# Patient Record
Sex: FEMALE | Race: BLACK OR AFRICAN AMERICAN | NOT HISPANIC OR LATINO | Employment: UNEMPLOYED | URBAN - METROPOLITAN AREA
[De-identification: names, ages, dates, MRNs, and addresses within clinical notes are randomized per-mention and may not be internally consistent; named-entity substitution may affect disease eponyms.]

---

## 2017-01-05 ENCOUNTER — PRE VISIT (OUTPATIENT)
Dept: UROLOGY | Facility: CLINIC | Age: 32
End: 2017-01-05

## 2017-01-05 NOTE — TELEPHONE ENCOUNTER
Patient coming in for neurogenic bladder follow up. Chart reviewed and all records are available. No need for a call.

## 2017-01-09 ENCOUNTER — ALLIED HEALTH/NURSE VISIT (OUTPATIENT)
Dept: UROLOGY | Facility: CLINIC | Age: 32
End: 2017-01-09

## 2017-01-09 ENCOUNTER — OFFICE VISIT (OUTPATIENT)
Dept: UROLOGY | Facility: CLINIC | Age: 32
End: 2017-01-09

## 2017-01-09 VITALS
DIASTOLIC BLOOD PRESSURE: 78 MMHG | BODY MASS INDEX: 26.05 KG/M2 | WEIGHT: 147 LBS | HEART RATE: 111 BPM | HEIGHT: 63 IN | SYSTOLIC BLOOD PRESSURE: 119 MMHG

## 2017-01-09 DIAGNOSIS — N31.9 NEUROGENIC BLADDER: Primary | ICD-10-CM

## 2017-01-09 RX ORDER — CIPROFLOXACIN 250 MG/1
250 TABLET, FILM COATED ORAL EVERY 12 HOURS SCHEDULED
Status: DISCONTINUED | OUTPATIENT
Start: 2017-01-09 | End: 2017-01-09 | Stop reason: HOSPADM

## 2017-01-09 ASSESSMENT — PAIN SCALES - GENERAL: PAINLEVEL: NO PAIN (0)

## 2017-01-09 NOTE — NURSING NOTE
"Chief Complaint   Patient presents with     RECHECK     Nurogenic bladder follow up       Blood pressure 119/78, pulse 111, height 1.6 m (5' 3\"), weight 66.679 kg (147 lb), last menstrual period 12/10/2016, not currently breastfeeding. Body mass index is 26.05 kg/(m^2).    Patient Active Problem List   Diagnosis     Spinal dysraphism (H)     Neurogenic bladder     Renal cyst       Allergies   Allergen Reactions     Naproxen Hives and Nausea and Vomiting     Bactrim [Sulfamethoxazole W/Trimethoprim] Itching     Gabapentin Itching and Nausea     Vicodin [Hydrocodone-Acetaminophen] Itching       Current Outpatient Prescriptions   Medication Sig Dispense Refill     mirabegron (MYRBETRIQ) 25 MG 24 hr tablet Take 1 tablet (25 mg) by mouth daily 30 tablet 6     polyethylene glycol (MIRALAX) powder Take 17 g by mouth daily 510 g 6     solifenacin (VESICARE) 10 MG tablet Take 1 tablet (10 mg) by mouth daily 90 tablet 3     cefixime (SUPRAX) 400 MG tablet Take 1 tablet by mouth daily. 7 tablet 0     ranitidine (ZANTAC) 150 MG tablet Take 1 tablet by mouth 2 times daily for 10 days. 20 tablet 0     sennosides (SENOKOT) 8.6 MG tablet Take 1 tablet by mouth 2 times daily as needed for constipation. 180 tablet 1     sodium chloride (OCEAN) 0.65 % nasal spray Spray 1 spray in nostril every hour as needed for congestion. 1 Bottle 0       Social History   Substance Use Topics     Smoking status: Never Smoker      Smokeless tobacco: Never Used     Alcohol Use: No       YANCI Osborn  1/9/2017  1:11 PM       "

## 2017-01-09 NOTE — PROGRESS NOTES
Pre Op Teaching Flowsheet       Pre and Post op Patient Education  Relevant Diagnosis:  Neurogenic bladder  Teaching Topic:  Pre and post op teaching for cystoscopy, botox injection into bladder  Person Involved in teaching:  Minh Altamirano      Motivation Level:  Asks Questions: Yes  Eager to Learn:  Yes  Cooperative: Yes  Receptive (willing/able to accept information):  Yes  Patient demonstrates understanding of the following:  Date and time of surgery:  2.24.17 at 0730  Location of surgery: [facility]- 5th Floor  History and Physical and any other testing necessary prior to surgery: Yes  Required time line for completion of History and Physical and any pre-op testing: Yes    NPO Guidelines: NPO per Anesthesia Guidelines    Patient demonstrates understanding of the following:  Pre-op bowel prep: not needed  Pre-op showering/scrub information with Hibiclens Soap: Yes  Medications to take the day of surgery:  Per PCP  Blood thinner medications discussed and when to stop (if applicable):  Yes  Diabetes medication management (if applicable):  N/A  Discussed pain control after surgery: pain scale, pain medications and pain management techniques  Infection Prevention: Patient demonstrates understanding of the following:  Patient instructed on hand hygiene:  Yes  Surgical procedure site care taught: N/A  Signs and symptoms of infection taught:  Yes  Wound care will be taught at the time of discharge.  Central venous catheter care will be taught at the time of discharge (if applicable).    Post-op follow-up:  Discussed how to contact the hospital, nurse, and clinic scheduling staff if necessary.    Instructional materials used/given/mailed:  [facility] Booklet, post op teaching sheet, Map, Soap, and arrival/location information.    Surgical instructions given to patient in clinic: Yes.    Instructional Materials given:  Before your surgery packet , Medications to avoid before  surgery , Showering or Bathing instructions before surgery  and What to expect after surgery    Post-op appointment/testing scheduled per MD orders: No    Total time with patient: 10 minutes    Daly Garcia, RN-BC, BSN  Urology Care Coordinator

## 2017-01-09 NOTE — PROGRESS NOTES
Follow-up Visit for Neurogenic Bladder    Name: Minh Altamirano    MRN: [id number]   YOB: 1985  Accompanied at today's visit by:self                 Chief Complaint:   Neurogenic Bladder          History of Present Illness:   HISTORY: Minh Altamirano is a 32 year old female with a history of neurogenic bladder secondary to Sacral agenesis. Patient is not wheelchair bound. Last visit with us was August 2016.     Previous Bladder Surgeries:  Previous Bladder Augmentation: colon in 2012. Revisions include: none   Catheterizable stoma:appendiceal Mitrofanoff in 2013. Revisions include: takedown due to dehiscence.   Anti-incontinence procedures: bladder neck sling (autologous fascia) in 2012  Botox injections: 8/2016. - states that leakage is less now.    Pertinent Medications:  Current Anticholinergics: myrbetriq 25mg. vesicare 10mg  Current Prophylactic antibiotics: None  Intravesical gentamycin:  none  Intravesical oxybutinin: None    Catheterization History:  The patient catheterizes per native urethra with a 12F straight catheter q4 hours. Catheterization is performed by  self. The patient uses a new catheter each time. She does irrigate the bladder. Irrigation is performed 1 times per week using water.     Incontinence History:  She does leak between voids/caths. She does experience urgency of urination. She does not experience stress urinary incontinence.    Urinary Tract Infection History:  Treated with antibiotics for positive culture and non-specific symptoms: 0 times in the last year  Treated with antibiotics for positive culture plus symptoms of UTI:  0 times in the last year    Bowel Movement History:  The patient has a bowel movement q5-7 days. Bowel regimen includes Miralax         Past Medical History:     Past Medical History   Diagnosis Date     neurogenic bladder      Constipation, chronic      Spinal dysraphism (H)      Lipoma of spinal cord      Anemia      Migraine      Incontinence of  urine      Chronic infection      MRSA            Past Surgical History:     Past Surgical History   Procedure Laterality Date     Laminectomy lumbar two levels       Tumor resection and cord detethering       Mitrofanoff procedure (appendix conduit)  1/5/2012     Procedure:MITROFANOFF PROCEDURE (APPENDIX CONDUIT); Bladder Augmentation with Right Colon, Appendix Conduit- Mitrofanoff, Insertion of Pubovaginal Sling using Autologous Rectus Fascia; Surgeon:TRINA MOORE; [facility]     Bladder augmentation  1/5/2012     Procedure:BLADDER AUGMENTATION; Surgeon:TRINA MOORE; [facility]     Laparotomy exploratory  1/11/2012     Procedure:LAPAROTOMY EXPLORATORY; Exploratory Laparotomy with Takedown of Mitrofanoff, Ventral Hernia Repair with Strattice Mesh implantation ; Surgeon:TRINA MOORE; [facility]     Cystoscopy, intravesical injection N/A 8/19/2016     Procedure: CYSTOSCOPY, INTRAVESICAL INJECTION;  Surgeon: Trina Moore MD;  [facility]            Allergies:     Allergies   Allergen Reactions     Naproxen Hives and Nausea and Vomiting     Bactrim [Sulfamethoxazole W/Trimethoprim] Itching     Gabapentin Itching and Nausea     Vicodin [Hydrocodone-Acetaminophen] Itching            Medications:     Current Outpatient Prescriptions   Medication Sig     mirabegron (MYRBETRIQ) 25 MG 24 hr tablet Take 1 tablet (25 mg) by mouth daily     polyethylene glycol (MIRALAX) powder Take 17 g by mouth daily     solifenacin (VESICARE) 10 MG tablet Take 1 tablet (10 mg) by mouth daily     cefixime (SUPRAX) 400 MG tablet Take 1 tablet by mouth daily.     ranitidine (ZANTAC) 150 MG tablet Take 1 tablet by mouth 2 times daily for 10 days.     sennosides (SENOKOT) 8.6 MG tablet Take 1 tablet by mouth 2 times daily as needed for constipation.     sodium chloride (OCEAN) 0.65 % nasal spray Spray 1 spray in nostril every hour as needed for congestion.     No current facility-administered  "medications for this visit.             Review of Systems:    ROS: 10 point ROS neg other than the symptoms noted above in the HPI and PMH.          Physical Exam:   B/P: 119/78, T: Data Unavailable, P: 111, R: Data Unavailable  Estimated body mass index is 26.05 kg/(m^2) as calculated from the following:    Height as of this encounter: 1.6 m (5' 3\").    Weight as of this encounter: 66.679 kg (147 lb).  General: age-appropriate appearing female in NAD sitting in an exam chair.    Abdomen: Degree of obesity is mild. Abdomen is soft and nontender. No organomegaly. Surgical scars include midline.  Gyn: Bartholin's cyst on right, non tender, pea sized or smaller.   Motor: Normal in both upper and lower limbs          Data:    Imaging:  Renal/Bladder Ultrasound (Date = 8//2016):       Right hydronephrosis: no hydronephrosis, but atrophi       Left hydronephrosis: none       Kidney stones:None  Bladder:        Labs:  Creatinine (Date = 8/1/2016): 0.83  Vitamin B12: 8/1/2016 558           Assessment and Plan:   32 year old female with neurogenic bladder secondary to sacral agenesis.    - Will schedule for repeat Botox for urinary incontinence without anesthesia    Follow-Up: in OR.       Emy Bay MD  January 9, 2017           =======================================================  As the attending surgeon I, Fday Moore, interviewed and examined the patient. The plan was developed between me and the patient. My findings and plan are as stated by the resident.    Fady Moore MD      "

## 2017-01-09 NOTE — Clinical Note
1/9/2017       RE: Minh Altamirano  [address] [facility]  [address]  [facility] [address]               Dear Colleague,    Thank you for referring your patient, Minh Altamirano, to the [facility] UROLOG[facility] AND INST FOR PROSTATE AND UROLOGIC CANCERS at [facility]. Please see a copy of my visit note below.    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 Patient is not wheelchair bound. Last visit with us was August 2016.     Previous Bladder Surgeries:  Previous Bladder Augmentation: colon in 2012. Revisions include: none   Catheterizable stoma:appendiceal Mitrofanoff in 2013. Revisions include: takedown due to dehiscence.   Anti-incontinence procedures: bladder neck sling (autologous fascia) in 2012  Botox injections: 8/2016. - states that leakage is less now.    Pertinent Medications:  Current Anticholinergics: myrbetriq 25mg. vesicare 10mg  Current Prophylactic antibiotics: None  Intravesical gentamycin:  none  Intravesical oxybutinin: None    Catheterization History:  The patient catheterizes per native urethra with a 12F straight catheter q4 hours. Catheterization is performed by  self. The patient uses a new catheter each time. She does irrigate the bladder. Irrigation is performed 1 times per week using water.     Incontinence History:  She does leak between voids/caths. She does experience urgency of urination. She does not experience stress urinary incontinence.    Urinary Tract Infection History:  Treated with antibiotics for positive culture and non-specific symptoms: 0 times in the last year  Treated with antibiotics for positive culture plus symptoms of UTI:  0 times  in the last year    Bowel Movement History:  The patient has a bowel movement q5-7 days. Bowel regimen includes Miralax         Past Medical History:     Past Medical History   Diagnosis Date     neurogenic bladder      Constipation, chronic      Spinal dysraphism (H)      Lipoma of spinal cord      Anemia      Migraine      Incontinence of urine      Chronic infection      MRSA            Past Surgical History:     Past Surgical History   Procedure Laterality Date     Laminectomy lumbar two levels       Tumor resection and cord detethering       Mitrofanoff procedure (appendix conduit)  1/5/2012     Procedure:MITROFANOFF PROCEDURE (APPENDIX CONDUIT); Bladder Augmentation with Right Colon, Appendix Conduit- Mitrofanoff, Insertion of Pubovaginal Sling using Autologous Rectus Fascia; Surgeon:TRINA MOORE; Location[facility]     Bladder augmentation  1/5/2012     Procedure:BLADDER AUGMENTATION; Surgeon:TRINA MOORE; Location[facility]     Laparotomy exploratory  1/11/2012     Procedure:LAPAROTOMY EXPLORATORY; Exploratory Laparotomy with Takedown of Mitrofanoff, Ventral Hernia Repair with Strattice Mesh implantation ; Surgeon:TRINA MOORE; [facility]     Cystoscopy, intravesical injection N/A 8/19/2016     Procedure: CYSTOSCOPY, INTRAVESICAL INJECTION;  Surgeon: Trina Moore MD;  [facility]            Allergies:     Allergies   Allergen Reactions     Naproxen Hives and Nausea and Vomiting     Bactrim [Sulfamethoxazole W/Trimethoprim] Itching     Gabapentin Itching and Nausea     Vicodin [Hydrocodone-Acetaminophen] Itching            Medications:     Current Outpatient Prescriptions   Medication Sig     mirabegron (MYRBETRIQ) 25 MG 24 hr tablet Take 1 tablet (25 mg) by mouth daily     polyethylene glycol (MIRALAX) powder Take 17 g by mouth daily     solifenacin (VESICARE) 10 MG tablet Take 1 tablet (10 mg) by mouth daily     cefixime (SUPRAX) 400 MG tablet Take 1 tablet by mouth  "daily.     ranitidine (ZANTAC) 150 MG tablet Take 1 tablet by mouth 2 times daily for 10 days.     sennosides (SENOKOT) 8.6 MG tablet Take 1 tablet by mouth 2 times daily as needed for constipation.     sodium chloride (OCEAN) 0.65 % nasal spray Spray 1 spray in nostril every hour as needed for congestion.     No current facility-administered medications for this visit.             Review of Systems:    ROS: 10 point ROS neg other than the symptoms noted above in the HPI and PMH.          Physical Exam:   B/P: 119/78, T: Data Unavailable, P: 111, R: Data Unavailable  Estimated body mass index is 26.05 kg/(m^2) as calculated from the following:    Height as of this encounter: 1.6 m (5' 3\").    Weight as of this encounter: 66.679 kg (147 lb).  General: age-appropriate appearing female in NAD sitting in an exam chair.    Abdomen: Degree of obesity is mild. Abdomen is soft and nontender. No organomegaly. Surgical scars include midline.  Gyn: Bartholin's cyst on right, non tender, pea sized or smaller.   Motor: Normal in both upper and lower limbs          Data:    Imaging:  Renal/Bladder Ultrasound (Date = 8//2016):       Right hydronephrosis: no hydronephrosis, but atrophi       Left hydronephrosis: none       Kidney stones:None  Bladder:        Labs:  Creatinine (Date = 8/1/2016): 0.83  Vitamin B12: 8/1/2016 558           Assessment and Plan:   32 year old female with neurogenic bladder secondary to sacral agenesis.    - Will schedule for repeat Botox for urinary incontinence without anesthesia    Follow-Up: in OR.       Emy Bay MD  January 9, 2017           =======================================================  As the attending surgeon I, Fady Moore, interviewed and examined the patient. The plan was developed between me and the patient. My findings and plan are as stated by the resident.    Fady Moore MD            "

## 2017-06-16 ENCOUNTER — TELEPHONE (OUTPATIENT)
Dept: UROLOGY | Facility: CLINIC | Age: 32
End: 2017-06-16

## 2017-06-16 NOTE — TELEPHONE ENCOUNTER
[employer]    UROLOGICAL ORDER FORM      Patient Information:    Customer's Name: Minh Altamirano  Date of Birth: [birth year]  Address: [address] [place] [address]  City / State / Zip: [facility]/[address]  Phone #: [phone]  Diagnosis: NEUROGENIC BLADDER    Product Needed:    12 [place]ian intermittent straight catheters  Instruction: Please catheterize 6-7 per day  QTY: 180 per month  Length of need: 99      ORDERING Physician/PA/NP        Dr. Fady Moore  DATE: 06/16/2017    [facility]  [address] [place] [address] [id number]  [place], [address]      FAX to [employer]  [address] [place] [address], [address]  Phone: [phone]  Fax: [phone]

## 2017-07-06 ENCOUNTER — PRE VISIT (OUTPATIENT)
Dept: UROLOGY | Facility: CLINIC | Age: 32
End: 2017-07-06

## 2017-07-06 NOTE — TELEPHONE ENCOUNTER
Patient with history of NGB coming in for catheter review. Patient chart reviewed, no need for call, all records available and ready for appointment.

## 2017-07-17 ENCOUNTER — OFFICE VISIT (OUTPATIENT)
Dept: UROLOGY | Facility: CLINIC | Age: 32
End: 2017-07-17

## 2017-07-17 VITALS
HEIGHT: 63 IN | BODY MASS INDEX: 25.6 KG/M2 | HEART RATE: 97 BPM | DIASTOLIC BLOOD PRESSURE: 85 MMHG | WEIGHT: 144.5 LBS | SYSTOLIC BLOOD PRESSURE: 125 MMHG

## 2017-07-17 DIAGNOSIS — N31.9 NEUROGENIC BLADDER: Primary | ICD-10-CM

## 2017-07-17 RX ORDER — TOLTERODINE 4 MG/1
4 CAPSULE, EXTENDED RELEASE ORAL DAILY
Qty: 90 CAPSULE | Refills: 3 | Status: SHIPPED | OUTPATIENT
Start: 2017-07-17 | End: 2019-07-12

## 2017-07-17 ASSESSMENT — PAIN SCALES - GENERAL: PAINLEVEL: NO PAIN (0)

## 2017-07-17 NOTE — PROGRESS NOTES
Follow-up Visit for Neurogenic Bladder    Name: Minh Altamirano    MRN: [id number]   YOB: 1985  Accompanied at today's visit by:self                 Chief Complaint:   Neurogenic Bladder          History of Present Illness:   HISTORY: Minh Altamirano is a 32 year old female with a history of neurogenic bladder secondary to sacral agenesis. Patient is not wheelchair bound. Last visit with us was January 2017. She is here for routine NGB f/u w/o any specific changes or complaints.     Previous Bladder Surgeries:  Previous Bladder Augmentation: colon in 2012. Revisions include: none   Catheterizable stoma:appendiceal Mitrofanoff in 2012. Revisions include: takedown due to dehiscence.   Anti-incontinence procedures: bladder neck sling (autologous fascia) in 2012  Botox injections: 8/2016.     Pertinent Medications:  Current Anticholinergics: None  Current Prophylactic antibiotics: None  Intravesical gentamycin:  None  Intravesical oxybutinin: None    Catheterization History:  The patient catheterizes per native urethra with a 12F straight catheter q3 hours. Catheterization is performed by  self. The patient uses a new catheter each time. She does not irrigate the bladder. NOTE - She used to receive 180 cath/month and irrigation supplies. Now she only receives 30/month and no irrigation supplies. Dr Moore on 06/16/17 wrote her a new order for 180/month.     Incontinence History:  She does leak between voids/caths. She does experience urgency of urination. She does not experience stress urinary incontinence with the following activities: coughing/sneezing. She wears pads and uses 1-2/day.    If patient has an AUS or Sling then:  Leak management: NA  Sphincter Complications: NA      Urinary Tract Infection History:  Treated with antibiotics for positive culture and non-specific symptoms: 1 times in the last year  Treated with antibiotics for positive culture plus symptoms of UTI: 1 times in the last  year    Bowel Movement History:  The patient has a bowel movement q2 days. Bowel regimen includes Miralax and senna          Past Medical History:     Past Medical History:   Diagnosis Date     Anemia      Chronic infection     MRSA     Constipation, chronic      Incontinence of urine      Lipoma of spinal cord      Migraine      neurogenic bladder      Spinal dysraphism (H)             Past Surgical History:     Past Surgical History:   Procedure Laterality Date     BLADDER AUGMENTATION  1/5/2012    Procedure:BLADDER AUGMENTATION; Surgeon:TRINA MOORE; [facility]     CYSTOSCOPY, INTRAVESICAL INJECTION N/A 8/19/2016    Procedure: CYSTOSCOPY, INTRAVESICAL INJECTION;  Surgeon: Trina Moore MD;  [facility]     LAMINECTOMY LUMBAR TWO LEVELS       LAPAROTOMY EXPLORATORY  1/11/2012    Procedure:LAPAROTOMY EXPLORATORY; Exploratory Laparotomy with Takedown of Mitrofanoff, Ventral Hernia Repair with Strattice Mesh implantation ; Surgeon:TRINA MOORE; Location[facility]     MITROFANOFF PROCEDURE (APPENDIX CONDUIT)  1/5/2012    Procedure:MITROFANOFF PROCEDURE (APPENDIX CONDUIT); Bladder Augmentation with Right Colon, Appendix Conduit- Mitrofanoff, Insertion of Pubovaginal Sling using Autologous Rectus Fascia; Surgeon:TRINA MOORE; Location[facility] OR     tumor resection and cord detethering              Allergies:     Allergies   Allergen Reactions     Naproxen Hives and Nausea and Vomiting     Bactrim [Sulfamethoxazole W/Trimethoprim] Itching     Gabapentin Itching and Nausea     Vicodin [Hydrocodone-Acetaminophen] Itching            Medications:     Current Outpatient Prescriptions   Medication Sig     mirabegron (MYRBETRIQ) 25 MG 24 hr tablet Take 1 tablet (25 mg) by mouth daily     polyethylene glycol (MIRALAX) powder Take 17 g by mouth daily     solifenacin (VESICARE) 10 MG tablet Take 1 tablet (10 mg) by mouth daily     ranitidine (ZANTAC) 150 MG tablet Take 1 tablet by mouth 2 times  "daily for 10 days.     sennosides (SENOKOT) 8.6 MG tablet Take 1 tablet by mouth 2 times daily as needed for constipation.     sodium chloride (OCEAN) 0.65 % nasal spray Spray 1 spray in nostril every hour as needed for congestion.     No current facility-administered medications for this visit.              Review of Systems:    ROS: 10 point ROS neg other than the symptoms noted above in the HPI and PMH.          Physical Exam:   B/P: Data Unavailable, T: Data Unavailable, P: Data Unavailable, R: Data Unavailable  Estimated body mass index is 26.04 kg/(m^2) as calculated from the following:    Height as of 1/9/17: 1.6 m (5' 3\").    Weight as of 1/9/17: 66.7 kg (147 lb).  General: age-appropriate appearing female in NAD sitting in an exam chair.    Abdomen: Degree of obesity is none.   Motor: Normal in both upper and lower limbs          Data:    Imaging:  Renal/Bladder Ultrasound   - f/u US ordered today    Labs:  Creatinine ordered today   Vitamin B12: NA           Assessment and Plan:   32 year old female with neurogenic bladder secondary to sacral agensis. Who is here for routine f/u. She has no acute changes in her  function and she is doing well. She does report stable urinary incontinence associated w/ spasms and urgency using 1-2 pads/day. She previously has been on anticholinergics in 2015. She is open for an new trial today. She also is having trouble receiving necessary catheretization and irrigation supplies from her insurance company despite recent renewal orders. She did not get survalance renal us (NB - she has a solitary lft kideny) or BMP prior to today's visit       Plan:   - start Detrol 4mg QD today  - Renal and bladder US ordered  - BMP ordered  - gave pt a few 12f straight caths today (she is down to one currently)    Renewed [facility] supply order today     Follow-Up: 1 month with above tests with Jerilyn to follow new anticholinergic and US/labs       LYDIA WELCH, MS4  UROLOGY Subi  July " 17, 2017     Scribe Disclosure:   I, Aditya Go, am serving as a scribe; to document services personally performed by Dr Moore -based on data collection and the provider's statements to me.     The medical student acted as a scribe for this note. All portions of the documented history and physical were personally performed by me as the attending physician.

## 2017-07-17 NOTE — PROGRESS NOTES
[facility]    UROLOGICAL ORDER FORM      Patient Information:    Customer's Name: Minh Altamirano  YOB: 1985  Address: [address] [place] [address] [place] [address]  City / State / Zip: [facility] / [address]  Phone #: [phone]  Diagnosis: Neurogenic Bladder N31.9    Product Needed:    12 Fr Straight Tipped Female Cath Self-Cath  Instruction: Catheterize self 6 times daily  QTY: 180 per 30 days  Length of need: 99 months    Product Needed:    Insertion supplies (lubrication)  Instruction: Use daily with catheterizations  QTY: 2 tubes per months  Length of need: 99 months      ORDERING Physician/PA/NP        Dr. Fady Moore  DATE: 7/17/2017    [facility]  [address] [place] [address] [id number]  [place], [address]      FAX to Room  [address] [place], [place], [address]  Phone: [phone]  Fax: [phone]

## 2017-07-17 NOTE — MR AVS SNAPSHOT
After Visit Summary   7/17/2017    Minh Altamirano    MRN: [id number]           Patient Information     Date Of Birth          1985        Visit Information        Provider Department      7/17/2017 5:00 PM Fady Moore MD; LANGUAGE [facility] and [facility] for Prostate and Urologic Cancers        Today's Diagnoses     Neurogenic bladder    -  1       Follow-ups after your visit        Follow-up notes from your care team     Return in about 4 weeks (around 8/14/2017), or with kuldeep.      Who to contact     Please call your clinic at [phone] to:    Ask questions about your health    Make or cancel appointments    Discuss your medicines    Learn about your test results    Speak to your doctor   If you have compliments or concerns about an experience at your clinic, or if you wish to file a complaint, please contact [facility] Patient Relations at [phone] or email us at [email or web address]         Additional Information About Your Visit        MyChart Information     [employer] gives you secure access to your electronic health record. If you see a primary care provider, you can also send messages to your care team and make appointments. If you have questions, please call your primary care clinic.  If you do not have a primary care provider, please call [phone] and they will assist you.      [employer] is an electronic gateway that provides easy, online access to your medical records. With [employer] you can request a clinic appointment, read your test results, renew a prescription or communicate with your care team.     To access your existing account, please contact your [facility] or call [phone] for assistance.        Care EveryWhere ID     This is your Care EveryWhere ID. This could be used by other organizations to access your [place] medical records  [id number]        Your Vitals Were     Pulse  "Height BMI (Body Mass Index)             97 1.6 m (5' 3\") 25.6 kg/m2          Blood Pressure from Last 3 Encounters:   07/17/17 125/85   01/09/17 119/78   08/19/16 110/70    Weight from Last 3 Encounters:   07/17/17 65.5 kg (144 lb 8 oz)   01/09/17 66.7 kg (147 lb)   08/19/16 65.8 kg (145 lb)              Today, you had the following     No orders found for display         Today's Medication Changes          These changes are accurate as of: 7/17/17  6:22 PM.  If you have any questions, ask your nurse or doctor.               Start taking these medicines.        Dose/Directions    tolterodine 4 MG 24 hr capsule   Commonly known as:  DETROL LA   Used for:  Neurogenic bladder   Started by:  Fady Moore MD        Dose:  4 mg   Take 1 capsule (4 mg) by mouth daily   Quantity:  90 capsule   Refills:  3            Where to get your medicines      These medications were sent to [facility] [place] [address]     Phone:  [phone]     tolterodine 4 MG 24 hr capsule                Primary Care Provider    Physician No Ref-Primary       No address on file        Equal Access to Services     IJEOMA SANCHEZ AH: Criss pondo Soraul, waaxda luqadaha, qaybta kaalmada adeegyada, mariah reeves. So [facility] [phone].    ATENCIÓN: Si habla español, tiene a georges disposición servicios gratuitos de asistencia lingüística. Llame al [phone].    We comply with applicable federal civil rights laws and [place] laws. We do not discriminate on the basis of race, color, national origin, age, disability sex, sexual orientation or gender identity.            Thank you!     Thank you for choosing [facility] UROLOGY AND [facility] FOR PROSTATE AND UROLOGIC CANCERS  for your care. Our goal is always to provide you with excellent care. Hearing back from our patients is one way we can continue to improve our services. Please take a few " minutes to complete the written survey that you may receive in the mail after your visit with us. Thank you!             Your Updated Medication List - Protect others around you: Learn how to safely use, store and throw away your medicines at www.disposemymeds.org.          This list is accurate as of: 7/17/17  6:22 PM.  Always use your most recent med list.                   Brand Name Dispense Instructions for use Diagnosis    mirabegron 25 MG 24 hr tablet    MYRBETRIQ    30 tablet    Take 1 tablet (25 mg) by mouth daily    Neurogenic bladder, Incontinence in female       polyethylene glycol powder    MIRALAX    510 g    Take 17 g by mouth daily    Constipation       ranitidine 150 MG tablet    ZANTAC    20 tablet    Take 1 tablet by mouth 2 times daily for 10 days.        sennosides 8.6 MG tablet    SENOKOT    180 tablet    Take 1 tablet by mouth 2 times daily as needed for constipation.    Chronic constipation       sodium chloride 0.65 % nasal spray    OCEAN    1 Bottle    Spray 1 spray in nostril every hour as needed for congestion.    Neurogenic bladder, NOS, Other urinary incontinence       solifenacin 10 MG tablet    VESICARE    90 tablet    Take 1 tablet (10 mg) by mouth daily    Neurogenic bladder, Urinary urgency, Urge incontinence       tolterodine 4 MG 24 hr capsule    DETROL LA    90 capsule    Take 1 capsule (4 mg) by mouth daily    Neurogenic bladder

## 2017-07-17 NOTE — NURSING NOTE
"No chief complaint on file.      Blood pressure 125/85, pulse 97, height 1.6 m (5' 3\"), weight 65.5 kg (144 lb 8 oz), not currently breastfeeding. Body mass index is 25.6 kg/(m^2).    Patient Active Problem List   Diagnosis     Spinal dysraphism (H)     Neurogenic bladder     Renal cyst       Allergies   Allergen Reactions     Naproxen Hives and Nausea and Vomiting     Bactrim [Sulfamethoxazole W/Trimethoprim] Itching     Gabapentin Itching and Nausea     Vicodin [Hydrocodone-Acetaminophen] Itching       Current Outpatient Prescriptions   Medication Sig Dispense Refill     polyethylene glycol (MIRALAX) powder Take 17 g by mouth daily 510 g 6     sennosides (SENOKOT) 8.6 MG tablet Take 1 tablet by mouth 2 times daily as needed for constipation. 180 tablet 1     sodium chloride (OCEAN) 0.65 % nasal spray Spray 1 spray in nostril every hour as needed for congestion. 1 Bottle 0     mirabegron (MYRBETRIQ) 25 MG 24 hr tablet Take 1 tablet (25 mg) by mouth daily (Patient not taking: Reported on 7/17/2017) 30 tablet 6     solifenacin (VESICARE) 10 MG tablet Take 1 tablet (10 mg) by mouth daily (Patient not taking: Reported on 7/17/2017) 90 tablet 3     ranitidine (ZANTAC) 150 MG tablet Take 1 tablet by mouth 2 times daily for 10 days. 20 tablet 0       Social History   Substance Use Topics     Smoking status: Never Smoker     Smokeless tobacco: Never Used     Alcohol use No       YANCI Osborn  7/17/2017  5:13 PM       "

## 2017-07-20 ENCOUNTER — TELEPHONE (OUTPATIENT)
Dept: UROLOGY | Facility: CLINIC | Age: 32
End: 2017-07-20

## 2017-07-20 DIAGNOSIS — N31.9 NEUROGENIC BLADDER: Primary | ICD-10-CM

## 2017-07-20 NOTE — TELEPHONE ENCOUNTER
----- Message from Galina Hampton sent at 7/20/2017  1:07 PM CDT -----  Regarding: RE: Appts please  Spoke to pt and confirmed her appt for 8/28 with Jerilyn IBARRA  ----- Message -----     From: Amelie Gallegos LPN     Sent: 7/20/2017  11:11 AM       To: Galina Hampton  Subject: RE: Appts please                                 Orders are in :)!  ----- Message -----     From: Galina Hampton     Sent: 7/20/2017  10:40 AM       To: Amelie Gallegos LPN  Subject: RE: Appts please                                 Hey Amelie, can you drop in the US order mentioned below? - FRED  ----- Message -----     From: Amelie Gallegos LPN     Sent: 7/17/2017   5:37 PM       To: [facility]  Subject: Appts please                                     Hi,    This patient needs to get a renal US, labs, and follow up with Jerilyn Greene PA-C, in a month.      Thank you!    -Amelie Gallegos LPN

## 2017-08-14 ENCOUNTER — PRE VISIT (OUTPATIENT)
Dept: UROLOGY | Facility: CLINIC | Age: 32
End: 2017-08-14

## 2017-08-23 DIAGNOSIS — N31.9 NEUROGENIC BLADDER: Primary | ICD-10-CM

## 2017-08-28 ENCOUNTER — OFFICE VISIT (OUTPATIENT)
Dept: UROLOGY | Facility: CLINIC | Age: 32
End: 2017-08-28

## 2017-08-28 VITALS
DIASTOLIC BLOOD PRESSURE: 84 MMHG | SYSTOLIC BLOOD PRESSURE: 118 MMHG | WEIGHT: 145 LBS | BODY MASS INDEX: 25.69 KG/M2 | HEIGHT: 63 IN | HEART RATE: 87 BPM

## 2017-08-28 DIAGNOSIS — R39.15 URINARY URGENCY: ICD-10-CM

## 2017-08-28 DIAGNOSIS — N31.9 NEUROGENIC BLADDER: ICD-10-CM

## 2017-08-28 DIAGNOSIS — N31.9 NEUROGENIC BLADDER: Primary | ICD-10-CM

## 2017-08-28 LAB
ANION GAP SERPL CALCULATED.3IONS-SCNC: 7 MMOL/L (ref 3–14)
BUN SERPL-MCNC: 8 MG/DL (ref 7–30)
CALCIUM SERPL-MCNC: 9.2 MG/DL (ref 8.5–10.1)
CHLORIDE SERPL-SCNC: 106 MMOL/L (ref 94–109)
CO2 SERPL-SCNC: 24 MMOL/L (ref 20–32)
CREAT SERPL-MCNC: 0.56 MG/DL (ref 0.52–1.04)
GFR SERPL CREATININE-BSD FRML MDRD: >90 ML/MIN/1.7M2
GLUCOSE SERPL-MCNC: 129 MG/DL (ref 70–99)
POTASSIUM SERPL-SCNC: 3.8 MMOL/L (ref 3.4–5.3)
SODIUM SERPL-SCNC: 137 MMOL/L (ref 133–144)

## 2017-08-28 ASSESSMENT — PAIN SCALES - GENERAL: PAINLEVEL: NO PAIN (0)

## 2017-08-28 NOTE — MR AVS SNAPSHOT
After Visit Summary   8/28/2017    Minh Altamirano    MRN: [id number]           Patient Information     Date Of Birth          1985        Visit Information        Provider Department      8/28/2017 1:45 PM Jerilyn Greene PA-C; [employer] [facility] and [facility] for Prostate and Urologic Cancers        Care Instructions    UROLOGY CLINIC VISIT PATIENT INSTRUCTIONS    1) Continue the Detrol (tolterodine) medication. If you do not see any more improvement in your symptoms, we can repeat the urodynamics testing to see if anything has changed. Call [phone] if your symptoms are not getting better.    Otherwise, follow up in 1 year.    For fertility questions or concerns, please ask your primary doctor about this.     If you have any issues, questions or concerns in the meantime, do not hesitate to contact us at [phone] or via [employer].     It was a pleasure meeting with you today.  Thank you for allowing me and my team the privilege of caring for you today.  YOU are the reason we are here, and I truly hope we provided you with the excellent service you deserve.  Please let us know if there is anything else we can do for you so that we can be sure you are leaving completely satisfied with your care experience.                Follow-ups after your visit        Who to contact     Please call your clinic at [phone] to:    Ask questions about your health    Make or cancel appointments    Discuss your medicines    Learn about your test results    Speak to your doctor   If you have compliments or concerns about an experience at your clinic, or if you wish to file a complaint, please contact [facility] Patient Relations at [phone] or email us at [email or web address]         Additional Information About Your Visit        [employer] Information     [employer] gives you secure access to your electronic health record. If you see a primary  "care provider, you can also send messages to your care team and make appointments. If you have questions, please call your primary care clinic.  If you do not have a primary care provider, please call [phone] and they will assist you.      [email or web address] is an electronic gateway that provides easy, online access to your medical records. With [email or web address], you can request a clinic appointment, read your test results, renew a prescription or communicate with your care team.     To access your existing account, please contact your [facility] or call [phone] for assistance.        Care EveryWhere ID     This is your Care EveryWhere ID. This could be used by other organizations to access your [place] medical records  [id number]        Your Vitals Were     Pulse Height BMI (Body Mass Index)             87 1.6 m (5' 3\") 25.69 kg/m2          Blood Pressure from Last 3 Encounters:   08/28/17 118/84   07/17/17 125/85   01/09/17 119/78    Weight from Last 3 Encounters:   08/28/17 65.8 kg (145 lb)   07/17/17 65.5 kg (144 lb 8 oz)   01/09/17 66.7 kg (147 lb)              Today, you had the following     No orders found for display       Primary Care Provider    Physician No Ref-Primary       No address on file        Equal Access to Services     IJEOMA SANCHEZ : Hadii aad ku hadasho Soomaali, waaxda luqadaha, qaybta kaalmada adeegyada, waxay elizaebt ann . So [facility] [phone].    ATENCIÓN: Si habla español, tiene a georges disposición servicios gratuitos de asistencia lingüística. Llame al [phone].    We comply with applicable federal civil rights laws and [place] laws. We do not discriminate on the basis of race, color, national origin, age, disability sex, sexual orientation or gender identity.            Thank you!     Thank you for choosing [facility] UROLOGY AND [facility] FOR PROSTATE AND UROLOGIC CANCERS  for your care. Our goal is always to provide you with excellent care. " Hearing back from our patients is one way we can continue to improve our services. Please take a few minutes to complete the written survey that you may receive in the mail after your visit with us. Thank you!             Your Updated Medication List - Protect others around you: Learn how to safely use, store and throw away your medicines at www.disposemymeds.org.          This list is accurate as of: 8/28/17  1:54 PM.  Always use your most recent med list.                   Brand Name Dispense Instructions for use Diagnosis    mirabegron 25 MG 24 hr tablet    MYRBETRIQ    30 tablet    Take 1 tablet (25 mg) by mouth daily    Neurogenic bladder, Incontinence in female       polyethylene glycol powder    MIRALAX    510 g    Take 17 g by mouth daily    Constipation       ranitidine 150 MG tablet    ZANTAC    20 tablet    Take 1 tablet by mouth 2 times daily for 10 days.        sennosides 8.6 MG tablet    SENOKOT    180 tablet    Take 1 tablet by mouth 2 times daily as needed for constipation.    Chronic constipation       sodium chloride 0.65 % nasal spray    OCEAN    1 Bottle    Spray 1 spray in nostril every hour as needed for congestion.    Neurogenic bladder, NOS, Other urinary incontinence       solifenacin 10 MG tablet    VESICARE    90 tablet    Take 1 tablet (10 mg) by mouth daily    Neurogenic bladder, Urinary urgency, Urge incontinence       tolterodine 4 MG 24 hr capsule    DETROL LA    90 capsule    Take 1 capsule (4 mg) by mouth daily    Neurogenic bladder

## 2017-08-28 NOTE — LETTER
8/28/2017       RE: Minh Altamirano  [address] [facility]  [address]  [place] [address]     Dear Colleague,    Thank you for referring your patient, Minh Altamirano, to the [facility] UROLOGY AND INST FOR PROSTATE AND UROLOGIC CANCERS at [facility]. Please see a copy of my visit note below.    Follow-up Visit for Neurogenic Bladder    Name: Minh Altamirano    MRN: [id number]   YOB: 1985  Accompanied at today's visit by:                  Chief Complaint:   Neurogenic Bladder          History of Present Illness:   HISTORY: Minh Altamirano is a 32 year old female with a history of neurogenic bladder secondary to sacral agenesis. Patient is not wheelchair bound. Last visit was with Dr. Moore on 7/17/17.     At recent visit, she complained of some stable urinary incontinence associated with spasms and urgency. Going through 1-2 ppd. Has tried Vesicare and Myrbetriq in the past without much benefit. She was recently started on Detrol LA 4 mg daily and returns today for follow up and to review results of renal ultrasound and blood work. She thinks the Detrol may be helping somewhat. Still having slight urgency and incontinence but overall it seems to be improving.      Previous Bladder Surgeries:  Previous Bladder Augmentation: colon in 2012. Revisions include: none   Catheterizable stoma:appendiceal Mitrofanoff in 2012. Revisions include: takedown due to dehiscence.   Anti-incontinence procedures: bladder neck sling (autologous fascia) in 2012  Botox injections: 8/2016.      Pertinent Medications:  Current Anticholinergics: Detrol LA 4 mg daily  Current Prophylactic antibiotics: None  Intravesical gentamycin:  None  Intravesical oxybutinin: None     Catheterization History:  The patient catheterizes per native urethra with a 12F straight catheter q3 hours. Catheterization is performed by  self. The patient uses a new catheter each time. She does not irrigate  the bladder. NOTE - She used to receive 180 cath/month and irrigation supplies. Now she only receives 30/month and no irrigation supplies. Dr Moore on 06/16/17 wrote her a new order for 180/month.      Incontinence History:  She does leak between voids/caths. She does experience urgency of urination. She does not experience stress urinary incontinence with the following activities: coughing/sneezing. She wears pads and uses 1-2/day.     If patient has an AUS or Sling then:  Leak management: NA  Sphincter Complications: NA     Urinary Tract Infection History:  Treated with antibiotics for positive culture and non-specific symptoms: 1 times in the last year  Treated with antibiotics for positive culture plus symptoms of UTI: 1 times in the last year     Bowel Movement History:  The patient has a bowel movement q2 days. Bowel regimen includes Miralax and senna          Past Medical History:     Past Medical History:   Diagnosis Date     Anemia      Chronic infection     MRSA     Constipation, chronic      Incontinence of urine      Lipoma of spinal cord      Migraine      neurogenic bladder      Spinal dysraphism (H)             Past Surgical History:     Past Surgical History:   Procedure Laterality Date     BLADDER AUGMENTATION  1/5/2012    Procedure:BLADDER AUGMENTATION; Surgeon:TRINA MOORE; Location:[facility] OR     CYSTOSCOPY, INTRAVESICAL INJECTION N/A 8/19/2016    Procedure: CYSTOSCOPY, INTRAVESICAL INJECTION;  Surgeon: Trina Moore MD;  Location: [facility] OR     LAMINECTOMY LUMBAR TWO LEVELS       LAPAROTOMY EXPLORATORY  1/11/2012    Procedure:LAPAROTOMY EXPLORATORY; Exploratory Laparotomy with Takedown of Mitrofanoff, Ventral Hernia Repair with Strattice Mesh implantation ; Surgeon:TRINA MOORE; Location:[facility] OR     MITROFANOFF PROCEDURE (APPENDIX CONDUIT)  1/5/2012    Procedure:MITROFANOFF PROCEDURE (APPENDIX CONDUIT); Bladder Augmentation with Right Colon, Appendix Conduit- Mitrofanoff,  "Insertion of Pubovaginal Sling using Autologous Rectus Fascia; Surgeon:TRINA COLLADO; Location:[facility] OR     tumor resection and cord detethering              Allergies:     Allergies   Allergen Reactions     Naproxen Hives and Nausea and Vomiting     Bactrim [Sulfamethoxazole W/Trimethoprim] Itching     Gabapentin Itching and Nausea     Vicodin [Hydrocodone-Acetaminophen] Itching            Medications:     Current Outpatient Prescriptions   Medication Sig     tolterodine (DETROL LA) 4 MG 24 hr capsule Take 1 capsule (4 mg) by mouth daily     mirabegron (MYRBETRIQ) 25 MG 24 hr tablet Take 1 tablet (25 mg) by mouth daily     polyethylene glycol (MIRALAX) powder Take 17 g by mouth daily     solifenacin (VESICARE) 10 MG tablet Take 1 tablet (10 mg) by mouth daily     sennosides (SENOKOT) 8.6 MG tablet Take 1 tablet by mouth 2 times daily as needed for constipation.     sodium chloride (OCEAN) 0.65 % nasal spray Spray 1 spray in nostril every hour as needed for congestion.     ranitidine (ZANTAC) 150 MG tablet Take 1 tablet by mouth 2 times daily for 10 days.     No current facility-administered medications for this visit.              Review of Systems:    ROS: 10 point ROS neg other than the symptoms noted above in the HPI and PMH.          Physical Exam:   B/P: Data Unavailable, T: Data Unavailable, P: Data Unavailable, R: Data Unavailable  Estimated body mass index is 25.69 kg/(m^2) as calculated from the following:    Height as of this encounter: 1.6 m (5' 3\").    Weight as of this encounter: 65.8 kg (145 lb).  General: age-appropriate appearing female in NAD sitting in an exam chair.    Abdomen: Degree of obesity is none.   Motor: Normal in both upper and lower limbs          Data:    Imaging:  Renal/Bladder Ultrasound (Date = 8/28/2017):  Right kidney: Measures 8.0 cm in length, and demonstrates thinned,  echogenic cortical parenchyma, greatest at the superior pole,  unchanged from 8/1/2016. No focal mass or " hydronephrosis. Cyst  described previously not visualized on the current exam.     Left kidney: Measures 11.0 cm in length. Parenchyma is of normal  thickness and echogenicity. No focal mass. No hydronephrosis.      Bladder: Diffusely thickened, partially distended bladder which may be  related to the patient's history of colonic bladder augmentation.     IMPRESSION:  1. No significant change in asymmetric, atrophic right kidney with  upper pole predominant parenchymal scarring. No hydronephrosis.  2. Normal left kidney. No hydronephrosis.    Labs:  Creatinine   Date Value Ref Range Status   08/28/2017 0.56 0.52 - 1.04 mg/dL Final              Assessment and Plan:   32 year old female with neurogenic bladder secondary to sacral agenesis. Overall doing well with no acute changes in  function, stable renal u/s and creatinine. She has trialed several medications for stable urinary urgency, urge incontinence - feels Detrol LA 4 mg daily may be helping somewhat.     Plan:  -Continue Detrol LA 4 mg daily  -Continue current CIC regimen  -She asks about fertility - she and her  have been trying to conceive for > 1 year. Offered for her  to see Dr. Varun Cooper. Provided her with a list of clinics for female fertility specialists.     Patient will send a [employer] message in 1 month if urinary urgency/spasms/UUI remain bothersome. Would then consider scheduling for video urodynamics.    Otherwise follow up in 1 year with RBUS and BMP.       Jerilyn Greene PA-C  August 28, 2017

## 2017-08-28 NOTE — PATIENT INSTRUCTIONS
UROLOGY CLINIC VISIT PATIENT INSTRUCTIONS    1) Continue the Detrol (tolterodine) medication. If you do not see any more improvement in your symptoms, we can repeat the urodynamics testing to see if anything has changed. Call [phone] if your symptoms are not getting better.    Otherwise, follow up in 1 year.    For fertility questions or concerns, please ask your primary doctor about this.     If you have any issues, questions or concerns in the meantime, do not hesitate to contact us at [phone] or via [employer].     It was a pleasure meeting with you today.  Thank you for allowing me and my team the privilege of caring for you today.  YOU are the reason we are here, and I truly hope we provided you with the excellent service you deserve.  Please let us know if there is anything else we can do for you so that we can be sure you are leaving completely satisfied with your care experience.

## 2017-08-28 NOTE — PROGRESS NOTES
Follow-up Visit for Neurogenic Bladder    Name: Minh Altamirano    MRN: [id number]   YOB: 1985  Accompanied at today's visit by:                  Chief Complaint:   Neurogenic Bladder          History of Present Illness:   HISTORY: Minh Altamirano is a 32 year old female with a history of neurogenic bladder secondary to sacral agenesis. Patient is not wheelchair bound. Last visit was with Dr. Moore on 7/17/17.     At recent visit, she complained of some stable urinary incontinence associated with spasms and urgency. Going through 1-2 ppd. Has tried Vesicare and Myrbetriq in the past without much benefit. She was recently started on Detrol LA 4 mg daily and returns today for follow up and to review results of renal ultrasound and blood work. She thinks the Detrol may be helping somewhat. Still having slight urgency and incontinence but overall it seems to be improving.      Previous Bladder Surgeries:  Previous Bladder Augmentation: colon in 2012. Revisions include: none   Catheterizable stoma:appendiceal Mitrofanoff in 2012. Revisions include: takedown due to dehiscence.   Anti-incontinence procedures: bladder neck sling (autologous fascia) in 2012  Botox injections: 8/2016.      Pertinent Medications:  Current Anticholinergics: Detrol LA 4 mg daily  Current Prophylactic antibiotics: None  Intravesical gentamycin:  None  Intravesical oxybutinin: None     Catheterization History:  The patient catheterizes per native urethra with a 12F straight catheter q3 hours. Catheterization is performed by  self. The patient uses a new catheter each time. She does not irrigate the bladder. NOTE - She used to receive 180 cath/month and irrigation supplies. Now she only receives 30/month and no irrigation supplies. Dr Moore on 06/16/17 wrote her a new order for 180/month.      Incontinence History:  She does leak between voids/caths. She does experience urgency of urination. She does not experience stress  urinary incontinence with the following activities: coughing/sneezing. She wears pads and uses 1-2/day.     If patient has an AUS or Sling then:  Leak management: NA  Sphincter Complications: NA     Urinary Tract Infection History:  Treated with antibiotics for positive culture and non-specific symptoms: 1 times in the last year  Treated with antibiotics for positive culture plus symptoms of UTI: 1 times in the last year     Bowel Movement History:  The patient has a bowel movement q2 days. Bowel regimen includes Miralax and senna          Past Medical History:     Past Medical History:   Diagnosis Date     Anemia      Chronic infection     MRSA     Constipation, chronic      Incontinence of urine      Lipoma of spinal cord      Migraine      neurogenic bladder      Spinal dysraphism (H)             Past Surgical History:     Past Surgical History:   Procedure Laterality Date     BLADDER AUGMENTATION  1/5/2012    Procedure:BLADDER AUGMENTATION; Surgeon:TRINA MOORE; Location[facility]     CYSTOSCOPY, INTRAVESICAL INJECTION N/A 8/19/2016    Procedure: CYSTOSCOPY, INTRAVESICAL INJECTION;  Surgeon: Trina Moore MD;  [facility]     LAMINECTOMY LUMBAR TWO LEVELS       LAPAROTOMY EXPLORATORY  1/11/2012    Procedure:LAPAROTOMY EXPLORATORY; Exploratory Laparotomy with Takedown of Mitrofanoff, Ventral Hernia Repair with Strattice Mesh implantation ; Surgeon:TRINA MOORE; Location[facility]     MITROFANOFF PROCEDURE (APPENDIX CONDUIT)  1/5/2012    Procedure:MITROFANOFF PROCEDURE (APPENDIX CONDUIT); Bladder Augmentation with Right Colon, Appendix Conduit- Mitrofanoff, Insertion of Pubovaginal Sling using Autologous Rectus Fascia; Surgeon:TRINA MOORE; Location[facility] OR     tumor resection and cord detethering              Allergies:     Allergies   Allergen Reactions     Naproxen Hives and Nausea and Vomiting     Bactrim [Sulfamethoxazole W/Trimethoprim] Itching     Gabapentin Itching and Nausea  "    Vicodin [Hydrocodone-Acetaminophen] Itching            Medications:     Current Outpatient Prescriptions   Medication Sig     tolterodine (DETROL LA) 4 MG 24 hr capsule Take 1 capsule (4 mg) by mouth daily     mirabegron (MYRBETRIQ) 25 MG 24 hr tablet Take 1 tablet (25 mg) by mouth daily     polyethylene glycol (MIRALAX) powder Take 17 g by mouth daily     solifenacin (VESICARE) 10 MG tablet Take 1 tablet (10 mg) by mouth daily     sennosides (SENOKOT) 8.6 MG tablet Take 1 tablet by mouth 2 times daily as needed for constipation.     sodium chloride (OCEAN) 0.65 % nasal spray Spray 1 spray in nostril every hour as needed for congestion.     ranitidine (ZANTAC) 150 MG tablet Take 1 tablet by mouth 2 times daily for 10 days.     No current facility-administered medications for this visit.              Review of Systems:    ROS: 10 point ROS neg other than the symptoms noted above in the HPI and PMH.          Physical Exam:   B/P: Data Unavailable, T: Data Unavailable, P: Data Unavailable, R: Data Unavailable  Estimated body mass index is 25.69 kg/(m^2) as calculated from the following:    Height as of this encounter: 1.6 m (5' 3\").    Weight as of this encounter: 65.8 kg (145 lb).  General: age-appropriate appearing female in NAD sitting in an exam chair.    Abdomen: Degree of obesity is none.   Motor: Normal in both upper and lower limbs          Data:    Imaging:  Renal/Bladder Ultrasound (Date = 8/28/2017):  Right kidney: Measures 8.0 cm in length, and demonstrates thinned,  echogenic cortical parenchyma, greatest at the superior pole,  unchanged from 8/1/2016. No focal mass or hydronephrosis. Cyst  described previously not visualized on the current exam.     Left kidney: Measures 11.0 cm in length. Parenchyma is of normal  thickness and echogenicity. No focal mass. No hydronephrosis.      Bladder: Diffusely thickened, partially distended bladder which may be  related to the patient's history of colonic bladder " augmentation.     IMPRESSION:  1. No significant change in asymmetric, atrophic right kidney with  upper pole predominant parenchymal scarring. No hydronephrosis.  2. Normal left kidney. No hydronephrosis.    Labs:  Creatinine   Date Value Ref Range Status   08/28/2017 0.56 0.52 - 1.04 mg/dL Final              Assessment and Plan:   32 year old female with neurogenic bladder secondary to sacral agenesis. Overall doing well with no acute changes in  function, stable renal u/s and creatinine. She has trialed several medications for stable urinary urgency, urge incontinence - feels Detrol LA 4 mg daily may be helping somewhat.     Plan:  -Continue Detrol LA 4 mg daily  -Continue current CIC regimen  -She asks about fertility - she and her  have been trying to conceive for > 1 year. Offered for her  to see Dr. Varun Cooper. Provided her with a list of clinics for female fertility specialists.     Patient will send a [employer] message in 1 month if urinary urgency/spasms/UUI remain bothersome. Would then consider scheduling for video urodynamics.    Otherwise follow up in 1 year with RBUS and BMP.       Jerilyn Greene PA-C  August 28, 2017

## 2017-08-28 NOTE — NURSING NOTE
Chief Complaint   Patient presents with     RECHECK     1 month with labs and imaging before       Kiana Farah MA

## 2017-09-25 ENCOUNTER — HOSPITAL ENCOUNTER (OUTPATIENT)
Dept: LAB | Facility: CLINIC | Age: 32
Discharge: HOME | End: 2017-09-25
Attending: INTERNAL MEDICINE | Admitting: INTERNAL MEDICINE
Payer: COMMERCIAL

## 2017-09-25 DIAGNOSIS — Z13.21 SCREENING FOR MALNUTRITION: ICD-10-CM

## 2017-09-25 DIAGNOSIS — Z31.69 WANTED BIRTH: Primary | ICD-10-CM

## 2017-09-25 DIAGNOSIS — Z13.1 SCREENING FOR DIABETES MELLITUS: ICD-10-CM

## 2017-09-25 DIAGNOSIS — Z13.29 SCREENING FOR THYROID DISORDER: ICD-10-CM

## 2017-09-25 LAB
ALBUMIN SERPL-MCNC: 3.4 G/DL (ref 3.4–5)
ALP SERPL-CCNC: 84 U/L (ref 40–150)
ALT SERPL W P-5'-P-CCNC: 20 U/L (ref 0–50)
ANION GAP SERPL CALCULATED.3IONS-SCNC: 6 MMOL/L (ref 3–14)
AST SERPL W P-5'-P-CCNC: 16 U/L (ref 0–45)
BILIRUB SERPL-MCNC: 0.3 MG/DL (ref 0.2–1.3)
BUN SERPL-MCNC: 8 MG/DL (ref 7–30)
CALCIUM SERPL-MCNC: 9.1 MG/DL (ref 8.5–10.1)
CHLORIDE SERPL-SCNC: 107 MMOL/L (ref 94–109)
CO2 SERPL-SCNC: 26 MMOL/L (ref 20–32)
CREAT SERPL-MCNC: 0.7 MG/DL (ref 0.52–1.04)
ERYTHROCYTE [DISTWIDTH] IN BLOOD BY AUTOMATED COUNT: 14 % (ref 10–15)
ESTRADIOL SERPL-MCNC: 44 PG/ML
FSH SERPL-ACNC: 8.4 IU/L
GFR SERPL CREATININE-BSD FRML MDRD: >90 ML/MIN/1.7M2
GLUCOSE SERPL-MCNC: 93 MG/DL (ref 70–99)
HBA1C MFR BLD: 5.7 % (ref 4.3–6)
HCT VFR BLD AUTO: 42.4 % (ref 35–47)
HGB BLD-MCNC: 13.9 G/DL (ref 11.7–15.7)
LH SERPL-ACNC: 4.9 IU/L
MCH RBC QN AUTO: 29.3 PG (ref 26.5–33)
MCHC RBC AUTO-ENTMCNC: 32.8 G/DL (ref 31.5–36.5)
MCV RBC AUTO: 89 FL (ref 78–100)
PLATELET # BLD AUTO: 341 10E9/L (ref 150–450)
POTASSIUM SERPL-SCNC: 4.1 MMOL/L (ref 3.4–5.3)
PROLACTIN SERPL-MCNC: 19 UG/L (ref 3–27)
PROT SERPL-MCNC: 7.8 G/DL (ref 6.8–8.8)
RBC # BLD AUTO: 4.75 10E12/L (ref 3.8–5.2)
SODIUM SERPL-SCNC: 139 MMOL/L (ref 133–144)
TSH SERPL DL<=0.005 MIU/L-ACNC: 2.74 MU/L (ref 0.4–4)
WBC # BLD AUTO: 7.7 10E9/L (ref 4–11)

## 2017-09-25 PROCEDURE — 83002 ASSAY OF GONADOTROPIN (LH): CPT | Performed by: OBSTETRICS & GYNECOLOGY

## 2017-09-25 PROCEDURE — 83036 HEMOGLOBIN GLYCOSYLATED A1C: CPT | Performed by: OBSTETRICS & GYNECOLOGY

## 2017-09-25 PROCEDURE — 83520 IMMUNOASSAY QUANT NOS NONAB: CPT | Performed by: OBSTETRICS & GYNECOLOGY

## 2017-09-25 PROCEDURE — 83001 ASSAY OF GONADOTROPIN (FSH): CPT | Performed by: OBSTETRICS & GYNECOLOGY

## 2017-09-25 PROCEDURE — 36415 COLL VENOUS BLD VENIPUNCTURE: CPT | Performed by: OBSTETRICS & GYNECOLOGY

## 2017-09-25 PROCEDURE — 80053 COMPREHEN METABOLIC PANEL: CPT | Performed by: OBSTETRICS & GYNECOLOGY

## 2017-09-25 PROCEDURE — 82670 ASSAY OF TOTAL ESTRADIOL: CPT | Performed by: OBSTETRICS & GYNECOLOGY

## 2017-09-25 PROCEDURE — 84146 ASSAY OF PROLACTIN: CPT | Performed by: OBSTETRICS & GYNECOLOGY

## 2017-09-25 PROCEDURE — 85027 COMPLETE CBC AUTOMATED: CPT | Performed by: OBSTETRICS & GYNECOLOGY

## 2017-09-25 PROCEDURE — 84443 ASSAY THYROID STIM HORMONE: CPT | Performed by: OBSTETRICS & GYNECOLOGY

## 2017-09-29 LAB — MIS SERPL-MCNC: 2.93 NG/ML (ref 0.18–11.71)

## 2017-10-29 ENCOUNTER — HEALTH MAINTENANCE LETTER (OUTPATIENT)
Age: 32
End: 2017-10-29

## 2018-03-26 ENCOUNTER — TRANSFERRED RECORDS (OUTPATIENT)
Dept: HEALTH INFORMATION MANAGEMENT | Facility: CLINIC | Age: 33
End: 2018-03-26

## 2018-03-29 ENCOUNTER — TRANSFERRED RECORDS (OUTPATIENT)
Dept: HEALTH INFORMATION MANAGEMENT | Facility: CLINIC | Age: 33
End: 2018-03-29

## 2018-04-17 ENCOUNTER — TRANSFERRED RECORDS (OUTPATIENT)
Dept: HEALTH INFORMATION MANAGEMENT | Facility: CLINIC | Age: 33
End: 2018-04-17

## 2018-06-11 ENCOUNTER — PRE VISIT (OUTPATIENT)
Dept: UROLOGY | Facility: CLINIC | Age: 33
End: 2018-06-11

## 2018-06-18 ENCOUNTER — OFFICE VISIT (OUTPATIENT)
Dept: UROLOGY | Facility: CLINIC | Age: 33
End: 2018-06-18
Payer: COMMERCIAL

## 2018-06-18 ENCOUNTER — RADIANT APPOINTMENT (OUTPATIENT)
Dept: ULTRASOUND IMAGING | Facility: CLINIC | Age: 33
End: 2018-06-18
Attending: PHYSICIAN ASSISTANT
Payer: COMMERCIAL

## 2018-06-18 VITALS
HEIGHT: 62 IN | OXYGEN SATURATION: 95 % | WEIGHT: 155.7 LBS | DIASTOLIC BLOOD PRESSURE: 81 MMHG | HEART RATE: 115 BPM | SYSTOLIC BLOOD PRESSURE: 125 MMHG | BODY MASS INDEX: 28.65 KG/M2

## 2018-06-18 DIAGNOSIS — N31.9 NEUROGENIC BLADDER: Primary | ICD-10-CM

## 2018-06-18 DIAGNOSIS — N39.41 URGE INCONTINENCE: ICD-10-CM

## 2018-06-18 DIAGNOSIS — N31.9 NEUROGENIC BLADDER: ICD-10-CM

## 2018-06-18 DIAGNOSIS — R39.15 URINARY URGENCY: ICD-10-CM

## 2018-06-18 LAB
ANION GAP SERPL CALCULATED.3IONS-SCNC: 12 MMOL/L (ref 3–14)
BUN SERPL-MCNC: 6 MG/DL (ref 7–30)
CALCIUM SERPL-MCNC: 8.6 MG/DL (ref 8.5–10.1)
CHLORIDE SERPL-SCNC: 107 MMOL/L (ref 94–109)
CO2 SERPL-SCNC: 20 MMOL/L (ref 20–32)
CREAT SERPL-MCNC: 0.53 MG/DL (ref 0.52–1.04)
GFR SERPL CREATININE-BSD FRML MDRD: >90 ML/MIN/1.7M2
GLUCOSE SERPL-MCNC: 105 MG/DL (ref 70–99)
POTASSIUM SERPL-SCNC: 3.7 MMOL/L (ref 3.4–5.3)
SODIUM SERPL-SCNC: 138 MMOL/L (ref 133–144)

## 2018-06-18 ASSESSMENT — PAIN SCALES - GENERAL: PAINLEVEL: NO PAIN (0)

## 2018-06-18 NOTE — NURSING NOTE
Chief Complaint   Patient presents with     Follow Up For     neurogenic bladder         Kris Sainz MA

## 2018-06-18 NOTE — LETTER
6/18/2018       RE: Minh Altamirano  [address]  [facility] [address]     Dear Colleague,    Thank you for referring your patient, Minh Altamirano, to the [facility] UROLOGY AND INST FOR PROSTATE AND UROLOGIC CANCERS at [facility]. Please see a copy of my visit note below.    Follow-up Visit for Neurogenic Bladder    Name: Minh Altamirano    MRN: [id number]   YOB: 1985  Accompanied at today's visit by:                  Chief Complaint:   Neurogenic Bladder          History of Present Illness:   HISTORY: Minh Altamirano is a 33 year old female with a history of neurogenic bladder secondary to sacral agenesis. She has a complicated urologic surgical history (summarized below). Patient is not wheelchair bound. Last visit with us was 8/28/2017. Previously, she had complained of some stable urinary incontinence associated with spasms and urgency. Going through 1-2 ppd. Had tried Vesicare and Myrbetriq in the past without much benefit. She was then started on Detrol LA 4 mg daily and at follow up appointment in 8/2017 reported some improvement. Was still having mild urgency and incontinence but overall it was improving.     She returns for annual follow up today and reports that she is currently 25 weeks pregnant. Stopped Detrol when she found out she was pregnant. As a result, her urinary frequency and urgency are worse. She now has medium volume incontinence 5-7 times per day. Treated for a UTI recently with Macrobid; symptoms now resolved.     Previous Bladder Surgeries:  Previous Bladder Augmentation: colon in 2012. Revisions include: none   Catheterizable stoma: appendiceal Mitrofanoff in 2012. Revisions include: takedown due to dehiscence.   Anti-incontinence procedures: bladder neck sling (autologous fascia) in 2012  Botox injections: 8/2016.      Pertinent Medications:  Current Anticholinergics: None  Current Prophylactic antibiotics:  None  Intravesical gentamycin:  None  Intravesical oxybutinin: None     Catheterization History:  The patient catheterizes per native urethra with a 12F straight catheter q3-5 hours. Catheterization is performed by  self. The patient uses a new catheter each time. She does not irrigate the bladder.      Incontinence History:  She does leak between voids/caths. She does experience urgency of urination. She does not experience stress urinary incontinence with the following activities: coughing/sneezing. She wears pads and uses 3-4/day.     If patient has an AUS or Sling then:  Leak management: pads  Sphincter Complications: NA     Urinary Tract Infection History:  Treated with antibiotics for positive culture and non-specific symptoms: 1 times in the last year  Treated with antibiotics for positive culture plus symptoms of UTI: 1 times in the last year     Bowel Movement History:  The patient has a bowel movement q2 days. Bowel regimen includes Miralax and senna          Past Medical History:     Past Medical History:   Diagnosis Date     Anemia      Chronic infection     MRSA     Constipation, chronic      Incontinence of urine      Lipoma of spinal cord      Migraine      neurogenic bladder      Spinal dysraphism (H)             Past Surgical History:     Past Surgical History:   Procedure Laterality Date     BLADDER AUGMENTATION  1/5/2012    Procedure:BLADDER AUGMENTATION; Surgeon:TRINA MOORE; [facility]     CYSTOSCOPY, INTRAVESICAL INJECTION N/A 8/19/2016    Procedure: CYSTOSCOPY, INTRAVESICAL INJECTION;  Surgeon: Trina Moore MD;  [facility]     LAMINECTOMY LUMBAR TWO LEVELS       LAPAROTOMY EXPLORATORY  1/11/2012    Procedure:LAPAROTOMY EXPLORATORY; Exploratory Laparotomy with Takedown of Mitrofanoff, Ventral Hernia Repair with Strattice Mesh implantation ; Surgeon:TRINA MOORE; [facility]     MITROFANOFF PROCEDURE (APPENDIX CONDUIT)  1/5/2012    Procedure:MITROFANOFF  "PROCEDURE (APPENDIX CONDUIT); Bladder Augmentation with Right Colon, Appendix Conduit- Mitrofanoff, Insertion of Pubovaginal Sling using Autologous Rectus Fascia; Surgeon:TRINA COLLADO; Location:[facility] OR     tumor resection and cord detethering              Allergies:     Allergies   Allergen Reactions     Naproxen Hives and Nausea and Vomiting     Bactrim [Sulfamethoxazole W/Trimethoprim] Itching     Gabapentin Itching and Nausea     Vicodin [Hydrocodone-Acetaminophen] Itching            Medications:     Current Outpatient Prescriptions   Medication Sig     mirabegron (MYRBETRIQ) 25 MG 24 hr tablet Take 1 tablet (25 mg) by mouth daily (Patient not taking: Reported on 6/18/2018)     polyethylene glycol (MIRALAX) powder Take 17 g by mouth daily (Patient not taking: Reported on 6/18/2018)     ranitidine (ZANTAC) 150 MG tablet Take 1 tablet by mouth 2 times daily for 10 days.     sennosides (SENOKOT) 8.6 MG tablet Take 1 tablet by mouth 2 times daily as needed for constipation. (Patient not taking: Reported on 6/18/2018)     sodium chloride (OCEAN) 0.65 % nasal spray Spray 1 spray in nostril every hour as needed for congestion. (Patient not taking: Reported on 6/18/2018)     solifenacin (VESICARE) 10 MG tablet Take 1 tablet (10 mg) by mouth daily (Patient not taking: Reported on 6/18/2018)     tolterodine (DETROL LA) 4 MG 24 hr capsule Take 1 capsule (4 mg) by mouth daily (Patient not taking: Reported on 6/18/2018)     No current facility-administered medications for this visit.              Review of Systems:    ROS: 10 point ROS neg other than the symptoms noted above in the HPI and PMH.          Physical Exam:   /81 (BP Location: Right arm, Patient Position: Sitting, Cuff Size: Adult Regular)  Pulse 115  Ht 1.575 m (5' 2\")  Wt 70.6 kg (155 lb 11.2 oz)  SpO2 95%  BMI 28.48 kg/m2  General: age-appropriate appearing female in NAD sitting in an exam chair.  Respiratory: no increased respiratory " effort  Motor: Normal in both upper and lower limbs          Data:    Imaging:  Renal/Bladder Ultrasound (Date = 6/18/2018):  IMPRESSION:  1. No hydronephrosis.  2. No significant change in asymmetric, atrophic right kidney with  upper pole predominant parenchymal scarring.  3. The left kidney is normal. No hydronephrosis.  4. Bladder was not well visualized, secondary to nondistended state.    Labs:  Creatinine   Date Value Ref Range Status   06/18/2018 0.53 0.52 - 1.04 mg/dL Final            Assessment and Plan:   33 year old female with neurogenic bladder secondary to sacral agenesis. Now 25 weeks pregnant with her first pregnancy. Given complicated urologic surgical history (previous colon bladder augment with appendiceal Mitrofanoff s/p takedown due to dehiscence, autologous fascial bladder neck sling), patient's OB/GYN physician requests involvement of surgeon who performed bladder procedures (Dr. Moore). She plans to deliver at [facility].   -Will schedule office visit with Dr. Moore to further discuss plans for delivery and whether any specific recommendations are needed given prior  procedures.   -Remain off Detrol and other anticholinergic medications as these are generally not considered safe during pregnancy (Detrol is pregnancy category C).  -Continue current CIC regimen.    20 minutes were spent with the patient, >50% in counseling and coordination of care.      Jerilyn Greene PA-C   June 18, 2018

## 2018-06-18 NOTE — PATIENT INSTRUCTIONS
UROLOGY CLINIC VISIT PATIENT INSTRUCTIONS    1) Schedule a kidney ultrasound and blood work today.    2) Follow up as scheduled with Dr. Moore on July 9, 2018 at 9:30 AM.    If you have any issues, questions or concerns in the meantime, do not hesitate to contact us at [phone] or via [employer].     It was a pleasure meeting with you today.  Thank you for allowing me and my team the privilege of caring for you today.  YOU are the reason we are here, and I truly hope we provided you with the excellent service you deserve.  Please let us know if there is anything else we can do for you so that we can be sure you are leaving completely satisfied with your care experience.

## 2018-06-18 NOTE — MR AVS SNAPSHOT
After Visit Summary   6/18/2018    Minh Altamirano    MRN: [id number]           Patient Information     Date Of Birth          1985        Visit Information        Provider Department      6/18/2018 4:15 PM Jerilyn Greene PA-C; LANGUAGE [facility] Urology and [facility] for Prostate and Urologic Cancers        Today's Diagnoses     Neurogenic bladder    -  1    Urinary urgency        Urge incontinence          Care Instructions    UROLOGY CLINIC VISIT PATIENT INSTRUCTIONS    1) Schedule a kidney ultrasound and blood work today.    2) Follow up as scheduled with Dr. Moore on July 9, 2018 at 9:30 AM.    If you have any issues, questions or concerns in the meantime, do not hesitate to contact us at [phone] or via [employer].     It was a pleasure meeting with you today.  Thank you for allowing me and my team the privilege of caring for you today.  YOU are the reason we are here, and I truly hope we provided you with the excellent service you deserve.  Please let us know if there is anything else we can do for you so that we can be sure you are leaving completely satisfied with your care experience.                Follow-ups after your visit        Your next 10 appointments already scheduled     Jun 18, 2018  5:30 PM CDT   US RENAL COMPLETE with [facility] [place] [address] Imaging Center US ([facility])    [address] [place] [id number] [phone]           Please bring a list of your medicines (including vitamins, minerals and over-the-counter drugs). Also, tell your doctor about any allergies you may have. Wear comfortable clothes and leave your valuables at home.  You do not need to do anything special to prepare for your exam.  Please call the Imaging Department at your exam site with any questions.            Jun 18, 2018  5:45 PM CDT   LAB with  LAB   [facility] Lab ([facility])    [address] [place] [address]  Floor  [facility] [id number] [phone]           Please do not eat 10-12 hours before your appointment if you are coming in fasting for labs on lipids, cholesterol, or glucose (sugar). This does not apply to pregnant women. Water, hot tea and black coffee (with nothing added) are okay. Do not drink other fluids, diet soda or chew gum.            Jul 09, 2018  9:30 AM CDT   (Arrive by 9:15 AM)   Return Visit with Fady Moore MD   [facility] and [facility] for Prostate and Urologic Cancers ([facility])    [address] [facility]  4th Floor  [facility] [id number] [phone]              Future tests that were ordered for you today     Open Future Orders        Priority Expected Expires Ordered    Renal US Routine  6/18/2019 6/18/2018    Basic metabolic panel Routine  6/18/2019 6/18/2018            Who to contact     Please call your clinic at [phone] to:    Ask questions about your health    Make or cancel appointments    Discuss your medicines    Learn about your test results    Speak to your doctor            Additional Information About Your Visit        [employer] Information     [employer] gives you secure access to your electronic health record. If you see a primary care provider, you can also send messages to your care team and make appointments. If you have questions, please call your primary care clinic.  If you do not have a primary care provider, please call [phone] and they will assist you.      [employer] is an electronic gateway that provides easy, online access to your medical records. With [employer], you can request a clinic appointment, read your test results, renew a prescription or communicate with your care team.     To access your existing account, please contact your [facility] or call [phone] for assistance.        Care EveryWhere ID     This is your Care EveryWhere ID. This could be used by other organizations to  "access your [place] medical records  [id number]        Your Vitals Were     Pulse Height Pulse Oximetry BMI (Body Mass Index)          115 1.575 m (5' 2\") 95% 28.48 kg/m2         Blood Pressure from Last 3 Encounters:   06/18/18 125/81   08/28/17 118/84   07/17/17 125/85    Weight from Last 3 Encounters:   06/18/18 70.6 kg (155 lb 11.2 oz)   08/28/17 65.8 kg (145 lb)   07/17/17 65.5 kg (144 lb 8 oz)               Primary Care Provider Fax #    Physician No Ref-Primary [phone]       No address on file        Equal Access to Services     IJEOMA SANCHEZ : Criss Morris, amaury elliott, oly kaalmajose a geiger, mariah ann . So [facility] [phone].    ATENCIÓN: Si habla español, tiene a georges disposición servicios gratuitos de asistencia lingüística. Llame al [phone].    We comply with applicable federal civil rights laws and [place] laws. We do not discriminate on the basis of race, color, national origin, age, disability, sex, sexual orientation, or gender identity.            Thank you!     Thank you for choosing [facility] UROLOGY AND [facility] FOR PROSTATE AND UROLOGIC CANCERS  for your care. Our goal is always to provide you with excellent care. Hearing back from our patients is one way we can continue to improve our services. Please take a few minutes to complete the written survey that you may receive in the mail after your visit with us. Thank you!             Your Updated Medication List - Protect others around you: Learn how to safely use, store and throw away your medicines at www.disposemymeds.org.          This list is accurate as of 6/18/18  5:09 PM.  Always use your most recent med list.                   Brand Name Dispense Instructions for use Diagnosis    mirabegron 25 MG 24 hr tablet    MYRBETRIQ    30 tablet    Take 1 tablet (25 mg) by mouth daily    Neurogenic bladder, Incontinence in female       polyethylene glycol powder    MIRALAX    510 g    " Take 17 g by mouth daily    Constipation       ranitidine 150 MG tablet    ZANTAC    20 tablet    Take 1 tablet by mouth 2 times daily for 10 days.        sennosides 8.6 MG tablet    SENOKOT    180 tablet    Take 1 tablet by mouth 2 times daily as needed for constipation.    Chronic constipation       sodium chloride 0.65 % nasal spray    OCEAN    1 Bottle    Spray 1 spray in nostril every hour as needed for congestion.    Neurogenic bladder, NOS, Other urinary incontinence       solifenacin 10 MG tablet    VESICARE    90 tablet    Take 1 tablet (10 mg) by mouth daily    Neurogenic bladder, Urinary urgency, Urge incontinence       tolterodine 4 MG 24 hr capsule    DETROL LA    90 capsule    Take 1 capsule (4 mg) by mouth daily    Neurogenic bladder

## 2018-07-06 ENCOUNTER — PRE VISIT (OUTPATIENT)
Dept: UROLOGY | Facility: CLINIC | Age: 33
End: 2018-07-06

## 2018-07-06 NOTE — TELEPHONE ENCOUNTER
Reason for visit: discuss prenatal plan for cathing     Relevant information: pt saw Jerilyn in June    Records/imaging/labs: all records available    Pt called: No need for a call    Rooming: regular

## 2018-07-09 ENCOUNTER — OFFICE VISIT (OUTPATIENT)
Dept: UROLOGY | Facility: CLINIC | Age: 33
End: 2018-07-09
Payer: COMMERCIAL

## 2018-07-09 VITALS
SYSTOLIC BLOOD PRESSURE: 133 MMHG | HEIGHT: 62 IN | WEIGHT: 157.5 LBS | OXYGEN SATURATION: 96 % | BODY MASS INDEX: 28.98 KG/M2 | HEART RATE: 112 BPM | DIASTOLIC BLOOD PRESSURE: 89 MMHG

## 2018-07-09 DIAGNOSIS — Q76.49 SACRAL AGENESIS WITH VERTEBRAL ANOMALY: Primary | ICD-10-CM

## 2018-07-09 DIAGNOSIS — N31.9 NEUROGENIC BLADDER: ICD-10-CM

## 2018-07-09 RX ORDER — CEPHALEXIN 500 MG/1
500 CAPSULE ORAL DAILY
Qty: 30 CAPSULE | Refills: 3 | Status: SHIPPED | OUTPATIENT
Start: 2018-07-09 | End: 2018-08-21

## 2018-07-09 ASSESSMENT — PAIN SCALES - GENERAL: PAINLEVEL: NO PAIN (0)

## 2018-07-09 NOTE — PROGRESS NOTES
Follow-up Visit for Neurogenic Bladder    Name: Minh Altamirano    MRN: [id number]   YOB: 1985  Accompanied at today's visit by:                  Chief Complaint:   Neurogenic Bladder          History of Present Illness:   HISTORY: Minh Altamirano is a 33 year old female with a history of neurogenic bladder secondary to sacral agenesis. She has a complicated urologic surgical history (summarized below). Patient is not wheelchair bound. Last visit with us was 6/8/18. Previously, she had complained of some stable urinary incontinence associated with spasms and urgency. Going through 1-2 ppd. Had tried Vesicare and Myrbetriq in the past without much benefit. She was then started on Detrol LA 4 mg daily and at follow up appointment in 8/2017 reported some improvement. Was still having mild urgency and incontinence but overall it was improving.     She is currently 27 weeks pregnant. She stopped Detrol when she found out she was pregnant. As a result of stopping her medication and being pregnant, her urinary frequency and urgency are worsening. She was treated with 2 UTIs during pregnancy.     Previous Bladder Surgeries:  Previous Bladder Augmentation: colon in 2012. Revisions include: none   Catheterizable stoma: appendiceal Mitrofanoff in 2012. Revisions include: takedown due to dehiscence.   Anti-incontinence procedures: bladder neck sling (autologous fascia) in 2012  Botox injections: 8/2016.      Pertinent Medications:  Current Anticholinergics: None  Current Prophylactic antibiotics: None  Intravesical gentamycin:  None  Intravesical oxybutinin: None     Catheterization History:  The patient catheterizes per native urethra with a 12F straight catheter q3-5 hours. Catheterization is performed by  self. The patient uses a new catheter each time. She does not irrigate the bladder.      Incontinence History:  She does leak between voids/caths. She does experience urgency of urination. She does  not experience stress urinary incontinence with the following activities: coughing/sneezing. She wears pads and uses 3-4/day.     If patient has an AUS or Sling then:  Leak management: pads  Sphincter Complications: NA     Urinary Tract Infection History:  Treated with antibiotics for positive culture and non-specific symptoms: 2 times in the last year  Treated with antibiotics for positive culture plus symptoms of UTI: 2 times in the last year     Bowel Movement History:  The patient has a bowel movement q2 days. Bowel regimen includes Miralax and senna          Past Medical History:     Past Medical History:   Diagnosis Date     Anemia      Chronic infection     MRSA     Constipation, chronic      Incontinence of urine      Lipoma of spinal cord      Migraine      neurogenic bladder      Spinal dysraphism (H)             Past Surgical History:     Past Surgical History:   Procedure Laterality Date     BLADDER AUGMENTATION  1/5/2012    Procedure:BLADDER AUGMENTATION; Surgeon:TRINA MOORE; [facility]     CYSTOSCOPY, INTRAVESICAL INJECTION N/A 8/19/2016    Procedure: CYSTOSCOPY, INTRAVESICAL INJECTION;  Surgeon: Trina Moore MD;  [facility]     LAMINECTOMY LUMBAR TWO LEVELS       LAPAROTOMY EXPLORATORY  1/11/2012    Procedure:LAPAROTOMY EXPLORATORY; Exploratory Laparotomy with Takedown of Mitrofanoff, Ventral Hernia Repair with Strattice Mesh implantation ; Surgeon:TRINA MOORE; [facility]     MITROFANOFF PROCEDURE (APPENDIX CONDUIT)  1/5/2012    Procedure:MITROFANOFF PROCEDURE (APPENDIX CONDUIT); Bladder Augmentation with Right Colon, Appendix Conduit- Mitrofanoff, Insertion of Pubovaginal Sling using Autologous Rectus Fascia; Surgeon:TRINA MOORE; [facility]     tumor resection and cord detethering              Allergies:     Allergies   Allergen Reactions     Naproxen Hives and Nausea and Vomiting     Bactrim [Sulfamethoxazole W/Trimethoprim] Itching      "Gabapentin Itching and Nausea     Vicodin [Hydrocodone-Acetaminophen] Itching            Medications:     Current Outpatient Prescriptions   Medication Sig     cephALEXin (KEFLEX) 500 MG capsule Take 1 capsule (500 mg) by mouth daily     mirabegron (MYRBETRIQ) 25 MG 24 hr tablet Take 1 tablet (25 mg) by mouth daily (Patient not taking: Reported on 6/18/2018)     polyethylene glycol (MIRALAX) powder Take 17 g by mouth daily (Patient not taking: Reported on 6/18/2018)     ranitidine (ZANTAC) 150 MG tablet Take 1 tablet by mouth 2 times daily for 10 days.     sennosides (SENOKOT) 8.6 MG tablet Take 1 tablet by mouth 2 times daily as needed for constipation. (Patient not taking: Reported on 6/18/2018)     sodium chloride (OCEAN) 0.65 % nasal spray Spray 1 spray in nostril every hour as needed for congestion. (Patient not taking: Reported on 6/18/2018)     solifenacin (VESICARE) 10 MG tablet Take 1 tablet (10 mg) by mouth daily (Patient not taking: Reported on 6/18/2018)     tolterodine (DETROL LA) 4 MG 24 hr capsule Take 1 capsule (4 mg) by mouth daily (Patient not taking: Reported on 6/18/2018)     No current facility-administered medications for this visit.              Review of Systems:    ROS: 10 point ROS neg other than the symptoms noted above in the HPI and PMH.          Physical Exam:   /89 (BP Location: Right arm, Patient Position: Sitting, Cuff Size: Adult Regular)  Pulse 112  Ht 1.575 m (5' 2\")  Wt 71.4 kg (157 lb 8 oz)  SpO2 96%  BMI 28.81 kg/m2  General: age-appropriate appearing female in NAD sitting in an exam chair.  Respiratory: no increased respiratory effort  Cardiovascular: well-perfused  Abdomen: soft, non-tender  Motor: Normal in both upper and lower limbs          Data:    Imaging:  Renal/Bladder Ultrasound (Date = 6/18/2018):  IMPRESSION:  1. No hydronephrosis.  2. No significant change in asymmetric, atrophic right kidney with  upper pole predominant parenchymal scarring.  3. The " left kidney is normal. No hydronephrosis.  4. Bladder was not well visualized, secondary to nondistended state.    Labs:  Creatinine   Date Value Ref Range Status   2018 0.53 0.52 - 1.04 mg/dL Final            Assessment and Plan:   33 year old female with neurogenic bladder secondary to sacral agenesis. Now 27 weeks pregnant with her first pregnancy and complicated urologic surgical history (previous colon bladder augment with appendiceal Mitrofanoff s/p takedown due to dehiscence, autologous fascial bladder neck sling),   -Recommend  to deliver baby due to prior  surgeries including augmentation and sling. Will recommend surgery to be performed locally and will make referral to OB[facility]GYN at [facility]  -Remain off Detrol and other anticholinergic medications as these are generally not considered safe during pregnancy (Detrol is pregnancy category C).  -Continue current CIC regimen.  -Will start daily prophylactic keflex to reduce the risk of UTIs during pregnancy      Kg French MD   2018         =======================================================  As the attending surgeon I, Fady Moore, interviewed and examined the patient. The plan was developed between me and the patient. My findings and plan are as stated by the fellow.    Fady Moore MD

## 2018-07-09 NOTE — PATIENT INSTRUCTIONS
Establish care with an OB/GYN for delivery with our hospital, you will need a .    It was a pleasure meeting with you today.  Thank you for allowing me and my team the privilege of caring for you today.  YOU are the reason we are here, and I truly hope we provided you with the excellent service you deserve.  Please let us know if there is anything else we can do for you so that we can be sure you are leaving completely satisfied with your care experience.

## 2018-07-09 NOTE — MR AVS SNAPSHOT
After Visit Summary   2018    Minh Altamirano    MRN: [id number]           Patient Information     Date Of Birth          1985        Visit Information        Provider Department      2018 9:15 AM Fady Moore MD; LANGUAGE [facility]        Today's Diagnoses     Sacral agenesis with vertebral anomaly    -  1    Neurogenic bladder          Care Instructions    Establish care with an OB/GYN for delivery with our hospital, you will need a .    It was a pleasure meeting with you today.  Thank you for allowing me and my team the privilege of caring for you today.  YOU are the reason we are here, and I truly hope we provided you with the excellent service you deserve.  Please let us know if there is anything else we can do for you so that we can be sure you are leaving completely satisfied with your care experience.                  Follow-ups after your visit        Additional Services     OB/GYN REFERRAL       Your provider has referred you to:  [facility]: [facility] [phone]   [email or web address]    Please be aware that coverage of these services is subject to the terms and limitations of your health insurance plan.  Call member services at your health plan with any benefit or coverage questions.      Please bring the following with you to your appointment:    (1) Any X-Rays, CTs or MRIs which have been performed.  Contact the facility where they were done to arrange for  prior to your scheduled appointment.   (2) List of current medications   (3) This referral request   (4) Any documents/labs given to you for this referral                  Your next 10 appointments already scheduled     2018  1:15 PM CDT   New Patient Visit with Josefina Gregory MD   [facility] ([facility])    [place] [facility]  "[facility] [address]  [address]  [facility] [id number] [phone]              Who to contact     Please call your clinic at [phone] to:    Ask questions about your health    Make or cancel appointments    Discuss your medicines    Learn about your test results    Speak to your doctor            Additional Information About Your Visit        THE [employer] Information     [employer] gives you secure access to your electronic health record. If you see a primary care provider, you can also send messages to your care team and make appointments. If you have questions, please call your primary care clinic.  If you do not have a primary care provider, please call [phone] and they will assist you.      [employer] is an electronic gateway that provides easy, online access to your medical records. With [employer], you can request a clinic appointment, read your test results, renew a prescription or communicate with your care team.     To access your existing account, please contact your [facility] or call [phone] for assistance.        Care EveryWhere ID     This is your Care EveryWhere ID. This could be used by other organizations to access your [place] medical records  [id number]        Your Vitals Were     Pulse Height Pulse Oximetry BMI (Body Mass Index)          112 1.575 m (5' 2\") 96% 28.81 kg/m2         Blood Pressure from Last 3 Encounters:   07/09/18 133/89   06/18/18 125/81   08/28/17 118/84    Weight from Last 3 Encounters:   07/09/18 71.4 kg (157 lb 8 oz)   06/18/18 70.6 kg (155 lb 11.2 oz)   08/28/17 65.8 kg (145 lb)              We Performed the Following     OB/GYN REFERRAL          Today's Medication Changes          These changes are accurate as of 7/9/18 10:50 AM.  If you have any questions, ask your nurse or doctor.               Start taking these medicines.        Dose/Directions    cephALEXin 500 MG capsule   Commonly known as:  KEFLEX   Used for:  Sacral " agenesis with vertebral anomaly   Started by:  Fady Moore MD        Dose:  500 mg   Take 1 capsule (500 mg) by mouth daily   Quantity:  30 capsule   Refills:  3            Where to get your medicines      These medications were sent to [facility] - [place], [place] - [address]  [address] [facility], [facility] [address]     Phone:  [phone]     cephALEXin 500 MG capsule                Primary Care Provider Fax #    Physician No Ref-Primary [phone]       No address on file        Equal Access to Services     RADHA [facility] Hospital[facility]EED : Hadii aad ku hadasho Soomaali, waaxda luqadaha, qaybta kaalmada adeegyada, waxay idiin hayaan adeeg kharash seth . So [facility] [phone].    ATENCIÓN: Si habla español, tiene a georges disposición servicios gratuitos de asistencia lingüística. Llame al [phone].    We comply with applicable federal civil rights laws and [place] laws. We do not discriminate on the basis of race, color, national origin, age, disability, sex, sexual orientation, or gender identity.            Thank you!     Thank you for choosing [facility] UROLOGY AND [facility] FOR PROSTATE AND UROLOGIC CANCERS  for your care. Our goal is always to provide you with excellent care. Hearing back from our patients is one way we can continue to improve our services. Please take a few minutes to complete the written survey that you may receive in the mail after your visit with us. Thank you!             Your Updated Medication List - Protect others around you: Learn how to safely use, store and throw away your medicines at www.disposemymeds.org.          This list is accurate as of 7/9/18 10:50 AM.  Always use your most recent med list.                   Brand Name Dispense Instructions for use Diagnosis    cephALEXin 500 MG capsule    KEFLEX    30 capsule    Take 1 capsule (500 mg) by mouth daily    Sacral agenesis with vertebral anomaly       mirabegron 25 MG 24 hr tablet    MYRBETRIQ    30 tablet     Take 1 tablet (25 mg) by mouth daily    Neurogenic bladder, Incontinence in female       polyethylene glycol powder    MIRALAX    510 g    Take 17 g by mouth daily    Constipation       ranitidine 150 MG tablet    ZANTAC    20 tablet    Take 1 tablet by mouth 2 times daily for 10 days.        sennosides 8.6 MG tablet    SENOKOT    180 tablet    Take 1 tablet by mouth 2 times daily as needed for constipation.    Chronic constipation       sodium chloride 0.65 % nasal spray    OCEAN    1 Bottle    Spray 1 spray in nostril every hour as needed for congestion.    Neurogenic bladder, NOS, Other urinary incontinence       solifenacin 10 MG tablet    VESICARE    90 tablet    Take 1 tablet (10 mg) by mouth daily    Neurogenic bladder, Urinary urgency, Urge incontinence       tolterodine 4 MG 24 hr capsule    DETROL LA    90 capsule    Take 1 capsule (4 mg) by mouth daily    Neurogenic bladder

## 2018-07-09 NOTE — LETTER
7/9/2018       RE: Minh Altamirano  [address]  [facility] [address]     Dear Colleague,    Thank you for referring your patient, Minh Altamirano, to the [facility] UROLOGY AND INST FOR PROSTATE AND UROLOGIC CANCERS at [facility]. Please see a copy of my visit note below.    Follow-up Visit for Neurogenic Bladder    Name: Minh Altamirano    MRN: [id number]   YOB: 1985  Accompanied at today's visit by:                  Chief Complaint:   Neurogenic Bladder          History of Present Illness:   HISTORY: Minh Altamirano is a 33 year old female with a history of neurogenic bladder secondary to sacral agenesis. She has a complicated urologic surgical history (summarized below). Patient is not wheelchair bound. Last visit with us was 6/8/18. Previously, she had complained of some stable urinary incontinence associated with spasms and urgency. Going through 1-2 ppd. Had tried Vesicare and Myrbetriq in the past without much benefit. She was then started on Detrol LA 4 mg daily and at follow up appointment in 8/2017 reported some improvement. Was still having mild urgency and incontinence but overall it was improving.     She is currently 27 weeks pregnant. She stopped Detrol when she found out she was pregnant. As a result of stopping her medication and being pregnant, her urinary frequency and urgency are worsening. She was treated with 2 UTIs during pregnancy.     Previous Bladder Surgeries:  Previous Bladder Augmentation: colon in 2012. Revisions include: none   Catheterizable stoma: appendiceal Mitrofanoff in 2012. Revisions include: takedown due to dehiscence.   Anti-incontinence procedures: bladder neck sling (autologous fascia) in 2012  Botox injections: 8/2016.      Pertinent Medications:  Current Anticholinergics: None  Current Prophylactic antibiotics: None  Intravesical gentamycin:  None  Intravesical oxybutinin: None     Catheterization  History:  The patient catheterizes per native urethra with a 12F straight catheter q3-5 hours. Catheterization is performed by  self. The patient uses a new catheter each time. She does not irrigate the bladder.      Incontinence History:  She does leak between voids/caths. She does experience urgency of urination. She does not experience stress urinary incontinence with the following activities: coughing/sneezing. She wears pads and uses 3-4/day.     If patient has an AUS or Sling then:  Leak management: pads  Sphincter Complications: NA     Urinary Tract Infection History:  Treated with antibiotics for positive culture and non-specific symptoms: 2 times in the last year  Treated with antibiotics for positive culture plus symptoms of UTI: 2 times in the last year     Bowel Movement History:  The patient has a bowel movement q2 days. Bowel regimen includes Miralax and senna          Past Medical History:     Past Medical History:   Diagnosis Date     Anemia      Chronic infection     MRSA     Constipation, chronic      Incontinence of urine      Lipoma of spinal cord      Migraine      neurogenic bladder      Spinal dysraphism (H)             Past Surgical History:     Past Surgical History:   Procedure Laterality Date     BLADDER AUGMENTATION  1/5/2012    Procedure:BLADDER AUGMENTATION; Surgeon:TRINA MOORE; [facility]     CYSTOSCOPY, INTRAVESICAL INJECTION N/A 8/19/2016    Procedure: CYSTOSCOPY, INTRAVESICAL INJECTION;  Surgeon: Trina Moore MD;  [facility]     LAMINECTOMY LUMBAR TWO LEVELS       LAPAROTOMY EXPLORATORY  1/11/2012    Procedure:LAPAROTOMY EXPLORATORY; Exploratory Laparotomy with Takedown of Mitrofanoff, Ventral Hernia Repair with Strattice Mesh implantation ; Surgeon:TRINA MOORE; [facility]     MITROFANOFF PROCEDURE (APPENDIX CONDUIT)  1/5/2012    Procedure:MITROFANOFF PROCEDURE (APPENDIX CONDUIT); Bladder Augmentation with Right Colon, Appendix Conduit-  "Elisabethff, Insertion of Pubovaginal Sling using Autologous Rectus Fascia; Surgeon:TRINA COLLADO; Location:[facility]     tumor resection and cord detethering              Allergies:     Allergies   Allergen Reactions     Naproxen Hives and Nausea and Vomiting     Bactrim [Sulfamethoxazole W/Trimethoprim] Itching     Gabapentin Itching and Nausea     Vicodin [Hydrocodone-Acetaminophen] Itching            Medications:     Current Outpatient Prescriptions   Medication Sig     cephALEXin (KEFLEX) 500 MG capsule Take 1 capsule (500 mg) by mouth daily     mirabegron (MYRBETRIQ) 25 MG 24 hr tablet Take 1 tablet (25 mg) by mouth daily (Patient not taking: Reported on 6/18/2018)     polyethylene glycol (MIRALAX) powder Take 17 g by mouth daily (Patient not taking: Reported on 6/18/2018)     ranitidine (ZANTAC) 150 MG tablet Take 1 tablet by mouth 2 times daily for 10 days.     sennosides (SENOKOT) 8.6 MG tablet Take 1 tablet by mouth 2 times daily as needed for constipation. (Patient not taking: Reported on 6/18/2018)     sodium chloride (OCEAN) 0.65 % nasal spray Spray 1 spray in nostril every hour as needed for congestion. (Patient not taking: Reported on 6/18/2018)     solifenacin (VESICARE) 10 MG tablet Take 1 tablet (10 mg) by mouth daily (Patient not taking: Reported on 6/18/2018)     tolterodine (DETROL LA) 4 MG 24 hr capsule Take 1 capsule (4 mg) by mouth daily (Patient not taking: Reported on 6/18/2018)     No current facility-administered medications for this visit.              Review of Systems:    ROS: 10 point ROS neg other than the symptoms noted above in the HPI and PMH.          Physical Exam:   /89 (BP Location: Right arm, Patient Position: Sitting, Cuff Size: Adult Regular)  Pulse 112  Ht 1.575 m (5' 2\")  Wt 71.4 kg (157 lb 8 oz)  SpO2 96%  BMI 28.81 kg/m2  General: age-appropriate appearing female in NAD sitting in an exam chair.  Respiratory: no increased respiratory " effort  Cardiovascular: well-perfused  Abdomen: soft, non-tender  Motor: Normal in both upper and lower limbs          Data:    Imaging:  Renal/Bladder Ultrasound (Date = 2018):  IMPRESSION:  1. No hydronephrosis.  2. No significant change in asymmetric, atrophic right kidney with  upper pole predominant parenchymal scarring.  3. The left kidney is normal. No hydronephrosis.  4. Bladder was not well visualized, secondary to nondistended state.    Labs:  Creatinine   Date Value Ref Range Status   2018 0.53 0.52 - 1.04 mg/dL Final            Assessment and Plan:   33 year old female with neurogenic bladder secondary to sacral agenesis. Now 27 weeks pregnant with her first pregnancy and complicated urologic surgical history (previous colon bladder augment with appendiceal Mitrofanoff s/p takedown due to dehiscence, autologous fascial bladder neck sling),   -Recommend  to deliver baby due to prior  surgeries including augmentation and sling. Will recommend surgery to be performed locally and will make referral to OB/GYN at [facility]  -Remain off Detrol and other anticholinergic medications as these are generally not considered safe during pregnancy (Detrol is pregnancy category C).  -Continue current CIC regimen.  -Will start daily prophylactic keflex to reduce the risk of UTIs during pregnancy      Kg French MD   2018         =======================================================  As the attending surgeon I, Fady Moore, interviewed and examined the patient. The plan was developed between me and the patient. My findings and plan are as stated by the fellow.    Fady Moore MD

## 2018-07-19 ENCOUNTER — TRANSFERRED RECORDS (OUTPATIENT)
Dept: HEALTH INFORMATION MANAGEMENT | Facility: CLINIC | Age: 33
End: 2018-07-19

## 2018-07-24 ENCOUNTER — OFFICE VISIT (OUTPATIENT)
Dept: OBGYN | Facility: CLINIC | Age: 33
End: 2018-07-24
Payer: COMMERCIAL

## 2018-07-24 VITALS
SYSTOLIC BLOOD PRESSURE: 134 MMHG | HEART RATE: 114 BPM | WEIGHT: 156.8 LBS | HEIGHT: 62 IN | BODY MASS INDEX: 28.85 KG/M2 | DIASTOLIC BLOOD PRESSURE: 84 MMHG

## 2018-07-24 DIAGNOSIS — N39.0 PSEUDOMONAS URINARY TRACT INFECTION: Primary | ICD-10-CM

## 2018-07-24 DIAGNOSIS — B96.5 PSEUDOMONAS URINARY TRACT INFECTION: Primary | ICD-10-CM

## 2018-07-24 RX ORDER — CEFTAZIDIME 2 G/1
2 INJECTION, POWDER, FOR SOLUTION INTRAVENOUS EVERY 12 HOURS
Qty: 1 EACH | Refills: 0 | Status: SHIPPED | OUTPATIENT
Start: 2018-07-24 | End: 2018-08-07

## 2018-07-24 NOTE — PROGRESS NOTES
"Pregnancy transfer of care visit  2018, 1:06 PM     Minh is a 33 year old  at 30w0d by LMP consistent with 7 week US referred to in chart (but not actually documented), also consistent with 20w5d US, who presents for OB transfer of care. She has a complex pelvic surgery history due to (maternal) sacral agenesis, including bladder augmentation with colon, appendiceal Mitrofanoff subsequently taken down for dehiscence, and then an autologous bladder neck sling (all in ) and then Botox injection into her bladder neck area 2016. Also of note she has a solitary left kidney. She is taking prophylactic Keflex. No other medications besides PNV. Stopped all bladder meds with pregnancy.     She feels well today. She denies contractions, leaking fluid, vaginal bleeding, decreased fetal movement. She has not had nausea for many weeks now. No headache, visual changes, SOB, chest pain. She has had urine leakage which is normal for her even outside of pregnancy, and denies back pain outside of her pre-pregnancy normal. No fevers or chills.     Exam  Patient Vitals for the past 12 hrs:   BP Pulse Height Weight   18 1315 134/84 114 1.575 m (5' 2\") 71.1 kg (156 lb 12.8 oz)       Labs on  unless otherwise dated  Cr:  0.53 ()  Hgb: 12.9   Plt:  267  ABO: O pos  Emani: Neg  HIV:  NR  RPR:  Pos > neg TPA  Ru:  Immune  G/C: neg  HBAg: nonreactve  HBAby: reactive    GCT:  162 > has not yet had GTT   GBS: Not collected  UCx: Pseudomonas      USG   Impression:   1. Single living intrauterine pregnancy at 20 weeks 4 days based on today's ultrasound  yielding an EDC of 10/1/2018.    2. No abnormality is identified on the fetal anatomy survey although evaluation is somewhat limited, for example the lumbar spine and sacrum were not well seen.  3. Anterior placenta. Cervix is not clearly identified.    A/P  33 year old  at 30w0d by LMP consistent with reported 7w US here to begin transfer of prenatal " care for complex pelvic/urologic surgical history. Previously received care at [facility] clinic.     #) Pseudomonas UTI  No concern for ascending infection at this time. Previously prescribed cefuroxime by [facility], but this antibiotic does not cover pseudomonas. Because flouroquinolones are contraindicated in pregnancy we would recommend parenteral therapy as soon as possible. I discussed this with her PCP Dr. James at [facility] and mentioned it to Minh. Her urologist Dr. French is aware as well and did not have a strong opinion about how she should be treated. Plan ceftazidime 2g IV q12 hours for a 14 day course, plan repeat UA/UCx at 10-12 days. This was arranged via [facility] home infusion and discussed with a IV infusion pharmacist.     #) Neurogenic bladder, complex bladder surgical history  Strong recommendation from Urology regarding importance of  section. Her urologist (Dr. French) would like to be present at delivery. Minh was understanding of that recommendation and agreed to  section, understanding my explanation that labor could cause stretching and distortion of her anatomy, and in the worst case she would need an emergency  section while laboring. We also discussed that she would require an upper abdominal incision and fundal uterine incision; I discussed this via staff message with Dr. French and he thinks this would be the most prudent plan. He would like to be present at the time of delivery. Minh and I briefly discussed that she would have to deliver future pregnancies at 36 weeks following a fundal incision. She was curious how many pregnancies would be safe with this type of surgery. I told her it depended on the scar tissue that forms.     #) Prenatal care  At recommendation of Urology and Minh's consent we would also recommend transferring her care to the [place]. That way, she could be evaluated and cared for at our triage and clinic, with easier access to her  Urology team, if urgent issues arose.   Failed GCT - will require GTT  Labs as above  Discuss and likely recommend growth US at next visit (scheduled for 1 week as complex patient with multiple issues).     Discussed with Dr. Molly Gregory, PGY4  OB/Gyn  7/24/2018, 5:30 PM    [facility] Staff Note:  Patient was seen by the resident in Continuity of Care Clinic.  I reviewed the history & exam.  The patient's assessment and plan were made jointly.    Lissy Barnes MD  7/24/18

## 2018-07-24 NOTE — MR AVS SNAPSHOT
After Visit Summary   7/24/2018    Minh Altamirano    MRN: [id number]           Patient Information     Date Of Birth          1985        Visit Information        Provider Department      7/24/2018 1:15 PM Josefina Gregory MD [facility]        Today's Diagnoses     Pseudomonas urinary tract infection    -  1       Follow-ups after your visit        Follow-up notes from your care team     Return in about 1 week (around 7/31/2018).      Your next 10 appointments already scheduled     Jul 31, 2018  4:00 PM CDT   RETURN OB with Josefina Gregory MD   [facility] ([facility])    [facility] [address]  [address]  [facility] [id number] [phone]              Who to contact     Please call your clinic at [phone] to:    Ask questions about your health    Make or cancel appointments    Discuss your medicines    Learn about your test results    Speak to your doctor            Additional Information About Your Visit        [employer] Information     [employer] gives you secure access to your electronic health record. If you see a primary care provider, you can also send messages to your care team and make appointments. If you have questions, please call your primary care clinic.  If you do not have a primary care provider, please call [phone] and they will assist you.      [employer] is an electronic gateway that provides easy, online access to your medical records. With [employer], you can request a clinic appointment, read your test results, renew a prescription or communicate with your care team.     To access your existing account, please contact your [facility] or call [phone] for assistance.        Care EveryWhere ID     This is your Care EveryWhere ID. This could be used by other organizations to access your [place] medical records  [id number]        Your Vitals Were     Pulse Height  "BMI (Body Mass Index)             114 1.575 m (5' 2\") 28.68 kg/m2          Blood Pressure from Last 3 Encounters:   07/24/18 134/84   07/09/18 133/89   06/18/18 125/81    Weight from Last 3 Encounters:   07/24/18 71.1 kg (156 lb 12.8 oz)   07/09/18 71.4 kg (157 lb 8 oz)   06/18/18 70.6 kg (155 lb 11.2 oz)              Today, you had the following     No orders found for display         Today's Medication Changes          These changes are accurate as of 7/24/18 11:59 PM.  If you have any questions, ask your nurse or doctor.               Start taking these medicines.        Dose/Directions    cefTAZidime 2 g Solr   Used for:  Pseudomonas urinary tract infection   Started by:  Josefina Gregory MD        Dose:  2 g   Inject 2 g into the vein every 12 hours for 14 days   Quantity:  1 each   Refills:  0            Where to get your medicines      Some of these will need a paper prescription and others can be bought over the counter.  Ask your nurse if you have questions.     Bring a paper prescription for each of these medications     cefTAZidime 2 g Solr                Primary Care Provider Fax #    Physician No Ref-Primary [phone]       No address on file        Equal Access to Services     IJEOMA SANCHEZ AH: Criss Morris, walokesh elliott, qaybta kaalmada adeegyada, mariah reeves. So [facility] [phone].    ATENCIÓN: Si habla español, tiene a georges disposición servicios gratuitos de asistencia lingüística. Llame al [phone].    We comply with applicable federal civil rights laws and [place] laws. We do not discriminate on the basis of race, color, national origin, age, disability, sex, sexual orientation, or gender identity.            Thank you!     Thank you for choosing [facility]  for your care. Our goal is always to provide you with excellent care. Hearing back from our patients is one way we can continue to improve our services. Please take a few " minutes to complete the written survey that you may receive in the mail after your visit with us. Thank you!             Your Updated Medication List - Protect others around you: Learn how to safely use, store and throw away your medicines at www.disposemymeds.org.          This list is accurate as of 7/24/18 11:59 PM.  Always use your most recent med list.                   Brand Name Dispense Instructions for use Diagnosis    cefTAZidime 2 g Solr     1 each    Inject 2 g into the vein every 12 hours for 14 days    Pseudomonas urinary tract infection       cephALEXin 500 MG capsule    KEFLEX    30 capsule    Take 1 capsule (500 mg) by mouth daily    Sacral agenesis with vertebral anomaly       mirabegron 25 MG 24 hr tablet    MYRBETRIQ    30 tablet    Take 1 tablet (25 mg) by mouth daily    Neurogenic bladder, Incontinence in female       polyethylene glycol powder    MIRALAX    510 g    Take 17 g by mouth daily    Constipation       PRENATAL VITAMINS PO           ranitidine 150 MG tablet    ZANTAC    20 tablet    Take 1 tablet by mouth 2 times daily for 10 days.        sennosides 8.6 MG tablet    SENOKOT    180 tablet    Take 1 tablet by mouth 2 times daily as needed for constipation.    Chronic constipation       sodium chloride 0.65 % nasal spray    OCEAN    1 Bottle    Spray 1 spray in nostril every hour as needed for congestion.    Neurogenic bladder, NOS, Other urinary incontinence       solifenacin 10 MG tablet    VESICARE    90 tablet    Take 1 tablet (10 mg) by mouth daily    Neurogenic bladder, Urinary urgency, Urge incontinence       tolterodine 4 MG 24 hr capsule    DETROL LA    90 capsule    Take 1 capsule (4 mg) by mouth daily    Neurogenic bladder

## 2018-07-25 ENCOUNTER — HOME INFUSION (PRE-WILLOW HOME INFUSION) (OUTPATIENT)
Dept: PHARMACY | Facility: CLINIC | Age: 33
End: 2018-07-25

## 2018-07-25 ENCOUNTER — TELEPHONE (OUTPATIENT)
Dept: OBGYN | Facility: CLINIC | Age: 33
End: 2018-07-25

## 2018-07-25 NOTE — TELEPHONE ENCOUNTER
Received call from [facility] RN, stating he called Minh to set up first homecare appointment and was told by Minh that she is inpatient at [facility] and receiving IV antibiotics.  Due to this, [facility] needs to cancel orders. If their services are needed after Minh is discharged, a new order needs to be placed as plan may be different since she is inpatient.    Nurse agreed with plan.    Forwarding to Dr. Gregory and Dr. Barnes.

## 2018-07-25 NOTE — TELEPHONE ENCOUNTER
Received callback from Taras at [facility] that he just received a callback and Minh is NOT being admitted at [facility] so [facility] will still see her likely tonight - the orders [facility] wrote are still active. She has received the first dose of IV antibiotics at [facility].  Nothing further is needed by [facility] at this point.    Forwarding to Dr. Barnes and Dr. Gregory.

## 2018-07-26 ENCOUNTER — HOME INFUSION (PRE-WILLOW HOME INFUSION) (OUTPATIENT)
Dept: PHARMACY | Facility: CLINIC | Age: 33
End: 2018-07-26

## 2018-07-26 NOTE — PROGRESS NOTES
This is a recent snapshot of the patient's [facility] medical record.  For current drug dose and complete information and questions, call [phone] or In Basket pool, [facility] home infusion ([id number])  CSN Number:  [id number]

## 2018-07-27 ENCOUNTER — HOME INFUSION (PRE-WILLOW HOME INFUSION) (OUTPATIENT)
Dept: PHARMACY | Facility: CLINIC | Age: 33
End: 2018-07-27

## 2018-07-27 ENCOUNTER — TELEPHONE (OUTPATIENT)
Dept: OBGYN | Facility: CLINIC | Age: 33
End: 2018-07-27

## 2018-07-27 NOTE — PROGRESS NOTES
This is a recent snapshot of the patient's [facility] medical record.  For current drug dose and complete information and questions, call [phone] or In Basket pool, fv home infusion ([id number])  CSN Number:  [id number]

## 2018-07-27 NOTE — TELEPHONE ENCOUNTER
Received message on triage voicemail from [facility] at  [facility] ([phone]) stating pt midline has clotted and they are needing orders for a new midline. Due to this, pt missed dose of antibiotic yesterday.    Spoke with [facility] at  [facility]. He reports that since he left the voicemail message he received orders from Dr. David Councilman's office as pt is there and getting new midline and will receive antibiotics. Dr. Councilman (assume with [facility]) is taking over management.    Discussed with Dr. Kent who agreed with plan.

## 2018-07-30 NOTE — PROGRESS NOTES
This is a recent snapshot of the patient's [facility] medical record.  For current drug dose and complete information and questions, call [phone] or In Basket pool, fv home infusion ([id number])  CSN Number:  [id number]

## 2018-07-31 ENCOUNTER — OFFICE VISIT (OUTPATIENT)
Dept: OBGYN | Facility: CLINIC | Age: 33
End: 2018-07-31
Payer: COMMERCIAL

## 2018-07-31 VITALS
WEIGHT: 160.2 LBS | SYSTOLIC BLOOD PRESSURE: 128 MMHG | DIASTOLIC BLOOD PRESSURE: 84 MMHG | HEIGHT: 62 IN | BODY MASS INDEX: 29.48 KG/M2 | HEART RATE: 76 BPM

## 2018-07-31 DIAGNOSIS — N39.0 PSEUDOMONAS URINARY TRACT INFECTION: Primary | ICD-10-CM

## 2018-07-31 DIAGNOSIS — B96.5 PSEUDOMONAS URINARY TRACT INFECTION: Primary | ICD-10-CM

## 2018-07-31 DIAGNOSIS — O99.810 ABNORMAL MATERNAL GLUCOSE TOLERANCE, ANTEPARTUM: ICD-10-CM

## 2018-07-31 NOTE — PATIENT INSTRUCTIONS
- schedule Diabetes Education  - schedule appointment in 2 weeks with me or other provider  - Schedule growth ultrasound

## 2018-07-31 NOTE — MR AVS SNAPSHOT
After Visit Summary   7/31/2018    Minh Altamirano    MRN: [id number]           Patient Information     Date Of Birth          1985        Visit Information        Provider Department      7/31/2018 4:00 PM Josefina Gregory MD [facility]        Today's Diagnoses     Pseudomonas urinary tract infection    -  1    Abnormal maternal glucose tolerance, antepartum          Care Instructions    - schedule Diabetes Education  - schedule appointment in 2 weeks with me or other provider  - Schedule growth ultrasound          Follow-ups after your visit        Additional Services     Diabetes Educator Referral       DIABETES SELF MANAGEMENT TRAINING (DSMT)      Your provider has referred you to Diabetes Education: PREFERRED PROVIDERS:    A  will call you to make your appointment. If it has been more than 3 business days since your referral was placed, please call the above phone number to schedule.    Type of training and number of hours: GDM new diagnosis    Diabetes Type: Gestational Diabetes        Diabetes Education Topics: Comprehensive Knowledge Assessment and Instruction and Blood glucose meter instruction     Special Educational Needs Requiring Individual DSMT: Intepreter needed for Somaliu (language)      Please be aware that coverage of these services is subject to the terms and limitations of your health insurance plan.  Call member services at your health plan to determine Diabetes Self-Management Training (Codes  and ) and Medical Nutrition Therapy (Codes 66852 and 53993) benefits and ask which blood glucose monitor brands are covered by your plan.  Please bring the following with you to your appointment:    (1)  List of current medications   (2)  List of Blood Glucose Monitor brands that are covered by your insurance plan  (3)  Blood Glucose Monitor and log book  (4)   Food records for the 3 days prior to your visit    The Certified Diabetes Educator may make  "diabetes medication adjustments per the CDE Protocol and Collaborative Practice Agreement.                  Future tests that were ordered for you today     Open Future Orders        Priority Expected Expires Ordered    US OB > 14 Weeks Complete Single Routine  7/31/2019 7/31/2018            Who to contact     Please call your clinic at [phone] to:    Ask questions about your health    Make or cancel appointments    Discuss your medicines    Learn about your test results    Speak to your doctor            Additional Information About Your Visit        [employer] Information     [employer] gives you secure access to your electronic health record. If you see a primary care provider, you can also send messages to your care team and make appointments. If you have questions, please call your primary care clinic.  If you do not have a primary care provider, please call [phone] and they will assist you.      [employer] is an electronic gateway that provides easy, online access to your medical records. With [employer], you can request a clinic appointment, read your test results, renew a prescription or communicate with your care team.     To access your existing account, please contact your [facility] or call [phone] for assistance.        Care EveryWhere ID     This is your Care EveryWhere ID. This could be used by other organizations to access your [place] medical records  [id number]        Your Vitals Were     Pulse Height BMI (Body Mass Index)             76 1.575 m (5' 2\") 29.3 kg/m2          Blood Pressure from Last 3 Encounters:   07/31/18 128/84   07/24/18 134/84   07/09/18 133/89    Weight from Last 3 Encounters:   07/31/18 72.7 kg (160 lb 3.2 oz)   07/24/18 71.1 kg (156 lb 12.8 oz)   07/09/18 71.4 kg (157 lb 8 oz)              We Performed the Following     Diabetes Educator Referral     Urine Culture Aerobic Bacterial        Primary Care Provider Fax #    Physician No " Ref-Primary [phone]       No address on file        Equal Access to Services     IJEOMA GIRALDOSAEED : Hadii nery bradley morenita Morris, waamrikda luqlevi, xanderta blanka pauliebobby, waxjairo mariain hayaacharles apodacaadelina stovall lafelicharles reeves. So [facility] [phone].    ATENCIÓN: Si habla español, tiene a goerges disposición servicios gratuitos de asistencia lingüística. Llame al [phone].    We comply with applicable federal civil rights laws and [place] laws. We do not discriminate on the basis of race, color, national origin, age, disability, sex, sexual orientation, or gender identity.            Thank you!     Thank you for choosing [facility]  for your care. Our goal is always to provide you with excellent care. Hearing back from our patients is one way we can continue to improve our services. Please take a few minutes to complete the written survey that you may receive in the mail after your visit with us. Thank you!             Your Updated Medication List - Protect others around you: Learn how to safely use, store and throw away your medicines at www.disposemymeds.org.          This list is accurate as of 7/31/18  5:22 PM.  Always use your most recent med list.                   Brand Name Dispense Instructions for use Diagnosis    cefTAZidime 2 g Solr     1 each    Inject 2 g into the vein every 12 hours for 14 days    Pseudomonas urinary tract infection       cephALEXin 500 MG capsule    KEFLEX    30 capsule    Take 1 capsule (500 mg) by mouth daily    Sacral agenesis with vertebral anomaly       mirabegron 25 MG 24 hr tablet    MYRBETRIQ    30 tablet    Take 1 tablet (25 mg) by mouth daily    Neurogenic bladder, Incontinence in female       polyethylene glycol powder    MIRALAX    510 g    Take 17 g by mouth daily    Constipation       PRENATAL VITAMINS PO           ranitidine 150 MG tablet    ZANTAC    20 tablet    Take 1 tablet by mouth 2 times daily for 10 days.        sennosides 8.6 MG tablet    SENOKOT     180 tablet    Take 1 tablet by mouth 2 times daily as needed for constipation.    Chronic constipation       sodium chloride 0.65 % nasal spray    OCEAN    1 Bottle    Spray 1 spray in nostril every hour as needed for congestion.    Neurogenic bladder, NOS, Other urinary incontinence       solifenacin 10 MG tablet    VESICARE    90 tablet    Take 1 tablet (10 mg) by mouth daily    Neurogenic bladder, Urinary urgency, Urge incontinence       tolterodine 4 MG 24 hr capsule    DETROL LA    90 capsule    Take 1 capsule (4 mg) by mouth daily    Neurogenic bladder

## 2018-08-01 NOTE — PROGRESS NOTES
"Prenatal visit  2018     Minh is a 33 year old  at 31w0d by LMP consistent with 7 week US referred to in chart (but not actually documented), also consistent with 20w5d US, who presents for OB transfer of care.     Pregnancy notable for     - Complex pelvic surgery history due to (maternal) sacral agenesis, including bladder augmentation with colon, appendiceal Mitrofanoff subsequently taken down for dehiscence, and then an autologous bladder neck sling (all in ) and then Botox injection into her bladder neck area 2016. Also of note she has a solitary left kidney. She continues taking prophylactic Keflex.   - New diagnosis of GDM on failed GTT  at [facility]    As last week, she feels well today. Denies headache, blurry vision, chest pain, RUQ/epigastric pain, nausea/vomiting. Mild low back pain, no mid back or CVA pain. No fevers or chills. No contractions, bleeding, leaking fluid. Still leaks urine sometimes, which is normal for her.     Exam  Patient Vitals for the past 12 hrs:   BP Pulse Height Weight   18 1623 128/84 76 1.575 m (5' 2\") 72.7 kg (160 lb 3.2 oz)       Labs on  unless otherwise dated  Cr:  0.53 ()  Hgb: 12.9   Plt:  267  ABO: O pos  Emani: Neg  HIV:  NR  RPR:  Pos > neg TPA  Ru:  Immune  G/C: neg  HBAg: nonreactve  HBAby: reactive    GCT:  162 > failed  GTT vailues  GBS: Not collected  UCx: Pseudomonas      USG   Impression:   1. Single living intrauterine pregnancy at 20 weeks 4 days based on today's ultrasound  yielding an EDC of 10/1/2018.    2. No abnormality is identified on the fetal anatomy survey although evaluation is somewhat limited, for example the lumbar spine and sacrum were not well seen.  3. Anterior placenta. Cervix is not clearly identified.    A/P  33 year old  at 31w0d by LMP consistent with reported 7w US here to begin transfer of prenatal care for complex pelvic/urologic surgical history.    #) Pseudomonas UTI  Please see previous note. " Plan UCx at next visit (ordered, collect with diabetic educator visit). Continue ceftazidime 2g IV q12 hours for a 14 day course. Appreciate [facility] home infusion.     #) Neurogenic bladder, complex bladder surgical history  Strong recommendation from Urology regarding importance of  section. Her urologist (Dr. French) would like to be present at delivery. Because of her surgical history she would require an upper abdominal incision and fundal uterine incision; this was previously discussed via staff message with Dr. French and he thinks this would be the most prudent plan. He would like to be present at the time of delivery. Also previously discussed that she would have to deliver future pregnancies at 36 weeks following a fundal incision. See prior notes    #) Prenatal care  At recommendation of Urology and Deko's consent we would also recommend transferring her care to the [place]. That way, she could be evaluated and cared for at our triage and clinic, with easier access to her Urology team, if urgent issues arose.   Labs as above  - Ordered growth US given GDM diagnosis. Start BPPs next week -- weekly unless quickly progresses to GDMA2.     #) GDM  Recently failed GTT (2/4 values) [place] [facility]. Will schedule with DM educator. Briefly reviewed risks of GDM (diabetic fetopathy,  hypoglycemia) and ways to work on keeping BS under control until her Educator appointment (fewer carbs, more veggies and protein, as well as walking after meals).     RTC for  - GDM education ASAP  - Growth US within 1 week  - OB provider 2 weeks    Discussed with .     Josefina Landin MD PGY4  OB/Gyn  2018, 7:07 PM    The Patient was seen in Resident Continuity Clinic by JOSEFINA LANDIN.  I reviewed the history & exam. Assessment and plan were jointly made.    Pamella Ames MD

## 2018-08-02 ENCOUNTER — HOME INFUSION (PRE-WILLOW HOME INFUSION) (OUTPATIENT)
Dept: PHARMACY | Facility: CLINIC | Age: 33
End: 2018-08-02

## 2018-08-02 LAB
BACTERIA SPEC CULT: NORMAL
SPECIMEN SOURCE: NORMAL

## 2018-08-03 ENCOUNTER — RADIANT APPOINTMENT (OUTPATIENT)
Dept: ULTRASOUND IMAGING | Facility: CLINIC | Age: 33
End: 2018-08-03
Attending: OBSTETRICS & GYNECOLOGY
Payer: COMMERCIAL

## 2018-08-03 DIAGNOSIS — O99.810 ABNORMAL MATERNAL GLUCOSE TOLERANCE, ANTEPARTUM: ICD-10-CM

## 2018-08-03 PROCEDURE — 76816 OB US FOLLOW-UP PER FETUS: CPT

## 2018-08-07 ENCOUNTER — TELEPHONE (OUTPATIENT)
Dept: OBGYN | Facility: CLINIC | Age: 33
End: 2018-08-07

## 2018-08-07 ENCOUNTER — HOME INFUSION (PRE-WILLOW HOME INFUSION) (OUTPATIENT)
Dept: PHARMACY | Facility: CLINIC | Age: 33
End: 2018-08-07

## 2018-08-07 ENCOUNTER — OFFICE VISIT (OUTPATIENT)
Dept: EDUCATION SERVICES | Facility: CLINIC | Age: 33
End: 2018-08-07
Attending: NUTRITIONIST
Payer: COMMERCIAL

## 2018-08-07 DIAGNOSIS — N39.0 URINARY TRACT INFECTION: Primary | ICD-10-CM

## 2018-08-07 DIAGNOSIS — O24.419 GESTATIONAL DIABETES: Primary | ICD-10-CM

## 2018-08-07 PROCEDURE — G0108 DIAB MANAGE TRN  PER INDIV: HCPCS | Performed by: NUTRITIONIST

## 2018-08-07 NOTE — MR AVS SNAPSHOT
After Visit Summary   8/7/2018    Minh Altamirano    MRN: [id number]           Patient Information     Date Of Birth          1985        Visit Information        Provider Department      8/7/2018 8:15 AM Arianna Croft RD; [employer] [facility], [place],  Diabetes Education        Today's Diagnoses     Gestational diabetes    -  1      Care Instructions    Test your blood sugar 4 times per day.  The goal for your fasting blood sugar (before breakfast) is less than 95.  The goal for your blood sugar one hour after meals is less than 140.  Two hours after - less than 120.     Test your ketones every morning for the first week.    2-3 carbs with breakfast  3-4 carbs with lunch  3-4 carbs with dinner  1-2 carbs with snack    Keep rice and pasta portions to no more than about 1 cup.  Good job with eliminating sugar.    Walk after meals when able.     You can call Heather Roberto (our nurse educator) or your clinic with any questions or concerns this week. Heather's number is [phone].          Follow-ups after your visit        Your next 10 appointments already scheduled     Aug 10, 2018 11:00 AM CDT   (Arrive by 10:45 AM)   US OB SINGLE FOLLOW UP REPEAT with [id number]   [facility] ([facility])    [facility]  3rd Floor, [address]  [address] [place] [address] [phone]           Please bring a list of your medicines (including vitamins, minerals and over-the-counter drugs). Also, tell your doctor about any allergies you may have. Wear comfortable clothes and leave your valuables at home.  Drink four 8-ounce glasses of fluid an hour before your exam. If you need to empty your bladder before your exam, try to release only a little urine. Then, drink another glass of fluid.  You may have up to two family members in the exam room. If you bring a small child, an adult must be there to care for him or her. No video or  camera photography during the procedure.  Please call the Imaging Department at your exam site with any questions.            Aug 14, 2018  1:15 PM CDT   RETURN OB with Josefina Gregory MD   [facility] ([facility])    [facility]  [address]  [address]  [facility] [address]   [phone]            Aug 17, 2018 10:30 AM CDT   Office Visit with Arianna Croft RD   [facility]  [facility] ([facility])    [facility]  [address]  [facility] [address] [phone]           Bring a current list of meds and any records pertaining to this visit. For Physicals, please bring immunization records and any forms needing to be filled out. Please arrive 10 minutes early to complete paperwork.              Who to contact     If you have questions or need follow up information about today's clinic visit or your schedule please contact [facility]  DIABETES EDUCATION directly at [phone].  Normal or non-critical lab and imaging results will be communicated to you by i3 membranehart, letter or phone within 4 business days after the clinic has received the results. If you do not hear from us within 7 days, please contact the clinic through [employer] or phone. If you have a critical or abnormal lab result, we will notify you by phone as soon as possible.  Submit refill requests through [employer] or call your pharmacy and they will forward the refill request to us. Please allow 3 business days for your refill to be completed.          Additional Information About Your Visit        [employer] Information     [employer] gives you secure access to your electronic health record. If you see a primary care provider, you can also send messages to your care team and make appointments. If you have questions, please call your primary care clinic.  If you do not have a primary care provider, please call [phone] and  they will assist you.        Care EveryWhere ID     This is your Care EveryWhere ID. This could be used by other organizations to access your [place] medical records  [id number]         Blood Pressure from Last 3 Encounters:   07/31/18 128/84   07/24/18 134/84   07/09/18 133/89    Weight from Last 3 Encounters:   07/31/18 72.7 kg (160 lb 3.2 oz)   07/24/18 71.1 kg (156 lb 12.8 oz)   07/09/18 71.4 kg (157 lb 8 oz)              We Performed the Following     DIABETES EDUCATION - Individual  []          Today's Medication Changes          These changes are accurate as of 8/7/18 10:04 AM.  If you have any questions, ask your nurse or doctor.               Start taking these medicines.        Dose/Directions    acetone (Urine) test Strp   Used for:  Gestational diabetes        Dose:  1 strip   1 strip by In Vitro route daily   Quantity:  20 each   Refills:  1       blood glucose monitoring lancets   Used for:  Gestational diabetes        Use to test blood sugar 4 times daily or as directed.  Ok to substitute alternative if insurance prefers.   Quantity:  200 each   Refills:  3       blood glucose monitoring test strip   Commonly known as:  ONETOUCH VERIO IQ   Used for:  Gestational diabetes        Use to test blood sugars 4 times daily or as directed.   Quantity:  200 strip   Refills:  3            Where to get your medicines      These medications were sent to [facility] - [place], [place] - [address] [address]     Phone:  [phone]     acetone (Urine) test Strp    blood glucose monitoring lancets    blood glucose monitoring test strip                Primary Care Provider Fax #    Physician No Ref-Primary [phone]       No address on file        Equal Access to Services     RADHA SANCHEZ : Criss Morris, amaury elliott, qamariah cruz. So [facility] [phone].    ATENCIÓN: Megan bone  español, tiene a georges disposición servicios gratuitos de asistencia lingüística. Carlton bright [phone].    We comply with applicable federal civil rights laws and [place] laws. We do not discriminate on the basis of race, color, national origin, age, disability, sex, sexual orientation, or gender identity.            Thank you!     Thank you for choosing [facility]  DIABETES EDUCATION  for your care. Our goal is always to provide you with excellent care. Hearing back from our patients is one way we can continue to improve our services. Please take a few minutes to complete the written survey that you may receive in the mail after your visit with us. Thank you!             Your Updated Medication List - Protect others around you: Learn how to safely use, store and throw away your medicines at www.disposemymeds.org.          This list is accurate as of 8/7/18 10:04 AM.  Always use your most recent med list.                   Brand Name Dispense Instructions for use Diagnosis    acetone (Urine) test Strp     20 each    1 strip by In Vitro route daily    Gestational diabetes       blood glucose monitoring lancets     200 each    Use to test blood sugar 4 times daily or as directed.  Ok to substitute alternative if insurance prefers.    Gestational diabetes       blood glucose monitoring test strip    ONETOUCH VERIO IQ    200 strip    Use to test blood sugars 4 times daily or as directed.    Gestational diabetes       cefTAZidime 2 g Solr     1 each    Inject 2 g into the vein every 12 hours for 14 days    Pseudomonas urinary tract infection       cephALEXin 500 MG capsule    KEFLEX    30 capsule    Take 1 capsule (500 mg) by mouth daily    Sacral agenesis with vertebral anomaly       mirabegron 25 MG 24 hr tablet    MYRBETRIQ    30 tablet    Take 1 tablet (25 mg) by mouth daily    Neurogenic bladder, Incontinence in female       polyethylene glycol powder    MIRALAX    510 g    Take 17 g by mouth daily     Constipation       PRENATAL VITAMINS PO           ranitidine 150 MG tablet    ZANTAC    20 tablet    Take 1 tablet by mouth 2 times daily for 10 days.        sennosides 8.6 MG tablet    SENOKOT    180 tablet    Take 1 tablet by mouth 2 times daily as needed for constipation.    Chronic constipation       sodium chloride 0.65 % nasal spray    OCEAN    1 Bottle    Spray 1 spray in nostril every hour as needed for congestion.    Neurogenic bladder, NOS, Other urinary incontinence       solifenacin 10 MG tablet    VESICARE    90 tablet    Take 1 tablet (10 mg) by mouth daily    Neurogenic bladder, Urinary urgency, Urge incontinence       tolterodine 4 MG 24 hr capsule    DETROL LA    90 capsule    Take 1 capsule (4 mg) by mouth daily    Neurogenic bladder

## 2018-08-07 NOTE — TELEPHONE ENCOUNTER
Returned call to Gregoria to let her know after last dose of antibiotic tomorrow, Minh's PIC line will need to be maintained until we get a negative UC .Earliest Friday.She indicated understanding and agreed with plan.      Called and spoke to Minh with [facility]  to let her know she needs to go to out patient lab tomorrow am(scheduled for 10 am per Minh needing ucare ride) for UC.  Informed her the PIC line will need to stay in until we get final UC results- being negative, which will be Friday the earliest.Pt indicated understanding and agreed with plan.

## 2018-08-07 NOTE — TELEPHONE ENCOUNTER
----- Message from Erin Jurado sent at 8/7/2018 11:33 AM CDT -----  Regarding: Call from  pharmacist  In-patient Jhonathan PharmD (Gregoria) calling asking if the patient has completed her therapy. Asking if it is a hard stop and can pt have her PICC line removed?    Ph: [phone]

## 2018-08-07 NOTE — PROGRESS NOTES
"  Diabetes Self-Management Training - Gestational Diabetes    SUBJECTIVE/OBJECTIVE:  Minh Altamirano presents today for education related to gestational diabetes.  She is accompanied by self  This is her first pregnancy  Patient's gestational diabetes management related comments/concerns: None    Patient's emotional response to diabetes: expresses readiness to learn    Relevant co-morbidities and related health problems:  Significant for:  none    Current health service and resource utilization related to diabetes (hyperglycemia, hypoglycemia, etc.):  None    There were no vitals taken for this visit.    Pre pregnancy weight: 145#    Weight gain 155 lbs at 32 weeks gestation.    Estimated Date of Delivery: Oct 2, 2018    Fasting Glucose  Lab Results   Component Value Date     06/18/2018       1 hour OGTT  No results found for: GLU1]    3 hour OGTT    Fasting  No results found for: GLF]    1 hour  No results found for: GL1    2 hour  No results found for: GL2]    3 hour  No results found for: GL3    History   Smoking Status     Never Smoker   Smokeless Tobacco     Never Used       Lifestyle and Health Behaviors:  Physical Activity: started walking  Walked for thirty minutes twice yesterday    Nutrition:  Patient eats 3 meals and snacks per day.    Breakfast:  2-3 injera or whole wheat toast and oil. Tea. Stopped adding sugar to the injera and the tea.  Lunch and dinner is bread or rice or pasta with goat meat and veg/salad and half banana  Snacks: fruit     Other time(s) food is eaten? no     Beverages: 2-3 cups milk per day, water  Stopped drinking sweet tea    Patient also eliminated rice and pasta and is just eating bread instead now.     Socio/Economic/Cultural considerations:  Support System: not assessed    Cultural Influences/Ethnic Background:  [place]    Health Literacy/Numeracy:  \"With diabetes, it's helpful to use forms and log books to write down blood sugars and what you're eating at times to help " understand how foods affect your blood sugars. With this in mind:    How confident are you at filling out medical forms, such as these by yourself?   Comfortable speaking and reading English but prefers to have an  present.     Health Beliefs and Attitudes:   Stage of Change: ACTION (Actively working towards change)    INTERVENTION:  Patient was instructed on One Touch Verio meter and was able to provide an accurate return demonstration. Patient's blood glucose reading today was 86 mg/dL fasting.    Educational topics covered today:  GDM diagnosis, pathophysiology, Risks and Complications of GDM, Means of controlling GDM, Using a Blood Glucose Monitor, Blood Glucose Goals, Logging and Interpreting Glucose Results, Ketone Testing, When to Call a Diabetes Educator or OB Provider, Healthy Eating During Pregnancy, Counting Carbohydrates, Meal Planning for GDM, and Physical Activity    Educational materials provided today:   Jhonathan Understanding Gestational Diabetes  GDM Log Book  One Touch Verio meter kit    Pt verbalized understanding of concepts discussed and recommendations provided today.     PLAN:  See AVS    Call/e-mail/MyChart message diabetes educator if 3 or more blood sugars are above the goal in 1 week or if ketones are positive.     Call/e-mail/MyChart message with questions/concerns.    FOLLOW-UP:  Follow up with diabetes educator in 1 week.    Time Spent: 90 minutes  Encounter type: Individual    Any diabetes medication dose changes were made via the CDE Protocol and Collaborative Practice Agreement with the patient's referring provider. A copy of this encounter was shared with the provider.

## 2018-08-07 NOTE — PATIENT INSTRUCTIONS
Test your blood sugar 4 times per day.  The goal for your fasting blood sugar (before breakfast) is less than 95.  The goal for your blood sugar one hour after meals is less than 140.  Two hours after - less than 120.     Test your ketones every morning for the first week.    2-3 carbs with breakfast  3-4 carbs with lunch  3-4 carbs with dinner  1-2 carbs with snack    Keep rice and pasta portions to no more than about 1 cup.  Good job with eliminating sugar.    Walk after meals when able.     You can call Heather Roberto (our nurse educator) or your clinic with any questions or concerns this week. Heather's number is [phone].

## 2018-08-08 ENCOUNTER — TELEPHONE (OUTPATIENT)
Dept: EDUCATION SERVICES | Facility: CLINIC | Age: 33
End: 2018-08-08

## 2018-08-08 DIAGNOSIS — O99.810 ABNORMAL MATERNAL GLUCOSE TOLERANCE, ANTEPARTUM: Primary | ICD-10-CM

## 2018-08-08 RX ORDER — BLOOD-GLUCOSE METER
EACH MISCELLANEOUS
Qty: 1 KIT | Refills: 0 | Status: SHIPPED | OUTPATIENT
Start: 2018-08-08 | End: 2019-11-18

## 2018-08-09 ENCOUNTER — HOME INFUSION (PRE-WILLOW HOME INFUSION) (OUTPATIENT)
Dept: PHARMACY | Facility: CLINIC | Age: 33
End: 2018-08-09

## 2018-08-10 ENCOUNTER — RADIANT APPOINTMENT (OUTPATIENT)
Dept: ULTRASOUND IMAGING | Facility: CLINIC | Age: 33
End: 2018-08-10
Attending: OBSTETRICS & GYNECOLOGY
Payer: COMMERCIAL

## 2018-08-10 ENCOUNTER — HOME INFUSION (PRE-WILLOW HOME INFUSION) (OUTPATIENT)
Dept: PHARMACY | Facility: CLINIC | Age: 33
End: 2018-08-10

## 2018-08-10 ENCOUNTER — TELEPHONE (OUTPATIENT)
Dept: OBGYN | Facility: CLINIC | Age: 33
End: 2018-08-10

## 2018-08-10 DIAGNOSIS — O09.93 HRP (HIGH RISK PREGNANCY), THIRD TRIMESTER: ICD-10-CM

## 2018-08-10 DIAGNOSIS — Z01.818 PREOPERATIVE EVALUATION TO RULE OUT SURGICAL CONTRAINDICATION: ICD-10-CM

## 2018-08-10 DIAGNOSIS — O99.810 ABNORMAL MATERNAL GLUCOSE TOLERANCE, ANTEPARTUM: ICD-10-CM

## 2018-08-10 PROCEDURE — 76819 FETAL BIOPHYS PROFIL W/O NST: CPT

## 2018-08-13 NOTE — PROGRESS NOTES
This is a recent snapshot of the patient's [facility] medical record.  For current drug dose and complete information and questions, call [phone] or In Basket pool, [facility] home infusion ([id number])  CSN Number:  [id number]

## 2018-08-13 NOTE — PROGRESS NOTES
This is a recent snapshot of the patient's [facility] medical record.  For current drug dose and complete information and questions, call [phone] or In Basket pool, [facility] home infusion ([id number])  CSN Number:  [id number]

## 2018-08-14 ENCOUNTER — OFFICE VISIT (OUTPATIENT)
Dept: OBGYN | Facility: CLINIC | Age: 33
End: 2018-08-14
Payer: COMMERCIAL

## 2018-08-14 VITALS
BODY MASS INDEX: 28.37 KG/M2 | HEIGHT: 62 IN | SYSTOLIC BLOOD PRESSURE: 129 MMHG | WEIGHT: 154.2 LBS | DIASTOLIC BLOOD PRESSURE: 83 MMHG | HEART RATE: 108 BPM

## 2018-08-14 DIAGNOSIS — O99.810 ABNORMAL MATERNAL GLUCOSE TOLERANCE, ANTEPARTUM: Primary | ICD-10-CM

## 2018-08-14 NOTE — MR AVS SNAPSHOT
After Visit Summary   8/14/2018    Minh Altamirano    MRN: [id number]           Patient Information     Date Of Birth          1985        Visit Information        Provider Department      8/14/2018 1:15 PM Josefina Gregory MD [facility]        Today's Diagnoses     Abnormal maternal glucose tolerance, antepartum    -  1      Care Instructions    - Keep working hard on your diet. Try vegetables, salad, hard boiled eggs, or nuts for a snack. No medicines for now.   - Keep 1 hour blood glucose less than 140.   - Follow up 1 weeks            Follow-ups after your visit        Follow-up notes from your care team     Return in about 1 week (around 8/21/2018).      Your next 10 appointments already scheduled     Aug 17, 2018 10:30 AM CDT   Office Visit with Arianna Croft RD   [facility], [place],  Diabetes Education ([facility])    [address] [place] [address] [phone]           Bring a current list of meds and any records pertaining to this visit. For Physicals, please bring immunization records and any forms needing to be filled out. Please arrive 10 minutes early to complete paperwork.              Who to contact     Please call your clinic at [phone] to:    Ask questions about your health    Make or cancel appointments    Discuss your medicines    Learn about your test results    Speak to your doctor            Additional Information About Your Visit        MyChart Information     [employer] gives you secure access to your electronic health record. If you see a primary care provider, you can also send messages to your care team and make appointments. If you have questions, please call your primary care clinic.  If you do not have a primary care provider, please call [phone] and they will assist you.      [employer] is an electronic gateway that provides easy, online access to your medical records.  "With MyChart, you can request a clinic appointment, read your test results, renew a prescription or communicate with your care team.     To access your existing account, please contact your [facility] or call [phone] for assistance.        Care EveryWhere ID     This is your Care EveryWhere ID. This could be used by other organizations to access your [place] medical records  [id number]        Your Vitals Were     Pulse Height BMI (Body Mass Index)             108 1.575 m (5' 2\") 28.2 kg/m2          Blood Pressure from Last 3 Encounters:   08/14/18 129/83   07/31/18 128/84   07/24/18 134/84    Weight from Last 3 Encounters:   08/14/18 69.9 kg (154 lb 3.2 oz)   07/31/18 72.7 kg (160 lb 3.2 oz)   07/24/18 71.1 kg (156 lb 12.8 oz)              Today, you had the following     No orders found for display         Today's Medication Changes          These changes are accurate as of 8/14/18  1:39 PM.  If you have any questions, ask your nurse or doctor.               These medicines have changed or have updated prescriptions.        Dose/Directions    * blood glucose monitoring lancets   This may have changed:  Another medication with the same name was added. Make sure you understand how and when to take each.   Used for:  Gestational diabetes   Changed by:  Josefina Gregory MD        Use to test blood sugar 4 times daily or as directed.  Ok to substitute alternative if insurance prefers.   Quantity:  200 each   Refills:  3       * blood glucose monitoring lancets   This may have changed:  You were already taking a medication with the same name, and this prescription was added. Make sure you understand how and when to take each.   Used for:  Abnormal maternal glucose tolerance, antepartum   Changed by:  Josefina Gregory MD        Test 4 times daily.   Quantity:  100 each   Refills:  4       * blood glucose monitoring test strip   Commonly known as:  ONETOUCH VERIO IQ   This may have changed:  " Another medication with the same name was added. Make sure you understand how and when to take each.   Used for:  Gestational diabetes   Changed by:  Josefina Gregory MD        Use to test blood sugars 4 times daily or as directed.   Quantity:  200 strip   Refills:  3       * blood glucose monitoring test strip   Commonly known as:  no brand specified   This may have changed:  You were already taking a medication with the same name, and this prescription was added. Make sure you understand how and when to take each.   Used for:  Abnormal maternal glucose tolerance, antepartum   Changed by:  Josefina Gregory MD        Use to test blood sugars 4 times daily or as directed   Quantity:  100 strip   Refills:  3       * Notice:  This list has 4 medication(s) that are the same as other medications prescribed for you. Read the directions carefully, and ask your doctor or other care provider to review them with you.         Where to get your medicines      These medications were sent to [facility] [place], [place] - [address]  [address] [place] [address], [facility] [address]     Phone:  [phone]     blood glucose monitoring lancets    blood glucose monitoring test strip                Primary Care Provider Fax #    Physician No Ref-Primary [phone]       No address on file        Equal Access to Services     RAD[facility] : Hadii nery xavier Soraul, waaxda luqadaha, qaybta kaalmada adebobby, mariah ann . So [facility] [phone].    ATENCIÓN: Si habla español, tiene a georges disposición servicios gratuitos de asistencia lingüística. Llame al [phone].    We comply with applicable federal civil rights laws and [place] laws. We do not discriminate on the basis of race, color, national origin, age, disability, sex, sexual orientation, or gender identity.            Thank you!     Thank you for choosing [facility]  for your care. Our goal is always to  provide you with excellent care. Hearing back from our patients is one way we can continue to improve our services. Please take a few minutes to complete the written survey that you may receive in the mail after your visit with us. Thank you!             Your Updated Medication List - Protect others around you: Learn how to safely use, store and throw away your medicines at www.disposemymeds.org.          This list is accurate as of 8/14/18  1:39 PM.  Always use your most recent med list.                   Brand Name Dispense Instructions for use Diagnosis    acetone (Urine) test Strp     20 each    1 strip by In Vitro route daily    Gestational diabetes       * blood glucose monitoring lancets     200 each    Use to test blood sugar 4 times daily or as directed.  Ok to substitute alternative if insurance prefers.    Gestational diabetes       * blood glucose monitoring lancets     100 each    Test 4 times daily.    Abnormal maternal glucose tolerance, antepartum       blood glucose monitoring meter device kit     1 kit    Use to test blood sugars 4 times daily or as directed.    Abnormal maternal glucose tolerance, antepartum       * blood glucose monitoring test strip    ONETOUCH VERIO IQ    200 strip    Use to test blood sugars 4 times daily or as directed.    Gestational diabetes       * blood glucose monitoring test strip    no brand specified    100 strip    Use to test blood sugars 4 times daily or as directed    Abnormal maternal glucose tolerance, antepartum       cephALEXin 500 MG capsule    KEFLEX    30 capsule    Take 1 capsule (500 mg) by mouth daily    Sacral agenesis with vertebral anomaly       mirabegron 25 MG 24 hr tablet    MYRBETRIQ    30 tablet    Take 1 tablet (25 mg) by mouth daily    Neurogenic bladder, Incontinence in female       polyethylene glycol powder    MIRALAX    510 g    Take 17 g by mouth daily    Constipation       PRENATAL VITAMINS PO           ranitidine 150 MG tablet    ZANTAC     20 tablet    Take 1 tablet by mouth 2 times daily for 10 days.        sennosides 8.6 MG tablet    SENOKOT    180 tablet    Take 1 tablet by mouth 2 times daily as needed for constipation.    Chronic constipation       sodium chloride 0.65 % nasal spray    OCEAN    1 Bottle    Spray 1 spray in nostril every hour as needed for congestion.    Neurogenic bladder, NOS, Other urinary incontinence       solifenacin 10 MG tablet    VESICARE    90 tablet    Take 1 tablet (10 mg) by mouth daily    Neurogenic bladder, Urinary urgency, Urge incontinence       tolterodine 4 MG 24 hr capsule    DETROL LA    90 capsule    Take 1 capsule (4 mg) by mouth daily    Neurogenic bladder       * Notice:  This list has 4 medication(s) that are the same as other medications prescribed for you. Read the directions carefully, and ask your doctor or other care provider to review them with you.

## 2018-08-14 NOTE — PATIENT INSTRUCTIONS
- Keep working hard on your diet. Try vegetables, salad, hard boiled eggs, or nuts for a snack. No medicines for now.   - Keep 1 hour blood glucose less than 140.   - Follow up 1 weeks

## 2018-08-14 NOTE — PROGRESS NOTES
"S return OB visit  2018, 1:08 PM     Minh is a 33 year old  at 33w0d by LMP consistent with 7 week US referred to in chart (but not actually documented), also consistent with 20w5d US, who presents for OB transfer of care.      Pregnancy notable for      - Complex pelvic surgery history due to (maternal) sacral agenesis, including bladder augmentation with colon, appendiceal Mitrofanoff subsequently taken down for dehiscence, and then an autologous bladder neck sling (all in ) and then Botox injection into her bladder neck area 2016. Also of note she has a solitary left kidney. She continues taking prophylactic Keflex.   - GDM, currently A1     Minh feels really well today. She has had some tang back pain, mostly at night, that responds well to acetaminophen. No fever, chills, leaking fluid, vag bleeding, headache.     Has been working hard following diabetic diet, taking a couple of short walks each day. Worried about her glucometers. One she has no more supplies for and the other seems to give her variable values even when she takes them in close proximity.      Exam  Patient Vitals for the past 12 hrs:   BP Pulse Height Weight   18 1308 129/83 108 1.575 m (5' 2\") 69.9 kg (154 lb 3.2 oz)     Blood glucose log 10/27 values elevated, mostly postprandials, only 1 >160      Labs on  unless otherwise dated  Cr:                   0.53 ()  Hgb:                12.9   Plt:                  267  ABO:               O pos  Emani:                Neg  HIV:                NR  RPR:               Pos > neg TPA  Ru:                  Immune  G/C:                neg  HBAg:             nonreactve  HBAby:           reactive              GCT:               162 > failed /4 GTT vailues  GBS:               Not collected  UCx:               Pseudomonas  > negative       USG 8/3  EFW 54%ile, AC 65%ile  BPP      A/P  33 year old  at 33w0d by LMP consistent with reported 7w US here to begin " transfer of prenatal care for complex pelvic/urologic surgical history.     #) Pseudomonas UTI  Resolved at  UCx. PICC removed, ceftazidime stopped. Congratulated Minh on being patient with infusions. Plan repeat UCx if symptomatic.      #) Neurogenic bladder, complex bladder surgical history  Strong recommendation from Urology regarding importance of  section. Her urologist (Dr. French) would like to be present at delivery. Because of her surgical history she would require an upper abdominal incision and fundal uterine incision; this was previously discussed via staff message with Dr. French and he thinks this would be the most prudent plan. He would like to be present at the time of delivery. Also previously discussed that she would have to deliver future pregnancies at 36 weeks following a fundal incision. See prior notes  - Plan CS 38 weeks, discussed with Minh today, will schedule next week, message sent to Dr. French about timing if he wants to be present     #) Prenatal care  - Transferred   - Ordered growth US given GDM diagnosis. Start BPPs next week -- weekly unless quickly progresses to GDMA2.   - Breastfeeding - yes plans to breast feed  - Initially thinking POPs for contraception but admits she's not great at taking her PNV every day. Brief discussion about LARCs. Will continue.       #) GDM  - Met with DM educator , already making diet changes at that point, another appt scheduled for   - No meds this week, encouraged Minh to continue with diet changes and exercise.        Josefina Landin MD PGY4  OB/Gyn  8/15/2018     The Patient was seen in [facility] by JOSEFINA LANDIN.  I reviewed the history & exam. Assessment and plan were jointly made.    Pamella Ames MD

## 2018-08-15 RX ORDER — BREAST PUMP
1 EACH MISCELLANEOUS DAILY PRN
Qty: 1 EACH | Refills: 0 | Status: SHIPPED | OUTPATIENT
Start: 2018-08-15 | End: 2019-07-12

## 2018-08-17 ENCOUNTER — OFFICE VISIT (OUTPATIENT)
Dept: EDUCATION SERVICES | Facility: CLINIC | Age: 33
End: 2018-08-17
Attending: OBSTETRICS & GYNECOLOGY
Payer: COMMERCIAL

## 2018-08-17 VITALS — BODY MASS INDEX: 27.98 KG/M2 | WEIGHT: 153 LBS

## 2018-08-17 DIAGNOSIS — O24.419 GESTATIONAL DIABETES: Primary | ICD-10-CM

## 2018-08-17 PROCEDURE — G0108 DIAB MANAGE TRN  PER INDIV: HCPCS | Performed by: NUTRITIONIST

## 2018-08-17 NOTE — PROGRESS NOTES
Gestational Diabetes Follow-up Visit    SUBJECTIVE/OBJECTIVE:  Minh Altamirano presents today for education and evaluation of glucose control related to gestational diabetes  She is accompanied by self    Wt 69.4 kg (153 lb)  BMI 27.98 kg/m2    Weight loss 2 lbs this week at 34 weeks gestation.    Blood Glucose/Ketone Log:    Date Ketones Fasting Post Breakfast Post Lunch Post Supper   8/7 Negative 86 119 95 141   8/8 Negative 94 120  135   8/9 Negative 99 143 132 132   8/10 Negative 95 124 114 140   8/11 Negative 96 140 154 135   8/12 Negative 97 135 141 149   8/13 Negative 96 150 145 144   8/14 Negative 97 114 137 136   8/15 Negative 95 122 126 115   8/16 Negative 96 120 133 113   8/17 Negative 93 123       Current gestational diabetes management:    Taking medications for gestational diabetes? No    Do you have any difficulty affording your medications or glucose monitoring supplies?   No     *Abbreviated insulin dose documentation key: Insulin trade name (pdriulceh-oqkvr-sxkmdr-bedtime) - i.e. Humalog 5-5-5-0 (Humalog 5 units at breakfast, 5 units at lunch, and 5 units at dinner).    Physical Activity: Minh has started walking after breakfast this week for one hour, she then tests her glucose after the walk.  She doesn't walk after lunch because she is more tired at that time and it is hot out.  She is willing to walk after dinner though. Her spouse will be home at that time and can go with her.    Nutrition:  Minh has learned this week how food is affecting her glucose and she has made adjustments to her diet.  She is having 1 whole wheat bread with beans and a glass of milk for breakfast. She has cut the bread back from two to one slices.  She has a piece of fruit like a plum and 1/2 glass milk for a snack  Lunch is rice or pasta with goat meat, salad and 1/2 banana. She sometimes has watermelon after lunch. She was eating over a cup of rice or pasta, she has cut that back this week.  For a second snack she  has fruit like an apple with tea (no sugar) and 1/2 glass milk  For dinner she stopped rice and pasta. She is having beans and milk.  For a bedtime snack she has no carbs, just an egg.     ASSESSMENT:  Minh has made changes to her diet that appear to be helping with glucose control this week.  She has lost two pounds. Ketones have been negative.   She is willing to add some protein/healthy fat to snacks  And is willing to add 15-30 grams of carb to bedtime snack to see if that helps keep fasting glucose in goal.  She is also willing to go for an after dinner walk with her spouse.    Health Beliefs and Attitudes:   Stage of Change: ACTION (Actively working towards change)    Educational Materials provided today:  [facility] Preventing Diabetes    PLAN:  See AVS  Call/e-mail/MyChart message diabetes educator if 3 or more blood sugars are above the goal in 1 week or if ketones are positive.     FOLLOW-UP:  Follow up with diabetes educator in 1 week.    Call/e-mail/[employer] message diabetes educator if 3 or more blood sugars are above the goal in 1 week or if ketones are positive.     Time spent was 30 minutes  Encounter type: Individual    Any diabetes medication dose changes were made via the CDE Protocol and Collaborative Practice Agreement with the patient's referring provider. A copy of this encounter was shared with the provider.

## 2018-08-17 NOTE — PATIENT INSTRUCTIONS
Great job with making changes to your diet!    Add protein to your daytime snacks:  Nuts, seeds, meat, egg, cheese  You can also include more vegetables at any time    Add a carbohydrate containing food to your bedtime snack:  Ideas - serving of fruit or slice of whole grain bread or 1 cup yogurt or serving of crackers or cup of milk or 1/2-1 cup oatmeal or 1/2 cup beans    Try to get in a 10 minute walk after dinner    Change ketone testing to once per week    Continue to check glucose four times per day    Follow up by phone to review glucose readings in one week

## 2018-08-17 NOTE — MR AVS SNAPSHOT
After Visit Summary   8/17/2018    Minh Altamirano    MRN: [id number]           Patient Information     Date Of Birth          1985        Visit Information        Provider Department      8/17/2018 10:30 AM [facility]ft [facility]  [facility]        Today's Diagnoses     Gestational diabetes    -  1      Care Instructions    Great job with making changes to your diet!    Add protein to your daytime snacks:  Nuts, seeds, meat, egg, cheese  You can also include more vegetables at any time    Add a carbohydrate containing food to your bedtime snack:  Ideas - serving of fruit or slice of whole grain bread or 1 cup yogurt or serving of crackers or cup of milk or 1/2-1 cup oatmeal or 1/2 cup beans    Try to get in a 10 minute walk after dinner    Change ketone testing to once per week    Continue to check glucose four times per day    Follow up by phone to review glucose readings in one week          Follow-ups after your visit        Your next 10 appointments already scheduled     Aug 21, 2018  1:15 PM CDT   RETURN OB with Josefina Gregory MD   [facility] ([facility])    [facility] [address]  [address]  [facility] [address]   [phone]            Aug 24, 2018  1:30 PM CDT   Office Visit with Arianna Croft RD   [facility]  Diabetes Education ([facility])    [facility] [address]  [address] [place] [id number] [phone]           Bring a current list of meds and any records pertaining to this visit. For Physicals, please bring immunization records and any forms needing to be filled out. Please arrive 10 minutes early to complete paperwork.              Who to contact     If you have questions or need follow up information about today's clinic visit or your schedule please contact [facility] directly at [phone].  Normal or  non-critical lab and imaging results will be communicated to you by MyChart, letter or phone within 4 business days after the clinic has received the results. If you do not hear from us within 7 days, please contact the clinic through [employer] or phone. If you have a critical or abnormal lab result, we will notify you by phone as soon as possible.  Submit refill requests through [employer] or call your pharmacy and they will forward the refill request to us. Please allow 3 business days for your refill to be completed.          Additional Information About Your Visit        [employer] Information     [employer] gives you secure access to your electronic health record. If you see a primary care provider, you can also send messages to your care team and make appointments. If you have questions, please call your primary care clinic.  If you do not have a primary care provider, please call [phone] and they will assist you.        Care EveryWhere ID     This is your Care EveryWhere ID. This could be used by other organizations to access your [place] medical records  [id number]        Your Vitals Were     BMI (Body Mass Index)                   27.98 kg/m2            Blood Pressure from Last 3 Encounters:   08/14/18 129/83   07/31/18 128/84   07/24/18 134/84    Weight from Last 3 Encounters:   08/17/18 69.4 kg (153 lb)   08/14/18 69.9 kg (154 lb 3.2 oz)   07/31/18 72.7 kg (160 lb 3.2 oz)              Today, you had the following     No orders found for display       Primary Care Provider Fax #    Physician No Ref-Primary [phone]       No address on file        Equal Access to Services     IJEOMA SANCHEZ : Hadii nery pondo Soguanacoali, waaxda luqadaha, qaybta kaalmada adeegyajose a, mariah ann . So [facility] [phone].    ATENCIÓN: Si habla español, tiene a georges disposición servicios gratuitos de asistencia lingüística. Llame al [phone].    We comply with applicable federal civil rights laws  and [place] laws. We do not discriminate on the basis of race, color, national origin, age, disability, sex, sexual orientation, or gender identity.            Thank you!     [facility]  [facility] your [facility]. Our goal is always to provide you with excellent care. Hearing back from our patients is one way we can continue to improve our services. Please take a few minutes to complete the written survey that you may receive in the mail after your visit with us. Thank you!             Your Updated Medication List - Protect others around you: Learn how to safely use, store and throw away your medicines at www.disposemymeds.org.          This list is accurate as of 8/17/18 10:44 AM.  Always use your most recent med list.                   Brand Name Dispense Instructions for use Diagnosis    acetone (Urine) test Strp     20 each    1 strip by In Vitro route daily    Gestational diabetes       * blood glucose monitoring lancets     200 each    Use to test blood sugar 4 times daily or as directed.  Ok to substitute alternative if insurance prefers.    Gestational diabetes       * blood glucose monitoring lancets     100 each    Test 4 times daily.    Abnormal maternal glucose tolerance, antepartum       blood glucose monitoring meter device kit     1 kit    Use to test blood sugars 4 times daily or as directed.    Abnormal maternal glucose tolerance, antepartum       * blood glucose monitoring test strip    ONETOUCH VERIO IQ    200 strip    Use to test blood sugars 4 times daily or as directed.    Gestational diabetes       * blood glucose monitoring test strip    no brand specified    100 strip    Use to test blood sugars 4 times daily or as directed    Abnormal maternal glucose tolerance, antepartum       breast pump Misc     1 each    1 each daily as needed    Postpartum care and examination of lactating mother       cephALEXin 500 MG capsule    KEFLEX    30 capsule    Take 1  capsule (500 mg) by mouth daily    Sacral agenesis with vertebral anomaly       mirabegron 25 MG 24 hr tablet    MYRBETRIQ    30 tablet    Take 1 tablet (25 mg) by mouth daily    Neurogenic bladder, Incontinence in female       polyethylene glycol powder    MIRALAX    510 g    Take 17 g by mouth daily    Constipation       PRENATAL VITAMINS PO           ranitidine 150 MG tablet    ZANTAC    20 tablet    Take 1 tablet by mouth 2 times daily for 10 days.        sennosides 8.6 MG tablet    SENOKOT    180 tablet    Take 1 tablet by mouth 2 times daily as needed for constipation.    Chronic constipation       sodium chloride 0.65 % nasal spray    OCEAN    1 Bottle    Spray 1 spray in nostril every hour as needed for congestion.    Neurogenic bladder, NOS, Other urinary incontinence       solifenacin 10 MG tablet    VESICARE    90 tablet    Take 1 tablet (10 mg) by mouth daily    Neurogenic bladder, Urinary urgency, Urge incontinence       tolterodine 4 MG 24 hr capsule    DETROL LA    90 capsule    Take 1 capsule (4 mg) by mouth daily    Neurogenic bladder       * Notice:  This list has 4 medication(s) that are the same as other medications prescribed for you. Read the directions carefully, and ask your doctor or other care provider to review them with you.

## 2018-08-21 ENCOUNTER — OFFICE VISIT (OUTPATIENT)
Dept: OBGYN | Facility: CLINIC | Age: 33
End: 2018-08-21
Payer: COMMERCIAL

## 2018-08-21 VITALS
HEART RATE: 114 BPM | WEIGHT: 156.5 LBS | HEIGHT: 62 IN | SYSTOLIC BLOOD PRESSURE: 127 MMHG | BODY MASS INDEX: 28.8 KG/M2 | DIASTOLIC BLOOD PRESSURE: 85 MMHG

## 2018-08-21 DIAGNOSIS — O24.410 DIET CONTROLLED GESTATIONAL DIABETES MELLITUS (GDM) IN THIRD TRIMESTER: ICD-10-CM

## 2018-08-21 DIAGNOSIS — O09.90 HIGH-RISK PREGNANCY, UNSPECIFIED TRIMESTER: Primary | ICD-10-CM

## 2018-08-21 DIAGNOSIS — O24.419 GESTATIONAL DIABETES MELLITUS (GDM) IN THIRD TRIMESTER, GESTATIONAL DIABETES METHOD OF CONTROL UNSPECIFIED: ICD-10-CM

## 2018-08-21 DIAGNOSIS — O99.810 ABNORMAL MATERNAL GLUCOSE TOLERANCE, ANTEPARTUM: ICD-10-CM

## 2018-08-21 PROCEDURE — 87653 STREP B DNA AMP PROBE: CPT | Performed by: OBSTETRICS & GYNECOLOGY

## 2018-08-21 PROCEDURE — G0463 HOSPITAL OUTPT CLINIC VISIT: HCPCS

## 2018-08-21 ASSESSMENT — PAIN SCALES - GENERAL: PAINLEVEL: NO PAIN (0)

## 2018-08-21 NOTE — MR AVS SNAPSHOT
After Visit Summary   2018    Minh Altamirano    MRN: [id number]           Patient Information     Date Of Birth          1985        Visit Information        Provider Department      2018 1:15 PM Josefina Gregory MD [facility]        Today's Diagnoses     Gestational diabetes mellitus (GDM) in third trimester, gestational diabetes method of control unspecified    -  1      Care Instructions    Keep up the great work with your diet    Next appointment 2 weeks     section 830              Follow-ups after your visit        Your next 10 appointments already scheduled     Aug 24, 2018  1:30 PM CDT   Diabetes Education with Arianna Croft RD   [facility] [place],  Diabetes Education ([facility])    [facility] [address]  [address] [place] [id number] [phone]              Who to contact     Please call your clinic at [phone] to:    Ask questions about your health    Make or cancel appointments    Discuss your medicines    Learn about your test results    Speak to your doctor            Additional Information About Your Visit        [employer] Information     [email or web address] gives you secure access to your electronic health record. If you see a primary care provider, you can also send messages to your care team and make appointments. If you have questions, please call your primary care clinic.  If you do not have a primary care provider, please call [phone] and they will assist you.      [email or web address] is an electronic gateway that provides easy, online access to your medical records. With [email or web address], you can request a clinic appointment, read your test results, renew a prescription or communicate with your care team.     To access your existing account, please contact your [facility] or call [phone] for assistance.        Care EveryWhere ID     This is your Care  "EveryWhere ID. This could be used by other organizations to access your [place] medical records  [id number]        Your Vitals Were     Pulse Height BMI (Body Mass Index)             114 1.575 m (5' 2\") 28.62 kg/m2          Blood Pressure from Last 3 Encounters:   08/21/18 127/85   08/14/18 129/83   07/31/18 128/84    Weight from Last 3 Encounters:   08/21/18 71 kg (156 lb 8 oz)   08/17/18 69.4 kg (153 lb)   08/14/18 69.9 kg (154 lb 3.2 oz)              We Performed the Following     Group B strep PCR        Primary Care Provider Fax #    Physician No Ref-Primary [phone]       No address on file        Equal Access to Services     IJEOMA SANCHEZ : Criss Morris, waamrikda luqadaha, qaybta kaalmada fely, mariah ann . So [facility] [phone].    ATENCIÓN: Si habla español, tiene a georges disposición servicios gratuitos de asistencia lingüística. Llame al [phone].    We comply with applicable federal civil rights laws and [place] laws. We do not discriminate on the basis of race, color, national origin, age, disability, sex, sexual orientation, or gender identity.            Thank you!     Thank you for choosing [facility]  for your care. Our goal is always to provide you with excellent care. Hearing back from our patients is one way we can continue to improve our services. Please take a few minutes to complete the written survey that you may receive in the mail after your visit with us. Thank you!             Your Updated Medication List - Protect others around you: Learn how to safely use, store and throw away your medicines at www.disposemymeds.org.          This list is accurate as of 8/21/18  1:58 PM.  Always use your most recent med list.                   Brand Name Dispense Instructions for use Diagnosis    acetone (Urine) test Strp     20 each    1 strip by In Vitro route daily    Gestational diabetes       * blood glucose monitoring " lancets     200 each    Use to test blood sugar 4 times daily or as directed.  Ok to substitute alternative if insurance prefers.    Gestational diabetes       * blood glucose monitoring lancets     100 each    Test 4 times daily.    Abnormal maternal glucose tolerance, antepartum       blood glucose monitoring meter device kit     1 kit    Use to test blood sugars 4 times daily or as directed.    Abnormal maternal glucose tolerance, antepartum       * blood glucose monitoring test strip    ONETOUCH VERIO IQ    200 strip    Use to test blood sugars 4 times daily or as directed.    Gestational diabetes       * blood glucose monitoring test strip    no brand specified    100 strip    Use to test blood sugars 4 times daily or as directed    Abnormal maternal glucose tolerance, antepartum       breast pump Misc     1 each    1 each daily as needed    Postpartum care and examination of lactating mother       mirabegron 25 MG 24 hr tablet    MYRBETRIQ    30 tablet    Take 1 tablet (25 mg) by mouth daily    Neurogenic bladder, Incontinence in female       polyethylene glycol powder    MIRALAX    510 g    Take 17 g by mouth daily    Constipation       PRENATAL VITAMINS PO           ranitidine 150 MG tablet    ZANTAC    20 tablet    Take 1 tablet by mouth 2 times daily for 10 days.        sennosides 8.6 MG tablet    SENOKOT    180 tablet    Take 1 tablet by mouth 2 times daily as needed for constipation.    Chronic constipation       sodium chloride 0.65 % nasal spray    OCEAN    1 Bottle    Spray 1 spray in nostril every hour as needed for congestion.    Neurogenic bladder, NOS, Other urinary incontinence       solifenacin 10 MG tablet    VESICARE    90 tablet    Take 1 tablet (10 mg) by mouth daily    Neurogenic bladder, Urinary urgency, Urge incontinence       tolterodine 4 MG 24 hr capsule    DETROL LA    90 capsule    Take 1 capsule (4 mg) by mouth daily    Neurogenic bladder       * Notice:  This list has 4  medication(s) that are the same as other medications prescribed for you. Read the directions carefully, and ask your doctor or other care provider to review them with you.

## 2018-08-21 NOTE — PROGRESS NOTES
"S return OB visit  2018, 1:31 PM     Minh is a 33 year old  at 34w0d by LMP consistent with 7 week US referred to in chart (but not actually documented), also consistent with 20w5d US, who presents for OB return visit.      Pregnancy notable for      - Complex pelvic surgery history due to (maternal) sacral agenesis, including bladder augmentation with colon, appendiceal Mitrofanoff subsequently taken down for dehiscence, and then an autologous bladder neck sling (all in ) and then Botox injection into her bladder neck area 2016. Also of note she has a solitary left kidney. She continues taking prophylactic Keflex.   - New diagnosis of GDM on failed GTT  at [facility]     Minh continues to feel well today. Denies headache, blurry vision, chest pain, RUQ/epigastric pain, nausea/vomiting. No fevers or chills. No contractions, bleeding, leaking fluid. Active fetus.      Exam  Patient Vitals for the past 12 hrs:   BP Pulse Height Weight   18 1337 127/85 - - -   18 1336 122/82 - - 71 kg (156 lb 8 oz)   18 1335 121/84 114 1.575 m (5' 2\") -     Maternal heart rate improved to 90s-100s at my exam    Glucose values:   0 fasting elevated   postprandial elevated, both 140     Labs on  unless otherwise dated  Cr:                   0.53 ()  Hgb:                12.9   Plt:                  267  ABO:               O pos  Emani:                Neg  HIV:                NR  RPR:               Pos > neg TPA  Ru:                  Immune  G/C:                neg  HBAg:             nonreactve  HBAby:           reactive              GCT:               162 > failed  GTT vailues  GBS:               Not collected  UCx:               Pseudomonas  > negative       USG 8/3  EFW 54%ile, AC 65%ile  BPP   FHT 150s accels to 170s     A/P  33 year old  at 34w0d by LMP consistent with reported 7w US here to begin transfer of prenatal care for complex pelvic/urologic surgical " history.     #) Pseudomonas UTI  Resolved at  UCx. PICC removed, ceftazidime stopped. Congratulated Minh on being patient with infusions.     #) Neurogenic bladder, complex bladder surgical history  Strong recommendation from Urology regarding importance of  section. Her urologist (Dr. French) would like to be present at delivery. Again today we discussed that she would require an upper abdominal incision and fundal uterine incision; this was previously confirmed with Dr. French and he thinks this would be the most prudent plan. He or Dr. Cartwright plan to be present at the time of delivery -- scheduled on  at 0830 for this reason. Also previously discussed that she would have to deliver future pregnancies at 36 weeks following a fundal incision. See prior notes     #) Prenatal care  - plans breastfeeding  - collected GBS today as Minh will deliver early     #) GDM  - No need for further BPP or growth as good BS control  - Good control of blood glucoses since last visit, no need to start medication. Congratulated Minh and encouraged her to sustain the changes  - Met with DM educator      Josefina Gregory MD PGY4  OB/Gyn  2018, 4:50 PM      I agree with note as above. The patient was seen in continuity clinic by the resident doctor.  Assessment and plan were jointly made.  Arianna Castellanos MD

## 2018-08-22 LAB
GP B STREP DNA SPEC QL NAA+PROBE: NEGATIVE
SPECIMEN SOURCE: NORMAL

## 2018-08-24 ENCOUNTER — ALLIED HEALTH/NURSE VISIT (OUTPATIENT)
Dept: EDUCATION SERVICES | Facility: CLINIC | Age: 33
End: 2018-08-24
Attending: OBSTETRICS & GYNECOLOGY
Payer: COMMERCIAL

## 2018-08-24 DIAGNOSIS — O24.419 GESTATIONAL DIABETES: Primary | ICD-10-CM

## 2018-08-24 NOTE — MR AVS SNAPSHOT
After Visit Summary   8/24/2018    Minh Altamirano    MRN: [id number]           Patient Information     Date Of Birth          1985        Visit Information        Provider Department      8/24/2018 1:15 PM Arianna Croft RD; [employer] [facility],  Diabetes Education         Follow-ups after your visit        Your next 10 appointments already scheduled     Sep 04, 2018  3:00 PM CDT   RETURN OB with Alondra Wilkinson MD   [facility] ([facility])    [place] [facility] [address]  [address] [facility] [address] [phone]            Sep 20, 2018   Procedure with Lily Villanueva MD   [facility] (--)    [address] [facility] [address] [phone]              Who to contact     If you have questions or need follow up information about today's clinic visit or your schedule please contact [facility],  DIABETES EDUCATION directly at [phone].  Normal or non-critical lab and imaging results will be communicated to you by [employer], letter or phone within 4 business days after the clinic has received the results. If you do not hear from us within 7 days, please contact the clinic through Mobile[employer] or phone. If you have a critical or abnormal lab result, we will notify you by phone as soon as possible.  Submit refill requests through [employer] or call your pharmacy and they will forward the refill request to us. Please allow 3 business days for your refill to be completed.          Additional Information About Your Visit        [employer] Information     [employer] gives you secure access to your electronic health record. If you see a primary care provider, you can also send messages to your care team and make appointments. If you have questions, please call your primary care clinic.  If you do not have a primary care provider, please call [phone] and they will assist you.        Care EveryWhere ID      This is your Care EveryWhere ID. This could be used by other organizations to access your [facility] medical records  [id number]         Blood Pressure from Last 3 Encounters:   08/21/18 127/85   08/14/18 129/83   07/31/18 128/84    Weight from Last 3 Encounters:   08/21/18 71 kg (156 lb 8 oz)   08/17/18 69.4 kg (153 lb)   08/14/18 69.9 kg (154 lb 3.2 oz)              Today, you had the following     No orders found for display       Primary Care Provider Fax #    Physician No Ref-Primary [phone]       No address on file        Equal Access to Services     [facility] Hospital[facility] : Hadii nery Morris, waaxda lulukasadaha, qaybta kaalmada adeadelinayada, mariah ann . So [facility] [phone].    ATENCIÓN: Si habla español, tiene a georges disposición servicios gratuitos de asistencia lingüística. Llame al [phone].    We comply with applicable federal civil rights laws and [place] laws. We do not discriminate on the basis of race, color, national origin, age, disability, sex, sexual orientation, or gender identity.            Thank you!     Thank you for choosing [facility],  DIABETES EDUCATION  for your care. Our goal is always to provide you with excellent care. Hearing back from our patients is one way we can continue to improve our services. Please take a few minutes to complete the written survey that you may receive in the mail after your visit with us. Thank you!             Your Updated Medication List - Protect others around you: Learn how to safely use, store and throw away your medicines at www.disposemymeds.org.          This list is accurate as of 8/24/18  1:22 PM.  Always use your most recent med list.                   Brand Name Dispense Instructions for use Diagnosis    acetone (Urine) test Strp     20 each    1 strip by In Vitro route daily    Gestational diabetes       * blood glucose monitoring lancets     100 each    Test 4 times daily.    Abnormal maternal glucose  tolerance, antepartum       * blood glucose monitoring lancets     200 each    Use to test blood sugar 5 times daily or as directed.  Ok to substitute alternative if insurance prefers.    Diet controlled gestational diabetes mellitus (GDM) in third trimester       blood glucose monitoring meter device kit     1 kit    Use to test blood sugars 4 times daily or as directed.    Abnormal maternal glucose tolerance, antepartum       * blood glucose monitoring test strip    ONETOUCH VERIO IQ    200 strip    Use to test blood sugars 4 times daily or as directed.    Gestational diabetes       * blood glucose monitoring test strip    no brand specified    100 strip    Use to test blood sugars 5 times daily or as directed    Abnormal maternal glucose tolerance, antepartum       breast pump Misc     1 each    1 each daily as needed    Postpartum care and examination of lactating mother       mirabegron 25 MG 24 hr tablet    MYRBETRIQ    30 tablet    Take 1 tablet (25 mg) by mouth daily    Neurogenic bladder, Incontinence in female       polyethylene glycol powder    MIRALAX    510 g    Take 17 g by mouth daily    Constipation       PRENATAL VITAMINS PO           ranitidine 150 MG tablet    ZANTAC    20 tablet    Take 1 tablet by mouth 2 times daily for 10 days.        sennosides 8.6 MG tablet    SENOKOT    180 tablet    Take 1 tablet by mouth 2 times daily as needed for constipation.    Chronic constipation       sodium chloride 0.65 % nasal spray    OCEAN    1 Bottle    Spray 1 spray in nostril every hour as needed for congestion.    Neurogenic bladder, NOS, Other urinary incontinence       solifenacin 10 MG tablet    VESICARE    90 tablet    Take 1 tablet (10 mg) by mouth daily    Neurogenic bladder, Urinary urgency, Urge incontinence       tolterodine 4 MG 24 hr capsule    DETROL LA    90 capsule    Take 1 capsule (4 mg) by mouth daily    Neurogenic bladder       * Notice:  This list has 4 medication(s) that are the same as  other medications prescribed for you. Read the directions carefully, and ask your doctor or other care provider to review them with you.

## 2018-08-24 NOTE — PROGRESS NOTES
Diabetes Self-Management Education & Support  Gestational Diabetes Self-Management Education & Support    SUBJECTIVE/OBJECTIVE:  Presents for education related to gestational diabetes.    Accompanied by:     Cultural Influences/Ethnic Background:  [place]    There were no vitals taken for this visit.    Weight 154 lbs at 35 weeks gestation.  One lb weight gain this week    Estimated Date of Delivery: Oct 2, 2018    Blood Glucose/Ketone Log:   Date  Fasting Post Breakfast Post Lunch Post Supper   8/18  92 109 135 123   8/19  88 108 124 125   8/20  88 110 127 140   8/21  92 107 140 116   8/22  91 116 126 111   8/23  83 119 90 123   8/24  84 98         Current Management:  Taking medications for gestational diabetes?: No    ASSESSMENT:  Ketones: negative.   Fasting blood glucoses: 100% in target.  After breakfast: 100% in target.  After lunch: 100% in target.  After dinner: 100% in target.    Glucose is well controlled with diet.     INTERVENTION:  Educational topics covered today:  What to expect after delivery, Future testing for Type 2 diabetes (2 hour OGTT at 6 week post-partum check-up and annual fasting blood glucose level), Risk of GDM and planning ahead for future pregnancies, Recommended lifestyle interventions for reducing the risk of Type 2 Diabetes, When to Call a Diabetes Educator or OB Provider    Educational Materials provided today:  [facility] Preventing Diabetes    PLAN:  Check glucose 4 times daily.  Check ketones once a week when readings are consistently negative.  Continue with recommended physical activity.  Continue to follow recommended meal plan: 2-3 carbs at breakfast, 3-4 carbs at lunch, 3-4 carbs at supper, 1-2 carbs at snacks.  Follow consistent CHO meal plan, eat CHO and protein/fat at all meals/snacks.    Call/e-mail/[employer]ffer[employer] message diabetes educator if 3 or more blood sugars are above the goal in 1 week or if ketones are positive.    Time Spent: 15 minutes  Encounter Type:  Individual    Any diabetes medication dose changes were made via the CDE Protocol and Collaborative Practice Agreement with the patient's referring provider. A copy of this encounter was shared with the provider.

## 2018-09-04 ENCOUNTER — OFFICE VISIT (OUTPATIENT)
Dept: OBGYN | Facility: CLINIC | Age: 33
End: 2018-09-04
Attending: OBSTETRICS & GYNECOLOGY
Payer: COMMERCIAL

## 2018-09-04 VITALS
SYSTOLIC BLOOD PRESSURE: 123 MMHG | HEIGHT: 62 IN | BODY MASS INDEX: 28.65 KG/M2 | HEART RATE: 112 BPM | WEIGHT: 155.7 LBS | DIASTOLIC BLOOD PRESSURE: 85 MMHG

## 2018-09-04 DIAGNOSIS — O09.93 SUPERVISION OF HIGH RISK PREGNANCY IN THIRD TRIMESTER: Primary | ICD-10-CM

## 2018-09-04 DIAGNOSIS — K21.9 GASTROESOPHAGEAL REFLUX DISEASE WITHOUT ESOPHAGITIS: ICD-10-CM

## 2018-09-04 DIAGNOSIS — N31.9 NEUROGENIC BLADDER: ICD-10-CM

## 2018-09-04 PROCEDURE — G0463 HOSPITAL OUTPT CLINIC VISIT: HCPCS | Mod: ZF

## 2018-09-04 NOTE — MR AVS SNAPSHOT
After Visit Summary   9/4/2018    Minh Altamirano    MRN: [id number]           Patient Information     Date Of Birth          1985        Visit Information        Provider Department      9/4/2018 3:00 PM Alondra Wilkinson MD [facility]        Today's Diagnoses     Supervision of high risk pregnancy in third trimester    -  1    Gastroesophageal reflux disease without esophagitis        Neurogenic bladder           Follow-ups after your visit        Follow-up notes from your care team     Return in about 1 week (around 9/11/2018) for DAVID.      Your next 10 appointments already scheduled     Sep 10, 2018  4:00 PM CDT   RETURN OB with Pamella Ames MD   [facility] ([facility])    [place] [facility] [address]  [address]  [facility] [address] [phone]            Sep 17, 2018 11:00 AM CDT   RETURN OB with Lissy Barnes MD   [facility] ([facility])    [place] [facility] [address]  [address]  [address] [facility] [address] [phone]            Sep 20, 2018   Procedure with Lily Villanueva MD   [facility] 4[facility]B (--)    [address] [facility] [address] [phone]              Who to contact     Please call your clinic at [phone] to:    Ask questions about your health    Make or cancel appointments    Discuss your medicines    Learn about your test results    Speak to your doctor            Additional Information About Your Visit        [employer] Information     [employer] gives you secure access to your electronic health record. If you see a primary care provider, you can also send messages to your care team and make appointments. If you have questions, please call your primary care clinic.  If you do not have a primary care provider, please call [phone] and they will assist you.      [employer] is an electronic gateway that provides  "easy, online access to your medical records. With [employer], you can request a clinic appointment, read your test results, renew a prescription or communicate with your care team.     To access your existing account, please contact your [facility] or call [phone] for assistance.        Care EveryWhere ID     This is your Care EveryWhere ID. This could be used by other organizations to access your [facility] medical records  [id number]        Your Vitals Were     Pulse Height BMI (Body Mass Index)             112 1.575 m (5' 2\") 28.48 kg/m2          Blood Pressure from Last 3 Encounters:   09/04/18 123/85   08/21/18 127/85   08/14/18 129/83    Weight from Last 3 Encounters:   09/04/18 70.6 kg (155 lb 11.2 oz)   08/21/18 71 kg (156 lb 8 oz)   08/17/18 69.4 kg (153 lb)              Today, you had the following     No orders found for display         Where to get your medicines      These medications were sent to [facility] [place] [address]     Phone:  [phone]     ranitidine 150 MG tablet          Primary Care Provider Fax #    Physician No Ref-Primary [phone]       No address on file        Equal Access to Services     IJEOMA SANCHEZ : Criss pondo Soraul, waaxda luqadaha, qaybta kaalmada adeegyada, mariah reeves. So [facility] [phone].    ATENCIÓN: Si habla español, tiene a georges disposición servicios gratuitos de asistencia lingüística. Llame al [phone].    We comply with applicable federal civil rights laws and [place] laws. We do not discriminate on the basis of race, color, national origin, age, disability, sex, sexual orientation, or gender identity.            Thank you!     Thank you for choosing [facility]  for your care. Our goal is always to provide you with excellent care. Hearing back from our patients is one way we can continue " to improve our services. Please take a few minutes to complete the written survey that you may receive in the mail after your visit with us. Thank you!             Your Updated Medication List - Protect others around you: Learn how to safely use, store and throw away your medicines at www.disposemymeds.org.          This list is accurate as of 9/4/18 11:59 PM.  Always use your most recent med list.                   Brand Name Dispense Instructions for use Diagnosis    acetone (Urine) test Strp     20 each    1 strip by In Vitro route daily    Gestational diabetes       * blood glucose monitoring lancets     100 each    Test 4 times daily.    Abnormal maternal glucose tolerance, antepartum       * blood glucose monitoring lancets     200 each    Use to test blood sugar 5 times daily or as directed.  Ok to substitute alternative if insurance prefers.    Diet controlled gestational diabetes mellitus (GDM) in third trimester       blood glucose monitoring meter device kit     1 kit    Use to test blood sugars 4 times daily or as directed.    Abnormal maternal glucose tolerance, antepartum       * blood glucose monitoring test strip    ONETOUCH VERIO IQ    200 strip    Use to test blood sugars 4 times daily or as directed.    Gestational diabetes       * blood glucose monitoring test strip    no brand specified    100 strip    Use to test blood sugars 5 times daily or as directed    Abnormal maternal glucose tolerance, antepartum       breast pump Misc     1 each    1 each daily as needed    Postpartum care and examination of lactating mother       mirabegron 25 MG 24 hr tablet    MYRBETRIQ    30 tablet    Take 1 tablet (25 mg) by mouth daily    Neurogenic bladder, Incontinence in female       polyethylene glycol powder    MIRALAX    510 g    Take 17 g by mouth daily    Constipation       PRENATAL VITAMINS PO           ranitidine 150 MG tablet    ZANTAC    60 tablet    Take 1 tablet (150 mg) by mouth 2 times daily     Gastroesophageal reflux disease without esophagitis       sennosides 8.6 MG tablet    SENOKOT    180 tablet    Take 1 tablet by mouth 2 times daily as needed for constipation.    Chronic constipation       sodium chloride 0.65 % nasal spray    OCEAN    1 Bottle    Spray 1 spray in nostril every hour as needed for congestion.    Neurogenic bladder, NOS, Other urinary incontinence       solifenacin 10 MG tablet    VESICARE    90 tablet    Take 1 tablet (10 mg) by mouth daily    Neurogenic bladder, Urinary urgency, Urge incontinence       tolterodine 4 MG 24 hr capsule    DETROL LA    90 capsule    Take 1 capsule (4 mg) by mouth daily    Neurogenic bladder       * Notice:  This list has 4 medication(s) that are the same as other medications prescribed for you. Read the directions carefully, and ask your doctor or other care provider to review them with you.

## 2018-09-04 NOTE — LETTER
2018      RE: Minh Altamirano  [address]  [place] [address]       S:  Doing well, good FM, no contractions.  Getting very uncomfortable in general-feels like baby is very high, hard to each much, lots of reflux especially at night.  Blood sugars are better-main change was portion size according to the patient.    O: see flow    GBS negative last visit  Blood sugar log-3 fasting values >95 but <100, few 1h postprandial values above 140 but <160    A:  34 y/o  at 36+0 weeks, doing well.  Pregnancy complicated by a complex maternal bladder surgery history, A1GDM    P:  Continue QID blood sugar checks, would not add medication at this time.  Rx for Zantac sent for reflux symptoms.  RTC 1 week.  Her c/s has been scheduled.      Alondra Wilkinson MD, FACOG

## 2018-09-04 NOTE — LETTER
2018       RE: Minh Altamirano  [address]  [facility] [address]     Dear Colleague,    Thank you for referring your patient, Minh Altamirano, to the [facility] at [facility]. Please see a copy of my visit note below.    S:  Doing well, good FM, no contractions.  Getting very uncomfortable in general-feels like baby is very high, hard to each much, lots of reflux especially at night.  Blood sugars are better-main change was portion size according to the patient.    O: see flow    GBS negative last visit  Blood sugar log-3 fasting values >95 but <100, few 1h postprandial values above 140 but <160    A:  34 y/o  at 36+0 weeks, doing well.  Pregnancy complicated by a complex maternal bladder surgery history, A1GDM    P:  Continue QID blood sugar checks, would not add medication at this time.  Rx for Zantac sent for reflux symptoms.  RTC 1 week.  Her c/s has been scheduled.    Alondra Wilkinson MD, FACOG      Again, thank you for allowing me to participate in the care of your patient.      Sincerely,    Alondra Wilkinson MD

## 2018-09-04 NOTE — NURSING NOTE
Chief Complaint   Patient presents with     Prenatal Care     36w0d       See LORETO Boyd 9/4/2018

## 2018-09-06 NOTE — PROGRESS NOTES
S:  Doing well, good FM, no contractions.  Getting very uncomfortable in general-feels like baby is very high, hard to each much, lots of reflux especially at night.  Blood sugars are better-main change was portion size according to the patient.    O: see flow    GBS negative last visit  Blood sugar log-3 fasting values >95 but <100, few 1h postprandial values above 140 but <160    A:  34 y/o  at 36+0 weeks, doing well.  Pregnancy complicated by a complex maternal bladder surgery history, A1GDM    P:  Continue QID blood sugar checks, would not add medication at this time.  Rx for Zantac sent for reflux symptoms.  RTC 1 week.  Her c/s has been scheduled.    Alondra Wilkinson MD, FACOG

## 2018-09-07 DIAGNOSIS — O24.419 GESTATIONAL DIABETES: ICD-10-CM

## 2018-09-10 ENCOUNTER — OFFICE VISIT (OUTPATIENT)
Dept: OBGYN | Facility: CLINIC | Age: 33
End: 2018-09-10
Attending: OBSTETRICS & GYNECOLOGY
Payer: COMMERCIAL

## 2018-09-10 VITALS
BODY MASS INDEX: 28.58 KG/M2 | DIASTOLIC BLOOD PRESSURE: 79 MMHG | HEART RATE: 106 BPM | WEIGHT: 155.3 LBS | SYSTOLIC BLOOD PRESSURE: 115 MMHG | HEIGHT: 62 IN

## 2018-09-10 DIAGNOSIS — O09.93 HIGH-RISK PREGNANCY IN THIRD TRIMESTER: Primary | ICD-10-CM

## 2018-09-10 PROCEDURE — G0463 HOSPITAL OUTPT CLINIC VISIT: HCPCS | Mod: ZF

## 2018-09-10 NOTE — MR AVS SNAPSHOT
After Visit Summary   9/10/2018    Minh Altamirano    MRN: [id number]           Patient Information     Date Of Birth          1985        Visit Information        Provider Department      9/10/2018 4:00 PM Pamella Ames MD [facility]        Today's Diagnoses     High-risk pregnancy in third trimester    -  1       Follow-ups after your visit        Your next 10 appointments already scheduled     Sep 17, 2018 11:00 AM CDT   RETURN OB with Lissy Barnes MD   [facility] ([facility])    [facility]  [address]  [address] [facility] [address] [phone]            Sep 20, 2018   Procedure with Lily Villanueva MD   [facility] (--)    [address] [facility] [address] [phone]              Who to contact     Please call your clinic at [phone] to:    Ask questions about your health    Make or cancel appointments    Discuss your medicines    Learn about your test results    Speak to your doctor            Additional Information About Your Visit        MyChart Information     [employer] gives you secure access to your electronic health record. If you see a primary care provider, you can also send messages to your care team and make appointments. If you have questions, please call your primary care clinic.  If you do not have a primary care provider, please call [phone] and they will assist you.      [employer] is an electronic gateway that provides easy, online access to your medical records. With [employer], you can request a clinic appointment, read your test results, renew a prescription or communicate with your care team.     To access your existing account, please contact your [facility] or call [phone] for assistance.        Care EveryWhere ID     This is your Care EveryWhere ID. This could be used by other organizations to access your [place]  "medical records  [id number]        Your Vitals Were     Pulse Height BMI (Body Mass Index)             106 1.575 m (5' 2\") 28.4 kg/m2          Blood Pressure from Last 3 Encounters:   09/10/18 115/79   09/04/18 123/85   08/21/18 127/85    Weight from Last 3 Encounters:   09/10/18 70.4 kg (155 lb 4.8 oz)   09/04/18 70.6 kg (155 lb 11.2 oz)   08/21/18 71 kg (156 lb 8 oz)              Today, you had the following     No orders found for display       Primary Care Provider Fax #    Physician No Ref-Primary [phone]       No address on file        Equal Access to Services     IJEOMA SANCHEZ : Criss Morris, amaury elliott, oly padillaalmajose a geiger, mariah ann . So [facility] [phone].    ATENCIÓN: Si habla español, tiene a georges disposición servicios gratuitos de asistencia lingüística. Llame al [phone].    We comply with applicable federal civil rights laws and [place] laws. We do not discriminate on the basis of race, color, national origin, age, disability, sex, sexual orientation, or gender identity.            Thank you!     Thank you for choosing [facility]  for your care. Our goal is always to provide you with excellent care. Hearing back from our patients is one way we can continue to improve our services. Please take a few minutes to complete the written survey that you may receive in the mail after your visit with us. Thank you!             Your Updated Medication List - Protect others around you: Learn how to safely use, store and throw away your medicines at www.disposemymeds.org.          This list is accurate as of 9/10/18 11:59 PM.  Always use your most recent med list.                   Brand Name Dispense Instructions for use Diagnosis    acetone (Urine) test Strp     20 each    1 strip by In Vitro route daily    Gestational diabetes       * blood glucose monitoring lancets     100 each    Test 4 times daily.    Abnormal maternal " glucose tolerance, antepartum       * blood glucose monitoring lancets     200 each    Use to test blood sugar 5 times daily or as directed.  Ok to substitute alternative if insurance prefers.    Diet controlled gestational diabetes mellitus (GDM) in third trimester       blood glucose monitoring meter device kit     1 kit    Use to test blood sugars 4 times daily or as directed.    Abnormal maternal glucose tolerance, antepartum       * blood glucose monitoring test strip    ONETOUCH VERIO IQ    200 strip    Use to test blood sugars 4 times daily or as directed.    Gestational diabetes       * blood glucose monitoring test strip    no brand specified    100 strip    Use to test blood sugars 5 times daily or as directed    Abnormal maternal glucose tolerance, antepartum       breast pump Misc     1 each    1 each daily as needed    Postpartum care and examination of lactating mother       mirabegron 25 MG 24 hr tablet    MYRBETRIQ    30 tablet    Take 1 tablet (25 mg) by mouth daily    Neurogenic bladder, Incontinence in female       polyethylene glycol powder    MIRALAX    510 g    Take 17 g by mouth daily    Constipation       PRENATAL VITAMINS PO           ranitidine 150 MG tablet    ZANTAC    60 tablet    Take 1 tablet (150 mg) by mouth 2 times daily    Gastroesophageal reflux disease without esophagitis       sennosides 8.6 MG tablet    SENOKOT    180 tablet    Take 1 tablet by mouth 2 times daily as needed for constipation.    Chronic constipation       sodium chloride 0.65 % nasal spray    OCEAN    1 Bottle    Spray 1 spray in nostril every hour as needed for congestion.    Neurogenic bladder, NOS, Other urinary incontinence       solifenacin 10 MG tablet    VESICARE    90 tablet    Take 1 tablet (10 mg) by mouth daily    Neurogenic bladder, Urinary urgency, Urge incontinence       tolterodine 4 MG 24 hr capsule    DETROL LA    90 capsule    Take 1 capsule (4 mg) by mouth daily    Neurogenic bladder       *  Notice:  This list has 4 medication(s) that are the same as other medications prescribed for you. Read the directions carefully, and ask your doctor or other care provider to review them with you.

## 2018-09-10 NOTE — LETTER
"9/10/2018       RE: Minh Altamirano  [address]  [place] [address]     Dear Colleague,    Thank you for referring your patient, Minh Altamirano, to the [facility] at [facility]. Please see a copy of my visit note below.    Doing well. BS log with 95% values within goal.     Vitals:    09/10/18 1518   BP: 115/79   BP Location: Left arm   Patient Position: Chair   Pulse: 106   Weight: 70.4 kg (155 lb 4.8 oz)   Height: 1.575 m (5' 2\")     See flow    A/P:  34 y/o  at 36+6 weeks, doing well.  Pregnancy complicated by a complex maternal bladder surgery history, A1GDM    1. PNC:  GBS neg.      2. Delivery plan clear. C/s via high vert midline away from EMMANUEL. Urology to scrub.      3. Continue BS monitoring as prior.        Pamella Ames MD, FACOG  [facility]  OB/GYN    2018  4:44 PM          "

## 2018-09-11 RX ORDER — URINE ACETONE TEST STRIPS
STRIP MISCELLANEOUS
Qty: 50 EACH | Refills: 0 | OUTPATIENT
Start: 2018-09-11

## 2018-09-11 NOTE — PROGRESS NOTES
"Doing well. BS log with 95% values within goal.     Vitals:    09/10/18 1518   BP: 115/79   BP Location: Left arm   Patient Position: Chair   Pulse: 106   Weight: 70.4 kg (155 lb 4.8 oz)   Height: 1.575 m (5' 2\")     See flow    A/P:  34 y/o  at 36+6 weeks, doing well.  Pregnancy complicated by a complex maternal bladder surgery history, A1GDM    1. PNC:  GBS neg.      2. Delivery plan clear. C/s via high vert midline away from EMMANUEL. Urology to scrub.      3. Continue BS monitoring as prior.        Pamella Ames MD, FACOG  [facility]  OB/GYN    2018  4:44 PM      "

## 2018-09-17 ENCOUNTER — OFFICE VISIT (OUTPATIENT)
Dept: OBGYN | Facility: CLINIC | Age: 33
End: 2018-09-17
Attending: OBSTETRICS & GYNECOLOGY
Payer: COMMERCIAL

## 2018-09-17 VITALS
WEIGHT: 155.7 LBS | BODY MASS INDEX: 28.65 KG/M2 | HEART RATE: 106 BPM | HEIGHT: 62 IN | SYSTOLIC BLOOD PRESSURE: 131 MMHG | DIASTOLIC BLOOD PRESSURE: 86 MMHG

## 2018-09-17 DIAGNOSIS — O09.93 SUPERVISION OF HIGH RISK PREGNANCY IN THIRD TRIMESTER: Primary | ICD-10-CM

## 2018-09-17 NOTE — MR AVS SNAPSHOT
"              After Visit Summary   9/17/2018    Minh Altamirano    MRN: [id number]           Patient Information     Date Of Birth          1985        Visit Information        Provider Department      9/17/2018 11:00 AM Lissy Barnes MD [facility]        Today's Diagnoses     Supervision of high risk pregnancy in third trimester    -  1       Follow-ups after your visit        Your next 10 appointments already scheduled     Sep 20, 2018   Procedure with Lily Villanueva MD   UR 4COB (--)    [address] [facility] [address] [phone]              Who to contact     Please call your clinic at [phone] to:    Ask questions about your health    Make or cancel appointments    Discuss your medicines    Learn about your test results    Speak to your doctor            Additional Information About Your Visit        MyChart Information     [employer] gives you secure access to your electronic health record. If you see a primary care provider, you can also send messages to your care team and make appointments. If you have questions, please call your primary care clinic.  If you do not have a primary care provider, please call [phone] and they will assist you.      [employer] is an electronic gateway that provides easy, online access to your medical records. With [employer], you can request a clinic appointment, read your test results, renew a prescription or communicate with your care team.     To access your existing account, please contact your [facility] or call [phone] for assistance.        Care EveryWhere ID     This is your Care EveryWhere ID. This could be used by other organizations to access your [place] medical records  [id number]        Your Vitals Were     Pulse Height BMI (Body Mass Index)             106 1.575 m (5' 2\") 28.48 kg/m2          Blood Pressure from Last 3 Encounters:   09/17/18 131/86   09/10/18 115/79   09/04/18 " 123/85    Weight from Last 3 Encounters:   09/17/18 70.6 kg (155 lb 11.2 oz)   09/10/18 70.4 kg (155 lb 4.8 oz)   09/04/18 70.6 kg (155 lb 11.2 oz)              Today, you had the following     No orders found for display       Primary Care Provider Fax #    Physician No Ref-Primary [phone]       No address on file        Equal Access to Services     RADHA SANCHEZ : Hadii aad ku hadwillieo Soomaali, waaxda luqadaha, qaybta kaalmada adeegyada, waxjairo elizabet roecharles apodacaadelina stovall lafelicharles . So [facility] [phone].    ATENCIÓN: Si habla paige, tiene a georges disposición servicios gratuitos de asistencia lingüística. Llame al [phone].    We comply with applicable federal civil rights laws and [place] laws. We do not discriminate on the basis of race, color, national origin, age, disability, sex, sexual orientation, or gender identity.            Thank you!     Thank you for choosing [facility]  for your care. Our goal is always to provide you with excellent care. Hearing back from our patients is one way we can continue to improve our services. Please take a few minutes to complete the written survey that you may receive in the mail after your visit with us. Thank you!             Your Updated Medication List - Protect others around you: Learn how to safely use, store and throw away your medicines at www.disposemymeds.org.          This list is accurate as of 9/17/18 11:07 AM.  Always use your most recent med list.                   Brand Name Dispense Instructions for use Diagnosis    acetone (Urine) test Strp     20 each    1 strip by In Vitro route daily    Gestational diabetes       * blood glucose monitoring lancets     100 each    Test 4 times daily.    Abnormal maternal glucose tolerance, antepartum       * blood glucose monitoring lancets     200 each    Use to test blood sugar 5 times daily or as directed.  Ok to substitute alternative if insurance prefers.    Diet controlled gestational  diabetes mellitus (GDM) in third trimester       blood glucose monitoring meter device kit     1 kit    Use to test blood sugars 4 times daily or as directed.    Abnormal maternal glucose tolerance, antepartum       * blood glucose monitoring test strip    ONETOUCH VERIO IQ    200 strip    Use to test blood sugars 4 times daily or as directed.    Gestational diabetes       * blood glucose monitoring test strip    no brand specified    100 strip    Use to test blood sugars 5 times daily or as directed    Abnormal maternal glucose tolerance, antepartum       breast pump Misc     1 each    1 each daily as needed    Postpartum care and examination of lactating mother       mirabegron 25 MG 24 hr tablet    MYRBETRIQ    30 tablet    Take 1 tablet (25 mg) by mouth daily    Neurogenic bladder, Incontinence in female       polyethylene glycol powder    MIRALAX    510 g    Take 17 g by mouth daily    Constipation       PRENATAL VITAMINS PO           ranitidine 150 MG tablet    ZANTAC    60 tablet    Take 1 tablet (150 mg) by mouth 2 times daily    Gastroesophageal reflux disease without esophagitis       sennosides 8.6 MG tablet    SENOKOT    180 tablet    Take 1 tablet by mouth 2 times daily as needed for constipation.    Chronic constipation       sodium chloride 0.65 % nasal spray    OCEAN    1 Bottle    Spray 1 spray in nostril every hour as needed for congestion.    Neurogenic bladder, NOS, Other urinary incontinence       solifenacin 10 MG tablet    VESICARE    90 tablet    Take 1 tablet (10 mg) by mouth daily    Neurogenic bladder, Urinary urgency, Urge incontinence       tolterodine 4 MG 24 hr capsule    DETROL LA    90 capsule    Take 1 capsule (4 mg) by mouth daily    Neurogenic bladder       * Notice:  This list has 4 medication(s) that are the same as other medications prescribed for you. Read the directions carefully, and ask your doctor or other care provider to review them with you.

## 2018-09-17 NOTE — PROGRESS NOTES
"Doing well. Denies ctx/LOF/VB. Endorses active fetal movement. No complaints today.   BS log this week with only one postprandial value =140. All other fastings and postprandials within goal.     Vitals:    18 1054   BP: 131/86   Pulse: 106   Weight: 70.6 kg (155 lb 11.2 oz)   Height: 1.575 m (5' 2\")     See flow    A/P:  32 y/o  at 37w6d weeks, doing well.  Pregnancy complicated by a complex maternal bladder surgery history, A1GDM    1. PNC: O+, GBS neg.      2. Delivery plan C/S via high vert midline away from EMMANUEL (scheduled ). Urology to scrub.      3. Continue BS monitoring as prior.        Lissy Barnes MD  2018    "

## 2018-09-17 NOTE — LETTER
"2018       RE: Minh Altamirano  [address]  [place] [facility] [address]     Dear Colleague,    Thank you for referring your patient, Minh Altamirano, to the [facility] at [facility]. Please see a copy of my visit note below.    Doing well. Denies ctx/LOF/VB. Endorses active fetal movement. No complaints today.   BS log this week with only one postprandial value =140. All other fastings and postprandials within goal.     Vitals:    18 1054   BP: 131/86   Pulse: 106   Weight: 70.6 kg (155 lb 11.2 oz)   Height: 1.575 m (5' 2\")     See flow    A/P:  32 y/o  at 37w6d weeks, doing well.  Pregnancy complicated by a complex maternal bladder surgery history, A1GDM    1. PNC: O+, GBS neg.      2. Delivery plan C/S via high vert midline away from EMMANUEL (scheduled ). Urology to scrub.      3. Continue BS monitoring as prior.        Lissy Barnes MD  2018  "

## 2018-09-19 ENCOUNTER — ANESTHESIA EVENT (OUTPATIENT)
Dept: SURGERY | Facility: CLINIC | Age: 33
End: 2018-09-19
Payer: COMMERCIAL

## 2018-09-19 ENCOUNTER — TELEPHONE (OUTPATIENT)
Dept: OBGYN | Facility: CLINIC | Age: 33
End: 2018-09-19

## 2018-09-19 ENCOUNTER — APPOINTMENT (OUTPATIENT)
Dept: LAB | Facility: CLINIC | Age: 33
End: 2018-09-19
Attending: OBSTETRICS & GYNECOLOGY
Payer: COMMERCIAL

## 2018-09-19 DIAGNOSIS — O09.93 HRP (HIGH RISK PREGNANCY), THIRD TRIMESTER: ICD-10-CM

## 2018-09-19 DIAGNOSIS — Z01.818 PREOPERATIVE EVALUATION TO RULE OUT SURGICAL CONTRAINDICATION: Primary | ICD-10-CM

## 2018-09-19 LAB
ABO + RH BLD: NORMAL
ABO + RH BLD: NORMAL
BLD GP AB SCN SERPL QL: NORMAL
BLOOD BANK CMNT PATIENT-IMP: NORMAL
ERYTHROCYTE [DISTWIDTH] IN BLOOD BY AUTOMATED COUNT: 14.9 % (ref 10–15)
HCT VFR BLD AUTO: 34.9 % (ref 35–47)
HGB BLD-MCNC: 11.5 G/DL (ref 11.7–15.7)
MCH RBC QN AUTO: 29 PG (ref 26.5–33)
MCHC RBC AUTO-ENTMCNC: 33 G/DL (ref 31.5–36.5)
MCV RBC AUTO: 88 FL (ref 78–100)
PLATELET # BLD AUTO: 288 10E9/L (ref 150–450)
RBC # BLD AUTO: 3.97 10E12/L (ref 3.8–5.2)
SPECIMEN EXP DATE BLD: NORMAL
T PALLIDUM AB SER QL: NONREACTIVE
WBC # BLD AUTO: 7.8 10E9/L (ref 4–11)

## 2018-09-19 PROCEDURE — 86900 BLOOD TYPING SEROLOGIC ABO: CPT | Performed by: OBSTETRICS & GYNECOLOGY

## 2018-09-19 PROCEDURE — 86780 TREPONEMA PALLIDUM: CPT | Performed by: OBSTETRICS & GYNECOLOGY

## 2018-09-19 PROCEDURE — 86850 RBC ANTIBODY SCREEN: CPT | Performed by: OBSTETRICS & GYNECOLOGY

## 2018-09-19 PROCEDURE — 36415 COLL VENOUS BLD VENIPUNCTURE: CPT | Performed by: OBSTETRICS & GYNECOLOGY

## 2018-09-19 PROCEDURE — 85027 COMPLETE CBC AUTOMATED: CPT | Performed by: OBSTETRICS & GYNECOLOGY

## 2018-09-19 PROCEDURE — 86901 BLOOD TYPING SEROLOGIC RH(D): CPT | Performed by: OBSTETRICS & GYNECOLOGY

## 2018-09-19 ASSESSMENT — LIFESTYLE VARIABLES: TOBACCO_USE: 0

## 2018-09-19 NOTE — H&P
[facility]  OB History and Physical      Minh Altamirano MRN# [id number]   Age: 33 year old YOB: 1985     CC:  Scheduled primary  section    HPI:  Ms. Minh Altamirano is a 33 year old  at 38w2d by LMP c/w 7wk ultrasound (per chart review) who presents for scheduled primary  section due to history of sacral agenesis and Mitrofanoff procedure.  She has been having rare contractions and some back pain recently. Denies vaginal bleeding, and loss of fluid.   + normal fetal movement.    Pregnancy Complications:  1.  Complex pelvic surgery history due to (maternal) sacral agenesis, including bladder augmentation with colon, appendiceal Mitrofanoff subsequently taken down for dehiscence, and then an autologous bladder neck sling (all in ) and then Botox injection into her bladder neck area 2016  3. GDMA1  4. Pseudomonas UTI s/p ceftazidime via PICC line     Prenatal Labs:   Lab Results   Component Value Date    ABO O 2018    RH Pos 2018    AS Neg 2018    CHPCRT Neg 2010    GCPCRT Heg 2010    HGB 11.5 (L) 2018       GBS Status:   Lab Results   Component Value Date    GBS Negative 2018       Ultrasounds  1. Normal anatomy scan  2. 8/3/2018: 32w0d, EFW 1897g, 54%tile, ANNAMARIE 16.6, anterior placenta      OB History  Obstetric History       T0      L0     SAB0   TAB0   Ectopic0   Multiple0   Live Births0       # Outcome Date GA Lbr Aidan/2nd Weight Sex Delivery Anes PTL Lv   1 Current                   PMHx:   Past Medical History:   Diagnosis Date     Anemia      Chronic infection     MRSA     Constipation, chronic      Incontinence of urine      Lipoma of spinal cord      Migraine      neurogenic bladder      Spinal dysraphism (H)      PSHx:   Past Surgical History:   Procedure Laterality Date     BLADDER AUGMENTATION  2012    Procedure:BLADDER AUGMENTATION; Surgeon:TRINA COLLADO; Location:[facility] OR      CYSTOSCOPY, INTRAVESICAL INJECTION N/A 8/19/2016    Procedure: CYSTOSCOPY, INTRAVESICAL INJECTION;  Surgeon: Trina Moore MD;  Location: [facility] OR     LAMINECTOMY LUMBAR TWO LEVELS       LAPAROTOMY EXPLORATORY  1/11/2012    Procedure:LAPAROTOMY EXPLORATORY; Exploratory Laparotomy with Takedown of Mitrofanoff, Ventral Hernia Repair with Strattice Mesh implantation ; Surgeon:TRINA MOORE; Location:[facility] OR     MITROFANOFF PROCEDURE (APPENDIX CONDUIT)  1/5/2012    Procedure:MITROFANOFF PROCEDURE (APPENDIX CONDUIT); Bladder Augmentation with Right Colon, Appendix Conduit- Mitrofanoff, Insertion of Pubovaginal Sling using Autologous Rectus Fascia; Surgeon:TRINA MOORE; Location:[facility] OR     tumor resection and cord detethering       Meds:   Prescriptions Prior to Admission   Medication Sig Dispense Refill Last Dose     polyethylene glycol (MIRALAX) powder Take 17 g by mouth daily 510 g 6 Past Week at Unknown time     polyethylene glycol (MIRALAX/GLYCOLAX) Packet Take 1 packet by mouth daily   Past Week at Unknown time     Prenatal Multivit-Min-Fe-FA (PRENATAL VITAMINS PO)    9/18/2018 at Unknown time     ranitidine (ZANTAC) 150 MG tablet Take 1 tablet (150 mg) by mouth 2 times daily 60 tablet 2 9/19/2018 at Unknown time     sennosides (SENOKOT) 8.6 MG tablet Take 1 tablet by mouth 2 times daily as needed for constipation. 180 tablet 1 Past Week at Unknown time     Allergies:    Allergies   Allergen Reactions     Naproxen Hives and Nausea and Vomiting     Bactrim [Sulfamethoxazole W/Trimethoprim] Itching     Gabapentin Itching and Nausea     Vicodin [Hydrocodone-Acetaminophen] Itching      FmHx: History reviewed. No pertinent family history.  SocHx: She  any tobacco, alcohol, or other drug use during this pregnancy.    ROS:   Complete 10-point ROS negative except as noted in HPI. She denies headache, blurry vision, chest pain, shortness of breath, RUQ pain, nausea, vomiting, dysuria, hematuria or  extremity edema.    PE:  Vit:   Patient Vitals for the past 4 hrs:   BP Temp Temp src   18 0557 - 98.5  F (36.9  C) Oral   18 0555 132/88 - -      Gen: Well-appearing, NAD, comfortable   CV: rrr, no mrg   Pulm: Ctab, no wheezes or crackles   Abd: Soft, gravid, non-tender  Ext: Trace LE edema b/l    Pres:  cephalic by BSUS  EFW:  7.5 by Leopold maneuvers              FHT: Baseline 150, moderate variability, + accelerations, no decelerations   [place]: 0 contractions in 10 minutes      Assessment  Ms. Minh Altamirano is a 33 year old , at 38w2d by LMP c/w 7wk ultrasound, who presents for scheduled primary  section.    # Scheduled primary  Section  Indicated due to history of sacral agenesis requiring extensive pelvic surgeries. Will plan for a upper abdominal skin incision with fundal hysterotomy. Appreciate assistance from Urology.  - Labs: CBC, T&S, RPR   - Pre-op Hgb 11.5, Plts 288  - Placenta: Anterior  - Anesthesia: Spinal  - 2g Ancef   - PPH Meds/Ppx:   - Diet: NPO  - PPx: SCDs  - Consent: Discussed risks and benefits of procedure, including but not limited to bleeding, infection, injury to surrounding organs, injury to infant, and the potential need for another surgery should some injury go unrecognized or patient were to have continued bleeding. Patient had time to ask questions and expressed understanding of procedure and associated risks. Agreed to blood transfusion if necessary. Consent signed.    # GDMA1   - well controlled during pregnancy  - plan fasting BG tomorrow AM and 6 wk GTT    The patient was discussed with Dr. Villanueva who is in agreement with the treatment plan.    Sita Aceves MD  OBGYN PGY-2  12:29 PM 2018      Appreciate note by Dr. Aceves. Patient has been seen and examined by me separate from the resident, agree with above note.     Lily Villanueva MD  8:43 AM

## 2018-09-19 NOTE — TELEPHONE ENCOUNTER
Received call from lab that pt is there but there are no orders.  Review of EPIC shows she is having a  tomorrow.  Labs entered.

## 2018-09-20 ENCOUNTER — HOSPITAL ENCOUNTER (INPATIENT)
Facility: CLINIC | Age: 33
LOS: 3 days | Discharge: HOME | End: 2018-09-23
Attending: OBSTETRICS & GYNECOLOGY | Admitting: OBSTETRICS & GYNECOLOGY
Payer: COMMERCIAL

## 2018-09-20 ENCOUNTER — SURGERY (OUTPATIENT)
Age: 33
End: 2018-09-20

## 2018-09-20 ENCOUNTER — OFFICE VISIT (OUTPATIENT)
Dept: INTERPRETER SERVICES | Facility: CLINIC | Age: 33
End: 2018-09-20
Payer: COMMERCIAL

## 2018-09-20 ENCOUNTER — ANESTHESIA (OUTPATIENT)
Dept: SURGERY | Facility: CLINIC | Age: 33
End: 2018-09-20
Payer: COMMERCIAL

## 2018-09-20 DIAGNOSIS — Z98.891 S/P C-SECTION: Primary | ICD-10-CM

## 2018-09-20 DIAGNOSIS — D62 ANEMIA DUE TO BLOOD LOSS, ACUTE: ICD-10-CM

## 2018-09-20 LAB
MRSA DNA SPEC QL NAA+PROBE: NEGATIVE
SPECIMEN SOURCE: NORMAL

## 2018-09-20 PROCEDURE — C9290 INJ, BUPIVACAINE LIPOSOME: HCPCS

## 2018-09-20 PROCEDURE — 71000014 ZZH RECOVERY PHASE 1 LEVEL 2 FIRST HR: Performed by: OBSTETRICS & GYNECOLOGY

## 2018-09-20 PROCEDURE — 25000125 ZZHC RX 250: Performed by: STUDENT IN AN ORGANIZED HEALTH CARE EDUCATION/TRAINING PROGRAM

## 2018-09-20 PROCEDURE — 36000057 ZZH SURGERY LEVEL 3 1ST 30 MIN - UMMC: Performed by: OBSTETRICS & GYNECOLOGY

## 2018-09-20 PROCEDURE — 27211024 ZZHC OR SUPPLY OTHER OPNP: Performed by: OBSTETRICS & GYNECOLOGY

## 2018-09-20 PROCEDURE — 25000128 H RX IP 250 OP 636

## 2018-09-20 PROCEDURE — 27210794 ZZH OR GENERAL SUPPLY STERILE: Performed by: OBSTETRICS & GYNECOLOGY

## 2018-09-20 PROCEDURE — 25000125 ZZHC RX 250

## 2018-09-20 PROCEDURE — 40000170 ZZH STATISTIC PRE-PROCEDURE ASSESSMENT II: Performed by: OBSTETRICS & GYNECOLOGY

## 2018-09-20 PROCEDURE — 0DNW0ZZ RELEASE PERITONEUM, OPEN APPROACH: ICD-10-PCS | Performed by: UROLOGY

## 2018-09-20 PROCEDURE — 36000059 ZZH SURGERY LEVEL 3 EA 15 ADDTL MIN UMMC: Performed by: OBSTETRICS & GYNECOLOGY

## 2018-09-20 PROCEDURE — C1765 ADHESION BARRIER: HCPCS | Performed by: OBSTETRICS & GYNECOLOGY

## 2018-09-20 PROCEDURE — 37000009 ZZH ANESTHESIA TECHNICAL FEE, EACH ADDTL 15 MIN: Performed by: OBSTETRICS & GYNECOLOGY

## 2018-09-20 PROCEDURE — 25000132 ZZH RX MED GY IP 250 OP 250 PS 637: Performed by: STUDENT IN AN ORGANIZED HEALTH CARE EDUCATION/TRAINING PROGRAM

## 2018-09-20 PROCEDURE — T1013 SIGN LANG/ORAL INTERPRETER: HCPCS | Mod: U3,ZF

## 2018-09-20 PROCEDURE — 25000128 H RX IP 250 OP 636: Performed by: ANESTHESIOLOGY

## 2018-09-20 PROCEDURE — 37000008 ZZH ANESTHESIA TECHNICAL FEE, 1ST 30 MIN: Performed by: OBSTETRICS & GYNECOLOGY

## 2018-09-20 PROCEDURE — 87641 MR-STAPH DNA AMP PROBE: CPT | Performed by: STUDENT IN AN ORGANIZED HEALTH CARE EDUCATION/TRAINING PROGRAM

## 2018-09-20 PROCEDURE — 12000032 ZZH R&B OB CRITICAL UMMC

## 2018-09-20 PROCEDURE — 25000125 ZZHC RX 250: Performed by: ANESTHESIOLOGY

## 2018-09-20 PROCEDURE — 25000128 H RX IP 250 OP 636: Performed by: STUDENT IN AN ORGANIZED HEALTH CARE EDUCATION/TRAINING PROGRAM

## 2018-09-20 PROCEDURE — 87640 STAPH A DNA AMP PROBE: CPT | Performed by: STUDENT IN AN ORGANIZED HEALTH CARE EDUCATION/TRAINING PROGRAM

## 2018-09-20 RX ORDER — NALOXONE HYDROCHLORIDE 0.4 MG/ML
.1-.4 INJECTION, SOLUTION INTRAMUSCULAR; INTRAVENOUS; SUBCUTANEOUS
Status: DISCONTINUED | OUTPATIENT
Start: 2018-09-20 | End: 2018-09-21

## 2018-09-20 RX ORDER — IBUPROFEN 600 MG/1
600 TABLET, FILM COATED ORAL EVERY 6 HOURS PRN
Qty: 60 TABLET | Refills: 0 | Status: SHIPPED | OUTPATIENT
Start: 2018-09-20 | End: 2019-11-18

## 2018-09-20 RX ORDER — NALBUPHINE HYDROCHLORIDE 10 MG/ML
2.5-5 INJECTION, SOLUTION INTRAMUSCULAR; INTRAVENOUS; SUBCUTANEOUS EVERY 6 HOURS PRN
Status: DISCONTINUED | OUTPATIENT
Start: 2018-09-20 | End: 2018-09-23 | Stop reason: HOSPADM

## 2018-09-20 RX ORDER — SODIUM CHLORIDE, SODIUM LACTATE, POTASSIUM CHLORIDE, CALCIUM CHLORIDE 600; 310; 30; 20 MG/100ML; MG/100ML; MG/100ML; MG/100ML
INJECTION, SOLUTION INTRAVENOUS CONTINUOUS
Status: DISCONTINUED | OUTPATIENT
Start: 2018-09-20 | End: 2018-09-20 | Stop reason: HOSPADM

## 2018-09-20 RX ORDER — OXYTOCIN/0.9 % SODIUM CHLORIDE 30/500 ML
PLASTIC BAG, INJECTION (ML) INTRAVENOUS
Status: DISCONTINUED
Start: 2018-09-20 | End: 2018-09-20 | Stop reason: HOSPADM

## 2018-09-20 RX ORDER — HYDROCORTISONE 2.5 %
CREAM (GRAM) TOPICAL 3 TIMES DAILY PRN
Status: DISCONTINUED | OUTPATIENT
Start: 2018-09-20 | End: 2018-09-23 | Stop reason: HOSPADM

## 2018-09-20 RX ORDER — CARBOPROST TROMETHAMINE 250 UG/ML
250 INJECTION, SOLUTION INTRAMUSCULAR
Status: DISCONTINUED | OUTPATIENT
Start: 2018-09-20 | End: 2018-09-23 | Stop reason: HOSPADM

## 2018-09-20 RX ORDER — DEXTROSE, SODIUM CHLORIDE, SODIUM LACTATE, POTASSIUM CHLORIDE, AND CALCIUM CHLORIDE 5; .6; .31; .03; .02 G/100ML; G/100ML; G/100ML; G/100ML; G/100ML
INJECTION, SOLUTION INTRAVENOUS CONTINUOUS
Status: DISCONTINUED | OUTPATIENT
Start: 2018-09-20 | End: 2018-09-23 | Stop reason: HOSPADM

## 2018-09-20 RX ORDER — KETOROLAC TROMETHAMINE 30 MG/ML
30 INJECTION, SOLUTION INTRAMUSCULAR; INTRAVENOUS ONCE
Status: DISCONTINUED | OUTPATIENT
Start: 2018-09-20 | End: 2018-09-20

## 2018-09-20 RX ORDER — AMOXICILLIN 250 MG
2 CAPSULE ORAL 2 TIMES DAILY
Status: DISCONTINUED | OUTPATIENT
Start: 2018-09-20 | End: 2018-09-23 | Stop reason: HOSPADM

## 2018-09-20 RX ORDER — SIMETHICONE 80 MG
80 TABLET,CHEWABLE ORAL 4 TIMES DAILY PRN
Status: DISCONTINUED | OUTPATIENT
Start: 2018-09-20 | End: 2018-09-23 | Stop reason: HOSPADM

## 2018-09-20 RX ORDER — FENTANYL CITRATE 50 UG/ML
25-50 INJECTION, SOLUTION INTRAMUSCULAR; INTRAVENOUS
Status: DISCONTINUED | OUTPATIENT
Start: 2018-09-20 | End: 2018-09-20 | Stop reason: HOSPADM

## 2018-09-20 RX ORDER — NALOXONE HYDROCHLORIDE 0.4 MG/ML
.1-.4 INJECTION, SOLUTION INTRAMUSCULAR; INTRAVENOUS; SUBCUTANEOUS
Status: DISCONTINUED | OUTPATIENT
Start: 2018-09-20 | End: 2018-09-20

## 2018-09-20 RX ORDER — CEFAZOLIN SODIUM 1 G/3ML
1 INJECTION, POWDER, FOR SOLUTION INTRAMUSCULAR; INTRAVENOUS SEE ADMIN INSTRUCTIONS
Status: DISCONTINUED | OUTPATIENT
Start: 2018-09-20 | End: 2018-09-20 | Stop reason: HOSPADM

## 2018-09-20 RX ORDER — BISACODYL 10 MG
10 SUPPOSITORY, RECTAL RECTAL DAILY PRN
Status: DISCONTINUED | OUTPATIENT
Start: 2018-09-22 | End: 2018-09-23 | Stop reason: HOSPADM

## 2018-09-20 RX ORDER — KETOROLAC TROMETHAMINE 30 MG/ML
30 INJECTION, SOLUTION INTRAMUSCULAR; INTRAVENOUS ONCE
Status: COMPLETED | OUTPATIENT
Start: 2018-09-20 | End: 2018-09-20

## 2018-09-20 RX ORDER — OXYTOCIN/0.9 % SODIUM CHLORIDE 30/500 ML
PLASTIC BAG, INJECTION (ML) INTRAVENOUS CONTINUOUS PRN
Status: DISCONTINUED | OUTPATIENT
Start: 2018-09-20 | End: 2018-09-20

## 2018-09-20 RX ORDER — OXYTOCIN/0.9 % SODIUM CHLORIDE 30/500 ML
340 PLASTIC BAG, INJECTION (ML) INTRAVENOUS CONTINUOUS PRN
Status: DISCONTINUED | OUTPATIENT
Start: 2018-09-20 | End: 2018-09-23 | Stop reason: HOSPADM

## 2018-09-20 RX ORDER — CITRIC ACID/SODIUM CITRATE 334-500MG
30 SOLUTION, ORAL ORAL
Status: COMPLETED | OUTPATIENT
Start: 2018-09-20 | End: 2018-09-20

## 2018-09-20 RX ORDER — METHYLERGONOVINE MALEATE 0.2 MG/ML
200 INJECTION INTRAVENOUS
Status: DISCONTINUED | OUTPATIENT
Start: 2018-09-20 | End: 2018-09-23 | Stop reason: HOSPADM

## 2018-09-20 RX ORDER — OXYTOCIN/0.9 % SODIUM CHLORIDE 30/500 ML
100 PLASTIC BAG, INJECTION (ML) INTRAVENOUS CONTINUOUS
Status: DISCONTINUED | OUTPATIENT
Start: 2018-09-20 | End: 2018-09-23 | Stop reason: HOSPADM

## 2018-09-20 RX ORDER — POLYETHYLENE GLYCOL 3350 17 G/17G
1 POWDER, FOR SOLUTION ORAL DAILY
COMMUNITY
End: 2019-11-18

## 2018-09-20 RX ORDER — LIDOCAINE 40 MG/G
CREAM TOPICAL
Status: DISCONTINUED | OUTPATIENT
Start: 2018-09-20 | End: 2018-09-23 | Stop reason: HOSPADM

## 2018-09-20 RX ORDER — MORPHINE SULFATE 0.5 MG/ML
0.1 INJECTION, SOLUTION EPIDURAL; INTRATHECAL; INTRAVENOUS ONCE
Status: DISCONTINUED | OUTPATIENT
Start: 2018-09-20 | End: 2018-09-23 | Stop reason: HOSPADM

## 2018-09-20 RX ORDER — ONDANSETRON 2 MG/ML
4 INJECTION INTRAMUSCULAR; INTRAVENOUS EVERY 6 HOURS PRN
Status: DISCONTINUED | OUTPATIENT
Start: 2018-09-20 | End: 2018-09-23 | Stop reason: HOSPADM

## 2018-09-20 RX ORDER — NALOXONE HYDROCHLORIDE 0.4 MG/ML
.1-.4 INJECTION, SOLUTION INTRAMUSCULAR; INTRAVENOUS; SUBCUTANEOUS
Status: DISCONTINUED | OUTPATIENT
Start: 2018-09-20 | End: 2018-09-23 | Stop reason: HOSPADM

## 2018-09-20 RX ORDER — LIDOCAINE HCL/EPINEPHRINE/PF 2%-1:200K
VIAL (ML) INJECTION PRN
Status: DISCONTINUED | OUTPATIENT
Start: 2018-09-20 | End: 2018-09-20

## 2018-09-20 RX ORDER — DIPHENHYDRAMINE HCL 25 MG
25 CAPSULE ORAL EVERY 6 HOURS PRN
Status: DISCONTINUED | OUTPATIENT
Start: 2018-09-20 | End: 2018-09-23 | Stop reason: HOSPADM

## 2018-09-20 RX ORDER — MORPHINE SULFATE 1 MG/ML
INJECTION, SOLUTION EPIDURAL; INTRATHECAL; INTRAVENOUS PRN
Status: DISCONTINUED | OUTPATIENT
Start: 2018-09-20 | End: 2018-09-20

## 2018-09-20 RX ORDER — KETOROLAC TROMETHAMINE 30 MG/ML
30 INJECTION, SOLUTION INTRAMUSCULAR; INTRAVENOUS EVERY 6 HOURS
Status: COMPLETED | OUTPATIENT
Start: 2018-09-20 | End: 2018-09-21

## 2018-09-20 RX ORDER — AMOXICILLIN 250 MG
1 CAPSULE ORAL 2 TIMES DAILY
Status: DISCONTINUED | OUTPATIENT
Start: 2018-09-20 | End: 2018-09-23 | Stop reason: HOSPADM

## 2018-09-20 RX ORDER — MISOPROSTOL 200 UG/1
800 TABLET ORAL
Status: DISCONTINUED | OUTPATIENT
Start: 2018-09-20 | End: 2018-09-23 | Stop reason: HOSPADM

## 2018-09-20 RX ORDER — BUPIVACAINE HYDROCHLORIDE 7.5 MG/ML
INJECTION, SOLUTION INTRASPINAL PRN
Status: DISCONTINUED | OUTPATIENT
Start: 2018-09-20 | End: 2018-09-20

## 2018-09-20 RX ORDER — FENTANYL CITRATE 50 UG/ML
INJECTION, SOLUTION INTRAMUSCULAR; INTRAVENOUS PRN
Status: DISCONTINUED | OUTPATIENT
Start: 2018-09-20 | End: 2018-09-20

## 2018-09-20 RX ORDER — DIPHENHYDRAMINE HYDROCHLORIDE 50 MG/ML
INJECTION INTRAMUSCULAR; INTRAVENOUS
Status: DISPENSED
Start: 2018-09-20 | End: 2018-09-21

## 2018-09-20 RX ORDER — LANOLIN 100 %
OINTMENT (GRAM) TOPICAL
Status: DISCONTINUED | OUTPATIENT
Start: 2018-09-20 | End: 2018-09-23 | Stop reason: HOSPADM

## 2018-09-20 RX ORDER — ACETAMINOPHEN 325 MG/1
650 TABLET ORAL EVERY 4 HOURS PRN
Status: DISCONTINUED | OUTPATIENT
Start: 2018-09-23 | End: 2018-09-23 | Stop reason: HOSPADM

## 2018-09-20 RX ORDER — KETOROLAC TROMETHAMINE 30 MG/ML
15 INJECTION, SOLUTION INTRAMUSCULAR; INTRAVENOUS EVERY 6 HOURS
Status: DISCONTINUED | OUTPATIENT
Start: 2018-09-20 | End: 2018-09-20

## 2018-09-20 RX ORDER — DIPHENHYDRAMINE HCL 25 MG
25 CAPSULE ORAL EVERY 6 HOURS PRN
Status: DISCONTINUED | OUTPATIENT
Start: 2018-09-20 | End: 2018-09-20

## 2018-09-20 RX ORDER — CEFAZOLIN SODIUM 2 G/100ML
2 INJECTION, SOLUTION INTRAVENOUS
Status: DISCONTINUED | OUTPATIENT
Start: 2018-09-20 | End: 2018-09-20 | Stop reason: HOSPADM

## 2018-09-20 RX ORDER — EPHEDRINE SULFATE 50 MG/ML
5 INJECTION, SOLUTION INTRAMUSCULAR; INTRAVENOUS; SUBCUTANEOUS
Status: DISCONTINUED | OUTPATIENT
Start: 2018-09-20 | End: 2018-09-23 | Stop reason: HOSPADM

## 2018-09-20 RX ORDER — ONDANSETRON 2 MG/ML
INJECTION INTRAMUSCULAR; INTRAVENOUS PRN
Status: DISCONTINUED | OUTPATIENT
Start: 2018-09-20 | End: 2018-09-20

## 2018-09-20 RX ORDER — LIDOCAINE 40 MG/G
CREAM TOPICAL
Status: DISCONTINUED | OUTPATIENT
Start: 2018-09-20 | End: 2018-09-21

## 2018-09-20 RX ORDER — AMOXICILLIN 250 MG
1 CAPSULE ORAL 2 TIMES DAILY
Qty: 100 TABLET | Refills: 0 | Status: SHIPPED | OUTPATIENT
Start: 2018-09-20 | End: 2019-11-18

## 2018-09-20 RX ORDER — OXYCODONE HYDROCHLORIDE 5 MG/1
5-10 TABLET ORAL
Qty: 20 TABLET | Refills: 0 | Status: SHIPPED | OUTPATIENT
Start: 2018-09-20 | End: 2019-07-12

## 2018-09-20 RX ORDER — DIPHENHYDRAMINE HYDROCHLORIDE 50 MG/ML
25 INJECTION INTRAMUSCULAR; INTRAVENOUS EVERY 6 HOURS PRN
Status: DISCONTINUED | OUTPATIENT
Start: 2018-09-20 | End: 2018-09-23 | Stop reason: HOSPADM

## 2018-09-20 RX ORDER — BUPIVACAINE HYDROCHLORIDE AND EPINEPHRINE 2.5; 5 MG/ML; UG/ML
INJECTION, SOLUTION INFILTRATION; PERINEURAL PRN
Status: DISCONTINUED | OUTPATIENT
Start: 2018-09-20 | End: 2018-09-20

## 2018-09-20 RX ORDER — ONDANSETRON 2 MG/ML
4 INJECTION INTRAMUSCULAR; INTRAVENOUS EVERY 30 MIN PRN
Status: DISCONTINUED | OUTPATIENT
Start: 2018-09-20 | End: 2018-09-20 | Stop reason: HOSPADM

## 2018-09-20 RX ORDER — OXYCODONE HYDROCHLORIDE 5 MG/1
5-10 TABLET ORAL
Status: DISCONTINUED | OUTPATIENT
Start: 2018-09-20 | End: 2018-09-23 | Stop reason: HOSPADM

## 2018-09-20 RX ORDER — OXYTOCIN 10 [USP'U]/ML
10 INJECTION, SOLUTION INTRAMUSCULAR; INTRAVENOUS
Status: DISCONTINUED | OUTPATIENT
Start: 2018-09-20 | End: 2018-09-23 | Stop reason: HOSPADM

## 2018-09-20 RX ORDER — ACETAMINOPHEN 325 MG/1
975 TABLET ORAL EVERY 8 HOURS
Status: COMPLETED | OUTPATIENT
Start: 2018-09-20 | End: 2018-09-23

## 2018-09-20 RX ORDER — ONDANSETRON 4 MG/1
4 TABLET, ORALLY DISINTEGRATING ORAL EVERY 30 MIN PRN
Status: DISCONTINUED | OUTPATIENT
Start: 2018-09-20 | End: 2018-09-20 | Stop reason: HOSPADM

## 2018-09-20 RX ADMIN — Medication 0.1 MG: at 08:57

## 2018-09-20 RX ADMIN — ACETAMINOPHEN 975 MG: 325 TABLET, FILM COATED ORAL at 15:20

## 2018-09-20 RX ADMIN — PHENYLEPHRINE HYDROCHLORIDE 0.5 MCG/KG/MIN: 10 INJECTION, SOLUTION INTRAMUSCULAR; INTRAVENOUS; SUBCUTANEOUS at 08:57

## 2018-09-20 RX ADMIN — DIPHENHYDRAMINE HYDROCHLORIDE 25 MG: 50 INJECTION, SOLUTION INTRAMUSCULAR; INTRAVENOUS at 14:57

## 2018-09-20 RX ADMIN — ONDANSETRON 4 MG: 2 INJECTION INTRAMUSCULAR; INTRAVENOUS at 09:46

## 2018-09-20 RX ADMIN — LIDOCAINE HYDROCHLORIDE,EPINEPHRINE BITARTRATE 3 ML: 20; .005 INJECTION, SOLUTION EPIDURAL; INFILTRATION; INTRACAUDAL; PERINEURAL at 09:40

## 2018-09-20 RX ADMIN — SODIUM CHLORIDE, POTASSIUM CHLORIDE, SODIUM LACTATE AND CALCIUM CHLORIDE: 600; 310; 30; 20 INJECTION, SOLUTION INTRAVENOUS at 08:37

## 2018-09-20 RX ADMIN — OXYTOCIN-SODIUM CHLORIDE 0.9% IV SOLN 30 UNIT/500ML 300 ML/HR: 30-0.9/5 SOLUTION at 09:44

## 2018-09-20 RX ADMIN — BUPIVACAINE HYDROCHLORIDE AND EPINEPHRINE BITARTRATE 20 ML: 2.5; .005 INJECTION, SOLUTION INFILTRATION; PERINEURAL at 11:00

## 2018-09-20 RX ADMIN — SODIUM CITRATE AND CITRIC ACID MONOHYDRATE 30 ML: 500; 334 SOLUTION ORAL at 08:18

## 2018-09-20 RX ADMIN — KETOROLAC TROMETHAMINE 30 MG: 30 INJECTION, SOLUTION INTRAMUSCULAR at 11:26

## 2018-09-20 RX ADMIN — OXYTOCIN-SODIUM CHLORIDE 0.9% IV SOLN 30 UNIT/500ML 100 ML/HR: 30-0.9/5 SOLUTION at 14:18

## 2018-09-20 RX ADMIN — FENTANYL CITRATE 10 MCG: 50 INJECTION, SOLUTION INTRAMUSCULAR; INTRAVENOUS at 08:57

## 2018-09-20 RX ADMIN — KETOROLAC TROMETHAMINE 30 MG: 30 INJECTION, SOLUTION INTRAMUSCULAR at 18:20

## 2018-09-20 RX ADMIN — BUPIVACAINE HYDROCHLORIDE IN DEXTROSE 1.6 ML: 7.5 INJECTION, SOLUTION SUBARACHNOID at 08:57

## 2018-09-20 RX ADMIN — BUPIVACAINE 20 ML: 13.3 INJECTION, SUSPENSION, LIPOSOMAL INFILTRATION at 11:00

## 2018-09-20 RX ADMIN — FENTANYL CITRATE 50 MCG: 50 INJECTION, SOLUTION INTRAMUSCULAR; INTRAVENOUS at 09:33

## 2018-09-20 NOTE — ANESTHESIA PREPROCEDURE EVALUATION
Anesthesia Evaluation     . Pt has had prior anesthetic. Type: General and MAC    No history of anesthetic complications          ROS/MED HX    ENT/Pulmonary:  - neg pulmonary ROS    (-) tobacco use and JELLY risk factors   Neurologic:     (+)other neuro Hx of sacral agenesis    Cardiovascular:  - neg cardiovascular ROS      (-) hypertension   METS/Exercise Tolerance:     Hematologic:     (+) Anemia (most recent 11.5), -      Musculoskeletal:  - neg musculoskeletal ROS       GI/Hepatic:     (+) GERD       Renal/Genitourinary:     (+) Other Renal/ Genitourinary, Hx of Mitrofanoff procedure      Endo:  - neg endo ROS       Psychiatric:  - neg psychiatric ROS       Infectious Disease:  - neg infectious disease ROS       Malignancy:      - no malignancy   Other:    (+) Possibly pregnant LMP: , 38w2d by LMP,                                   Anesthesia Plan      History & Physical Review      ASA Status:  2 .        Plan for Spinal Maintenance will be Other.    PONV prophylaxis:  Ondansetron (or other 5HT-3) and Dexamethasone or Solumedrol  Additional equipment: 2nd IV CBC:   Lab Test        18                       1010          WBC          7.8           RBC          3.97          HGB          11.5*         HCT          34.9*         MCV          88            MCH          29.0          MCHC         33.0          RDW          14.9          PLT          288                 ASSESSMENT & PLAN:  - ASA 2  - Spinal anesthesia with standard ASA monitors  - Vigilant blood pressure monitoring, consider vasopressor gtt post spinal  - PIV x2  - Ancef 2g prior to incision  - Pitocin gtt post delivery  - PONV prophylaxis with Zofran and Decadron  - Consider TAP block for post op pain control  - Type ans screen    Kg [place] CA-1          Postoperative Care  Postoperative pain management:  Multi-modal analgesia.      Consents        I have personally seen and evaluated patient.  Plan for CSE due to possible prolonged  surgery as well as midline incision.  Plan for post operative TAP blocks with Exparel, IT Morphine for post operative pain management.  Plan discussed with patient through .  Questions answered.    Lona Blake MD  September 20, 2018

## 2018-09-20 NOTE — PROVIDER NOTIFICATION
09/20/18 1356   Provider Notification   Provider Name/Title G2   Method of Notification Electronic Page   Request Evaluate-Remote   Notification Reason Medication Request;Other   Pt itchy can you order Benadryl. Ketoralac ordered at 15mg should it be 30mg? also please clarify plan for removal of flores bladder catheter. Thanks

## 2018-09-20 NOTE — DISCHARGE SUMMARY
[facility] Discharge Summary    Minh Altamirano MRN# [id number]   Age: 33 year old YOB: 1985     Date of Admission:  2018  Date of Discharge:  2018  Admitting Physician:  Lily Villanueva MD  Discharge Physician:  Lily Villanueva MD    Admit Dx:   -  at 38w2d  - Complex pelvic surgery history with prior Metrofanoff and takedown  - Sacral agenesis  - GDMA1  - Right renal agenesis    Discharge Dx:  - , s/p primary classical  section  - Same as above    Procedures:  - Primary classical  section with triple layer uterine closure via vertical midline skin incision  - Combined spinal/epidural analgesia    Admit HPI:  Minh Altamirano is a 33 year old  at 38w2d by LMP c/w 7wk ultrasound (per chart review) who presents for scheduled primary  section due to history of sacral agenesis and Mitrofanoff procedure.  She has been having rare contractions and some back pain recently. Denies vaginal bleeding, and loss of fluid.   + normal fetal movement.    Please see her admit H&P for full details of her PMH, PSH, Meds, Allergies and exam on admit.    Operative Course:  Surgery was uncomplicated. EBL from the delivery was 1200mL.     Findings:   1. Intrabdominal adhesions as described in urology op note  2. Classical uterine incision due to lack of access to lower uterine segment due to prior bladder surgeries. Clear amniotic fluid  3. Liveborn male infant in vertex presentation at 0944 on . Apgars 8 at 1 minute & 9 at 5 minutes. Weight 3100g.  4. Normal uterus, fallopian tubes, and ovaries.     Please see her  Section Operative Note for full details regarding her delivery.    Postoperative Course:  Her postoperative course was complicated by occasional non-sustained mild range blood pressures. HELLP labs were obtained on POD#2 and were within normal limits. On POD#3, she was meeting all of her postpartum goals and  deemed stable for discharge. She was self-catheterizing without difficulty, tolerating a regular diet without nausea and vomiting, her pain was well controlled on oral pain medicines and her lochia was appropriate. Her hemoglobin prior to delivery was 11.5 and after delivery was 10.5, with drop to 9.9 on POD#2. Her Rh status was positive and Rhogam was not indicated.    Discharge Medications:     Review of your medicines      START taking       Dose / Directions    ibuprofen 600 MG tablet   Commonly known as:  ADVIL/MOTRIN        Dose:  600 mg   Take 1 tablet (600 mg) by mouth every 6 hours as needed for other (cramping)   Quantity:  60 tablet   Refills:  0       oxyCODONE IR 5 MG tablet   Commonly known as:  ROXICODONE        Dose:  5-10 mg   Take 1-2 tablets (5-10 mg) by mouth every 3 hours as needed   Quantity:  20 tablet   Refills:  0       senna-docusate 8.6-50 MG per tablet   Commonly known as:  SENOKOT-S;PERICOLACE        Dose:  1 tablet   Take 1 tablet by mouth 2 times daily   Quantity:  100 tablet   Refills:  0         CONTINUE these medicines which have NOT CHANGED       Dose / Directions    acetone (Urine) test Strp   Used for:  Gestational diabetes        Dose:  1 strip   1 strip by In Vitro route daily   Quantity:  20 each   Refills:  1       * blood glucose monitoring lancets   Used for:  Abnormal maternal glucose tolerance, antepartum        Test 4 times daily.   Quantity:  100 each   Refills:  4       * blood glucose monitoring lancets   Used for:  Diet controlled gestational diabetes mellitus (GDM) in third trimester        Use to test blood sugar 5 times daily or as directed.  Ok to substitute alternative if insurance prefers.   Quantity:  200 each   Refills:  3       blood glucose monitoring meter device kit   Used for:  Abnormal maternal glucose tolerance, antepartum        Use to test blood sugars 4 times daily or as directed.   Quantity:  1 kit   Refills:  0       * blood glucose monitoring  test strip   Commonly known as:  ONETOUCH VERIO IQ   Used for:  Gestational diabetes        Use to test blood sugars 4 times daily or as directed.   Quantity:  200 strip   Refills:  3       * blood glucose monitoring test strip   Commonly known as:  no brand specified   Used for:  Abnormal maternal glucose tolerance, antepartum        Use to test blood sugars 5 times daily or as directed   Quantity:  100 strip   Refills:  3       breast pump Misc   Used for:  Postpartum care and examination of lactating mother        Dose:  1 each   1 each daily as needed   Quantity:  1 each   Refills:  0       mirabegron 25 MG 24 hr tablet   Commonly known as:  MYRBETRIQ   Used for:  Neurogenic bladder, Incontinence in female        Dose:  25 mg   Take 1 tablet (25 mg) by mouth daily   Quantity:  30 tablet   Refills:  6       * polyethylene glycol Packet   Commonly known as:  MIRALAX/GLYCOLAX        Dose:  1 packet   Take 1 packet by mouth daily   Refills:  0       * polyethylene glycol powder   Commonly known as:  MIRALAX   Used for:  Constipation        Dose:  1 capful   Take 17 g by mouth daily   Quantity:  510 g   Refills:  6       PRENATAL VITAMINS PO        Refills:  0       ranitidine 150 MG tablet   Commonly known as:  ZANTAC   Used for:  Gastroesophageal reflux disease without esophagitis        Dose:  150 mg   Take 1 tablet (150 mg) by mouth 2 times daily   Quantity:  60 tablet   Refills:  2       sodium chloride 0.65 % nasal spray   Commonly known as:  OCEAN   Used for:  Neurogenic bladder, NOS, Other urinary incontinence        Dose:  1 spray   Spray 1 spray in nostril every hour as needed for congestion.   Quantity:  1 Bottle   Refills:  0       solifenacin 10 MG tablet   Commonly known as:  VESICARE   Used for:  Neurogenic bladder, Urinary urgency, Urge incontinence        Dose:  10 mg   Take 1 tablet (10 mg) by mouth daily   Quantity:  90 tablet   Refills:  3       tolterodine 4 MG 24 hr capsule   Commonly known as:   DETROL LA   Used for:  Neurogenic bladder        Dose:  4 mg   Take 1 capsule (4 mg) by mouth daily   Quantity:  90 capsule   Refills:  3       * Notice:  This list has 6 medication(s) that are the same as other medications prescribed for you. Read the directions carefully, and ask your doctor or other care provider to review them with you.      STOP taking          sennosides 8.6 MG tablet   Commonly known as:  SENOKOT                Where to get your medicines      These medications were sent to [place], [place] - [address]  [address], [facility] [address]     Phone:  [phone]      ibuprofen 600 MG tablet     senna-docusate 8.6-50 MG per tablet         Some of these will need a paper prescription and others can be bought over the counter. Ask your nurse if you have questions.     Bring a paper prescription for each of these medications      oxyCODONE IR 5 MG tablet               Discharge/Disposition:  Minh Altamirano was discharged to home in stable condition with the following instructions/medications:  1) Call for temperature > 100.4, bright red vaginal bleeding >1 pad an hour x 2 hours, foul smelling vaginal discharge, pain not controlled by usual oral pain meds, persistent nausea and vomiting not controlled on medications, drainage or redness from incision site  2) She was undecided for contraception.  3) For feeding she decided to breastfeed.  4) She was instructed to follow-up with her primary OB in 1 week for a blood pressure check and 6 weeks for a routine postpartum visit and GTT.  5) Discharge activity:  No heavy lifting >15 lbs or strenuous activity for 6 weeks, pelvic rest for 6 weeks, no driving or operating machinery while on narcotics.    Marquita Ross MD  Ob/Gyn Resident, PGY-1  09/23/18 8:30 AM     Appreciate note by Dr. Silva. Patient has been seen and examined by me separate from the resident, agree with above note.     Lily Roberto  MD Kay  10:17 AM

## 2018-09-20 NOTE — PHARMACY
Prescriber Notification Note    The pharmacist has communicated with this patient's provider regarding a concern or therapy recommendation.    Notified Person: Dr. Tomlin  Date/Time of Notification: 9/20/18  Interaction: phone  Concern/Recommendation:  Pt has allergy to naproxen (hives, n&v). Ketorolac ordered. Are you okay with giving?     Comments/Additional Details:  -pt did receive one dose in the OR and tolerated fine. Okay to give

## 2018-09-20 NOTE — BRIEF OP NOTE
[facility]  Brief Operative Note     Surgery Date:  2018  Surgeon:  Lily Villanueva MD- OB; Fady Cartwright MD- Urology  Assistants:  Tiff Tomlin MD PGY-3- OB    Sita Aceves MD, PGY-2- OB    Varun French MD- Fellow- Urology    Massiel Peck MD, PGY-4 - Urology      Pre-op Diagnosis:  1. Intrauterine pregnancy at 38w2d     2. Complex abdominal surgical history with prior Mitrofanoff procedure and take-down     3. GDMA1     4. Atrophic right kidney    Post-op Diagnosis:  1.  s/p procedure below     2. Liveborn male infant      3. Same as above  Procedure:  Prmary classical  section with triple layer uterine closure via vertical midline incision, lysis of adhesions, backfilling of bladder    Anesthesia: Combined spinal/epidural  EBL:  1200 mL  IVF:  2000 mL crystalloid  UOP:  850 mL clear yellow urine at the end of the case  Drains: Crump Catheter   Specimens:  Cord blood  Complications: None     Indications:   Minh Altamirano is a 33 year old  at 38w2d admitted for a scheduled primary  due to history of complex abdominal and urologic procedures. The risks, benefits, and alternatives of  section were discussed with the patient, and she agreed to proceed.     Findings:   1. Intrabdominal adhesions as described in urology op note  2. Classical uterine incision due to lack of access to lower uterine segment due to prior bladder surgeries. Clear amniotic fluid  3. Liveborn male infant in vertex presentation at 0944 on . Apgars 8 at 1 minute & 9 at 5 minutes. Weight 3100g.  4. Normal uterus, fallopian tubes, and ovaries.     Tiff Tomlin MD  Ob/Gyn, PGY3  Pager [phone]

## 2018-09-20 NOTE — PLAN OF CARE
Problem: Postpartum ( Delivery) (Adult,Obstetrics,Pediatric)  Goal: Signs and Symptoms of Listed Potential Problems Will be Absent, Minimized or Managed (Postpartum)  Signs and symptoms of listed potential problems will be absent, minimized or managed by discharge/transition of care (reference Postpartum ( Delivery) (Adult,Obstetrics,Pediatric) CPG).  Outcome: Improving  Data: Minh Altamirano transferred to [facility], room 7134 via cart at 1315. Baby transferred via parent's arms.  Action: Receiving unit notified of transfer: Yes. Patient and family notified of room change. Report given to Marquita More RN at 1330. Belongings sent to receiving unit. Accompanied by Registered Nurse. Oriented patient to surroundings. Call light within reach. ID bands double-checked with receiving RN.  Response: Patient tolerated transfer and is stable.

## 2018-09-20 NOTE — PROGRESS NOTES
Visited with pt on the basis of hospital  requested for spiritual support of pt.   Reflected with pt around her hospital experience, sources of spiritual and emotional support and current spiritual health needs. Pt has a new baby and he is very healthy baby. This baby is the first baby of the family. During my visit pt was sitting doing on her bed and she was breastfeeding of her baby. She receives support from her .   Emotional support. Reflective conversation and share with her happiness. I shared a conversation that invite God into the room and to bless in to those who in the room. I provided a special prayer for the  baby. I place my hand on the head of the baby.  [place] for her, her baby, and the whole family. Gave Islamic Prayer Booklet and several Prayer Bookmarks.  Introduced spiritual Health Services that the hospital offers. I also, introduced myself as Orthodox  in the hospital.   Pt received spiritual support and reflective conversation in the context of this hospitalization. She appreciated for the visit and my words.    Will continue to provide support to pt/family during their hospitalization at least 1x/wk

## 2018-09-20 NOTE — ANESTHESIA POSTPROCEDURE EVALUATION
Patient: Minh Altamirano    Procedure(s):  Primary  Section  with classical incision - Wound Class: II-Clean Contaminated    Diagnosis:Term Pregnancy  Diagnosis Additional Information: No value filed.    Anesthesia Type:  Spinal    Note:  Anesthesia Post Evaluation    Patient location during evaluation: PACU  Patient participation: Able to participate in evaluation but full recovery from regional anesthesia has not yet ocurrred but is anticipated to occur within 48 hours  Level of consciousness: awake and alert  Pain management: adequate  Airway patency: patent  Cardiovascular status: acceptable  Respiratory status: acceptable  Hydration status: acceptable  PONV: none     Anesthetic complications: None          Last vitals:  Vitals:    18 1200 18 1215 18 1230   BP: 131/80 123/87 129/84   Resp:  14   Temp: 36.9  C (98.4  F)     SpO2: 100% 100% 100%         Electronically Signed By: Lnoa Blake MD  2018  12:45 PM

## 2018-09-20 NOTE — PROGRESS NOTES
"SPIRITUAL HEALTH SERVICES  SPIRITUAL ASSESSMENT Progress Note  [facility] ([facility]) Preop     REFERRAL SOURCE: [facility] Consult Order for pre-surgery visit, stating \"pt requesting Imam Falk\" and the surgery time of 8:30am    Spoke with pt's pre-op nurse. She asked pt Minh if she wanted a visit before surgery or would prefer to wait for [facility] diogenes Falk to visit her later. She said she would like Dileep to visit her anytime during her hospital admission. She is here for a planned .     PLAN: Will notify [facility] di[facility] Falk of pt's request.    EARNEST Campos.  Associate    Pager [phone]    * [facility] remains available  for emergent requests/referrals, either by having the switchboard page the on-call  or by entering an ASAP/STAT consult in Epic (this will also page the on-call ).*     "

## 2018-09-20 NOTE — PLAN OF CARE
Accompanied pt during surgery for OB documentation. FHTs checked by nesha after spinal was placed. Stayed with pt in PACU for fundus/lochia assessments during phase 1 recovery with PACU RN. Care assumed at 1200 for remainder of PACU cares. Anticipate transfer to [facility] shortly. Baby with cold temps and brought to 4th floor, L & D for radiant warmer.

## 2018-09-20 NOTE — IP AVS SNAPSHOT
MRN:[id number]                      After Visit Summary   2018    Minh Altamirano    MRN: [id number]           Thank you!     Thank you for choosing [facility] for your care. Our goal is always to provide you with excellent care. Hearing back from our patients is one way we can continue to improve our services. Please take a few minutes to complete the written survey that you may receive in the mail after you visit with us. Thank you!        Patient Information     Date Of Birth          1985        Designated Caregiver       Most Recent Value    Caregiver    Will someone help with your care after discharge? no      About your hospital stay     You were admitted on:  2018 You last received care in the:  [facility]    You were discharged on:  2018        Reason for your hospital stay       Maternity care                  Who to Call     For medical emergencies, please call 911.  For non-urgent questions about your medical care, please call your primary care provider or clinic, [phone]  For questions related to your surgery, please call your surgery clinic        Attending Provider     Provider Specialty    Lily Villanueva MD OB/Gyn       Primary Care Provider Office Phone # Fax #    Jonathanjack Goldman [phone]      After Care Instructions     Activity       Review discharge instructions            Diet       Resume previous diet            Discharge Instructions - Gestational diabetic patients       Gestational diabetic patients to follow up for fasting blood sugar and 2 hour 75gm glucose load at 6 weeks postpartum.            Discharge Instructions - Postpartum visit       Schedule postpartum visit with your provider and return to clinic in one week for a blood pressure check and in 6 weeks for a postpartum visit.                  Further instructions from your care team        Birth Discharge Instructions:  [place]  Waxqabadka    [place] qaadin wax kabadan 10 roodal 6 asbuuc kadib qalliinka. Waydii qoyska iyo saxibadaa caawin markaad u baahantahay.     [place] wadin gaadhi markaad qaadanayso kaniiniyada xanunka ee uu dhakhtarkaagu kuu qoro. Waxaad wadi kartaa gadhi haddii aad qaadanayso kaniiniyada xanuunka ee koontarka.     Lama ogala jimicsi adag ama howl culus 6 asbuuc. [place] samayn wax dhib ku keeni dania meesha qalliinka lagu sameeeyay.    [place] chase jiidin adoo aad u doconaya markaad isticmaalayso musqusha. Kooxdaada daryeelka ayaa waxay ku qori doonaan wax saxarada jilciya haddii ay dhibaato kaa haysato saxarada oo adag (caloosha oo fadhida) .    Ka taxadar meesha la qalay:    Meesha la qalay nadiif [place] ahaato hana qallalnaato    [place] ku buuxin meesha la qalay biyo. Dabaal ama tuubooyinka biyaha kulul lama ogala ilaa uu qalliinku tra ahaanba bogsado. Waxaad isku furi kartaa tuubada qabayska haddii heerka biyuhu ay ka hoooseeyaan meesha qalliinka.    Dhaqidda kadib, meesha qalliinka si fican u qalaji. Sidoo kale ku patrice qalajinta maqarka u dhow meesha qaliinka ee ay istaaban karaan.     Ha isticmaalin wax subkid, jeel, kain, looshin ama wax kale oo aad mariso meesha qalliinka.    Dharkaaga u xiri qaab wanaagsan si aysan cadaadis ugu samayn meesha la qalay  (tusaale ahaan fiiri say u chase jiidayso kastuumadaadu)     Haddii aad leedahay Qodabada LatCranstony, faraha ka qaad tolmada. Iyagaa iskood isaga [facility] waxaad siibi kartaa joe asbuuc kadib.    Waxaad arki kartaa cadad amy oo grace cad ah ama binki ah waana iska caadi. U taga bixiyahaaga [facility] caafimaad:    Haddii raadka tolmuhu uu waynaado ama uu ur keeno.    Haddii aad ku yeelato meesha la tolay barar, guduudasho, ama cun cun.    Haddii aad yeelato xanuun kordhaya oo xanuunkaagana aysan daawadu wax ka tarayn.     Haddii aad qabto qandho  100.4  F (38  C) am aka saraysa (heerkulka laga qaaday carabkaaga hoostiisa), oo qarqaryo leh ama aan lahayn     Meesha u dhow meesha la  qalay ([facility] qalliinka) waxaad ka dareemi doontaa inaysan dareen lahayn. Johnnie waa caadi. Dareen la aantu waxay u dhamaan doontaa wax kayar sanad.     Gacmahaagga nadiifi:  Markasta dhaqa gacahaaga ka hor inta aadan taaban farjiga agagaarkiisa  iyo meesha la tolay. Johnnie waxay caawiniaysaa inuu yaraado infakshanku. Haddii gacmahaagu aysan wasakh lahayn, waxaad isticmaali kartaa aalkalo aad gacmaha ku tirtirto si aad u nadiifiso gacmahaaga. Cidiyahaaga nadiifi ooo jar.    Wac bixiyahaaga daryeelka caafimaaad haddii aad qabtid mid ka  mid ah [facility] tony socda:    Haddii suufka dhiigga nuuga uu ku buuxsamo 1 saac gudihiis, ama aad aragto xinjiro dhiig ah oo ka wayn kubadda golafka.    Dhiig soconaya wax kabadan 6 asbuuc.    Haddii aad qabto dheecaan farjiga ka imaanaya oo  si xun u uraya.    Xanuun, nabar, majiirid daran oo aad ka dareeto qaybta hoose ee ubucda.    Kaadi badan oo dagdag ah oo markasta ku qabanaysa, ama gubasho aad dareento markaad kaajayso.    Lalabbo ama matag.    Guduudasho, barar, ama xanuun aad ka dareento xididada lugta.    Dhibaato kaa haysata naas nuujinta, ama guduudasho ama xanuun naaska ah.    Xabad xanuun iyo qufac ama naqaska oo kugu dhagaya.    Dhibaato ay la socoto lani jordan, jose manuel sanchez.   Cliff dunne [facility] inessa nuno.     Cliff rodriguez.       Birth Discharge Instructions  Activity    Do not lift more than 10 pounds for 6 weeks after surgery. Ask family and friends for help when you need it.    Do not drive while taking pain pills prescribed by your doctor. You may drive if taking over-the-counter pain pills.    No heavy exercise or activity for 6 weeks. Don t do anything that will put a strain on your surgery site.    Don t strain when using the toilet. Your care team may prescribe a stool softener if you have problems with your bowel  movements (constipation).    To care for your incision:    Keep the incision clean and dry    Do not soak your incision in water. No swimming or hot tubs until your incision has fully healed. You may soak in the bathtub if the water level is below your incision.    After washing, dry your incision well. Include the skin that may come in contact with your incision.    Do not use any peroxide, gel, cream, lotion, or ointment on your incision.     Adjust your clothes to avoid pressure on your surgery site (check the elastic in your underwear for example)    If you have Steri-Strips, leave the small strips of tape in place. They will fall off on their own, or you can remove them after one week.    You may see a small amount of clear or pink drainage and this is normal. Check with your health care provider:    If the drainage increases or has an odor.    If you have swelling, redness, or a rash around the incision.    If you have increased pain and your pain medicine doesn t help    If you have a fever of 100.4  F (38  C) or higher (temperature taken under your tongue), with or without chills   The area around your incision (surgery wound) will feel numb. This is normal. The numbness should go away in less than a year.   Keep your hands clean:   Always wash your hands before touching your incision. This helps reduce your risk of infection. If your hands aren t dirty, you may use an alcohol hand-rub to clean your hands. Keep your nails clean and short.     Call your health care provider if you have any of these symptoms:    You soak a sanitary pad with blood within 1 hour, or you see blood clots larger than a golf ball.    Bleeding that lasts more than 6 weeks.    You have vaginal discharge that smells bad.    Severe pain, cramping or tenderness in your lower belly area.    A more frequent or urgent need to urinate (pee), or it burns when you pee.    Nausea and vomiting.    Redness, swelling or pain around a vein in your  leg.    Problems breastfeeding, or a red or painful area on your breast.    If you have chest pain and cough or are gasping for air.    Problems coping with sadness, anxiety, or depression.     If you have any concerns about hurting yourself of the baby, call your doctor right away.    You have questions or concerns after you return home.Postop  Birth Instructions    Activity       Do not lift more than 10 pounds for 6 weeks after surgery.  Ask family and friends for help when you need it.    No driving until you have stopped taking your pain medications (usually two weeks after surgery).    No heavy exercise or activity for 6 weeks.  Don't do anything that will put a strain on your surgery site.    Don't strain when using the toilet.  Your care team may prescribe a stool softener if you have problems with your bowel movements.     To care for your incision:       Keep the incision clean and dry.    Do not soak your incision in water. No swimming or hot tubs until it has fully healed. You may soak in the bathtub if the water level is below your incision.    Do not use peroxide, gel, cream, lotion, or ointment on your incision.    Adjust your clothes to avoid pressure on your surgery site (check the elastic in your underwear for example).     You may see a small amount of clear or pink drainage and this is normal.  Check with your health care provider:       If the drainage increases or has an odor.    If the incision reddens, you have swelling, or develop a rash.    If you have increased pain and the medicine we prescribed doesn't help.    If you have a fever above 100.4 F (38 C) with or without chills when placing thermometer under your tongue.   The area around your incision (surgery wound), will feel numb.  This is normal. The numbness should go away in less than a year.     Keep your hands clean:  Always wash your hands before touching your incision (surgery wound). This helps reduce your risk of  infection. If your hands aren't dirty, you may use an alcohol hand-rub to clean your hands. Keep your nails clean and short.    Call your healthcare provider if you have any of these symptoms:       You soak a sanitary pad with blood within 1 hour, or you see blood clots larger than a golf ball.    Bleeding that lasts more than 6 weeks.    Vaginal discharge that smells bad.    Severe pain, cramping or tenderness in your lower belly area.    A need to urinate more frequently (use the toilet more often), more urgently (use the toilet very quickly), or it burns when you urinate.    Nausea and vomiting.    Redness, swelling or pain around a vein in your leg.    Problems breastfeeding or a red or painful area on your breast.    Chest pain and cough or are gasping for air.    Problems with coping with sadness, anxiety or depression. If you have concerns about hurting yourself or the baby, call your provider immediately.      You have questions or concerns after you return home.                  Pending Results     No orders found from 9/18/2018 to 9/21/2018.            Statement of Approval     Ordered          09/23/18 5553  I have reviewed and agree with all the recommendations and orders detailed in this document.  EFFECTIVE NOW     Approved and electronically signed by:  Lily Villanueva MD             Admission Information     Date & Time Provider Department Dept. Phone    9/20/2018 Lily Villanueva MD [facility] [phone]      Your Vitals Were     Blood Pressure Pulse Temperature Respirations Pulse Oximetry       147/93 80 98.7  F (37.1  C) (Oral) 16 100%       MyChart Information     [employer] gives you secure access to your electronic health record. If you see a primary care provider, you can also send messages to your care team and make appointments. If you have questions, please call your primary care clinic.  If you do not have a primary care provider, please call [phone] and they will assist  you.        Care EveryWhere ID     This is your Care EveryWhere ID. This could be used by other organizations to access your [facility] medical records  [id number]        Equal Access to Services     IJEOMA SANCHEZ : Criss Morris, amaury leojuhiha, oly kanancy pauliebobby, mariah elizabet roecharles apodacaadelina stovall seth reeves. So [facility] [phone].    ATENCIÓN: Si habla español, tiene a georges disposición servicios gratuitos de asistencia lingüística. Llame al [phone].    We comply with applicable federal civil rights laws and [place] laws. We do not discriminate on the basis of race, color, national origin, age, disability, sex, sexual orientation, or gender identity.               Review of your medicines      START taking        Dose / Directions    ferrous sulfate 325 (65 Fe) MG tablet   Commonly known as:  IRON   Used for:  Anemia due to blood loss, acute        Dose:  325 mg   Take 1 tablet (325 mg) by mouth daily (with breakfast)   Quantity:  45 tablet   Refills:  0       ibuprofen 600 MG tablet   Commonly known as:  ADVIL/MOTRIN        Dose:  600 mg   Take 1 tablet (600 mg) by mouth every 6 hours as needed for other (cramping)   Quantity:  60 tablet   Refills:  0       oxyCODONE IR 5 MG tablet   Commonly known as:  ROXICODONE        Dose:  5-10 mg   Take 1-2 tablets (5-10 mg) by mouth every 3 hours as needed   Quantity:  20 tablet   Refills:  0       senna-docusate 8.6-50 MG per tablet   Commonly known as:  SENOKOT-S;PERICOLACE        Dose:  1 tablet   Take 1 tablet by mouth 2 times daily   Quantity:  100 tablet   Refills:  0         CONTINUE these medicines which have NOT CHANGED        Dose / Directions    acetone (Urine) test Strp   Used for:  Gestational diabetes        Dose:  1 strip   1 strip by In Vitro route daily   Quantity:  20 each   Refills:  1       * blood glucose monitoring lancets   Used for:  Abnormal maternal glucose tolerance, antepartum        Test 4 times daily.   Quantity:  100  each   Refills:  4       * blood glucose monitoring lancets   Used for:  Diet controlled gestational diabetes mellitus (GDM) in third trimester        Use to test blood sugar 5 times daily or as directed.  Ok to substitute alternative if insurance prefers.   Quantity:  200 each   Refills:  3       blood glucose monitoring meter device kit   Used for:  Abnormal maternal glucose tolerance, antepartum        Use to test blood sugars 4 times daily or as directed.   Quantity:  1 kit   Refills:  0       * blood glucose monitoring test strip   Commonly known as:  ONETOUCH VERIO IQ   Used for:  Gestational diabetes        Use to test blood sugars 4 times daily or as directed.   Quantity:  200 strip   Refills:  3       * blood glucose monitoring test strip   Commonly known as:  no brand specified   Used for:  Abnormal maternal glucose tolerance, antepartum        Use to test blood sugars 5 times daily or as directed   Quantity:  100 strip   Refills:  3       breast pump Misc   Used for:  Postpartum care and examination of lactating mother        Dose:  1 each   1 each daily as needed   Quantity:  1 each   Refills:  0       mirabegron 25 MG 24 hr tablet   Commonly known as:  MYRBETRIQ   Used for:  Neurogenic bladder, Incontinence in female        Dose:  25 mg   Take 1 tablet (25 mg) by mouth daily   Quantity:  30 tablet   Refills:  6       * polyethylene glycol Packet   Commonly known as:  MIRALAX/GLYCOLAX        Dose:  1 packet   Take 1 packet by mouth daily   Refills:  0       * polyethylene glycol powder   Commonly known as:  MIRALAX   Used for:  Constipation        Dose:  1 capful   Take 17 g by mouth daily   Quantity:  510 g   Refills:  6       PRENATAL VITAMINS PO        Refills:  0       ranitidine 150 MG tablet   Commonly known as:  ZANTAC   Used for:  Gastroesophageal reflux disease without esophagitis        Dose:  150 mg   Take 1 tablet (150 mg) by mouth 2 times daily   Quantity:  60 tablet   Refills:  2        "sodium chloride 0.65 % nasal spray   Commonly known as:  OCEAN   Used for:  Neurogenic bladder, NOS, Other urinary incontinence        Dose:  1 spray   Spray 1 spray in nostril every hour as needed for congestion.   Quantity:  1 Bottle   Refills:  0       solifenacin 10 MG tablet   Commonly known as:  VESICARE   Used for:  Neurogenic bladder, Urinary urgency, Urge incontinence        Dose:  10 mg   Take 1 tablet (10 mg) by mouth daily   Quantity:  90 tablet   Refills:  3       tolterodine 4 MG 24 hr capsule   Commonly known as:  DETROL LA   Used for:  Neurogenic bladder        Dose:  4 mg   Take 1 capsule (4 mg) by mouth daily   Quantity:  90 capsule   Refills:  3       * Notice:  This list has 6 medication(s) that are the same as other medications prescribed for you. Read the directions carefully, and ask your doctor or other care provider to review them with you.      STOP taking     sennosides 8.6 MG tablet   Commonly known as:  SENOKOT                Where to get your medicines      These medications were sent to [facility] [address]  [address] [place] [address], [facility] [address]     Phone:  [phone]     ferrous sulfate 325 (65 Fe) MG tablet    ibuprofen 600 MG tablet    senna-docusate 8.6-50 MG per tablet         Some of these will need a paper prescription and others can be bought over the counter. Ask your nurse if you have questions.     Bring a paper prescription for each of these medications     oxyCODONE IR 5 MG tablet                Protect others around you: Learn how to safely use, store and throw away your medicines at www.disposemymeds.org.        Information about your nerve block     Today you received a block to numb the nerves near your surgery site.    This is a block using local anesthetic or \"numbing\" medication injected around the nerves to anesthetize or \"numb\" the area supplied by those nerves. This block is injected into the muscle layer near " your surgical site. The type of anesthesia (Exparel) your anesthesia team used to numb your abdomen may give you relief for up to 72 hours.     Diet: There are no diet restrictions, but you should drink plenty of fluids, unless you are on a fluid-restricted diet.     Activity: If your surgical site is an arm or leg you should be careful with your affected limb, since it is possible to injure your limb without being aware of it due to the numbing. Until full feeling returns, you should guard against bumping or hitting your limb, and avoid extreme hot or cold temperatures on the skin.    Pain Medication: As the block wears off, the feeling will return as a tingling or prickly sensation near your surgical site. You will experience more discomfort from your incisions as the feeling returns. You may want to take a pain pill (a narcotic or Tylenol if this was prescribed by your surgeon) when you start to experience mild pain, before the pain becomes more severe. If your pain medications do not control your pain, you should notify your surgeon. If you are taking narcotics for pain management, do not drink alcohol, drive a car, or perform hazardous activities.  If you have questions or concerns you may call your surgeon at the number provided with your discharge instructions.     Call your surgeon if you experience blurry vision, ringing in the ears or metallic taste in your mouth.         Information about OPIOIDS     PRESCRIPTION OPIOIDS: WHAT YOU NEED TO KNOW   We gave you an opioid (narcotic) pain medicine. It is important to manage your pain, but opioids are not always the best choice. You should first try all the other options your care team gave you. Take this medicine for as short a time (and as few doses) as possible.    Some activities can increase your pain, such as bandage changes or therapy sessions. It may help to take your pain medicine 30 to 60 minutes before these activities. Reduce your stress by getting  enough sleep, working on hobbies you enjoy and practicing relaxation or meditation. Talk to your care team about ways to manage your pain beyond prescription opioids.    These medicines have risks:    DO NOT drive when on new or higher doses of pain medicine. These medicines can affect your alertness and reaction times, and you could be arrested for driving under the influence (DUI). If you need to use opioids long-term, talk to your care team about driving.    DO NOT operate heavy machinery    DO NOT do any other dangerous activities while taking these medicines.    DO NOT drink any alcohol while taking these medicines.     If the opioid prescribed includes acetaminophen, DO NOT take with any other medicines that contain acetaminophen. Read all labels carefully. Look for the word  acetaminophen  or  Tylenol.  Ask your pharmacist if you have questions or are unsure.    You can get addicted to pain medicines, especially if you have a history of addiction (chemical, alcohol or substance dependence). Talk to your care team about ways to reduce this risk.    All opioids tend to cause constipation. Drink plenty of water and eat foods that have a lot of fiber, such as fruits, vegetables, prune juice, apple juice and high-fiber cereal. Take a laxative (Miralax, milk of magnesia, Colace, Senna) if you don t move your bowels at least every other day. Other side effects include upset stomach, sleepiness, dizziness, throwing up, tolerance (needing more of the medicine to have the same effect), physical dependence and slowed breathing.    Store your pills in a secure place, locked if possible. We will not replace any lost or stolen medicine. If you don t finish your medicine, please throw away (dispose) as directed by your pharmacist. The [place] [employer]ution [employer] has more information about safe disposal: https://www.pca.[facility].[place].us/living-green/managing-unwanted-medications             Medication List: This is a list  of all your medications and when to take them. Check marks below indicate your daily home schedule. Keep this list as a reference.      Medications           Morning Afternoon Evening Bedtime As Needed    acetone (Urine) test Strp   1 strip by In Vitro route daily                                * blood glucose monitoring lancets   Test 4 times daily.                                * blood glucose monitoring lancets   Use to test blood sugar 5 times daily or as directed.  Ok to substitute alternative if insurance prefers.                                blood glucose monitoring meter device kit   Use to test blood sugars 4 times daily or as directed.                                * blood glucose monitoring test strip   Commonly known as:  ONETOUCH VERIO IQ   Use to test blood sugars 4 times daily or as directed.                                * blood glucose monitoring test strip   Commonly known as:  no brand specified   Use to test blood sugars 5 times daily or as directed                                breast pump Misc   1 each daily as needed                                ferrous sulfate 325 (65 Fe) MG tablet   Commonly known as:  IRON   Take 1 tablet (325 mg) by mouth daily (with breakfast)                                ibuprofen 600 MG tablet   Commonly known as:  ADVIL/MOTRIN   Take 1 tablet (600 mg) by mouth every 6 hours as needed for other (cramping)   Last time this was given:  800 mg on 9/23/2018 12:07 PM                                mirabegron 25 MG 24 hr tablet   Commonly known as:  MYRBETRIQ   Take 1 tablet (25 mg) by mouth daily                                oxyCODONE IR 5 MG tablet   Commonly known as:  ROXICODONE   Take 1-2 tablets (5-10 mg) by mouth every 3 hours as needed                                * polyethylene glycol Packet   Commonly known as:  MIRALAX/GLYCOLAX   Take 1 packet by mouth daily                                * polyethylene glycol powder   Commonly known as:  MIRALAX    Take 17 g by mouth daily                                PRENATAL VITAMINS PO                                ranitidine 150 MG tablet   Commonly known as:  ZANTAC   Take 1 tablet (150 mg) by mouth 2 times daily                                senna-docusate 8.6-50 MG per tablet   Commonly known as:  SENOKOT-S;PERICOLACE   Take 1 tablet by mouth 2 times daily   Last time this was given:  2 tablets on 9/23/2018  9:15 AM                                sodium chloride 0.65 % nasal spray   Commonly known as:  OCEAN   Spray 1 spray in nostril every hour as needed for congestion.                                solifenacin 10 MG tablet   Commonly known as:  VESICARE   Take 1 tablet (10 mg) by mouth daily                                tolterodine 4 MG 24 hr capsule   Commonly known as:  DETROL LA   Take 1 capsule (4 mg) by mouth daily                                * Notice:  This list has 6 medication(s) that are the same as other medications prescribed for you. Read the directions carefully, and ask your doctor or other care provider to review them with you.

## 2018-09-20 NOTE — PLAN OF CARE
Problem: Patient Care Overview  Goal: Plan of Care/Patient Progress Review  Outcome: No Change  Transfer to Pre-op [Main OR]  Report given to HAY Simon in anticipation of scheduled  section in main OR. Triage/arrival info completed. Care transferred to Alfred RN at 0650. Pt ambulatory to pre-op with Alfred GUIDO.

## 2018-09-20 NOTE — PLAN OF CARE
Problem: Patient Care Overview  Goal: Plan of Care/Patient Progress Review  Outcome: No Change  Data: Patient presented to triage for planned  section in Main OR at 0540. Patient is a . Prenatal record reviewed.   Obstetric History       T0      L0     SAB0   TAB0   Ectopic0   Multiple0   Live Births0       # Outcome Date GA Lbr Aidan/2nd Weight Sex Delivery Anes PTL Lv   1 Current               .  Medical History:   Past Medical History:   Diagnosis Date     Anemia      Chronic infection     MRSA     Constipation, chronic      Incontinence of urine      Lipoma of spinal cord      Migraine      neurogenic bladder      Spinal dysraphism (H)    .  Gestational age 38w2d. Vital signs per doc flowsheet. Fetal movement present. Patient reports Scheduled  Section   as reason for admission. Support persons  present.  Action: RN obtained at 0540. Care of patient assumed at 0540. Rick  at bedside. Verbal consent for EFM, external fetal monitors applied. Admission assessment completed. Patient instructed to report change in fetal movement, contractions, vaginal leaking of fluid or bleeding, abdominal pain, or any concerns related to the pregnancy to her nurse/physician. Patient oriented to room, call light in reach.   Response: Dr. Connelly informed of arrival. Plan to transfer to Pre-op RN for surgery prep. Patient verbalized understanding of education and verbalized agreement with plan. Patient denies pain.

## 2018-09-20 NOTE — OP NOTE
[facility]  Full Operative Note     Surgery Date:  2018  Surgeon:  Lily Villanueva MD- OB; Fady Cartwright MD- Urology  Assistants:  Tiff Tomlin MD PGY-3- OB    Sita Aceves MD, PGY-2- OB    Varun French MD- Fellow- Urology    Massiel Peck MD, PGY-4 - Urology      Pre-op Diagnosis:  1. Intrauterine pregnancy at 38w2d     2. Complex abdominal surgical history with prior Mitrofanoff procedure and take-down     3. GDMA1     4. Atrophic right kidney    Post-op Diagnosis:  1.  s/p procedure below     2. Liveborn male infant      3. Same as above  Procedure:  Prmary classical  section with triple layer uterine closure via vertical midline incision, lysis of adhesions, backfilling of bladder    Anesthesia: Combined spinal/epidural  EBL:  1200 mL  IVF:  2000 mL crystalloid  UOP:  850 mL clear yellow urine at the end of the case  Drains: Crump Catheter   Specimens:  Cord blood  Complications: None     Indications:   Minh Altamirano is a 33 year old  at 38w2d admitted for a scheduled primary  due to history of complex abdominal and urologic procedures. The risks, benefits, and alternatives of  section were discussed with the patient, and she agreed to proceed.     Findings:   1. Intrabdominal adhesions as described in urology op note  2. Classical uterine incision due to lack of access to lower uterine segment due to prior bladder surgeries. Clear amniotic fluid  3. Liveborn male infant in vertex presentation at 0944 on . Apgars 8 at 1 minute & 9 at 5 minutes. Weight 3100g.  4. Normal uterus, fallopian tubes, and ovaries.     Procedure Details:   The patient was brought to the OR, where adequate combined spinal/epidural anesthesia was administered. She was placed in the dorsal supine position with a slight leftward tilt. She was prepped and draped in the usual sterile fashion. A surgical time out was performed. The urology team opened the abdomen  through a vertical midline laparotomy incision. Please see their op note for details. Once the uterus was clear of adhesions, the OB team took over. A classical uterine incision was made with the scalpel due to lack of access to the lower uterine segment, and the incision was extended with digital pressure. The anterior placenta was breached and amniotic sac entered bluntly with clear fluid present. The infant was noted to be in the vertex position, and was delivered atraumatically. The cord was doubly clamped and cut immediately, and the infant was handed off to the awaiting NICU staff. A segment of cord was cut and held for cord gases if needed. The placenta was delivered with gentle traction on the umbilical cord and uterine massage. The uterus was exteriorized and cleared of all clots and debris. Uterine tone was noted to be adequate with pitocin and uterine massage.  The hysterotomy was closed with 3 running locked sutures of 0 Vicryl in a layered fashion. The hysterotomy was noted to be hemostatic.  The posterior cul-de-sac was cleared of all clots. The uterus was returned to the abdomen. Seprafilm was placed over the hysterotomy. The case was then handed back to the urology team for reinspection of the bladder and closure of the fascia. The subcutaneous tissue was then closed with a running suture of 3-0 Vicryl and a dermal suture of 3-0 monocryl. The skin was closed with 4-0 Monocryl and covered with steristrips and a sterile dressing.    All sponge, needle, and instrument counts were correct. The patient tolerated the procedure well, and was transferred to recovery in stable condition. Dr. Villanueva was present and scrubbed until closure of the fascia.     Tiff Tomlin MD  OB GYN PGY-3  9/20/2018, 10:55 AM    I was present and scrubbed for entire procedure.   Lily Villanueva MD

## 2018-09-20 NOTE — ANESTHESIA CARE TRANSFER NOTE
Patient: Minh Altamirano    Procedure(s):  Primary  Section  with classical incision - Wound Class: II-Clean Contaminated    Diagnosis: Term Pregnancy  Diagnosis Additional Information: No value filed.    Anesthesia Type:   Spinal     Note:    Patient transferred to:PACU  Comments: VSS. Report given to nursing staffHandoff Report: Identifed the Patient, Identified the Reponsible Provider, Reviewed the pertinent medical history, Discussed the surgical course, Reviewed Intra-OP anesthesia mangement and issues during anesthesia, Set expectations for post-procedure period and Allowed opportunity for questions and acknowledgement of understanding      Vitals: (Last set prior to Anesthesia Care Transfer)    CRNA VITALS  2018 1035 - 2018 1128      2018             Pulse: 86    Ht Rate: 84    SpO2: 100 %                Electronically Signed By: Kg Canchola MD  2018  11:28 AM

## 2018-09-20 NOTE — IP AVS SNAPSHOT
[facility]    [address] [address]    Phone:  [phone]                                       After Visit Summary   9/20/2018    Minh Altamirano    MRN: [id number]           After Visit Summary Signature Page     I have received my discharge instructions, and my questions have been answered. I have discussed any challenges I see with this plan with the nurse or doctor.    ..........................................................................................................................................  Patient/Patient Representative Signature      ..........................................................................................................................................  Patient Representative Print Name and Relationship to Patient    ..................................................               ................................................  Date                                   Time    ..........................................................................................................................................  Reviewed by Signature/Title    ...................................................              ..............................................  Date                                               Time          22EPIC Rev 08/18

## 2018-09-20 NOTE — ANESTHESIA POSTPROCEDURE EVALUATION
Patient: Minh Altamirano    Procedure(s):  Primary  Section  with classical incision - Wound Class: II-Clean Contaminated    Diagnosis:Term Pregnancy    Anesthesia Type:  Spinal    Note:  Anesthesia Post Evaluation    Patient location during evaluation: PACU  Patient participation: Able to fully participate in evaluation  Level of consciousness: awake and alert  Pain management: adequate  Airway patency: patent  Cardiovascular status: hemodynamically stable  Respiratory status: acceptable, spontaneous ventilation, nonlabored ventilation and room air  Hydration status: acceptable  PONV: none     Anesthetic complications: None          Last vitals:  Vitals:    18 1115 18 1130 18 1145   BP: 100/54 126/85 122/80   Resp: 10 18 27   Temp:   36.6  C (97.9  F)   SpO2: 100% 100% 100%       Minh Altamirano is doing well after her  and tolerated anesthesia without apparent anesthesia-related complications. Her recovery from anesthesia is satisfactory and she is ready to be transferred to the floor for further monitoring and management.    Carmel Bustamante MD  Staff Anesthesiologist  Pager: [phone]    2018  11:59 AM

## 2018-09-20 NOTE — OP NOTE
Date of Operation: 2018    PREOPERATIVE DIAGNOSIS: 1. Neurogenic Bladder s/p bladder augment, bladder neck sling, mitrofanoff (subsequently taken down). 2. Pregnant  POSTOPERATIVE DIAGNOSIS: Same  PROCEDURE: Exploratory laparotomy, bladder exposure, uterus exposure. 1.5 hours.  SURGEON: Fady Moore MD, present and scrubbed for the entire procedure.   FELLOW: Kg French MD  RESIDENT SURGEON: Massiel Peck MD  INTRAVENOUS FLUIDS: Please see anesthesia record.   DRAINS AND TUBES: A 14-Tuvaluan urethral Crump catheter  COMPLICATIONS: None.   DISPOSITION: PACU.   INDICATIONS: Minh Altamirano is a 33 year old female with history of neurogenic bladder who has become pregnant. Due to her prior urologic reconstruction, she was recommended to deliver via . Because of prior reconstruction it was necessary that we gain exposure for OB in order to avoid injury to bladder augmentation. The risks, benefits and alternatives of management were discussed. Patient understands the risks to include bleeding, infection, injury to structures, delayed fistula, etc. Informed consent was obtained.   DESCRIPTION OF PROCEDURE: Informed consent was obtained from the patient. The patient was then brought to the operating theater and spinal anesthesia was induced. A timeout was then performed, verifying the correct patient's site and procedure to be performed. The patient was placed supine and was prepped and draped in the usual sterile fashion. A 14 Tuvaluan urethral catheter was placed.  We began the procedure by making a midline laparotomy incision through her prior incision. We dissected down through Toñito's fascia to the level of the rectus fascia. We then incised the rectus fascia vertically and proceeded to lift the fascia off the rectus muscles superiorly and inferiorly. The rectus abdominis muscles were split in the midline.  There were significant adhesions. Lysis of adhesions time: 1.0 hours. After adhesions were  carefully taken down, we were able to easily identify the uterus, which had more adhesions overlying it. This did not appear to be bladder and the adhesions were divided until the uterus was exposed.   At this point, the case was turned over the obstetricians, who incised the uterus vertically and delivered the baby. The uterus was closed in multiple layers and the case was turned back over to the urology team. Please see separately dictated operative note for full details of this portion of the operation.  The bladder was filled with 300ml of normal saline and there was no evidence of a leak. Excellent hemostasis was observed. The fascia, which was quite healthy in appearance, was closed with running looped 0 PDS. The case was then turned back over to the obstetricians, who elected to close the skin with subcutaneous suture. Again, please refer to their operative note for full details of this portion of the operation.  There were no complications. The patient was then awoken from anesthesia and transferred to the PACU for further monitoring.    As the attending surgeon I, Fady Moore, was present and scrubbed throughout the procedure.

## 2018-09-20 NOTE — ANESTHESIA PROCEDURE NOTES
Combined Spinal/Epidural procedure note    Staff  Anesthesiologist:  KEKE TALBOT  Resident/CRNA:  ARUN GALLARDO  CSE performed by resident/CRNA in presence of a teaching physician    Location:  OR  Timeout  patient identified, IV checked, site marked, risks and benefits discussed, informed consent, monitors and equipment checked, pre-op evaluation and post-op pain management    Correct Patient: Yes    Correct Position: Yes    Correct Site: Yes    Correct Procedure: Yes    Correct Laterality:  Yes  Site Marked:  Yes    Procedure details  Procedure:  Combined Spinal/Epidural (CSE)  Position:  Sitting  ASA:  2  Sterile Prep: chloraprep    Insertion site:  L4-5  Local skin infiltration:  1% lidocaine  Approach:  Midline  Epidural Needle Type:  Plivo  Needle gauge (G):  17  Needle length (in):  3.5  Injection Technique:  Single-shot  Attempts:  2  Redirects:  2  Spinal Needle Type:  Carri  Catheter threaded easily: Yes    Threaded to skin (cm) :  9  Paresthesias:  No  CSF with Epidural needle: No    CSF with Spinal needle: Yes    Aspiration negative for Heme or CSF: Yes    : T4.  : T4.

## 2018-09-21 ENCOUNTER — OFFICE VISIT (OUTPATIENT)
Dept: INTERPRETER SERVICES | Facility: CLINIC | Age: 33
End: 2018-09-21
Payer: COMMERCIAL

## 2018-09-21 LAB
GLUCOSE BLDC GLUCOMTR-MCNC: 103 MG/DL (ref 70–99)
HGB BLD-MCNC: 10.5 G/DL (ref 11.7–15.7)

## 2018-09-21 PROCEDURE — 25000128 H RX IP 250 OP 636: Performed by: OBSTETRICS & GYNECOLOGY

## 2018-09-21 PROCEDURE — 36415 COLL VENOUS BLD VENIPUNCTURE: CPT | Performed by: STUDENT IN AN ORGANIZED HEALTH CARE EDUCATION/TRAINING PROGRAM

## 2018-09-21 PROCEDURE — 12000028 ZZH R&B OB UMMC

## 2018-09-21 PROCEDURE — 00000146 ZZHCL STATISTIC GLUCOSE BY METER IP

## 2018-09-21 PROCEDURE — 25000128 H RX IP 250 OP 636: Performed by: STUDENT IN AN ORGANIZED HEALTH CARE EDUCATION/TRAINING PROGRAM

## 2018-09-21 PROCEDURE — 25000132 ZZH RX MED GY IP 250 OP 250 PS 637: Performed by: STUDENT IN AN ORGANIZED HEALTH CARE EDUCATION/TRAINING PROGRAM

## 2018-09-21 PROCEDURE — 85018 HEMOGLOBIN: CPT | Performed by: STUDENT IN AN ORGANIZED HEALTH CARE EDUCATION/TRAINING PROGRAM

## 2018-09-21 PROCEDURE — 90686 IIV4 VACC NO PRSV 0.5 ML IM: CPT | Performed by: OBSTETRICS & GYNECOLOGY

## 2018-09-21 PROCEDURE — T1013 SIGN LANG/ORAL INTERPRETER: HCPCS | Mod: U3

## 2018-09-21 RX ADMIN — KETOROLAC TROMETHAMINE 30 MG: 30 INJECTION, SOLUTION INTRAMUSCULAR at 00:15

## 2018-09-21 RX ADMIN — IBUPROFEN 800 MG: 600 TABLET ORAL at 18:14

## 2018-09-21 RX ADMIN — ACETAMINOPHEN 975 MG: 325 TABLET, FILM COATED ORAL at 08:41

## 2018-09-21 RX ADMIN — INFLUENZA A VIRUS A/MICHIGAN/45/2015 X-275 (H1N1) ANTIGEN (FORMALDEHYDE INACTIVATED), INFLUENZA A VIRUS A/SINGAPORE/INFIMH-16-0019/2016 IVR-186 (H3N2) ANTIGEN (FORMALDEHYDE INACTIVATED), INFLUENZA B VIRUS B/PHUKET/3073/2013 ANTIGEN (FORMALDEHYDE INACTIVATED), AND INFLUENZA B VIRUS B/MARYLAND/15/2016 BX-69A ANTIGEN (FORMALDEHYDE INACTIVATED) 0.5 ML: 15; 15; 15; 15 INJECTION, SUSPENSION INTRAMUSCULAR at 12:52

## 2018-09-21 RX ADMIN — IBUPROFEN 800 MG: 600 TABLET ORAL at 23:30

## 2018-09-21 RX ADMIN — SENNOSIDES AND DOCUSATE SODIUM 1 TABLET: 8.6; 5 TABLET ORAL at 08:41

## 2018-09-21 RX ADMIN — ACETAMINOPHEN 975 MG: 325 TABLET, FILM COATED ORAL at 00:15

## 2018-09-21 RX ADMIN — KETOROLAC TROMETHAMINE 30 MG: 30 INJECTION, SOLUTION INTRAMUSCULAR at 06:11

## 2018-09-21 RX ADMIN — ACETAMINOPHEN 975 MG: 325 TABLET, FILM COATED ORAL at 16:29

## 2018-09-21 RX ADMIN — KETOROLAC TROMETHAMINE 30 MG: 30 INJECTION, SOLUTION INTRAMUSCULAR at 12:46

## 2018-09-21 RX ADMIN — SIMETHICONE CHEW TAB 80 MG 80 MG: 80 TABLET ORAL at 18:11

## 2018-09-21 RX ADMIN — SIMETHICONE CHEW TAB 80 MG 80 MG: 80 TABLET ORAL at 23:30

## 2018-09-21 RX ADMIN — SENNOSIDES AND DOCUSATE SODIUM 2 TABLET: 8.6; 5 TABLET ORAL at 23:30

## 2018-09-21 NOTE — LACTATION NOTE
This note was copied from a baby's chart.  Consult for first time breastfeeding    Minh is a 33 year old  who delivered her baby boy at 38.2 weeks via  on 18 at 0944. She has a history of GDM, sacral agenesis, neurogenic bladder and she is in isolation for history of MRSA.  She transferred care from [facility] due to urology involvement with , but will follow up with her baby at [facility] after discharge.     She noted breast growth in early pregnancy and has been able to hand express colostrum. She had been supplementing with formula by bottle due to sleepy baby and blood sugar concerns, but she has worked with her RN this morning on breastfeeding. They have had success getting baby to latch with a nipple shield due to smoother nipples. Baby was able to sustain latch and Minh was able to hand express and feed colostrum to infant via spoon after. Per Rn she denied discomfort during feeding.  Baby has age appropriate output, the weight loss at 24 hours was -3.7% of his birthweight, and the blood sugars have been stable     Recent Labs  Lab 18  0439 18  0235 18  0008 18  2136 18  1922 18  1637   BGM 49 52 49 40 40 48     Arti denies questions at this time    Reviewed: benefits of exclusive breastfeeding, feeding cues, normal  weight loss, expected  output, the Second Night, inpatient lactation support and outpatient breastfeeding resources.    Plan: Continue to encourage exclusive breastfeeding with RN support as needed. Encourage skin to skin and hand expression after feedings. If Arti chooses to continue supplementing with formula, encourage her to start pumping. At discharge, encourage follow up at PCP, [facility] ([facility]) or outpatient lactation clinic at [facility] for continued lactation support.

## 2018-09-21 NOTE — PROGRESS NOTES
Post Partum Progress Note  POD#1    Subjective:  She is resting comfortably in bed this morning. She notes other than some difficulty breastfeeding she had a good night. Pain is tolerable on current medication regimen. She is tolerating PO intake. Lochia present and patient is not aware of any increased bleeding. Crump remains in place and patient does not yet feel comfortable with self-caths  She is ambulating a little last night. She denies headache, changes in vision, nausea/vomiting, chest pain, shortness of breath, RUQ pain, or worsening edema.  Plans to breastfeed and would like assistance from lactation today.     Objective:   Vitals:    18 1930 18 0015 18 0400 18 0611   BP: 123/78 122/84 (!) 136/91 113/65   Pulse:  98 92 96   Resp: 16 17 18 17   Temp: 98  F (36.7  C) 98.3  F (36.8  C) 98  F (36.7  C) 98.3  F (36.8  C)   TempSrc: Oral Oral Oral Oral   SpO2: 100% 98% 98% 98%       General: NAD. A&Ox3.  CV: Regular rate, well perfused.   Pulm: Normal respiratory effort.  Abd: Soft, non-tender, non-distended. Fundus is difficult to palpate due to VML incision.   Incision: Vertical midline skin incision with bandage in place, no shadowing  Ext: trace lower extremity edema bilaterally. No calf tenderness. SCDs in place.     Assessment/Plan:  Minh Altamirano is a 33 year old  female who is POD#1 s/p PCCS via VML for history of sacral agenesis, prior Mitrofanoff procedure. Doing well postoperatively.     - Encourage routine post-operative goals including ambulation and incentive spirometry  - PNC: Rh positive. Rubella immune. No intervention indicated.  - Pain: controlled on oral medications  - Heme: Hgb 11.5>EBL 1200>AM Hgb pending.  If <10 will discharge home with iron.  - GI: bowel regimen, PRN antiemetics  - : Sacral agenesis, s/p Mitrofanoff and takedown, autologous neck sling, right renal agenesis. Crump in place. Plan removal when patient feels she is ready to resume self  catheterizations through urethra  - Infant: Stable in patient's room   - Feeding: Plans on breastfeeding would benefit from lactation visit today  - Questions about WIC: Has paperwork with contact number, [employer] consult today to help   - BC: Not discussed today  - GDMA1: FBG this   - Mildly elevated BP: mostly normotensive, HELLP labs if remain elevated today    Discharge to home likely on POD#3 when meeting postoperative goals    Tiff Tomlin MD  Ob/Gyn, PGY3  Pager [phone]        [facility] staff:    Appreciate note by Dr. Tomlin.  I have seen and examined the patient without the resident. I have reviewed, edited, and agree with the note.    Patient desires removal of catheter later today. Will order evening removal.     Lissy Barnes MD  9/21/2018

## 2018-09-21 NOTE — PLAN OF CARE
Problem: Patient Care Overview  Goal: Plan of Care/Patient Progress Review  Outcome: Improving  Data: Vital signs and postpartum checks WDL - see flow record. Patient eating and drinking normally.  Patient performing self cares and is able to care for infant. Flores is draining well. Pt had been doing straight cathing at home  Plan is to remove flores when pt is ready to do it herself. Per OB Resident who saw  this morning pt is not yet ready to do self cathing.  Fasting BG = 103.  Action: Patient medicated during the shift for pain with Tylenol and Ibuprofen. Patient education done see education record.  Response: Positive attachment behaviors observed with infant. Support persons  present.    Plan: Continue with the plan of care and notify provider if decline in status. Will continue to monitor and assess.

## 2018-09-21 NOTE — PLAN OF CARE
Problem: Patient Care Overview  Goal: Plan of Care/Patient Progress Review  Care provided from [id number]: Patient stable. Crump removed. No concerns noted.

## 2018-09-21 NOTE — PLAN OF CARE
Problem: Patient Care Overview  Goal: Plan of Care/Patient Progress Review  Outcome: Improving  VSS. Tolerating diet. Crump is patent and draining adequate amounts. Pain is tolerable with toradol and tylenol. PIV SL. Abd binder in place. Incision is c/d/i. Ambulated around room, tolerated well. Breastfeeding with nipple shield. Educated on pain, latch, hand expression, breastfeeding positions, paperwork. Spouse in room for support. Continue plan of care.

## 2018-09-21 NOTE — PLAN OF CARE
Problem: Patient Care Overview  Goal: Plan of Care/Patient Progress Review  Outcome: Improving  Pt vitals & PP assessments are stable. Lungs clear. Crump patent & draining to gravity. Crump to stay until Pt feels she is ready to self catheter. Up ad guillermo to bathroom without dizziness. Doing skin to skin & bonding well with her infant. Will continue on supportive cares.

## 2018-09-21 NOTE — CONSULTS
Diabetes Education  Received consult request to see this 33 year old female for diabetes education.  Patient was diagnosed with gestational diabetes during this pregnancy.  She received diabetes education on diet and blood glucose monitoring.  Per the outpatient diabetes educator notes, her glucose levels were well controlled.    This morning, her fasting glucose was 103 mg/dL.  She is not receiving insulin or oral antihyperglycemic agents.  No education needs identified.  She should undergo a 2 hour glucose tolerance test at her 6 week postpartum visit.  Will sign off.  Hannah Crockett MS RN CDE CDTC  [phone]

## 2018-09-22 ENCOUNTER — OFFICE VISIT (OUTPATIENT)
Dept: INTERPRETER SERVICES | Facility: CLINIC | Age: 33
End: 2018-09-22
Payer: COMMERCIAL

## 2018-09-22 LAB
ALT SERPL W P-5'-P-CCNC: 15 U/L (ref 0–50)
AST SERPL W P-5'-P-CCNC: 15 U/L (ref 0–45)
CREAT SERPL-MCNC: 0.72 MG/DL (ref 0.52–1.04)
ERYTHROCYTE [DISTWIDTH] IN BLOOD BY AUTOMATED COUNT: 15 % (ref 10–15)
GFR SERPL CREATININE-BSD FRML MDRD: >90 ML/MIN/1.7M2
HCT VFR BLD AUTO: 29.5 % (ref 35–47)
HGB BLD-MCNC: 9.9 G/DL (ref 11.7–15.7)
MCH RBC QN AUTO: 29.9 PG (ref 26.5–33)
MCHC RBC AUTO-ENTMCNC: 33.6 G/DL (ref 31.5–36.5)
MCV RBC AUTO: 89 FL (ref 78–100)
PLATELET # BLD AUTO: 331 10E9/L (ref 150–450)
RBC # BLD AUTO: 3.31 10E12/L (ref 3.8–5.2)
URATE SERPL-MCNC: 4 MG/DL (ref 2.6–6)
WBC # BLD AUTO: 11.9 10E9/L (ref 4–11)

## 2018-09-22 PROCEDURE — 36415 COLL VENOUS BLD VENIPUNCTURE: CPT | Performed by: OBSTETRICS & GYNECOLOGY

## 2018-09-22 PROCEDURE — 84460 ALANINE AMINO (ALT) (SGPT): CPT | Performed by: OBSTETRICS & GYNECOLOGY

## 2018-09-22 PROCEDURE — 82565 ASSAY OF CREATININE: CPT | Performed by: OBSTETRICS & GYNECOLOGY

## 2018-09-22 PROCEDURE — 25000132 ZZH RX MED GY IP 250 OP 250 PS 637: Performed by: STUDENT IN AN ORGANIZED HEALTH CARE EDUCATION/TRAINING PROGRAM

## 2018-09-22 PROCEDURE — 84450 TRANSFERASE (AST) (SGOT): CPT | Performed by: OBSTETRICS & GYNECOLOGY

## 2018-09-22 PROCEDURE — 84550 ASSAY OF BLOOD/URIC ACID: CPT | Performed by: OBSTETRICS & GYNECOLOGY

## 2018-09-22 PROCEDURE — 12000028 ZZH R&B OB UMMC

## 2018-09-22 PROCEDURE — 85027 COMPLETE CBC AUTOMATED: CPT | Performed by: OBSTETRICS & GYNECOLOGY

## 2018-09-22 PROCEDURE — T1013 SIGN LANG/ORAL INTERPRETER: HCPCS | Mod: U3

## 2018-09-22 RX ADMIN — SENNOSIDES AND DOCUSATE SODIUM 2 TABLET: 8.6; 5 TABLET ORAL at 21:36

## 2018-09-22 RX ADMIN — SIMETHICONE CHEW TAB 80 MG 80 MG: 80 TABLET ORAL at 21:36

## 2018-09-22 RX ADMIN — SIMETHICONE CHEW TAB 80 MG 80 MG: 80 TABLET ORAL at 05:00

## 2018-09-22 RX ADMIN — SENNOSIDES AND DOCUSATE SODIUM 2 TABLET: 8.6; 5 TABLET ORAL at 09:14

## 2018-09-22 RX ADMIN — ACETAMINOPHEN 975 MG: 325 TABLET, FILM COATED ORAL at 16:56

## 2018-09-22 RX ADMIN — IBUPROFEN 800 MG: 600 TABLET ORAL at 13:28

## 2018-09-22 RX ADMIN — IBUPROFEN 800 MG: 600 TABLET ORAL at 05:00

## 2018-09-22 RX ADMIN — ACETAMINOPHEN 975 MG: 325 TABLET, FILM COATED ORAL at 09:14

## 2018-09-22 RX ADMIN — IBUPROFEN 800 MG: 600 TABLET ORAL at 21:36

## 2018-09-22 RX ADMIN — SIMETHICONE CHEW TAB 80 MG 80 MG: 80 TABLET ORAL at 13:28

## 2018-09-22 RX ADMIN — ACETAMINOPHEN 975 MG: 325 TABLET, FILM COATED ORAL at 01:45

## 2018-09-22 NOTE — PLAN OF CARE
Problem: Patient Care Overview  Goal: Plan of Care/Patient Progress Review  Outcome: No Change  VSS and assessments WDL. Pain well managed with oral meds. Passing flatus, BM today. Breastfeeding infant independently, uses nipple shield for flat nipples. Spouse in the room, supportive and involved in care. No concerns, patient is stable.

## 2018-09-22 NOTE — CONSULTS
[facility]  MATERNAL CHILD HEALTH   SOCIAL WORK PROGRESS NOTE    DATA:     SW was consulted order re: patient's request for community resoources.    INTERVENTION:     SW consulted with bedside RN, Ailyn, who reports plan that patient has changed her mind and has been able to purchase necessary supplies.      ASSESSMENT:      No SW concerns identified.    PLAN:      Anticipate no further SW involvement at this time. Re-consult for SW to follow if needs arise.    Kjerstin Rydeen, [facility]   Social Worker  Maternal Child Health   Direct: [phone]  Pager: [phone]

## 2018-09-22 NOTE — PLAN OF CARE
Problem: Patient Care Overview  Goal: Plan of Care/Patient Progress Review  Outcome: Improving  VSS. PP assessments wnl. Ambulatory. Pain well controlled. Pt Self catheterizes, passing flatus, abdomen distended. Encouraged pt to ambulate, taking simethicone. Bonding with infant. Assist with feedings. Will continue with plan of care.

## 2018-09-22 NOTE — PROGRESS NOTES
Post Partum Progress Note  POD#2    Subjective:  Minh is resting comfortably in bed. She is doing well overall and had no problems overnight. Her Crump has been removed and she is successfully self-catheterizing. Pain is tolerable on current medication regimen. She is tolerating PO intake. Lochia is minimal. She is ambulating well. She denies headache, changes in vision, nausea/vomiting, chest pain, shortness of breath, RUQ pain, or worsening edema.  Breastfeeding and pumping going well.    Objective:   Vitals:    18 0821 18 0842 /18 1800 18 2322   BP: 120/86   129/90   Pulse: 102   101   Resp:  18   Temp: 98.1  F (36.7  C)  98.7  F (37.1  C) 97.6  F (36.4  C)   TempSrc: Oral  Oral Oral   SpO2: 100% 100%         General: NAD. A&Ox3.  CV: Regular rate, well perfused.   Pulm: Normal respiratory effort, clear to auscultation bilaterally.  Abd: Soft, appropriately tender, non-distended. Fundus is difficult to palpate due to VML incision.   Incision: Vertical midline skin incision clean, dry, intact with steri strips in place.  Ext: Trace lower extremity edema bilaterally. No calf tenderness.    Assessment/Plan:  Minh Altamirano is a 33 year old  female who is POD#2 s/p PCCS via VML for history of sacral agenesis, prior Mitrofanoff procedure. Doing well postoperatively.     - Encourage routine post-operative goals including ambulation and incentive spirometry  - PNC: Rh positive. Rubella immune. No intervention indicated.  - Pain: controlled on oral medications  - Heme: Hgb 11.5>EBL 1200>10.5. No signs or symptoms of acute blood loss anemia.  - GI: bowel regimen, PRN antiemetics  - : Sacral agenesis, s/p Mitrofanoff and takedown, autologous neck sling, right renal agenesis. Self catheterization going well per patient.  - Infant: Stable in patient's room   - Feeding: Breastfeeding and pumping going well  - BC: Not discussed today  - GDMA1: , plan for GTT at 6w postpartum  visit  - Mildly elevated BPs: normotensive overnight, continue to monitor    Discharge to home POD#2-3 when meeting postoperative goals    Marquita Ross MD  Ob/Gyn Resident, PGY-1  09/22/18 9:08 AM       [facility] staff:    Patient seen with .     Appreciate note by Dr. Ross.  I have seen and examined the patient without the resident. I have reviewed, edited, and agree with the note.      My findings are:  Patient's only c/o this AM is constipation. Passed hard BM last night. Still passing flatus and denies n/v. Has been able to self-cath without issues. Pain controlled. She reports may desire discharge to home today but would like to discuss with  first (sleeping on couch). Will let me know if needs discharge orders placed. Otherwise, will plan on seeing her again in AM with discharge tomorrow. Is meeting all goals currently.     Occasional mildly elevated BP but asymptomatic. Will check HELLP labs today. Would recommend f/u for BP check in one week after discharge.    Lissy Barnes MD  9/22/2018  10:30 AM

## 2018-09-23 ENCOUNTER — OFFICE VISIT (OUTPATIENT)
Dept: INTERPRETER SERVICES | Facility: CLINIC | Age: 33
End: 2018-09-23
Payer: COMMERCIAL

## 2018-09-23 VITALS
SYSTOLIC BLOOD PRESSURE: 147 MMHG | DIASTOLIC BLOOD PRESSURE: 93 MMHG | TEMPERATURE: 98.7 F | OXYGEN SATURATION: 100 % | HEART RATE: 80 BPM | RESPIRATION RATE: 16 BRPM

## 2018-09-23 PROCEDURE — 25000132 ZZH RX MED GY IP 250 OP 250 PS 637: Performed by: STUDENT IN AN ORGANIZED HEALTH CARE EDUCATION/TRAINING PROGRAM

## 2018-09-23 PROCEDURE — T1013 SIGN LANG/ORAL INTERPRETER: HCPCS | Mod: U3

## 2018-09-23 RX ORDER — FERROUS SULFATE 325(65) MG
325 TABLET ORAL
Qty: 45 TABLET | Refills: 0 | Status: SHIPPED | OUTPATIENT
Start: 2018-09-23 | End: 2019-11-18

## 2018-09-23 RX ADMIN — IBUPROFEN 800 MG: 600 TABLET ORAL at 03:33

## 2018-09-23 RX ADMIN — ACETAMINOPHEN 975 MG: 325 TABLET, FILM COATED ORAL at 09:16

## 2018-09-23 RX ADMIN — SIMETHICONE CHEW TAB 80 MG 80 MG: 80 TABLET ORAL at 03:34

## 2018-09-23 RX ADMIN — SIMETHICONE CHEW TAB 80 MG 80 MG: 80 TABLET ORAL at 09:16

## 2018-09-23 RX ADMIN — IBUPROFEN 800 MG: 600 TABLET ORAL at 12:07

## 2018-09-23 RX ADMIN — SENNOSIDES AND DOCUSATE SODIUM 2 TABLET: 8.6; 5 TABLET ORAL at 09:15

## 2018-09-23 RX ADMIN — ACETAMINOPHEN 975 MG: 325 TABLET, FILM COATED ORAL at 01:10

## 2018-09-23 NOTE — DISCHARGE INSTRUCTIONS
Birth Discharge Instructions: Cymraes  Waxqabadka    [place] qaadin wax kabadan 10 roodal 6 asbuuc kadib qalliinka. Waydii qoyska iyo saxibadaa caawin markaad u baFormerly [facility].     [place] wadin gaadhi markaad qaadanayso kaniiniyada xanunka ee uu dhakhtarkaagu kuu qoro. Waxaad wadi kartaa gadhi haddii aad qaadanayso kaniiniyada xanuunka ee koontarka.     Lama ogala jimicsi adag ama howl culus 6 asbuuc. [place] samayn wax dhib ku keeni dania meesha qalliinka lagu sameeeyay.    [place] chase jiidin adoo aad u doconaya markaad isticmaalayso musqusha. Kooxdaada daryeelka ayaa waxay ku qori doonaan wax saxarada jilciya haddii ay dhibaato kaa haysato saxarada oo adag (caloosha oo fadhida) .    Ka taxadar meesha la qalay:    Meesha la qalay nadiif [place] ahaato hana qallalnaato    [place] ku buuxin meesha la qalay biyo. Dabaal ama tuubooyinka biyaha kulul lama ogala ilaa uu qalliinku tra ahaanba bogsado. Waxaad isku furi kartaa tuubada qabayska haddii heerka biyuhu ay ka hoooseeyaan meesha qalliinka.    Dhaqidda kadib, meesha qalliinka si fican u qalaji. Sidoo kale ku patrice qalajinta maqarka u dhow meesha qaliinka ee ay istaaban karaan.     Ha isticmaalin wax subkid, jeel, kain, looshin ama wax kale oo aad mariso meesha qalliinka.    Dharkaaga u xiri qaab wanaagsan si aysan cadaadis ugu samayn meesha la qalay  (tusaale ahaan fiiri say u chase jiidayso kastuumadaadu)     Haddii aad leedahay Qodabada Latolay, faraha ka qaad tolmada. Iyagaa iskood isaga dhacaya ama waxaad siibi kartaa joe asbuuc kadib.    Waxaad arki kartaa cadad amy oo grace cad ah ama binki ah waana iska caadi. U taga bixiyahaaga daryeka caafimaad:    Haddii raadka tolmuhu uu waynaado ama uu ur keeno.    Haddii aad ku yeelato meesha la tolay barar, guduudasho, ama cun cun.    Haddii aad yeelato xanuun kordhaya oo xanuunkaagana aysan daawadu wax ka tarayn.     Haddii aad qabto qandho  100.4  F (38  C) am aka saraysa (heerkulka laga qaaday carabkaaga hoostiisa), oo qarqaryo leh ama  aan lahayn     Meesha u dhow meesha la qalay ([facility] qalliinka) waxaad ka dareemi doontaa inaysan dareen lahayn. Johnnie waa caadi. Dareen la aantu waxay u dhamaan doontaa wax kayar sanad.     Gacmahaagga nadiifi:  Markasta dhaqa gacahaaga ka hor inta aadan taaban farjiga agagaarkiisa  iyo meesha la tolay. Johnnie waxay caawiniaysaa inuu yaraado infakshanku. Haddii gacmahaagu aysan wasakh lahayn, waxaad isticmaali kartaa aalkalo aad gacmaha ku tirtirto si aad u nadiifiso gacmahaaga. Cidiyahaaga nadiifi ooo jar.    Wac bixiyahaaga daryeelka caafimaaad haddii aad qabtid mid ka  mid ah [facility] tony socda:    Haddii suufka dhiigga nuuga uu ku buuxsamo 1 saac gudihiis, ama aad aragto xinjiro dhiig ah oo ka wayn kubadda golafka.    Dhiig soconaya wax kabadan 6 asbuuc.    Haddii aad qabto dheecaan farjiga ka imaanaya oo  si xun u uraya.    Naeem, nabar, majiirid daran oo aad ka dareeto qaybta hoose ee ubucda.    Kaadi badan oo dagdag ah oo markasta ku qabanaysa, ama gubasho aad dareento markaad kaajayso.    Lalabbo ama matag.    Guduudasho, barar, ama xanuun aad ka dareento xididada lugta.    Dhibaato kaa haysata naas nuujinta, ama guduudasho ama xanuun naaska ah.    Xabad xanuun iyo qufac ama naqaska oo kugu dhagaya.    Dhibaato ay la socoto murugo, walaac, aa walwal.   Haddii aad erika dunne [facility] inessa nuno.     Haddii aad qabto destini rodriguez.       Birth Discharge Instructions  Activity    Do not lift more than 10 pounds for 6 weeks after surgery. Ask family and friends for help when you need it.    Do not drive while taking pain pills prescribed by your doctor. You may drive if taking over-the-counter pain pills.    No heavy exercise or activity for 6 weeks. Don t do anything that will put a strain on your surgery site.    Don t strain when using the toilet. Your care team may prescribe a stool  softener if you have problems with your bowel movements (constipation).    To care for your incision:    Keep the incision clean and dry    Do not soak your incision in water. No swimming or hot tubs until your incision has fully healed. You may soak in the bathtub if the water level is below your incision.    After washing, dry your incision well. Include the skin that may come in contact with your incision.    Do not use any peroxide, gel, cream, lotion, or ointment on your incision.     Adjust your clothes to avoid pressure on your surgery site (check the elastic in your underwear for example)    If you have Steri-Strips, leave the small strips of tape in place. They will fall off on their own, or you can remove them after one week.    You may see a small amount of clear or pink drainage and this is normal. Check with your health care provider:    If the drainage increases or has an odor.    If you have swelling, redness, or a rash around the incision.    If you have increased pain and your pain medicine doesn t help    If you have a fever of 100.4  F (38  C) or higher (temperature taken under your tongue), with or without chills   The area around your incision (surgery wound) will feel numb. This is normal. The numbness should go away in less than a year.   Keep your hands clean:   Always wash your hands before touching your incision. This helps reduce your risk of infection. If your hands aren t dirty, you may use an alcohol hand-rub to clean your hands. Keep your nails clean and short.     Call your health care provider if you have any of these symptoms:    You soak a sanitary pad with blood within 1 hour, or you see blood clots larger than a golf ball.    Bleeding that lasts more than 6 weeks.    You have vaginal discharge that smells bad.    Severe pain, cramping or tenderness in your lower belly area.    A more frequent or urgent need to urinate (pee), or it burns when you pee.    Nausea and  vomiting.    Redness, swelling or pain around a vein in your leg.    Problems breastfeeding, or a red or painful area on your breast.    If you have chest pain and cough or are gasping for air.    Problems coping with sadness, anxiety, or depression.     If you have any concerns about hurting yourself of the baby, call your doctor right away.    You have questions or concerns after you return home.Postop  Birth Instructions    Activity       Do not lift more than 10 pounds for 6 weeks after surgery.  Ask family and friends for help when you need it.    No driving until you have stopped taking your pain medications (usually two weeks after surgery).    No heavy exercise or activity for 6 weeks.  Don't do anything that will put a strain on your surgery site.    Don't strain when using the toilet.  Your care team may prescribe a stool softener if you have problems with your bowel movements.     To care for your incision:       Keep the incision clean and dry.    Do not soak your incision in water. No swimming or hot tubs until it has fully healed. You may soak in the bathtub if the water level is below your incision.    Do not use peroxide, gel, cream, lotion, or ointment on your incision.    Adjust your clothes to avoid pressure on your surgery site (check the elastic in your underwear for example).     You may see a small amount of clear or pink drainage and this is normal.  Check with your health care provider:       If the drainage increases or has an odor.    If the incision reddens, you have swelling, or develop a rash.    If you have increased pain and the medicine we prescribed doesn't help.    If you have a fever above 100.4 F (38 C) with or without chills when placing thermometer under your tongue.   The area around your incision (surgery wound), will feel numb.  This is normal. The numbness should go away in less than a year.     Keep your hands clean:  Always wash your hands before touching your  incision (surgery wound). This helps reduce your risk of infection. If your hands aren't dirty, you may use an alcohol hand-rub to clean your hands. Keep your nails clean and short.    Call your healthcare provider if you have any of these symptoms:       You soak a sanitary pad with blood within 1 hour, or you see blood clots larger than a golf ball.    Bleeding that lasts more than 6 weeks.    Vaginal discharge that smells bad.    Severe pain, cramping or tenderness in your lower belly area.    A need to urinate more frequently (use the toilet more often), more urgently (use the toilet very quickly), or it burns when you urinate.    Nausea and vomiting.    Redness, swelling or pain around a vein in your leg.    Problems breastfeeding or a red or painful area on your breast.    Chest pain and cough or are gasping for air.    Problems with coping with sadness, anxiety or depression. If you have concerns about hurting yourself or the baby, call your provider immediately.      You have questions or concerns after you return home.

## 2018-09-23 NOTE — PLAN OF CARE
Problem: Patient Care Overview  Goal: Plan of Care/Patient Progress Review  Outcome: Adequate for Discharge Date Met: 09/23/18  Problem: Patient Care Overview  Goal: Plan of Care/Patient Progress Review  Outcome: Adequate for Discharge Date Met: 09/23/18  Data: Vital signs stable, assessments within normal limits.   Breastfeeding well, latch checked. Supplementing infant with formula and pumped breast milk. Declined breast pump, has one at home.  Discharge outcomes on care plan met, patient instructed of signs/symptoms to look for and report per discharge instructions.   No apparent pain. Patient stated that she has self-catheterization items at home.   Action: Review of care plan, teaching, and discharge instructions done with patient. Infant identification with ID bands done, patient verification with signature obtained.   Response: Patient states understanding and comfort with self cares and feeding. All questions addressed. Patient discharged with infant @ 1600.

## 2018-09-23 NOTE — PROGRESS NOTES
Post Partum Progress Note  POD#3    Subjective:  Minh is resting comfortably in bed. She is doing well overall and had no problems overnight. She did have a bowel movement overnight and reports improvement of her constipation. She is successfully self-catheterizing. Pain is well controlled. She is tolerating a regular diet. Lochia is minimal. She is ambulating well. She denies headache, changes in vision, nausea/vomiting, chest pain, shortness of breath, RUQ pain, or worsening edema.  Breastfeeding and pumping going well.    Objective:   Vitals:    18 1656 18 1800 18 1900 18 0330   BP:   135/88 130/90   Pulse:   100 80   Resp: 16 16 16 16   Temp:  98.7  F (37.1  C) 98.6  F (37  C) 98.2  F (36.8  C)   TempSrc:  Oral Oral Oral   SpO2:           General: NAD. A&Ox3.  CV: Regular rate, well perfused.   Pulm: Normal respiratory effort, clear to auscultation bilaterally.  Abd: Soft, appropriately tender, non-distended. Fundus firm and below umbilicus  Incision: Vertical midline skin incision clean, dry, intact with steri strips in place.  Ext: Trace lower extremity edema bilaterally. No calf tenderness.    Assessment/Plan:  Minh Altamirano is a 33 year old  female who is POD#3 s/p PCCS via VML for history of sacral agenesis, prior Mitrofanoff procedure. Doing well postoperatively.     - GDMA1: , plan for GTT at 6w postpartum visit  - Mildly elevated BPs: /95 yesterday morning, no repeat since. HELLP labs within normal limits and patient asymptomatic. Will schedule 1w postpartum visit for BP check.  - Encourage routine post-operative goals including ambulation and incentive spirometry  - PNC: Rh positive. Rubella immune. No intervention indicated.  - Pain: controlled on oral medications  - Heme: Hgb 11.5>EBL 1200>10.5>9.9. No signs or symptoms of acute blood loss anemia, will discharge home with PO iron.  - GI: bowel regimen, PRN antiemetics  - : Sacral agenesis, s/p Mitrofanoff  and takedown, autologous neck sling, right renal agenesis. Self catheterization going well per patient.  - Infant: Stable in patient's room   - Feeding: Breastfeeding and pumping going well  - BC: Not discussed today    Discharge to home today    Marquita Ross MD  Ob/Gyn Resident, PGY-1  09/23/18 8:20 AM       Appreciate note by Dr. Silva. Patient has been seen and examined by me separate from the resident, agree with above note. Reviewed pregnancy spacing and need for early delivery due to CCS.     Lily Villanueva MD  10:18 AM

## 2018-09-23 NOTE — PLAN OF CARE
Problem: Patient Care Overview  Goal: Plan of Care/Patient Progress Review  Outcome: Improving  Doing well.  Pain is well-controlled.  No fevers. Good appetite. Intake and out \put is adequate.  Ambulatory. Breastfeeding and supplimenting with formula. Requires minimum assist with latch and positioning. Bonding with infant. Will continue to support.

## 2018-10-31 ENCOUNTER — OFFICE VISIT (OUTPATIENT)
Dept: OBGYN | Facility: CLINIC | Age: 33
End: 2018-10-31
Attending: STUDENT IN AN ORGANIZED HEALTH CARE EDUCATION/TRAINING PROGRAM
Payer: COMMERCIAL

## 2018-10-31 VITALS
SYSTOLIC BLOOD PRESSURE: 113 MMHG | BODY MASS INDEX: 25.77 KG/M2 | HEART RATE: 99 BPM | WEIGHT: 140.9 LBS | DIASTOLIC BLOOD PRESSURE: 77 MMHG

## 2018-10-31 DIAGNOSIS — Z98.891 HISTORY OF CESAREAN DELIVERY: ICD-10-CM

## 2018-10-31 PROCEDURE — G0463 HOSPITAL OUTPT CLINIC VISIT: HCPCS | Mod: ZF

## 2018-10-31 ASSESSMENT — PAIN SCALES - GENERAL: PAINLEVEL: NO PAIN (0)

## 2018-10-31 NOTE — LETTER
10/31/2018       RE: Minh Altamirano  [address]  [place] [place] [address]     Dear Colleague,    Thank you for referring your patient, Minh Altamirano, to the [facility] at [facility]. Please see a copy of my visit note below.    6 Week Postpartum Visit Note    S:  Minh Altamirano is a 33 year old  here for her 6-week postpartum checkup. She reports feeling well since delivery. Baby is also doing well.    Delivery Date: 2018.    Delivering provider:  Lily Villanueva MD.    Type of delivery:  .     Infant gender:  boy, weight 3100 g  Feeding Method:  .  Complications reported with feeding: Baby not latching so pumping and feeding.  Bleeding:  Light.  Duration:  21 days.  Menses resumed:  Yes   Bowel/Urinary problems:  No    PHQ-9 score: not done    Contraception Planned:  Condoms, considering LARC  She  has not had intercourse since delivery..    Current tobacco use:  No  ================================================================  ROS: 10 point ROS neg other than the symptoms noted above in the HPI.     O:  EXAM:  /77 (BP Location: Left arm, Patient Position: Sitting, Cuff Size: Adult Regular)  Pulse 99  Wt 63.9 kg (140 lb 14.4 oz)  BMI 25.77 kg/m2  General: alert, oriented, sitting comfortably  Psych: normal mood and affect  Breasts: symmetric bilaterally, no edema or erythema, no skin retractions. No masses.  Abdomen: soft, non-tender, no masses appreciated.  Incision:  well healed and dry and intact   Pelvic exam deferred    A:   33 year old  6 weeks POD# s/p pCCS via VML. Here for postpartum visit. Doing well postpartum.    P:  Contraception: condoms. Discussed recommendations for pregnancy spacing. Patient in agreement and does not want another baby for several years. Patient unfamiliar with condom use, also expressed that she would forget to take daily pill. Counseled on LARCs. Patient to  discuss with  and return if she decides on a LARC.  Feeding: Pumping    RTC for annual exam or contraception.    Staffed with Dr. Hickey.     Sita Aceves MD  PGY-2 Department of Obstetrics, Gynecology, and Women's Health2  10/31/18    The Patient was seen in [facility] by Dr. Aceves.   I reviewed the history & exam. Assessment and plan were jointly made.   Radha Hickey MD, MPH

## 2018-10-31 NOTE — NURSING NOTE
Chief Complaint   Patient presents with     c section check     Dilia Romero CMA on 10/31/2018 at 2:50 PM

## 2018-10-31 NOTE — PROGRESS NOTES
6 Week Postpartum Visit Note    S:  Minh Altamirano is a 33 year old  here for her 6-week postpartum checkup. She reports feeling well since delivery. Baby is also doing well.    Delivery Date: 2018.    Delivering provider:  Lily Villanueva MD.    Type of delivery:  .     Infant gender:  boy, weight 3100 g  Feeding Method:  .  Complications reported with feeding: Baby not latching so pumping and feeding.  Bleeding:  Light.  Duration:  21 days.  Menses resumed:  Yes   Bowel/Urinary problems:  No    PHQ-9 score: not done    Contraception Planned:  Condoms, considering LARC  She  has not had intercourse since delivery..    Current tobacco use:  No  ================================================================  ROS: 10 point ROS neg other than the symptoms noted above in the HPI.     O:  EXAM:  /77 (BP Location: Left arm, Patient Position: Sitting, Cuff Size: Adult Regular)  Pulse 99  Wt 63.9 kg (140 lb 14.4 oz)  BMI 25.77 kg/m2  General: alert, oriented, sitting comfortably  Psych: normal mood and affect  Breasts: symmetric bilaterally, no edema or erythema, no skin retractions. No masses.  Abdomen: soft, non-tender, no masses appreciated.  Incision:  well healed and dry and intact   Pelvic exam deferred    A:   33 year old  6 weeks POD# s/p pCCS via VML. Here for postpartum visit. Doing well postpartum.    P:  Contraception: condoms. Discussed recommendations for pregnancy spacing. Patient in agreement and does not want another baby for several years. Patient unfamiliar with condom use, also expressed that she would forget to take daily pill. Counseled on LARCs. Patient to discuss with  and return if she decides on a LARC.  Feeding: Pumping    RTC for annual exam or contraception.    Staffed with Dr. Hickey.     Sita Aceves MD  PGY-2 Department of Obstetrics, Gynecology, and Women's Health2  10/31/18    The Patient was seen in [facility] by   Ketan.   I reviewed the history & exam. Assessment and plan were jointly made.   Radha Hickey MD, MPH

## 2018-10-31 NOTE — MR AVS SNAPSHOT
After Visit Summary   10/31/2018    Minh Altamirano    MRN: [id number]           Patient Information     Date Of Birth          1985        Visit Information        Provider Department      10/31/2018 2:30 PM Sita Aceves MD [facility]        Today's Diagnoses     Routine postpartum follow-up    -  1    History of  delivery           Follow-ups after your visit        Who to contact     Please call your clinic at [phone] to:    Ask questions about your health    Make or cancel appointments    Discuss your medicines    Learn about your test results    Speak to your doctor            Additional Information About Your Visit        MyChart Information     [employer] gives you secure access to your electronic health record. If you see a primary care provider, you can also send messages to your care team and make appointments. If you have questions, please call your primary care clinic.  If you do not have a primary care provider, please call [phone] and they will assist you.      [employer] is an electronic gateway that provides easy, online access to your medical records. With [employer], you can request a clinic appointment, read your test results, renew a prescription or communicate with your care team.     To access your existing account, please contact your [facility] or call [phone] for assistance.        Care EveryWhere ID     This is your Care EveryWhere ID. This could be used by other organizations to access your [place] medical records  [id number]        Your Vitals Were     Pulse BMI (Body Mass Index)                99 25.77 kg/m2           Blood Pressure from Last 3 Encounters:   10/31/18 113/77   18 (!) 147/93   18 131/86    Weight from Last 3 Encounters:   10/31/18 63.9 kg (140 lb 14.4 oz)   18 70.6 kg (155 lb 11.2 oz)   09/10/18 70.4 kg (155 lb 4.8 oz)              Today, you had the following     No  orders found for display       Primary Care Provider Office Phone # Fax #    Jonathanshade Goldman [phone]       [facility] [address]  [facility] [address]        Equal Access to Services     IJEOMA SANCHEZ : Hadkinjal nery bradley saltyo Soguanacoali, waaxda luqadaha, qaybta kaalmada adeegyada, mariah stovall seth reeves. So [facility] [phone].    ATENCIÓN: Si habla español, tiene a georges disposición servicios gratuitos de asistencia lingüística. Llame al [phone].    We comply with applicable federal civil rights laws and [place] laws. We do not discriminate on the basis of race, color, national origin, age, disability, sex, sexual orientation, or gender identity.            Thank you!     Thank you for choosing [facility]  for your care. Our goal is always to provide you with excellent care. Hearing back from our patients is one way we can continue to improve our services. Please take a few minutes to complete the written survey that you may receive in the mail after your visit with us. Thank you!             Your Updated Medication List - Protect others around you: Learn how to safely use, store and throw away your medicines at www.disposemymeds.org.          This list is accurate as of 10/31/18 11:59 PM.  Always use your most recent med list.                   Brand Name Dispense Instructions for use Diagnosis    acetone (urine) test strip    KETOSTIX    20 each    1 strip by In Vitro route daily    Gestational diabetes       * blood glucose monitoring lancets     100 each    Test 4 times daily.    Abnormal maternal glucose tolerance, antepartum       * blood glucose monitoring lancets     200 each    Use to test blood sugar 5 times daily or as directed.  Ok to substitute alternative if insurance prefers.    Diet controlled gestational diabetes mellitus (GDM) in third trimester       blood glucose monitoring meter device kit     1 kit    Use to test  blood sugars 4 times daily or as directed.    Abnormal maternal glucose tolerance, antepartum       * blood glucose monitoring test strip    ONETOUCH VERIO IQ    200 strip    Use to test blood sugars 4 times daily or as directed.    Gestational diabetes       * blood glucose monitoring test strip    no brand specified    100 strip    Use to test blood sugars 5 times daily or as directed    Abnormal maternal glucose tolerance, antepartum       breast pump Misc     1 each    1 each daily as needed    Postpartum care and examination of lactating mother       ferrous sulfate 325 (65 Fe) MG tablet    IRON    45 tablet    Take 1 tablet (325 mg) by mouth daily (with breakfast)    Anemia due to blood loss, acute       ibuprofen 600 MG tablet    ADVIL/MOTRIN    60 tablet    Take 1 tablet (600 mg) by mouth every 6 hours as needed for other (cramping)    S/P        mirabegron 25 MG 24 hr tablet    MYRBETRIQ    30 tablet    Take 1 tablet (25 mg) by mouth daily    Neurogenic bladder, Incontinence in female       oxyCODONE IR 5 MG tablet    ROXICODONE    20 tablet    Take 1-2 tablets (5-10 mg) by mouth every 3 hours as needed    S/P        * polyethylene glycol Packet    MIRALAX/GLYCOLAX     Take 1 packet by mouth daily        * polyethylene glycol powder    MIRALAX    510 g    Take 17 g by mouth daily    Constipation       PRENATAL VITAMINS PO           ranitidine 150 MG tablet    ZANTAC    60 tablet    Take 1 tablet (150 mg) by mouth 2 times daily    Gastroesophageal reflux disease without esophagitis       senna-docusate 8.6-50 MG per tablet    SENOKOT-S;PERICOLACE    100 tablet    Take 1 tablet by mouth 2 times daily    S/P        sodium chloride 0.65 % nasal spray    OCEAN    1 Bottle    Spray 1 spray in nostril every hour as needed for congestion.    Neurogenic bladder, NOS, Other urinary incontinence       solifenacin 10 MG tablet    VESICARE    90 tablet    Take 1 tablet (10 mg) by mouth daily     Neurogenic bladder, Urinary urgency, Urge incontinence       tolterodine 4 MG 24 hr capsule    DETROL LA    90 capsule    Take 1 capsule (4 mg) by mouth daily    Neurogenic bladder       * Notice:  This list has 6 medication(s) that are the same as other medications prescribed for you. Read the directions carefully, and ask your doctor or other care provider to review them with you.

## 2018-11-09 DIAGNOSIS — O24.410 DIET CONTROLLED GESTATIONAL DIABETES MELLITUS (GDM) IN THIRD TRIMESTER: ICD-10-CM

## 2018-11-13 ENCOUNTER — TRANSFERRED RECORDS (OUTPATIENT)
Dept: HEALTH INFORMATION MANAGEMENT | Facility: CLINIC | Age: 33
End: 2018-11-13

## 2018-11-13 DIAGNOSIS — O24.410 DIET CONTROLLED GESTATIONAL DIABETES MELLITUS (GDM) IN THIRD TRIMESTER: Primary | ICD-10-CM

## 2018-11-13 RX ORDER — LANCETS 30 GAUGE
EACH MISCELLANEOUS
Refills: 2 | OUTPATIENT
Start: 2018-11-13

## 2018-11-13 NOTE — TELEPHONE ENCOUNTER
Received refill request for lancets.  Last in clinic 10/31/2018 for her 6-week post-partum exam. She had GDM and was to have 2-hr GTT at post-partum visit but this did not occur.    Spoke with Dr. Hickey who indicated pt still needs 2 hr glucose test but does not need to be checking glucose.    Spoke with Minh via   Services. Minh stated she is no longer checking glucose - nurse informed her refill request will then be denied.  Discussed need for 2 hr glucose and that this is a fasting test.   She will come 11/21 at 0930. Forwarding to  to schedule.  Minh had all questions answered.

## 2018-11-21 DIAGNOSIS — O24.410 DIET CONTROLLED GESTATIONAL DIABETES MELLITUS (GDM) IN THIRD TRIMESTER: ICD-10-CM

## 2018-11-21 LAB
GLUCOSE 1H P 75 G GLC PO SERPL-MCNC: 146 MG/DL (ref 60–180)
GLUCOSE 2H P 75 G GLC PO SERPL-MCNC: 114 MG/DL (ref 60–155)
GLUCOSE P FAST SERPL-MCNC: 90 MG/DL (ref 60–95)

## 2018-11-21 PROCEDURE — 82951 GLUCOSE TOLERANCE TEST (GTT): CPT | Performed by: OBSTETRICS & GYNECOLOGY

## 2018-11-21 PROCEDURE — 36415 COLL VENOUS BLD VENIPUNCTURE: CPT | Performed by: OBSTETRICS & GYNECOLOGY

## 2019-04-25 ENCOUNTER — TRANSFERRED RECORDS (OUTPATIENT)
Dept: HEALTH INFORMATION MANAGEMENT | Facility: CLINIC | Age: 34
End: 2019-04-25

## 2019-07-12 ENCOUNTER — OFFICE VISIT (OUTPATIENT)
Dept: OBGYN | Facility: CLINIC | Age: 34
End: 2019-07-12
Attending: OBSTETRICS & GYNECOLOGY
Payer: COMMERCIAL

## 2019-07-12 VITALS
DIASTOLIC BLOOD PRESSURE: 80 MMHG | BODY MASS INDEX: 25.87 KG/M2 | HEIGHT: 63 IN | HEART RATE: 84 BPM | WEIGHT: 146 LBS | SYSTOLIC BLOOD PRESSURE: 115 MMHG

## 2019-07-12 DIAGNOSIS — N81.89 OTHER FEMALE GENITAL PROLAPSE: ICD-10-CM

## 2019-07-12 DIAGNOSIS — Z30.09 FAMILY PLANNING COUNSELING: Primary | ICD-10-CM

## 2019-07-12 ASSESSMENT — PATIENT HEALTH QUESTIONNAIRE - PHQ9
5. POOR APPETITE OR OVEREATING: NOT AT ALL
SUM OF ALL RESPONSES TO PHQ QUESTIONS 1-9: 0

## 2019-07-12 ASSESSMENT — ANXIETY QUESTIONNAIRES
GAD7 TOTAL SCORE: 0
7. FEELING AFRAID AS IF SOMETHING AWFUL MIGHT HAPPEN: NOT AT ALL
1. FEELING NERVOUS, ANXIOUS, OR ON EDGE: NOT AT ALL
3. WORRYING TOO MUCH ABOUT DIFFERENT THINGS: NOT AT ALL
6. BECOMING EASILY ANNOYED OR IRRITABLE: NOT AT ALL
5. BEING SO RESTLESS THAT IT IS HARD TO SIT STILL: NOT AT ALL
2. NOT BEING ABLE TO STOP OR CONTROL WORRYING: NOT AT ALL

## 2019-07-12 ASSESSMENT — MIFFLIN-ST. JEOR: SCORE: 1341.38

## 2019-07-12 ASSESSMENT — PAIN SCALES - GENERAL: PAINLEVEL: NO PAIN (0)

## 2019-07-12 NOTE — LETTER
"2019       RE: Minh Altamirano  [address]  [facility] [address]     Dear Colleague,    Thank you for referring your patient, Minh Altamirano, to the [facility] at [facility]. Please see a copy of my visit note below.      Gyn Clinic Visit Note  2019      Pt is a 34 y.o.   with scheduled primary  due to history of complex abdominal and urologic procedures. 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 She developed a fascial dehiscience 1 week postop and returned to the OR for a takedown of the Mitrofanoff and ventral hernia repair with mesh. Prior to this surgery, she underwent a lumbosacral laminectomy with partial untethering of the spinal cord and subtotal excision of an intraspinal lipoma 2010.     The neuromuscular insult due to her spinal cord tethering/sacral tumor have resulted in lack of pelvic support with resultant pelvic organ prolapse.    She had a classical uterine incision due to lack of access to the lower uterine segment due to prior bladder surgeries.  Uterus fallopian tubes and ovaries were otherwise normal.  She is breast feeding. Menses are regular.    She is here with her . She wants to know spacing before her next pregnancy. She thought the person during her surgery told her 18 months.  She uses condoms every time.    She mentions that she \"feels something\" in  her vagina when she wipes. She is menstruating today.      Even in 2017 she felt a bulge at the opening of her vagina: \"present for years\".    PAP smear history:  PAP   Date Value Ref Range Status   2012 NIL  Final   ]  ROS: Back pain o/w 10 point review is negative.     O:   /80   Pulse " "84   Ht 1.6 m (5' 3\")   Wt 66.2 kg (146 lb)   LMP 2019   Breastfeeding? Yes   BMI 25.86 kg/m     Pelvic exam: Cervix noted at the level of the introitus.  Cx: normal Uterus/Adnexa negative.    A:  Minh Altamirano is a 34 year old y/o  here today for questions re: spacing of her next pregnancy and for concerns of something in her vagina..     P: I would recommend 2 year spacing of her children.  She wishes to continue condoms.  She has Pelvic organ prolapse even before her 1st pregnancy and has done will; so I am hopeful she will do well again.          Again, thank you for allowing me to participate in the care of your patient.      Sincerely,    Chloe Crisostomo MD      "

## 2019-07-12 NOTE — PROGRESS NOTES
"  Gyn Clinic Visit Note  2019      Pt is a 34 y.o.   with scheduled primary  due to history of complex abdominal and urologic procedures. 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 She developed a fascial dehiscience 1 week postop and returned to the OR for a takedown of the Mitrofanoff and ventral hernia repair with mesh. Prior to this surgery, she underwent a lumbosacral laminectomy with partial untethering of the spinal cord and subtotal excision of an intraspinal lipoma 2010.     The neuromuscular insult due to her spinal cord tethering/sacral tumor have resulted in lack of pelvic support with resultant pelvic organ prolapse.    She had a classical uterine incision due to lack of access to the lower uterine segment due to prior bladder surgeries.  Uterus fallopian tubes and ovaries were otherwise normal.  She is breast feeding. Menses are regular.    She is here with her . She wants to know spacing before her next pregnancy. She thought the person during her surgery told her 18 months.  She uses condoms every time.    She mentions that she \"feels something\" in  her vagina when she wipes. She is menstruating today.      Even in  she felt a bulge at the opening of her vagina: \"present for years\".    PAP smear history:  PAP   Date Value Ref Range Status   2012 NIL  Final   ]  ROS: Back pain o/w 10 point review is negative.     O:   /80   Pulse 84   Ht 1.6 m (5' 3\")   Wt 66.2 kg (146 lb)   LMP 2019   Breastfeeding? Yes   BMI 25.86 kg/m    Pelvic exam: Cervix noted at the level of the introitus.  Cx: normal Uterus/Adnexa negative.    A:  Minh Altamirano is a 34 year old y/o  here today for questions re: spacing of her next " pregnancy and for concerns of something in her vagina..     P: I would recommend 2 year spacing of her children.  She wishes to continue condoms.  She has Pelvic organ prolapse even before her 1st pregnancy and has done will; so I am hopeful she will do well again.

## 2019-07-12 NOTE — NURSING NOTE
Chief Complaint   Patient presents with     Establish Care     Pre-pregnancy consult   Yasmeen Kirk LPN

## 2019-07-13 ASSESSMENT — ANXIETY QUESTIONNAIRES: GAD7 TOTAL SCORE: 0

## 2019-11-04 ENCOUNTER — TELEPHONE (OUTPATIENT)
Dept: OBGYN | Facility: CLINIC | Age: 34
End: 2019-11-04

## 2019-11-04 NOTE — TELEPHONE ENCOUNTER
----- Message from Marixa Dunaway sent at 11/4/2019  1:49 PM CST -----  Regarding: OB intake  Patient called positive pregnancy test on Oct 5, missed period 2 months ago per patient. Appt needed.     Thank you  Marixa   [facility]

## 2019-11-18 ENCOUNTER — OFFICE VISIT (OUTPATIENT)
Dept: OBGYN | Facility: CLINIC | Age: 34
End: 2019-11-18
Attending: ADVANCED PRACTICE MIDWIFE
Payer: COMMERCIAL

## 2019-11-18 ENCOUNTER — ANCILLARY PROCEDURE (OUTPATIENT)
Dept: ULTRASOUND IMAGING | Facility: CLINIC | Age: 34
End: 2019-11-18
Attending: ADVANCED PRACTICE MIDWIFE
Payer: COMMERCIAL

## 2019-11-18 VITALS
HEART RATE: 85 BPM | HEIGHT: 63 IN | DIASTOLIC BLOOD PRESSURE: 86 MMHG | WEIGHT: 140.8 LBS | SYSTOLIC BLOOD PRESSURE: 124 MMHG | BODY MASS INDEX: 24.95 KG/M2

## 2019-11-18 DIAGNOSIS — O02.1 MISSED AB: Primary | ICD-10-CM

## 2019-11-18 DIAGNOSIS — Z34.91 ENCOUNTER FOR SUPERVISION OF NORMAL PREGNANCY IN FIRST TRIMESTER, UNSPECIFIED GRAVIDITY: ICD-10-CM

## 2019-11-18 PROCEDURE — G0463 HOSPITAL OUTPT CLINIC VISIT: HCPCS | Mod: ZF

## 2019-11-18 PROCEDURE — 76801 OB US < 14 WKS SINGLE FETUS: CPT

## 2019-11-18 ASSESSMENT — MIFFLIN-ST. JEOR: SCORE: 1317.79

## 2019-11-18 NOTE — PROGRESS NOTES
"Subjective:  Minh Altamirano is a 34 year old female  at 11 weeks 3 days by LMP to review result of ultrasound.   was present for the duration of the visit.    Pt had ultrasound performed here in clinic. The US was was consistent with missed AB. No fetal heart rate present for fetus A or B. Measuring 9 weeks with CRLs of 28.0 mm and 24.0 mm.  Patient is seen here and informed of the results. Pt expresses appropriate sadness at loss, has adequate support from partner.  Reassured that the loss could not have been stopped by her actions or any other persons actions.  The patient has been experiencing mild cramping, but denies any bleeding.     Review Of Systems  ROS: 10 point ROS neg other than the symptoms noted above in the HPI.    OBJECTIVE:   Blood pressure 124/86, pulse 85, height 1.6 m (5' 3\"), weight 63.9 kg (140 lb 12.8 oz), currently breastfeeding.    ASSESSMENT:  - Missed AB  - Rh positive    PLAN:   - Offered support and encouraged self care.  Pt given space and time to address questions/concerns.  - Discussed possible causes of miscarriage including chromosome abnormalities.     - Education on management options provided. Reviewed medical, surgical, and expectant management and expectations of bleeding.   Pt desires expectant management at this time.  -  Reviewed how/why to call or present to the emergency room if she were to develop heavy bleeding saturating a maxi pad more frequently than every hour or passing large clots.     - Reviewed how/why If she is bleeding longer than one week or it is heavy, recommended she follow-up for possible D&C.   - Pt verbalized understanding of and agreement to plan of care.  - RTC in 1 week for follow-up      Over 50% of this 15 minute appointment was spent in face to face counseling and coordination care as above.  GEORGINA Plascencia CNM  "

## 2019-11-18 NOTE — LETTER
"2019       RE: Minh Altamirano  [address]  [facility] [address]     Dear Colleague,    Thank you for referring your patient, Minh Altamirano, to the [facility] at [facility]. Please see a copy of my visit note below.    Subjective:  Minh Altamirano is a 34 year old female  at 11 weeks 3 days by LMP to review result of ultrasound.   was present for the duration of the visit.    Pt had ultrasound performed here in clinic. The US was was consistent with missed AB. No fetal heart rate present for fetus A or B. Measuring 9 weeks with CRLs of 28.0 mm and 24.0 mm.  Patient is seen here and informed of the results. Pt expresses appropriate sadness at loss, has adequate support from partner.  Reassured that the loss could not have been stopped by her actions or any other persons actions.  The patient has been experiencing mild cramping, but denies any bleeding.     Review Of Systems  ROS: 10 point ROS neg other than the symptoms noted above in the HPI.    OBJECTIVE:   Blood pressure 124/86, pulse 85, height 1.6 m (5' 3\"), weight 63.9 kg (140 lb 12.8 oz), currently breastfeeding.    ASSESSMENT:  - Missed AB  - Rh positive    PLAN:   - Offered support and encouraged self care.  Pt given space and time to address questions/concerns.  - Discussed possible causes of miscarriage including chromosome abnormalities.     - Education on management options provided. Reviewed medical, surgical, and expectant management and expectations of bleeding.   Pt desires expectant management at this time.  -  Reviewed how/why to call or present to the emergency room if she were to develop heavy bleeding saturating a maxi pad more frequently than every hour or passing large clots.     - Reviewed how/why If she is bleeding longer than one week or it is heavy, recommended she follow-up for possible D&C.   - Pt verbalized understanding of and agreement " to plan of care.  - RTC in 1 week for follow-up      Over 50% of this 15 minute appointment was spent in face to face counseling and coordination care as above.      GEORGINA PlascenciaM

## 2019-11-25 ENCOUNTER — OFFICE VISIT (OUTPATIENT)
Dept: OBGYN | Facility: CLINIC | Age: 34
End: 2019-11-25
Attending: MIDWIFE
Payer: COMMERCIAL

## 2019-11-25 ENCOUNTER — ANCILLARY PROCEDURE (OUTPATIENT)
Dept: ULTRASOUND IMAGING | Facility: CLINIC | Age: 34
End: 2019-11-25
Attending: MIDWIFE
Payer: COMMERCIAL

## 2019-11-25 VITALS
HEIGHT: 63 IN | WEIGHT: 140.4 LBS | DIASTOLIC BLOOD PRESSURE: 88 MMHG | SYSTOLIC BLOOD PRESSURE: 144 MMHG | HEART RATE: 89 BPM | BODY MASS INDEX: 24.88 KG/M2

## 2019-11-25 DIAGNOSIS — O02.1 MISSED AB: Primary | ICD-10-CM

## 2019-11-25 DIAGNOSIS — O02.1 MISSED AB: ICD-10-CM

## 2019-11-25 PROCEDURE — G0463 HOSPITAL OUTPT CLINIC VISIT: HCPCS | Mod: ZF

## 2019-11-25 PROCEDURE — 76801 OB US < 14 WKS SINGLE FETUS: CPT

## 2019-11-25 PROCEDURE — T1013 SIGN LANG/ORAL INTERPRETER: HCPCS | Mod: U3,ZF

## 2019-11-25 PROCEDURE — G0463 HOSPITAL OUTPT CLINIC VISIT: HCPCS | Mod: 25,ZF

## 2019-11-25 RX ORDER — DOXYCYCLINE 100 MG/10ML
100 INJECTION, POWDER, LYOPHILIZED, FOR SOLUTION INTRAVENOUS
Status: CANCELLED | OUTPATIENT
Start: 2019-11-25

## 2019-11-25 ASSESSMENT — PAIN SCALES - GENERAL: PAINLEVEL: NO PAIN (0)

## 2019-11-25 ASSESSMENT — MIFFLIN-ST. JEOR: SCORE: 1315.85

## 2019-11-25 NOTE — LETTER
"2019       RE: Minh Altamirano  [address]  [facility] [address]     Dear Colleague,    Thank you for referring your patient, Minh Altamirano, to the [facility] at [facility]. Please see a copy of my visit note below.    Chief Complaint:  Missed AB    SUBJECTIVE: Minh Altamirano is a 34 year old female    At 12 wks  by LMP to review result of ultrasound  Confirms  fetal demise of twins 9 + wk gestational age .   Pt is not bleeding  She had repeat confirmatory US today per her request.     Pt had two ultra sounds performed here in clinic. The first US was 19   The US today confirms missed AB, no fetal heartrate.  Patient is seen here  and informed of the results. Pt expresses appropriate sadness at loss, has  adequate support from .  Reassured that the loss could not have been stopped by her actions or any other persons actions.  The patient has been experiencing no cramping or bleeding   Review Of Systems   ROS: 10 point ROS neg other than the symptoms noted above in the HPI.    OBJECTIVE: Blood pressure (!) 144/88, pulse 89, height 1.6 m (5' 2.99\"), weight 63.7 kg (140 lb 6.4 oz), currently breastfeeding.    OR Physical Exam     Heart: RRR  No murmur  Lungs:  Clear bilaterally     ASSESSMENT:  -  Missed AB at 12 wks gestation 9 wk twin pregnancy   - Rh POS *    PLAN:   - Offered support, encouraged self care.  Offered coordination with mental health services prn.     - Education on management option  ADVISED scheduled D & C asap   Reviewed surgical,  expectations of bleeding.   Discussed possible causes of miscarriage including chromosome abnormalities.  Pt verbalized understanding that nothing can be done to prevent a miscarriage from occuring.      -  Reviewed how/why to call or present to the emergency room if she were to develop heavy bleeding saturating a maxi pad more frequently than every hour or passing large " clots.   . Pt instructed to call clinic if she develops a fever. Reviewed Ibuprofen for the cramping, up to 800mg every 8 hours.     - Reviewed recommendation for home pregnancy test 3 weeks after miscarriage to ensure that all pregnancy tissue has passed.    - Recommend wait one normal menses before trying to become pregnant again.  Continue prenatal vitamin and avoidance of possible teratogens.    Rhogam NOT indicated    Pt verbalized understanding of and agreement to plan of care.    MD and surgery scheduling messaged to facilitate scheduling D & C  Over 100% of this 15  minute appointment was spent in face to face counseling and coordination care as above.    GEORGINA ShahM

## 2019-11-25 NOTE — PROGRESS NOTES
"Chief Complaint:  Missed AB    SUBJECTIVE: Minh Altamirano is a 34 year old female    At 12 wks  by LMP to review result of ultrasound  Confirms  fetal demise of twins 9 + wk gestational age .   Pt is not bleeding  She had repeat confirmatory US today per her request.     Pt had two ultra sounds performed here in clinic. The first US was 19   The US today confirms missed AB, no fetal heartrate.  Patient is seen here  and informed of the results. Pt expresses appropriate sadness at loss, has  adequate support from .  Reassured that the loss could not have been stopped by her actions or any other persons actions.  The patient has been experiencing no cramping or bleeding   Review Of Systems   ROS: 10 point ROS neg other than the symptoms noted above in the HPI.    OBJECTIVE: Blood pressure (!) 144/88, pulse 89, height 1.6 m (5' 2.99\"), weight 63.7 kg (140 lb 6.4 oz), currently breastfeeding.   .     OR Physical Exam     Heart: RRR  No murmur  Lungs:  Clear bilaterally         ASSESSMENT:  -  Missed AB at 12 wks gestation 9 wk twin pregnancy   - Rh POS *    PLAN:   - Offered support, encouraged self care.  Offered coordination with mental health services prn.     - Education on management option  ADVISED scheduled D & C asap   Reviewed surgical,  expectations of bleeding.   Discussed possible causes of miscarriage including chromosome abnormalities.  Pt verbalized understanding that nothing can be done to prevent a miscarriage from occuring.      -  Reviewed how/why to call or present to the emergency room if she were to develop heavy bleeding saturating a maxi pad more frequently than every hour or passing large clots.   . Pt instructed to call clinic if she develops a fever. Reviewed Ibuprofen for the cramping, up to 800mg every 8 hours.     - Reviewed recommendation for home pregnancy test 3 weeks after miscarriage to ensure that all pregnancy tissue has passed.    - Recommend wait one normal menses " before trying to become pregnant again.  Continue prenatal vitamin and avoidance of possible teratogens.    Rhogam NOT indicated    Pt verbalized understanding of and agreement to plan of care.    MD and surgery scheduling messaged to facilitate scheduling D & C  Over 100% of this 15  minute appointment was spent in face to face counseling and coordination care as above.        Pia ERICKSON, CYNDIEM

## 2019-11-26 ENCOUNTER — PREP FOR PROCEDURE (OUTPATIENT)
Dept: OBGYN | Facility: CLINIC | Age: 34
End: 2019-11-26

## 2019-11-26 ENCOUNTER — ANESTHESIA EVENT (OUTPATIENT)
Dept: SURGERY | Facility: CLINIC | Age: 34
End: 2019-11-26
Payer: COMMERCIAL

## 2019-11-26 DIAGNOSIS — O02.1 MISSED ABORTION: Primary | ICD-10-CM

## 2019-11-26 ASSESSMENT — LIFESTYLE VARIABLES: TOBACCO_USE: 0

## 2019-11-27 ENCOUNTER — ANESTHESIA (OUTPATIENT)
Dept: SURGERY | Facility: CLINIC | Age: 34
End: 2019-11-27
Payer: COMMERCIAL

## 2019-11-27 ENCOUNTER — HOSPITAL ENCOUNTER (OUTPATIENT)
Facility: CLINIC | Age: 34
Discharge: HOME | End: 2019-11-27
Attending: OBSTETRICS & GYNECOLOGY | Admitting: OBSTETRICS & GYNECOLOGY
Payer: COMMERCIAL

## 2019-11-27 ENCOUNTER — TELEPHONE (OUTPATIENT)
Dept: OBGYN | Facility: CLINIC | Age: 34
End: 2019-11-27

## 2019-11-27 VITALS
HEIGHT: 63 IN | RESPIRATION RATE: 16 BRPM | OXYGEN SATURATION: 100 % | WEIGHT: 140.43 LBS | HEART RATE: 80 BPM | BODY MASS INDEX: 24.88 KG/M2 | DIASTOLIC BLOOD PRESSURE: 94 MMHG | SYSTOLIC BLOOD PRESSURE: 134 MMHG | TEMPERATURE: 97.8 F

## 2019-11-27 DIAGNOSIS — O02.1 MISSED ABORTION: ICD-10-CM

## 2019-11-27 LAB
ABO + RH BLD: NORMAL
ABO + RH BLD: NORMAL
BLD GP AB SCN SERPL QL: NORMAL
BLOOD BANK CMNT PATIENT-IMP: NORMAL
ERYTHROCYTE [DISTWIDTH] IN BLOOD BY AUTOMATED COUNT: 16.9 % (ref 10–15)
GLUCOSE SERPL-MCNC: 90 MG/DL (ref 70–99)
HCT VFR BLD AUTO: 39.1 % (ref 35–47)
HGB BLD-MCNC: 12.7 G/DL (ref 11.7–15.7)
MCH RBC QN AUTO: 26.8 PG (ref 26.5–33)
MCHC RBC AUTO-ENTMCNC: 32.5 G/DL (ref 31.5–36.5)
MCV RBC AUTO: 83 FL (ref 78–100)
PLATELET # BLD AUTO: 315 10E9/L (ref 150–450)
RBC # BLD AUTO: 4.73 10E12/L (ref 3.8–5.2)
SPECIMEN EXP DATE BLD: NORMAL
WBC # BLD AUTO: 8.5 10E9/L (ref 4–11)

## 2019-11-27 PROCEDURE — 40000171 ZZH STATISTIC PRE-PROCEDURE ASSESSMENT III: Performed by: OBSTETRICS & GYNECOLOGY

## 2019-11-27 PROCEDURE — 36415 COLL VENOUS BLD VENIPUNCTURE: CPT | Performed by: OBSTETRICS & GYNECOLOGY

## 2019-11-27 PROCEDURE — 36000053 ZZH SURGERY LEVEL 2 EA 15 ADDTL MIN - UMMC: Performed by: OBSTETRICS & GYNECOLOGY

## 2019-11-27 PROCEDURE — 25000125 ZZHC RX 250: Performed by: OBSTETRICS & GYNECOLOGY

## 2019-11-27 PROCEDURE — 36000051 ZZH SURGERY LEVEL 2 1ST 30 MIN - UMMC: Performed by: OBSTETRICS & GYNECOLOGY

## 2019-11-27 PROCEDURE — 25800030 ZZH RX IP 258 OP 636: Performed by: ANESTHESIOLOGY

## 2019-11-27 PROCEDURE — 25000128 H RX IP 250 OP 636: Performed by: NURSE ANESTHETIST, CERTIFIED REGISTERED

## 2019-11-27 PROCEDURE — 88233 TISSUE CULTURE SKIN/BIOPSY: CPT | Performed by: OBSTETRICS & GYNECOLOGY

## 2019-11-27 PROCEDURE — 86900 BLOOD TYPING SEROLOGIC ABO: CPT | Performed by: OBSTETRICS & GYNECOLOGY

## 2019-11-27 PROCEDURE — 85027 COMPLETE CBC AUTOMATED: CPT | Performed by: OBSTETRICS & GYNECOLOGY

## 2019-11-27 PROCEDURE — 86850 RBC ANTIBODY SCREEN: CPT | Performed by: OBSTETRICS & GYNECOLOGY

## 2019-11-27 PROCEDURE — 88262 CHROMOSOME ANALYSIS 15-20: CPT | Performed by: OBSTETRICS & GYNECOLOGY

## 2019-11-27 PROCEDURE — 88305 TISSUE EXAM BY PATHOLOGIST: CPT | Mod: 26 | Performed by: OBSTETRICS & GYNECOLOGY

## 2019-11-27 PROCEDURE — 37000008 ZZH ANESTHESIA TECHNICAL FEE, 1ST 30 MIN: Performed by: OBSTETRICS & GYNECOLOGY

## 2019-11-27 PROCEDURE — 82947 ASSAY GLUCOSE BLOOD QUANT: CPT | Performed by: OBSTETRICS & GYNECOLOGY

## 2019-11-27 PROCEDURE — 88305 TISSUE EXAM BY PATHOLOGIST: CPT | Performed by: OBSTETRICS & GYNECOLOGY

## 2019-11-27 PROCEDURE — 25000125 ZZHC RX 250: Performed by: NURSE ANESTHETIST, CERTIFIED REGISTERED

## 2019-11-27 PROCEDURE — 27210794 ZZH OR GENERAL SUPPLY STERILE: Performed by: OBSTETRICS & GYNECOLOGY

## 2019-11-27 PROCEDURE — 86901 BLOOD TYPING SEROLOGIC RH(D): CPT | Performed by: OBSTETRICS & GYNECOLOGY

## 2019-11-27 PROCEDURE — 71000027 ZZH RECOVERY PHASE 2 EACH 15 MINS: Performed by: OBSTETRICS & GYNECOLOGY

## 2019-11-27 PROCEDURE — 25800030 ZZH RX IP 258 OP 636: Performed by: OBSTETRICS & GYNECOLOGY

## 2019-11-27 PROCEDURE — 37000009 ZZH ANESTHESIA TECHNICAL FEE, EACH ADDTL 15 MIN: Performed by: OBSTETRICS & GYNECOLOGY

## 2019-11-27 PROCEDURE — 40000892 ZZHCL STATISTIC DNA ISOLATION: Performed by: OBSTETRICS & GYNECOLOGY

## 2019-11-27 PROCEDURE — 00000159 ZZHCL STATISTIC H-SEND OUTS PREP: Performed by: OBSTETRICS & GYNECOLOGY

## 2019-11-27 PROCEDURE — 25000128 H RX IP 250 OP 636: Performed by: ANESTHESIOLOGY

## 2019-11-27 RX ORDER — LIDOCAINE HYDROCHLORIDE 10 MG/ML
INJECTION, SOLUTION INFILTRATION; PERINEURAL PRN
Status: DISCONTINUED | OUTPATIENT
Start: 2019-11-27 | End: 2019-11-27

## 2019-11-27 RX ORDER — DEXAMETHASONE SODIUM PHOSPHATE 4 MG/ML
INJECTION, SOLUTION INTRA-ARTICULAR; INTRALESIONAL; INTRAMUSCULAR; INTRAVENOUS; SOFT TISSUE PRN
Status: DISCONTINUED | OUTPATIENT
Start: 2019-11-27 | End: 2019-11-27

## 2019-11-27 RX ORDER — FENTANYL CITRATE 50 UG/ML
INJECTION, SOLUTION INTRAMUSCULAR; INTRAVENOUS PRN
Status: DISCONTINUED | OUTPATIENT
Start: 2019-11-27 | End: 2019-11-27

## 2019-11-27 RX ORDER — IBUPROFEN 600 MG/1
600 TABLET, FILM COATED ORAL EVERY 6 HOURS PRN
Qty: 30 TABLET | Refills: 0 | Status: SHIPPED | OUTPATIENT
Start: 2019-11-27 | End: 2019-11-27

## 2019-11-27 RX ORDER — ACETAMINOPHEN 325 MG/1
650 TABLET ORAL
Status: DISCONTINUED | OUTPATIENT
Start: 2019-11-27 | End: 2019-11-27 | Stop reason: HOSPADM

## 2019-11-27 RX ORDER — SODIUM CHLORIDE, SODIUM LACTATE, POTASSIUM CHLORIDE, CALCIUM CHLORIDE 600; 310; 30; 20 MG/100ML; MG/100ML; MG/100ML; MG/100ML
INJECTION, SOLUTION INTRAVENOUS CONTINUOUS
Status: DISCONTINUED | OUTPATIENT
Start: 2019-11-27 | End: 2019-11-27 | Stop reason: HOSPADM

## 2019-11-27 RX ORDER — NORGESTIMATE AND ETHINYL ESTRADIOL 0.25-0.035
1 KIT ORAL DAILY
Qty: 90 TABLET | Refills: 4 | Status: SHIPPED | OUTPATIENT
Start: 2019-11-27 | End: 2020-10-30

## 2019-11-27 RX ORDER — DOXYCYCLINE 100 MG/10ML
100 INJECTION, POWDER, LYOPHILIZED, FOR SOLUTION INTRAVENOUS
Status: DISCONTINUED | OUTPATIENT
Start: 2019-11-27 | End: 2019-11-27 | Stop reason: CLARIF

## 2019-11-27 RX ORDER — PROPOFOL 10 MG/ML
INJECTION, EMULSION INTRAVENOUS CONTINUOUS PRN
Status: DISCONTINUED | OUTPATIENT
Start: 2019-11-27 | End: 2019-11-27

## 2019-11-27 RX ORDER — ONDANSETRON 2 MG/ML
4 INJECTION INTRAMUSCULAR; INTRAVENOUS EVERY 30 MIN PRN
Status: DISCONTINUED | OUTPATIENT
Start: 2019-11-27 | End: 2019-11-27 | Stop reason: HOSPADM

## 2019-11-27 RX ORDER — PROPOFOL 10 MG/ML
INJECTION, EMULSION INTRAVENOUS PRN
Status: DISCONTINUED | OUTPATIENT
Start: 2019-11-27 | End: 2019-11-27

## 2019-11-27 RX ORDER — LIDOCAINE HYDROCHLORIDE 10 MG/ML
INJECTION, SOLUTION INFILTRATION; PERINEURAL PRN
Status: DISCONTINUED | OUTPATIENT
Start: 2019-11-27 | End: 2019-11-27 | Stop reason: HOSPADM

## 2019-11-27 RX ORDER — SODIUM CHLORIDE, SODIUM LACTATE, POTASSIUM CHLORIDE, CALCIUM CHLORIDE 600; 310; 30; 20 MG/100ML; MG/100ML; MG/100ML; MG/100ML
INJECTION, SOLUTION INTRAVENOUS CONTINUOUS PRN
Status: DISCONTINUED | OUTPATIENT
Start: 2019-11-27 | End: 2019-11-27

## 2019-11-27 RX ORDER — ONDANSETRON 4 MG/1
4 TABLET, ORALLY DISINTEGRATING ORAL EVERY 30 MIN PRN
Status: DISCONTINUED | OUTPATIENT
Start: 2019-11-27 | End: 2019-11-27 | Stop reason: HOSPADM

## 2019-11-27 RX ORDER — ACETAMINOPHEN 325 MG/1
325-650 TABLET ORAL EVERY 6 HOURS PRN
Qty: 20 TABLET | Refills: 0 | Status: SHIPPED | OUTPATIENT
Start: 2019-11-27

## 2019-11-27 RX ORDER — NALOXONE HYDROCHLORIDE 0.4 MG/ML
.1-.4 INJECTION, SOLUTION INTRAMUSCULAR; INTRAVENOUS; SUBCUTANEOUS
Status: DISCONTINUED | OUTPATIENT
Start: 2019-11-27 | End: 2019-11-27 | Stop reason: HOSPADM

## 2019-11-27 RX ORDER — OXYCODONE HYDROCHLORIDE 5 MG/1
5 TABLET ORAL EVERY 4 HOURS PRN
Status: DISCONTINUED | OUTPATIENT
Start: 2019-11-27 | End: 2019-11-27 | Stop reason: HOSPADM

## 2019-11-27 RX ADMIN — PROPOFOL 75 MCG/KG/MIN: 10 INJECTION, EMULSION INTRAVENOUS at 11:03

## 2019-11-27 RX ADMIN — DEXAMETHASONE SODIUM PHOSPHATE 4 MG: 4 INJECTION, SOLUTION INTRAMUSCULAR; INTRAVENOUS at 11:18

## 2019-11-27 RX ADMIN — FENTANYL CITRATE 25 MCG: 50 INJECTION, SOLUTION INTRAMUSCULAR; INTRAVENOUS at 11:06

## 2019-11-27 RX ADMIN — PROPOFOL 20 MG: 10 INJECTION, EMULSION INTRAVENOUS at 11:22

## 2019-11-27 RX ADMIN — MIDAZOLAM 2 MG: 1 INJECTION INTRAMUSCULAR; INTRAVENOUS at 10:54

## 2019-11-27 RX ADMIN — PROPOFOL 50 MG: 10 INJECTION, EMULSION INTRAVENOUS at 11:03

## 2019-11-27 RX ADMIN — SODIUM CHLORIDE, SODIUM LACTATE, POTASSIUM CHLORIDE, CALCIUM CHLORIDE: 600; 310; 30; 20 INJECTION, SOLUTION INTRAVENOUS at 10:57

## 2019-11-27 RX ADMIN — DOXYCYCLINE 0.2 G: 100 INJECTION, POWDER, LYOPHILIZED, FOR SOLUTION INTRAVENOUS at 11:10

## 2019-11-27 RX ADMIN — ONDANSETRON 4 MG: 2 INJECTION INTRAMUSCULAR; INTRAVENOUS at 11:34

## 2019-11-27 RX ADMIN — FENTANYL CITRATE 25 MCG: 50 INJECTION, SOLUTION INTRAMUSCULAR; INTRAVENOUS at 11:15

## 2019-11-27 RX ADMIN — FENTANYL CITRATE 50 MCG: 50 INJECTION, SOLUTION INTRAMUSCULAR; INTRAVENOUS at 11:22

## 2019-11-27 RX ADMIN — LIDOCAINE HYDROCHLORIDE 50 MG: 10 INJECTION, SOLUTION INFILTRATION; PERINEURAL at 11:00

## 2019-11-27 ASSESSMENT — MIFFLIN-ST. JEOR: SCORE: 1316.13

## 2019-11-27 NOTE — TELEPHONE ENCOUNTER
----- Message from Lexus Bustamante sent at 11/26/2019  1:23 PM CST -----  Regarding: call back  Contact: [phone]  Pt stated she was supposed to receive a call, pt has twins and one of the babies was determined to have no heartbeat, she was here for an ultrasound and was supposed to receive a call pt is wondering what to do, please  call her asap thanks

## 2019-11-27 NOTE — ANESTHESIA CARE TRANSFER NOTE
Patient: Minh Altamirano    Procedure(s):  suction dilation and curetage,  pelvic exam under anesthesia    Diagnosis: Missed  [O02.1]  Diagnosis Additional Information: No value filed.    Anesthesia Type:   MAC     Note:  Airway :Room Air  Patient transferred to:Phase II  Handoff Report: Identifed the Patient, Identified the Reponsible Provider, Reviewed the pertinent medical history, Discussed the surgical course, Reviewed Intra-OP anesthesia mangement and issues during anesthesia, Set expectations for post-procedure period and Allowed opportunity for questions and acknowledgement of understanding      Vitals: (Last set prior to Anesthesia Care Transfer)    CRNA VITALS  2019 1106 - 2019 1149      2019             Resp Rate (observed):  (!) 1                Electronically Signed By: GEORGINA Mccray CRNA  2019  11:49 AM

## 2019-11-27 NOTE — DISCHARGE INSTRUCTIONS
Same-Day Surgery   Adult Discharge Orders & Instructions     For 24 hours after surgery:  1. Get plenty of rest.  A responsible adult must stay with you for at least 24 hours after you leave the hospital.   2. Pain medication can slow your reflexes. Do not drive or use heavy equipment.  If you have weakness or tingling, don't drive or use heavy equipment until this feeling goes away.  3. Mixing alcohol and pain medication can cause dizziness and slow your breathing. It can even be fatal. Do not drink alcohol while taking pain medication.  4. Avoid strenuous or risky activities.  Ask for help when climbing stairs.   5. You may feel lightheaded.  If so, sit for a few minutes before standing.  Have someone help you get up.   6. If you have nausea (feel sick to your stomach), drink only clear liquids such as apple juice, ginger ale, broth or 7-Up.  Rest may also help.  Be sure to drink enough fluids.  Move to a regular diet as you feel able. Take pain medications with a small amount of solid food, such as toast or crackers, to avoid nausea.   7. A slight fever is normal. Call the doctor if your fever is over 100 F (37.7 C) (taken under the tongue) or lasts longer than 24 hours.  8. You may have a dry mouth, muscle aches, trouble sleeping or a sore throat.  These symptoms should go away after 24 hours.  9. Do not make important or legal decisions.   Pain Management:      1. Take pain medication (if prescribed) for pain as directed by your physician.        2. WARNING: If the pain medication you have been prescribed contains Tylenol  (acetaminophen), DO NOT take additional doses of Tylenol (acetaminophen).     Call your doctor for any of the followin.  Signs of infection (fever, growing tenderness at the surgery site, severe pain, a large amount of drainage or bleeding, foul-smelling drainage, redness, swelling).    2.  It has been over 8 to 10 hours since surgery and you are still not able to urinate (pee).    3.   Headache for over 24 hours.    4.  Numbness, tingling or weakness the day after surgery (if you had spinal anesthesia).  To contact a doctor, call _____________________________________ or:      [phone] and ask for the Resident On Call for:          __________________________________________ (answered 24 hours a day)      Emergency Department:  [place] [facility]: [phone]  [place] [facility]: [phone]               Rev. 10/2014

## 2019-11-27 NOTE — OP NOTE
[facility] Operative Note    Patient: Minh Altamirano  : 1985  MRN: [id number]    Date of Service: 2019    Pre-operative diagnosis:  1.  at 12w4d by LMP  2. Missed , measuring 9w4d of mono-di twins  3. History of classical csection   4. History of multiple bladder procedures    Post-operative diagnosis:  1.  s-p procedure below  2. Same as above    Procedure:   1. Exam under anesthesia  2. Suction dilation and curettage    Surgeon: Radha Hickey MD  Assistants: Tiff Bishop MD, PGY4    Anesthesia: Local with MAC    EBL: 10 mL  Urine: not drained during the case    Specimens: products of conception    Complications: none apparent    Findings: EUA revealed stage III apical prolapse with elongated cervix. Approximately 11 week sized uterus. Ultrasound at the conclusion of the procedure revealed a thin endometrial stripe.    Indications: Minh Altamirano is a 34 year old female who was found to have a missed  of mono/di twins at 9w4d. Management options were discussed and dilation and curettage was recommended. Risks, benefits, and alternatives to the procedure were discussed. The patient's questions were answered, understanding confirmed, and the patient signed written informed consent.    Technique: The patient was taken to the operating room where she underwent monitored anesthesia care.  She was placed in the dorsal lithotomy position with Brandt stirrups.  An exam under anesthesia was performed with findings noted above. The patient was prepped and draped in the usual sterile fashion. A speculum was placed and the cervix visualized. 2 cc of 1% lidocaine was infiltrated into the anterior lip of the cervix and a single-toothed tenaculum was placed. A paracervical block was performed at 4- and 8-o'clock using a total of 20 cc 1% lidocaine. The cervix was carefully dilated to 31F with sequential Lehman dilators. A 10mm suction curette was placed into the uterine cavity.  Products of conception were visualized through suction tubing. A gritty texture was noted circumferentially at the conclusion of the procedure. Products of conception were sent to pathology for examination and genetic testing. The tenaculum was removed from the cervix and good hemostasis was noted. All instruments were removed from the vagina at the conclusion of the case.    Instrument counts were correct x2. Dr. Hickey was present and scrubbed for the entire procedure. The patient was awoken in the OR and transferred to the PACU in stable condition.    Tiff Bishop MD  Ob/Gyn, PGY4    Staff MD Note  I was present and scrubbed for the entire procedure noted above.  I agree with the description above and any necessary changes have been made by me.  Radha Hickey MD

## 2019-11-27 NOTE — ANESTHESIA POSTPROCEDURE EVALUATION
Anesthesia POST Procedure Evaluation    Patient: Minh Altamirano   MRN:     [id number] Gender:   female   Age:    34 year old :      1985        Preoperative Diagnosis: Missed  [O02.1]   Procedure(s):  suction dilation and curetage,  pelvic exam under anesthesia   Postop Comments: No value filed.       Anesthesia Type:  Not documented  MAC    Reportable Event: NO     PAIN: Uncomplicated   Sign Out status: Comfortable, Well controlled pain     PONV: No PONV   Sign Out status:  No Nausea or Vomiting     Neuro/Psych: Uneventful perioperative course   Sign Out Status: Preoperative baseline; Age appropriate mentation     Airway/Resp.: Uneventful perioperative course   Sign Out Status: Non labored breathing, age appropriate RR; Resp. Status within EXPECTED Parameters     CV: Uneventful perioperative course   Sign Out status: Appropriate BP and perfusion indices; Appropriate HR/Rhythm     Disposition:   Sign Out in:  PACU  Disposition:  Phase II; Home  Recovery Course: Uneventful  Follow-Up: Not required     Comments/Narrative:  - Uneventful, ready for discharge           Last Anesthesia Record Vitals:  CRNA VITALS  2019 1106 - 2019 1200      2019             NIBP:  128/81    Pulse:  75    NIBP Mean:  92    Temp:  36.4  C (97.6  F)    SpO2:  99 %    Resp Rate (observed):  14    EKG:  NSR          Last PACU Vitals:  Vitals Value Taken Time   /81 2019 11:40 AM   Temp 36.4  C (97.5  F) 2019 11:40 AM   Pulse 79 2019 11:40 AM   Resp 16 2019 11:40 AM   SpO2 100 % 2019 11:40 AM   Temp src     NIBP 128/81 2019 11:40 AM   Pulse 75 2019 11:40 AM   SpO2 99 % 2019 11:40 AM   Resp     Temp 36.4  C (97.6  F) 2019 11:40 AM   Ht Rate 90 2019 11:32 AM   Temp 2           Electronically Signed By: Kristofer Friedman MD, 2019, 12:00 PM

## 2019-11-27 NOTE — ANESTHESIA PREPROCEDURE EVALUATION
Anesthesia Pre-Procedure Evaluation    Patient: Minh Altamirano   MRN:     [id number] Gender:   female   Age:    34 year old :      1985        Preoperative Diagnosis: Missed  [O02.1]   Procedure(s):  suction dilation and curetage,  pelvic exam under anesthesia     Past Medical History:   Diagnosis Date     Anemia      Chronic infection     MRSA     Constipation, chronic      Incontinence of urine      Lipoma of spinal cord      Migraine      neurogenic bladder      Spinal dysraphism (H)       Past Surgical History:   Procedure Laterality Date     BLADDER AUGMENTATION  2012    Procedure:BLADDER AUGMENTATION; Surgeon:FADY COLLADO; [facility]      SECTION N/A 2018    Procedure:  SECTION;  Primary  Section  with classical incision;  Surgeon: Lily Villanueva MD;  [facility]     CYSTOSCOPY, INTRAVESICAL INJECTION N/A 2016    Procedure: CYSTOSCOPY, INTRAVESICAL INJECTION;  Surgeon: Fady Collado MD;  [facility]     LAMINECTOMY LUMBAR TWO LEVELS       LAPAROTOMY EXPLORATORY  2012    Procedure:LAPAROTOMY EXPLORATORY; Exploratory Laparotomy with Takedown of Mitrofanoff, Ventral Hernia Repair with Strattice Mesh implantation ; Surgeon:FADY COLLADO; [facility]     MITROFANOFF PROCEDURE (APPENDIX CONDUIT)  2012    Procedure:MITROFANOFF PROCEDURE (APPENDIX CONDUIT); Bladder Augmentation with Right Colon, Appendix Conduit- Mitrofanoff, Insertion of Pubovaginal Sling using Autologous Rectus Fascia; Surgeon:FADY COLLADO; Location[facility]     tumor resection and cord detethering            Anesthesia Evaluation     . Pt has had prior anesthetic. Type: General, MAC and Regional    No history of anesthetic complications          ROS/MED HX    ENT/Pulmonary:  - neg pulmonary ROS    (-) tobacco use and JELLY risk factors   Neurologic:     (+)other neuro Hx of sacral agenesis    Cardiovascular:  - neg cardiovascular ROS       (-) hypertension   METS/Exercise Tolerance:     Hematologic:     (+) Anemia (most recent 11.5), -      Musculoskeletal:  - neg musculoskeletal ROS       GI/Hepatic:     (+) GERD       Renal/Genitourinary:     (+) Other Renal/ Genitourinary, Hx of Mitrofanoff procedure      Endo:  - neg endo ROS       Psychiatric:  - neg psychiatric ROS       Infectious Disease:  - neg infectious disease ROS       Malignancy:      - no malignancy   Other:    (+) No chance of pregnancy C-spine cleared: N/A,                        PHYSICAL EXAM:   Mental Status/Neuro: A/A/O   Airway: Facies: Feasible  Mallampati: II  Mouth/Opening: Full  TM distance: > 6 cm  Neck ROM: Full   Respiratory: Auscultation: CTAB     Resp. Rate: Normal     Resp. Effort: Normal      CV: Rhythm: Regular  Rate: Age appropriate  Heart: Normal Sounds  Edema: None   Comments:      Dental: Normal Dentition                LABS:  CBC:   Lab Results   Component Value Date    WBC 11.9 (H) 09/22/2018    WBC 7.8 09/19/2018    HGB 9.9 (L) 09/22/2018    HGB 10.5 (L) 09/21/2018    HCT 29.5 (L) 09/22/2018    HCT 34.9 (L) 09/19/2018     09/22/2018     09/19/2018     BMP:   Lab Results   Component Value Date     06/18/2018     09/25/2017    POTASSIUM 3.7 06/18/2018    POTASSIUM 4.1 09/25/2017    CHLORIDE 107 06/18/2018    CHLORIDE 107 09/25/2017    CO2 20 06/18/2018    CO2 26 09/25/2017    BUN 6 (L) 06/18/2018    BUN 8 09/25/2017    CR 0.72 09/22/2018    CR 0.53 06/18/2018     (H) 06/18/2018    GLC 93 09/25/2017     COAGS:   Lab Results   Component Value Date    INR 1.23 (H) 01/16/2012     POC:   Lab Results   Component Value Date     (H) 09/21/2018    HCG Negative 05/22/2013     OTHER:   Lab Results   Component Value Date    A1C 5.7 09/25/2017    BASSAM 8.6 06/18/2018    PHOS 5.8 (H) 01/20/2012    MAG 2.1 01/20/2012    ALBUMIN 3.4 09/25/2017    PROTTOTAL 7.8 09/25/2017    ALT 15 09/22/2018    AST 15 09/22/2018    ALKPHOS 84 09/25/2017  "   BILITOTAL 0.3 09/25/2017    TSH 2.74 09/25/2017        Preop Vitals    BP Readings from Last 3 Encounters:   11/27/19 127/89   11/25/19 (!) 144/88   11/18/19 124/86    Pulse Readings from Last 3 Encounters:   11/27/19 58   11/25/19 89   11/18/19 85      Resp Readings from Last 3 Encounters:   11/27/19 18   09/23/18 16   08/19/16 16    SpO2 Readings from Last 3 Encounters:   11/27/19 98%   09/21/18 100%   07/09/18 96%      Temp Readings from Last 1 Encounters:   11/27/19 36.7  C (98.1  F) (Oral)    Ht Readings from Last 1 Encounters:   11/27/19 1.6 m (5' 3\")      Wt Readings from Last 1 Encounters:   11/27/19 63.7 kg (140 lb 6.9 oz)    Estimated body mass index is 24.88 kg/m  as calculated from the following:    Height as of this encounter: 1.6 m (5' 3\").    Weight as of this encounter: 63.7 kg (140 lb 6.9 oz).     LDA:  Urethral Catheter Non-latex;Straight-tip 12 fr (Active)   Number of days: 2876       Suprapubic Catheter Latex;Non-latex 20 fr (Active)   Number of days: 2883        Assessment:   ASA SCORE: 2    H&P: History and physical reviewed and following examination; no interval change.   Smoking Status:  Non-Smoker/Unknown   NPO Status: NPO Appropriate     Plan:   Anes. Type:  MAC   Pre-Medication: None   Induction:  IV (Standard)   Airway: Native Airway   Access/Monitoring: PIV   Maintenance: Propofol Sedation     Postop Plan:   Postop Pain: None  Postop Sedation/Airway: Not planned  Disposition: Outpatient     PONV Management:   Adult Risk Factors: Female, Non-Smoker   Prevention: Ondansetron, Propofol     CONSENT: Direct conversation; Via    Plan and risks discussed with: Patient   Blood Products: Consent Deferred (Minimal Blood Loss)       Comments for Plan/Consent:  Discussed common and potentially harmful risks for Native Airway, MAC (GA as backup).   These risks include, but were not limited to: Conversion to secured airway, Sore throat, Airway injury, Dental injury, Aspiration, " Respiratory issues (Bronchospasm, Laryngospasm, Desaturation), Hemodynamic issues (Arrhythmia, Hypotension, Ischemia), Potential long term consequences of respiratory and hemodynamic issues, PONV, Emergence delirium, Potential overnight admission  Risks of invasive procedures were not discussed: N/A    All questions were answered.                 Kristofer Friedman MD

## 2019-12-09 LAB — COPATH REPORT: NORMAL

## 2019-12-10 NOTE — PROGRESS NOTES
"Suction d&c  for missed ab of 9wk mono di twins  Chromosomes pending    [facility]  Gynecology Postoperative Visit    SUBJECTIVE    Minh Altamirano is a 34 year old  who is here for a postoperative visit. She had a D&C on 19 for a missed  of 9wk mono-di twins. This visit was facilitated by an in-person . Since surgery, she notes that vaginal bleeding has stopped. She has no abdominal pain. She denies bowel or bladder concerns. She wants to know if we found anything that could explain the miscarriage.    Her LMP is: Patient's last menstrual period was 2019.      REVIEW OF SYSTEMS  A 10 point review of systems including Constitutional, Eyes, Respiratory, Cardiovascular, Gastroenterology, Genitourinary, Integumentary, Musculoskeletal, and Psychiatric, were all negative, except for pertinent positives noted in the above HPI.    OBJECTIVE  /83 (BP Location: Right arm, Patient Position: Chair)   Pulse 88   Ht 1.6 m (5' 3\")   Wt 63.7 kg (140 lb 8 oz)   LMP 2019   Breastfeeding No   BMI 24.89 kg/m      General: Alert, without distress   Exam deferred today   Extremities: normal    SURGICAL PATHOLOGY 19:  SPECIMEN(S):   Products of conception     FINAL DIAGNOSIS:   PRODUCTS OF CONCEPTION:   - Decidua and chorionic villi, consistent with products of intrauterine   conception   - Fetal parts identified     ASSESSMENT  Minh Altamirano is a 34 year old  who is here for a postoperative visit. She is 2 weeks after a D&C for a missed .    PLAN  Minh and I discussed that she has healed well from her procedure. She and her partner would like to add to their family. I discussed waiting to have unprotected intercourse until after her next period, and to continue her prenatal vitamin. She repeated this information and expressed understanding.    I apologized that the chromosome results were not back yet, and that we would call her once " resulted. I apologized that the pathology did not give more answers as to why the miscarriage occurred.    RTC PRN.    Tova Vieira MD, MSCI    [facility]/OBGYN

## 2019-12-13 ENCOUNTER — OFFICE VISIT (OUTPATIENT)
Dept: OBGYN | Facility: CLINIC | Age: 34
End: 2019-12-13
Attending: OBSTETRICS & GYNECOLOGY
Payer: COMMERCIAL

## 2019-12-13 VITALS
SYSTOLIC BLOOD PRESSURE: 133 MMHG | WEIGHT: 140.5 LBS | HEIGHT: 63 IN | HEART RATE: 88 BPM | BODY MASS INDEX: 24.89 KG/M2 | DIASTOLIC BLOOD PRESSURE: 83 MMHG

## 2019-12-13 DIAGNOSIS — Z98.890 POSTOPERATIVE STATE: Primary | ICD-10-CM

## 2019-12-13 PROCEDURE — G0463 HOSPITAL OUTPT CLINIC VISIT: HCPCS | Mod: ZF

## 2019-12-13 RX ORDER — PRENATAL VIT/IRON FUM/FOLIC AC 27MG-0.8MG
1 TABLET ORAL DAILY
Qty: 90 TABLET | Refills: 3 | Status: SHIPPED | OUTPATIENT
Start: 2019-12-13

## 2019-12-13 ASSESSMENT — MIFFLIN-ST. JEOR: SCORE: 1316.43

## 2019-12-13 ASSESSMENT — PAIN SCALES - GENERAL: PAINLEVEL: NO PAIN (0)

## 2019-12-13 NOTE — LETTER
"2019       RE: Minh Altamirano  [address]  [facility] [address]     Dear Colleague,    Thank you for referring your patient, Minh Altamirano, to [facility] Please see a copy of my visit note below.    Suction d&c  for missed ab of 9wk mono di twins  Chromosomes pending    [facility]  Gynecology Postoperative Visit    SUBJECTIVE    Minh Altamirano is a 34 year old  who is here for a postoperative visit. She had a D&C on 19 for a missed  of 9wk mono-di twins. This visit was facilitated by an in-person . Since surgery, she notes that vaginal bleeding has stopped. She has no abdominal pain. She denies bowel or bladder concerns. She wants to know if we found anything that could explain the miscarriage.    Her LMP is: Patient's last menstrual period was 2019.      REVIEW OF SYSTEMS  A 10 point review of systems including Constitutional, Eyes, Respiratory, Cardiovascular, Gastroenterology, Genitourinary, Integumentary, Musculoskeletal, and Psychiatric, were all negative, except for pertinent positives noted in the above HPI.    OBJECTIVE  /83 (BP Location: Right arm, Patient Position: Chair)   Pulse 88   Ht 1.6 m (5' 3\")   Wt 63.7 kg (140 lb 8 oz)   LMP 2019   Breastfeeding No   BMI 24.89 kg/m       General: Alert, without distress   Exam deferred today   Extremities: normal    SURGICAL PATHOLOGY 19:  SPECIMEN(S):   Products of conception     FINAL DIAGNOSIS:   PRODUCTS OF CONCEPTION:   - Decidua and chorionic villi, consistent with products of intrauterine   conception   - Fetal parts identified     ASSESSMENT  Minh Altamirano is a 34 year old  who is here for a postoperative visit. She is 2 weeks after a D&C for a missed .    PLAN  Minh and I discussed that she has healed well from her procedure. She and her partner would like " to add to their family. I discussed waiting to have unprotected intercourse until after her next period, and to continue her prenatal vitamin. She repeated this information and expressed understanding.    I apologized that the chromosome results were not back yet, and that we would call her once resulted. I apologized that the pathology did not give more answers as to why the miscarriage occurred.    RTC PRN.    Tova Vieira MD, MSCI    [facility]/OBGYN

## 2019-12-13 NOTE — NURSING NOTE
Chief Complaint   Patient presents with     Post-op Visit     Post op D&C       See LORETO Boyd 12/13/2019

## 2019-12-31 LAB — COPATH REPORT: NORMAL

## 2020-03-01 ENCOUNTER — HEALTH MAINTENANCE LETTER (OUTPATIENT)
Age: 35
End: 2020-03-01

## 2020-10-13 ENCOUNTER — PRE VISIT (OUTPATIENT)
Dept: UROLOGY | Facility: CLINIC | Age: 35
End: 2020-10-13

## 2020-10-13 DIAGNOSIS — N31.9 NEUROGENIC BLADDER: Primary | ICD-10-CM

## 2020-10-13 NOTE — TELEPHONE ENCOUNTER
Reason for visit: Neurogenic bladder follow up     Relevant information: history of Nas, CIC, Mitrofanoff    Records/imaging/labs/orders: orders placed for renal US    Pt called: message sent to scheduling team    At Rooming: standard

## 2020-10-19 ENCOUNTER — OFFICE VISIT (OUTPATIENT)
Dept: UROLOGY | Facility: CLINIC | Age: 35
End: 2020-10-19
Attending: UROLOGY
Payer: COMMERCIAL

## 2020-10-19 ENCOUNTER — ANCILLARY PROCEDURE (OUTPATIENT)
Dept: ULTRASOUND IMAGING | Facility: CLINIC | Age: 35
End: 2020-10-19
Attending: UROLOGY
Payer: COMMERCIAL

## 2020-10-19 VITALS
DIASTOLIC BLOOD PRESSURE: 81 MMHG | WEIGHT: 144 LBS | BODY MASS INDEX: 25.52 KG/M2 | HEIGHT: 63 IN | HEART RATE: 102 BPM | SYSTOLIC BLOOD PRESSURE: 120 MMHG

## 2020-10-19 DIAGNOSIS — N31.9 NEUROGENIC BLADDER: Primary | ICD-10-CM

## 2020-10-19 DIAGNOSIS — N31.9 NEUROGENIC BLADDER: ICD-10-CM

## 2020-10-19 PROCEDURE — 76770 US EXAM ABDO BACK WALL COMP: CPT | Mod: GC | Performed by: STUDENT IN AN ORGANIZED HEALTH CARE EDUCATION/TRAINING PROGRAM

## 2020-10-19 PROCEDURE — 99214 OFFICE O/P EST MOD 30 MIN: CPT | Performed by: UROLOGY

## 2020-10-19 ASSESSMENT — MIFFLIN-ST. JEOR: SCORE: 1327.31

## 2020-10-19 ASSESSMENT — PAIN SCALES - GENERAL: PAINLEVEL: MODERATE PAIN (5)

## 2020-10-19 NOTE — LETTER
10/19/2020       RE: Minh Altamirano  [address]  [facility] [address]     Dear Colleague,    Thank you for referring your patient, Minh Altamirano, to the [facility] [place] at [facility]. Please see a copy of my visit note below.    Follow-up Visit for Neurogenic Bladder    Name: Minh Altamirano    MRN: [id number]   YOB: 1985  Accompanied at today's visit by:self                 Chief Complaint:   Neurogenic Bladder          History of Present Illness:   HISTORY: Minh Altamirano is a 35 year old female with a history of neurogenic bladder secondary to Sacral agenesis.. Patient is not wheelchair bound. Last visit with us was 2018. Her main concern today is abdominal pain which has been present since 2019 when she had an . Pain is across lower abdomen and radiates around to bilateral back. She says it feels like uterine contraction like she is pregnant. She is getting normal menstrual periods. Pain does not change with empty vs full bladder. Pain does get better after having a bowel.     Previous Bladder Surgeries:  Previous Bladder Augmentation: right colon in . Revisions include: takedown of Mitrofanoff and repair of fascial dehiscence on POD #7.    Catheterizable stoma:none  Anti-incontinence procedures: none  Botox injections: None    Pertinent Medications:  Current Anticholinergics: None  Current Prophylactic antibiotics: None  Intravesical gentamycin:  None  Intravesical oxybutinin: None    Catheterization History:  The patient catheterizes per native urethra into a augmented bladder with a 12F straight catheter q4 hours. Catheterization is performed by  self. The patient uses a clean catheter each time. She does not irrigate the bladder.     Incontinence History:  She does leak between voids/caths. She does not experience urgency of urination. She does not experience stress urinary  incontinence with the following activities. She leaks a small amount but more than just a few drops. This happens when the bladder is full.     Urinary Tract Infection History:  No    Bowel Movement History:  The patient has a bowel movement q7 days. Bowel regimen includes prune juice. No fecal incontinence.          Past Medical History:     Past Medical History:   Diagnosis Date     Anemia      Chronic infection     MRSA     Constipation, chronic      Incontinence of urine      Lipoma of spinal cord      Migraine      neurogenic bladder      Spinal dysraphism (H)             Past Surgical History:     Past Surgical History:   Procedure Laterality Date     BLADDER AUGMENTATION  2012    Procedure:BLADDER AUGMENTATION; Surgeon:TRINA MOORE; [facility]      SECTION N/A 2018    Procedure:  SECTION;  Primary  Section  with classical incision;  Surgeon: Lily Villanueva MD;  [facility]     CYSTOSCOPY, INTRAVESICAL INJECTION N/A 2016    Procedure: CYSTOSCOPY, INTRAVESICAL INJECTION;  Surgeon: Trina Moore MD;  [facility]     DILATION AND CURETTAGE SUCTION N/A 2019    Procedure: suction dilation and curetage,;  Surgeon: Radha Hickey MD;  [facility]     EXAM UNDER ANESTHESIA PELVIC N/A 2019    Procedure: pelvic exam under anesthesia;  Surgeon: Radha Hickey MD;  [facility]     LAMINECTOMY LUMBAR TWO LEVELS       LAPAROTOMY EXPLORATORY  2012    Procedure:LAPAROTOMY EXPLORATORY; Exploratory Laparotomy with Takedown of Mitrofanoff, Ventral Hernia Repair with Strattice Mesh implantation ; Surgeon:TRINA MOORE; [facility]     MITROFANOFF PROCEDURE (APPENDIX CONDUIT)  2012    Procedure:MITROFANOFF PROCEDURE (APPENDIX CONDUIT); Bladder Augmentation with Right Colon, Appendix Conduit- Mitrofanoff, Insertion of Pubovaginal Sling using Autologous Rectus Fascia; Surgeon:TRINA MOORE; Location[facility]  "OR     tumor resection and cord detethering              Allergies:     Allergies   Allergen Reactions     Naproxen Hives and Nausea and Vomiting     Bactrim [Sulfamethoxazole W/Trimethoprim] Itching     Gabapentin Itching and Nausea     Vicodin [Hydrocodone-Acetaminophen] Itching            Medications:     Current Outpatient Medications   Medication Sig     acetaminophen (TYLENOL) 325 MG tablet Take 1-2 tablets (325-650 mg) by mouth every 6 hours as needed for mild pain     norgestimate-ethinyl estradiol (ORTHO-CYCLEN/SPRINTEC) 0.25-35 MG-MCG tablet Take 1 tablet by mouth daily     Prenatal Vit-Fe Fumarate-FA (PRENATAL MULTIVITAMIN W/IRON) 27-0.8 MG tablet Take 1 tablet by mouth daily     No current facility-administered medications for this visit.              Review of Systems:    ROS: 10 point ROS neg other than the symptoms noted above in the HPI and PMH.          Physical Exam:   B/P: 120/81, T: Data Unavailable, P: 102, R: Data Unavailable  Estimated body mass index is 25.51 kg/m  as calculated from the following:    Height as of this encounter: 1.6 m (5' 3\").    Weight as of this encounter: 65.3 kg (144 lb).  General: age-appropriate appearing female in NAD sitting in an exam chair.    Back: bony spine is non-tender, flanks are non-tender. Surgical scars include none.  Abdomen: Degree of obesity is moderate. Abdomen is soft and nontender. No organomegaly. Surgical scars include midline.  Motor: Atrophy and weakness in lower limbs.        Data:    Imaging:  Renal/Bladder Ultrasound: no hydro or stones on left. Mild to no hydro on left.     Labs:  Creatinine : none recent       Assessment and Plan:   35 year old female with neurogenic bladder secondary to sacral surgery who has abdominal pain not associated with bladder but likely either due to constipation or D+C. Will have her increase her prune juice to every other day with goal of BM every other day because she says her pain gets better with BMs. Have her " f/u with Gyn to r/o other causes. RTC by video visit in 1-2 months with KAYLA Trejo in our clinic to see if further management of constipation is needed.           Fady Moore MD  October 19, 2020

## 2020-10-19 NOTE — PROGRESS NOTES
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 Patient is not wheelchair bound. Last visit with us was 2018. Her main concern today is abdominal pain which has been present since 2019 when she had an . Pain is across lower abdomen and radiates around to bilateral back. She says it feels like uterine contraction like she is pregnant. She is getting normal menstrual periods. Pain does not change with empty vs full bladder. Pain does get better after having a bowel.     Previous Bladder Surgeries:  Previous Bladder Augmentation: right colon in . Revisions include: takedown of Mitrofanoff and repair of fascial dehiscence on POD #7.    Catheterizable stoma:none  Anti-incontinence procedures: none  Botox injections: None    Pertinent Medications:  Current Anticholinergics: None  Current Prophylactic antibiotics: None  Intravesical gentamycin:  None  Intravesical oxybutinin: None    Catheterization History:  The patient catheterizes per native urethra into a augmented bladder with a 12F straight catheter q4 hours. Catheterization is performed by  self. The patient uses a clean catheter each time. She does not irrigate the bladder.     Incontinence History:  She does leak between voids/caths. She does not experience urgency of urination. She does not experience stress urinary incontinence with the following activities. She leaks a small amount but more than just a few drops. This happens when the bladder is full.     Urinary Tract Infection History:  No    Bowel Movement History:  The patient has a bowel movement q7 days. Bowel regimen includes prune juice. No fecal incontinence.          Past  Medical History:     Past Medical History:   Diagnosis Date     Anemia      Chronic infection     MRSA     Constipation, chronic      Incontinence of urine      Lipoma of spinal cord      Migraine      neurogenic bladder      Spinal dysraphism (H)             Past Surgical History:     Past Surgical History:   Procedure Laterality Date     BLADDER AUGMENTATION  2012    Procedure:BLADDER AUGMENTATION; Surgeon:TRINA MOORE; [facility]      SECTION N/A 2018    Procedure:  SECTION;  Primary  Section  with classical incision;  Surgeon: Lily Villanueva MD;  [facility]     CYSTOSCOPY, INTRAVESICAL INJECTION N/A 2016    Procedure: CYSTOSCOPY, INTRAVESICAL INJECTION;  Surgeon: Trina Moore MD;  [facility]     DILATION AND CURETTAGE SUCTION N/A 2019    Procedure: suction dilation and curetage,;  Surgeon: Radha Hickey MD;  [facility]     EXAM UNDER ANESTHESIA PELVIC N/A 2019    Procedure: pelvic exam under anesthesia;  Surgeon: Radha Hickey MD;  [facility]     LAMINECTOMY LUMBAR TWO LEVELS       LAPAROTOMY EXPLORATORY  2012    Procedure:LAPAROTOMY EXPLORATORY; Exploratory Laparotomy with Takedown of Mitrofanoff, Ventral Hernia Repair with Strattice Mesh implantation ; Surgeon:TRINA MOORE; [facility]     MITROFANOFF PROCEDURE (APPENDIX CONDUIT)  2012    Procedure:MITROFANOFF PROCEDURE (APPENDIX CONDUIT); Bladder Augmentation with Right Colon, Appendix Conduit- Mitrofanoff, Insertion of Pubovaginal Sling using Autologous Rectus Fascia; Surgeon:TRINA MOORE; [facility]     tumor resection and cord detethering              Allergies:     Allergies   Allergen Reactions     Naproxen Hives and Nausea and Vomiting     Bactrim [Sulfamethoxazole W/Trimethoprim] Itching     Gabapentin Itching and Nausea     Vicodin [Hydrocodone-Acetaminophen] Itching            Medications:     Current  "Outpatient Medications   Medication Sig     acetaminophen (TYLENOL) 325 MG tablet Take 1-2 tablets (325-650 mg) by mouth every 6 hours as needed for mild pain     norgestimate-ethinyl estradiol (ORTHO-CYCLEN/SPRINTEC) 0.25-35 MG-MCG tablet Take 1 tablet by mouth daily     Prenatal Vit-Fe Fumarate-FA (PRENATAL MULTIVITAMIN W/IRON) 27-0.8 MG tablet Take 1 tablet by mouth daily     No current facility-administered medications for this visit.              Review of Systems:    ROS: 10 point ROS neg other than the symptoms noted above in the HPI and PMH.          Physical Exam:   B/P: 120/81, T: Data Unavailable, P: 102, R: Data Unavailable  Estimated body mass index is 25.51 kg/m  as calculated from the following:    Height as of this encounter: 1.6 m (5' 3\").    Weight as of this encounter: 65.3 kg (144 lb).  General: age-appropriate appearing female in NAD sitting in an exam chair.    Back: bony spine is non-tender, flanks are non-tender. Surgical scars include none.  Abdomen: Degree of obesity is moderate. Abdomen is soft and nontender. No organomegaly. Surgical scars include midline.  Motor: Atrophy and weakness in lower limbs.        Data:    Imaging:  Renal/Bladder Ultrasound: no hydro or stones on left. Mild to no hydro on left.     Labs:  Creatinine : none recent       Assessment and Plan:   35 year old female with neurogenic bladder secondary to sacral surgery who has abdominal pain not associated with bladder but likely either due to constipation or D+C. Will have her increase her prune juice to every other day with goal of BM every other day because she says her pain gets better with BMs. Have her f/u with Gyn to r/o other causes. RTC by video visit in 1-2 months with KAYLA Trejo in our clinic to see if further management of constipation is needed.           Fady Moore MD  October 19, 2020           "

## 2020-10-19 NOTE — PATIENT INSTRUCTIONS
Schedule an appointment with Women's health clinic to discuss cramps.    Schedule an appointment with Jerilyn Greene PA-C next available you have been working on the constipation. (No sooner than 4 weeks)    It was a pleasure meeting with you today.  Thank you for allowing me and my team the privilege of caring for you today.  YOU are the reason we are here, and I truly hope we provided you with the excellent service you deserve.  Please let us know if there is anything else we can do for you so that we can be sure you are leaving completely satisfied with your care experience.        Patricia Giraldo, [facility]

## 2020-10-19 NOTE — NURSING NOTE
Chief Complaint   Patient presents with     RECHECK     Neurogenic bladder follow up          Kiana Farah MA

## 2020-10-19 NOTE — PROGRESS NOTES
[employer]    UROLOGICAL ORDER FORM      Patient Information:    Customer's Name: Minh Altamirano  YOB: 1985  Address: [address] [place] [address] [address]  [facility] [address]  Phone #: [phone]  Diagnosis: Neurogenic bladder    Product Needed:     12 Fr Straight Tipped Female Cath Self-Cath  Instruction: Catheterize self 6 times daily  QTY: 180 per 30 days  Length of need: 99 months     Product Needed:     Insertion supplies (lubrication)  Instruction: Use daily with catheterizations  QTY: 2 tubes per months  Length of need: 99 months    ORDERING Physician/PA/NP      Dr. Fady Moore  DATE: 10/19/20    [facility]  [address] [facility] [address]  [place], [place], [address]      FAX to [facility]  [address] [place] [address], [address]  Phone: [phone]  Fax: [phone]    Email: [email or web address]

## 2020-10-23 ENCOUNTER — OFFICE VISIT (OUTPATIENT)
Dept: OBGYN | Facility: CLINIC | Age: 35
End: 2020-10-23
Payer: COMMERCIAL

## 2020-10-23 VITALS
WEIGHT: 145 LBS | BODY MASS INDEX: 25.69 KG/M2 | SYSTOLIC BLOOD PRESSURE: 112 MMHG | HEART RATE: 99 BPM | DIASTOLIC BLOOD PRESSURE: 77 MMHG

## 2020-10-23 DIAGNOSIS — Z12.4 CERVICAL CANCER SCREENING: ICD-10-CM

## 2020-10-23 DIAGNOSIS — K59.01 SLOW TRANSIT CONSTIPATION: ICD-10-CM

## 2020-10-23 DIAGNOSIS — R10.2 PELVIC PAIN IN FEMALE: Primary | ICD-10-CM

## 2020-10-23 PROCEDURE — G0145 SCR C/V CYTO,THINLAYER,RESCR: HCPCS | Performed by: OBSTETRICS & GYNECOLOGY

## 2020-10-23 PROCEDURE — G0463 HOSPITAL OUTPT CLINIC VISIT: HCPCS

## 2020-10-23 PROCEDURE — 87491 CHLMYD TRACH DNA AMP PROBE: CPT | Performed by: OBSTETRICS & GYNECOLOGY

## 2020-10-23 PROCEDURE — 99213 OFFICE O/P EST LOW 20 MIN: CPT | Mod: GE | Performed by: OBSTETRICS & GYNECOLOGY

## 2020-10-23 PROCEDURE — 87591 N.GONORRHOEAE DNA AMP PROB: CPT | Performed by: OBSTETRICS & GYNECOLOGY

## 2020-10-23 PROCEDURE — 87624 HPV HI-RISK TYP POOLED RSLT: CPT | Performed by: OBSTETRICS & GYNECOLOGY

## 2020-10-23 RX ORDER — SENNA AND DOCUSATE SODIUM 50; 8.6 MG/1; MG/1
1-2 TABLET, FILM COATED ORAL 2 TIMES DAILY PRN
Qty: 60 TABLET | Refills: 0 | Status: SHIPPED | OUTPATIENT
Start: 2020-10-23

## 2020-10-23 RX ORDER — POLYETHYLENE GLYCOL 3350 17 G/17G
1 POWDER, FOR SOLUTION ORAL DAILY
Qty: 72 PACKET | Refills: 0 | Status: SHIPPED | OUTPATIENT
Start: 2020-10-23

## 2020-10-23 ASSESSMENT — PAIN SCALES - GENERAL: PAINLEVEL: MODERATE PAIN (4)

## 2020-10-23 NOTE — PROGRESS NOTES
[facility]   Progress Note    10/23/2020    CC: cramping    Telephone Sudanese Interpretor was utilized for this entire encounter.    HPI:  Minh Altamirano is a 35 year old  with pmh neurogenic bladder 2/2 sacral surgery who presents for cramping abdominal pain.    Patient presents today to discuss lower abdominal pain and cramping.  She reports that since her D&C last year she has had intermittent lower abdominal pain.  She describes it as constant.  Reports that it actually resolves during her periods.  Cycles are regular, every 28 days.  Menses last 5 days.  Not heavy.  Other associated symptoms include back pain.  She notes that she is constipated.  Has a bowel movement about 1 in 7 days has been taking prune juice but no laxatives. No nausea, vomiting, fevers, chills. No abnormal discharge. Sexually active with 1 male partner. Denies concern for STI. No dyspareunia. Not using anything for contraception - has been trying to conceive since D&C last November. Has taken multiple pregnancy tests at home which have all been negative.    With seen by Urology on  who felt pain not related to bladder.  Patient denies any bladder symptoms today.  She self catheterizes every 4 hours.    Obstetric History:  Missed AB of mono-di twins at 9 weeks s/p D&C on 2019. History of classical CS with first pregnancy in 2018 due to history of sacral agenesis and Mitrofanoff procedure (s/p takedown).    ROS otherwise negative as per HPI    Exam:  /77   Pulse 99   Wt 65.8 kg (145 lb)   LMP 10/15/2020   Breastfeeding No   BMI 25.69 kg/m    Gen: NAD, A&Ox3  Pulm: breathing comfortably on room air  Abd: Soft, nontender, nondistended  MSK: moving all extremities equally  Psych/Neuro: affect appropriate  Pelvic Exam:  Vulva: No external lesions, normal hair distribution, normal architecture  Vagina: Moist, pink, no abnormal discharge, well rugated, no lesions  Cervix: smooth, pink, no  visible lesions  On bimanual exam, Apical prolapse with elongated cervix. Limited mobility of uterus. No CMT. No adnexal masses appreciated.     Imaging:   Renal US 10/2020  IMPRESSION:  1.  Stable asymmetric atrophy of the right kidney, with focal upper  pole cortical thinning. No hydronephrosis.  2.  Normal ultrasound appearance of the left kidney. No  hydronephrosis.  3.  Partially distended urinary bladder, otherwise unremarkable.    A/P:  Minh Altamirano is a 35 year old  with pmh neurogenic bladder 2/2 sacral agenesis presenting with pelvic pain.    #Pelvic pain: Unclear etiology. Has been seen by Urology, not concerned for bladder etiology. Exam overall unremarkable. No evidence of PID. GC/CT collected. Not pregnant - regular menses and has taken multiple UPTs at home. Will obtain pelvic US as pain started following D&C. Difficult exam due to patient discomfort however limited mobility on bimanual exam, likley related to adhesions. Reviewed significant constipation that is likely contributing, discussed bowel regimen in detail. Prescriptions sent for senna-docusate 2 tabs BID, miralax daily.    #Healthcare maintenance  -Pap smear collected today. Please call patient w/ results    #Secondary infertility  -Did not have time to evaluate today  -Return to clinic to discuss    RTC after pelvic ultrasound to f/u pelvic pain, discuss infertility    Staffed with Dr. Scar Miranda MD  Ob/Gyn PGY-3  2020 7:18 PM      The Patient was seen in [facility] by ANDRE MIRANDA.  OSCAR reviewed the history & exam. Assessment and plan were jointly made.    Arianna Abbott MD

## 2020-10-25 LAB
C TRACH DNA SPEC QL NAA+PROBE: NEGATIVE
N GONORRHOEA DNA SPEC QL NAA+PROBE: NEGATIVE
SPECIMEN SOURCE: NORMAL
SPECIMEN SOURCE: NORMAL

## 2020-10-27 ENCOUNTER — ANCILLARY PROCEDURE (OUTPATIENT)
Dept: ULTRASOUND IMAGING | Facility: CLINIC | Age: 35
End: 2020-10-27
Attending: OBSTETRICS & GYNECOLOGY
Payer: COMMERCIAL

## 2020-10-27 DIAGNOSIS — R10.2 PELVIC PAIN IN FEMALE: ICD-10-CM

## 2020-10-27 PROCEDURE — 76830 TRANSVAGINAL US NON-OB: CPT

## 2020-10-27 PROCEDURE — 76830 TRANSVAGINAL US NON-OB: CPT | Performed by: OBSTETRICS & GYNECOLOGY

## 2020-10-28 LAB
COPATH REPORT: NORMAL
PAP: NORMAL

## 2020-10-30 ENCOUNTER — OFFICE VISIT (OUTPATIENT)
Dept: OBGYN | Facility: CLINIC | Age: 35
End: 2020-10-30
Attending: ADVANCED PRACTICE MIDWIFE
Payer: COMMERCIAL

## 2020-10-30 VITALS
HEART RATE: 80 BPM | WEIGHT: 145.9 LBS | SYSTOLIC BLOOD PRESSURE: 104 MMHG | HEIGHT: 63 IN | BODY MASS INDEX: 25.85 KG/M2 | DIASTOLIC BLOOD PRESSURE: 72 MMHG

## 2020-10-30 DIAGNOSIS — R10.2 PELVIC PAIN IN FEMALE: Primary | ICD-10-CM

## 2020-10-30 PROBLEM — K02.9 CARIES: Status: ACTIVE | Noted: 2018-08-22

## 2020-10-30 PROBLEM — N97.9 FEMALE INFERTILITY: Status: ACTIVE | Noted: 2017-01-11

## 2020-10-30 PROBLEM — Z98.890 HISTORY OF LUMBAR LAMINECTOMY: Status: ACTIVE | Noted: 2018-05-21

## 2020-10-30 LAB
FINAL DIAGNOSIS: NORMAL
HPV HR 12 DNA CVX QL NAA+PROBE: NEGATIVE
HPV16 DNA SPEC QL NAA+PROBE: NEGATIVE
HPV18 DNA SPEC QL NAA+PROBE: NEGATIVE
SPECIMEN DESCRIPTION: NORMAL
SPECIMEN SOURCE CVX/VAG CYTO: NORMAL

## 2020-10-30 PROCEDURE — G0463 HOSPITAL OUTPT CLINIC VISIT: HCPCS

## 2020-10-30 PROCEDURE — 99213 OFFICE O/P EST LOW 20 MIN: CPT | Performed by: ADVANCED PRACTICE MIDWIFE

## 2020-10-30 ASSESSMENT — MIFFLIN-ST. JEOR: SCORE: 1335.93

## 2020-10-30 NOTE — LETTER
10/30/2020       RE: Minh Altamirano  [address]  [facility] [address]     Dear Colleague,    Thank you for referring your patient, Minh Altamirano, to the [facility] [place] at [facility]. Please see a copy of my visit note below.    CC: Patient would like to review test results from last visit.    Subjective:  Patient is a  with history of pelvic pain and cramping.  Patient was seen in clinic on 10/23/2020 and pelvic ultrasound was ordered.  The patient is anxious to discuss these results and all the tests that were performed at the clinic that day.      She is also concerned about infertility, states that next month it will be one full year since last pregnancy following D&C, wonders why she has not conceived.  Asking about how to time intercourse to achieve pregnancy.      Objective:  Physical exam not indicated today.    Normal pelvic ultrasound findings reviewed with patient.  Discussed negative GC/CT results.  Reviewed Pap smear is normal, but HPV is still in process.      Assessment:  Normal pelvic ultrasound    Plan:  1.  Discussed timing intercourse mid cycle every other day.  2.  RTC as needed   GEORGINA Mi CNM  TT 15 minutes over 50% in counseling.

## 2020-10-30 NOTE — NURSING NOTE
Chief Complaint   Patient presents with     Follow Up     Follow up uls for pelvic pain       See LORETO Boyd 10/30/2020

## 2020-10-30 NOTE — PROGRESS NOTES
CC: Patient would like to review test results from last visit.    Subjective:  Patient is a  with history of pelvic pain and cramping.  Patient was seen in clinic on 10/23/2020 and pelvic ultrasound was ordered.  The patient is anxious to discuss these results and all the tests that were performed at the clinic that day.      She is also concerned about infertility, states that next month it will be one full year since last pregnancy following D&C, wonders why she has not conceived.  Asking about how to time intercourse to achieve pregnancy.      Objective:  Physical exam not indicated today.    Normal pelvic ultrasound findings reviewed with patient.  Discussed negative GC/CT results.  Reviewed Pap smear is normal, but HPV is still in process.      Assessment:  Normal pelvic ultrasound    Plan:  1.  Discussed timing intercourse mid cycle every other day.  2.  RTC as needed   GEORGINA Mi CNM  TT 15 minutes over 50% in counseling.

## 2020-11-03 ENCOUNTER — PRE VISIT (OUTPATIENT)
Dept: UROLOGY | Facility: CLINIC | Age: 35
End: 2020-11-03

## 2020-11-03 NOTE — TELEPHONE ENCOUNTER
Visit Type : 4 week follow up     Records/Orders: in epic     Pt Contacted: no    At Rooming: phone

## 2020-11-17 ENCOUNTER — VIRTUAL VISIT (OUTPATIENT)
Dept: UROLOGY | Facility: CLINIC | Age: 35
End: 2020-11-17
Payer: COMMERCIAL

## 2020-11-17 DIAGNOSIS — N31.9 NEUROGENIC BLADDER: Primary | ICD-10-CM

## 2020-11-17 PROCEDURE — 99442 PR PHYSICIAN TELEPHONE EVALUATION 11-20 MIN: CPT | Mod: 95 | Performed by: PHYSICIAN ASSISTANT

## 2020-11-17 NOTE — PROGRESS NOTES
"Minh Altamirano is a 35 year old female who is being evaluated via a billable telephone visit.      The patient has been notified of following:     \"This telephone visit will be conducted via a call between you and your physician/provider. We have found that certain health care needs can be provided without the need for a physical exam.  This service lets us provide the care you need with a short phone conversation.  If a prescription is necessary we can send it directly to your pharmacy.  If lab work is needed we can place an order for that and you can then stop by our lab to have the test done at a later time.    Telephone visits are billed at different rates depending on your insurance coverage. During this emergency period, for some insurers they may be billed the same as an in-person visit.  Please reach out to your insurance provider with any questions.    If during the course of the call the physician/provider feels a telephone visit is not appropriate, you will not be charged for this service.\"    Patient has given verbal consent for Telephone visit?  Yes    What phone number would you like to be contacted at? [phone]. Please use [employer] language for visit     How would you like to obtain your AVS? Mail a copy    Follow-up Telephone Visit for Neurogenic Bladder    Name: Minh Altamirano    MRN: [id number]   YOB: 1985  Accompanied at today's visit by: Rick  via phone                 Chief Complaint:   1 month follow up          History of Present Illness:   HISTORY: Minh Altamirano is a 35 year old female with a history of neurogenic bladder secondary to sacral agenesis. Patient is not wheelchair bound. Last visit with Dr. Moore in 2020 at which time her main concern was abdominal pain which has been present since 2019 when she had an . Pain is across lower abdomen and radiates around to bilateral back. She says it feels like uterine " contractions like she is pregnant. She is getting normal menstrual periods. Pain does not change with empty vs full bladder. Pain does get better after having a bowel movement. She was recommended to increase prune juice to daily and follow up with OBGYN where she underwent normal pelvic ultrasound and normal PAP and labs. They recommended she add Miralax and Senna to her bowel regimen. Today, she reports that this combination has been helpful. She is doing well with no further concerns today. She does ask about scheduling an appointment with Dr. Garcia who performed spine surgery on her in 2011. She has been having back pain.      The following subcategories were reviewed with the patient and updated accordingly. If no updates, this indicates no change since last visit:    Previous Bladder Surgeries:  Previous Bladder Augmentation: right colon in 2012. Revisions include: takedown of Mitrofanoff and repair of fascial dehiscence on POD #7.    Catheterizable stoma:none  Anti-incontinence procedures: none  Botox injections: None    Pertinent Medications:  Current Anticholinergics: None  Current Prophylactic antibiotics: None  Intravesical gentamycin:  None  Intravesical oxybutinin: None    Catheterization History:  The patient catheterizes per native urethra into a augmented bladder with a 12F straight catheter q4 hours. Catheterization is performed by  self. The patient uses a clean catheter each time. She does not irrigate the bladder.     Incontinence History:  She does leak between voids/caths. She does not experience urgency of urination. She does not experience stress urinary incontinence. She leaks a small amount but more than just a few drops. This happens when the bladder is full.     Urinary Tract Infection History:  None in recent history.    Bowel Movement History:  The patient has a bowel movement q2 days. Bowel regimen includes prune juice, Miralax, Senna. No fecal incontinence.          Past Medical  History:     Past Medical History:   Diagnosis Date     Anemia      Chronic infection     MRSA     Constipation, chronic      Incontinence of urine      Lipoma of spinal cord      Migraine      neurogenic bladder      Spinal dysraphism (H)             Past Surgical History:     Past Surgical History:   Procedure Laterality Date     BLADDER AUGMENTATION  2012    Procedure:BLADDER AUGMENTATION; Surgeon:TRINA MOORE; [facility]      SECTION N/A 2018    Procedure:  SECTION;  Primary  Section  with classical incision;  Surgeon: Lily Villanueva MD;  [facility]     CYSTOSCOPY, INTRAVESICAL INJECTION N/A 2016    Procedure: CYSTOSCOPY, INTRAVESICAL INJECTION;  Surgeon: Trina Moore MD;  [facility]     DILATION AND CURETTAGE SUCTION N/A 2019    Procedure: suction dilation and curetage,;  Surgeon: Radha Hickey MD;  [facility]     EXAM UNDER ANESTHESIA PELVIC N/A 2019    Procedure: pelvic exam under anesthesia;  Surgeon: Radha Hickey MD;  [facility]     LAMINECTOMY LUMBAR TWO LEVELS       LAPAROTOMY EXPLORATORY  2012    Procedure:LAPAROTOMY EXPLORATORY; Exploratory Laparotomy with Takedown of Mitrofanoff, Ventral Hernia Repair with Strattice Mesh implantation ; Surgeon:TRINA MOORE; [facility]     MITROFANOFF PROCEDURE (APPENDIX CONDUIT)  2012    Procedure:MITROFANOFF PROCEDURE (APPENDIX CONDUIT); Bladder Augmentation with Right Colon, Appendix Conduit- Mitrofanoff, Insertion of Pubovaginal Sling using Autologous Rectus Fascia; Surgeon:TRINA MOORE; [facility]     tumor resection and cord detethering              Allergies:     Allergies   Allergen Reactions     Naproxen Hives and Nausea and Vomiting     Bactrim [Sulfamethoxazole W/Trimethoprim] Itching     Gabapentin Itching and Nausea     Vicodin [Hydrocodone-Acetaminophen] Itching            Medications:     Current Outpatient  Medications   Medication Sig     acetaminophen (TYLENOL) 325 MG tablet Take 1-2 tablets (325-650 mg) by mouth every 6 hours as needed for mild pain     polyethylene glycol (MIRALAX) 17 g packet Take 17 g by mouth daily     Prenatal Vit-Fe Fumarate-FA (PRENATAL MULTIVITAMIN W/IRON) 27-0.8 MG tablet Take 1 tablet by mouth daily     SENNA-docusate sodium (SENNA S) 8.6-50 MG tablet Take 1-2 tablets by mouth 2 times daily as needed (constipation)     No current facility-administered medications for this visit.              Review of Systems:    ROS: 10 point ROS neg other than the symptoms noted above in the HPI and PMH.          Data:    Imaging:  Renal/Bladder Ultrasound   10/19/2020  FINDINGS:     Right kidney: Measures 8 cm in length. Unchanged focal cortical  thinning and increased echogenicity at the superior pole. No  hydronephrosis or focal lesion.     Left kidney: Measures 11.2 cm in length. Parenchyma is of normal  thickness and echogenicity. No focal mass. No hydronephrosis.      Bladder: Partially distended and otherwise unremarkable                                                                      IMPRESSION:  1.  Stable asymmetric atrophy of the right kidney, with focal upper  pole cortical thinning. No hydronephrosis.  2.  Normal ultrasound appearance of the left kidney. No  hydronephrosis.  3.  Partially distended urinary bladder, otherwise unremarkable.    Labs:  Creatinine   Date Value Ref Range Status   09/22/2018 0.72 0.52 - 1.04 mg/dL Final       11/18/19   0.68   Creatinine         Assessment and Plan:   35 year old female with neurogenic bladder secondary to sacral surgery s/p bladder augment. Previously had issues with abdominal pain which has improved with management of constipation with prune juice, miralax, and Senna. Doing well from this standpoint today though reports some ongoing back pain.   -Encouraged to follow up with PCP regarding back pain.   -Continue the above regimen for  constipation.  -Continue CIC regimen for management of neurogenic bladder.  -Follow up with urology in 1 year with renal ultrasound and labs (creatinine) prior.        Phone call duration: 13 minutes    Jerilyn Greene PA-C

## 2020-11-17 NOTE — LETTER
"11/17/2020       RE: Minh Altamirano  [address]  [facility] [address]     Dear Colleague,    Thank you for referring your patient, Minh Altamirano, to the [facility] [place] at [facility]. Please see a copy of my visit note below.    Minh Altamirano is a 35 year old female who is being evaluated via a billable telephone visit.      The patient has been notified of following:     \"This telephone visit will be conducted via a call between you and your physician/provider. We have found that certain health care needs can be provided without the need for a physical exam.  This service lets us provide the care you need with a short phone conversation.  If a prescription is necessary we can send it directly to your pharmacy.  If lab work is needed we can place an order for that and you can then stop by our lab to have the test done at a later time.    Telephone visits are billed at different rates depending on your insurance coverage. During this emergency period, for some insurers they may be billed the same as an in-person visit.  Please reach out to your insurance provider with any questions.    If during the course of the call the physician/provider feels a telephone visit is not appropriate, you will not be charged for this service.\"    Patient has given verbal consent for Telephone visit?  Yes    What phone number would you like to be contacted at? [phone]. Please use [employer] Chinese language for visit     How would you like to obtain your AVS? Mail a copy    Follow-up Telephone Visit for Neurogenic Bladder    Name: Minh Altamirano    MRN: [id number]   YOB: 1985  Accompanied at today's visit by: Rick  via phone                 Chief Complaint:   1 month follow up          History of Present Illness:   HISTORY: Minh Altamirano is a 35 year old female with a history of neurogenic bladder secondary to " sacral agenesis. Patient is not wheelchair bound. Last visit with Dr. Moore in 2020 at which time her main concern was abdominal pain which has been present since 2019 when she had an . Pain is across lower abdomen and radiates around to bilateral back. She says it feels like uterine contractions like she is pregnant. She is getting normal menstrual periods. Pain does not change with empty vs full bladder. Pain does get better after having a bowel movement. She was recommended to increase prune juice to daily and follow up with OBGYN where she underwent normal pelvic ultrasound and normal PAP and labs. They recommended she add Miralax and Senna to her bowel regimen. Today, she reports that this combination has been helpful. She is doing well with no further concerns today. She does ask about scheduling an appointment with Dr. Garcia who performed spine surgery on her in . She has been having back pain.      The following subcategories were reviewed with the patient and updated accordingly. If no updates, this indicates no change since last visit:    Previous Bladder Surgeries:  Previous Bladder Augmentation: right colon in . Revisions include: takedown of Mitrofanoff and repair of fascial dehiscence on POD #7.    Catheterizable stoma:none  Anti-incontinence procedures: none  Botox injections: None    Pertinent Medications:  Current Anticholinergics: None  Current Prophylactic antibiotics: None  Intravesical gentamycin:  None  Intravesical oxybutinin: None    Catheterization History:  The patient catheterizes per native urethra into a augmented bladder with a 12F straight catheter q4 hours. Catheterization is performed by  self. The patient uses a clean catheter each time. She does not irrigate the bladder.     Incontinence History:  She does leak between voids/caths. She does not experience urgency of urination. She does not experience stress urinary incontinence. She leaks a small  amount but more than just a few drops. This happens when the bladder is full.     Urinary Tract Infection History:  None in recent history.    Bowel Movement History:  The patient has a bowel movement q2 days. Bowel regimen includes prune juice, Miralax, Senna. No fecal incontinence.          Past Medical History:     Past Medical History:   Diagnosis Date     Anemia      Chronic infection     MRSA     Constipation, chronic      Incontinence of urine      Lipoma of spinal cord      Migraine      neurogenic bladder      Spinal dysraphism (H)             Past Surgical History:     Past Surgical History:   Procedure Laterality Date     BLADDER AUGMENTATION  2012    Procedure:BLADDER AUGMENTATION; Surgeon:TRINA MOORE; [facility]      SECTION N/A 2018    Procedure:  SECTION;  Primary  Section  with classical incision;  Surgeon: Lily Villanueva MD;  [facility]     CYSTOSCOPY, INTRAVESICAL INJECTION N/A 2016    Procedure: CYSTOSCOPY, INTRAVESICAL INJECTION;  Surgeon: Trina Moore MD;  [facility]     DILATION AND CURETTAGE SUCTION N/A 2019    Procedure: suction dilation and curetage,;  Surgeon: Radha Hickey MD;  [facility]     EXAM UNDER ANESTHESIA PELVIC N/A 2019    Procedure: pelvic exam under anesthesia;  Surgeon: Radha Hickey MD;  [facility]     LAMINECTOMY LUMBAR TWO LEVELS       LAPAROTOMY EXPLORATORY  2012    Procedure:LAPAROTOMY EXPLORATORY; Exploratory Laparotomy with Takedown of Mitrofanoff, Ventral Hernia Repair with Strattice Mesh implantation ; Surgeon:TRINA MOORE; [facility]     MITROFANOFF PROCEDURE (APPENDIX CONDUIT)  2012    Procedure:MITROFANOFF PROCEDURE (APPENDIX CONDUIT); Bladder Augmentation with Right Colon, Appendix Conduit- Mitrofanoff, Insertion of Pubovaginal Sling using Autologous Rectus Fascia; Surgeon:TRINA MOORE; [facility]     tumor resection  and cord detethering              Allergies:     Allergies   Allergen Reactions     Naproxen Hives and Nausea and Vomiting     Bactrim [Sulfamethoxazole W/Trimethoprim] Itching     Gabapentin Itching and Nausea     Vicodin [Hydrocodone-Acetaminophen] Itching            Medications:     Current Outpatient Medications   Medication Sig     acetaminophen (TYLENOL) 325 MG tablet Take 1-2 tablets (325-650 mg) by mouth every 6 hours as needed for mild pain     polyethylene glycol (MIRALAX) 17 g packet Take 17 g by mouth daily     Prenatal Vit-Fe Fumarate-FA (PRENATAL MULTIVITAMIN W/IRON) 27-0.8 MG tablet Take 1 tablet by mouth daily     SENNA-docusate sodium (SENNA S) 8.6-50 MG tablet Take 1-2 tablets by mouth 2 times daily as needed (constipation)     No current facility-administered medications for this visit.              Review of Systems:    ROS: 10 point ROS neg other than the symptoms noted above in the HPI and PMH.          Data:    Imaging:  Renal/Bladder Ultrasound   10/19/2020  FINDINGS:     Right kidney: Measures 8 cm in length. Unchanged focal cortical  thinning and increased echogenicity at the superior pole. No  hydronephrosis or focal lesion.     Left kidney: Measures 11.2 cm in length. Parenchyma is of normal  thickness and echogenicity. No focal mass. No hydronephrosis.      Bladder: Partially distended and otherwise unremarkable                                                                      IMPRESSION:  1.  Stable asymmetric atrophy of the right kidney, with focal upper  pole cortical thinning. No hydronephrosis.  2.  Normal ultrasound appearance of the left kidney. No  hydronephrosis.  3.  Partially distended urinary bladder, otherwise unremarkable.    Labs:  Creatinine   Date Value Ref Range Status   09/22/2018 0.72 0.52 - 1.04 mg/dL Final       11/18/19   0.68   Creatinine         Assessment and Plan:   35 year old female with neurogenic bladder secondary to sacral surgery s/p bladder augment.  Previously had issues with abdominal pain which has improved with management of constipation with prune juice, miralax, and Senna. Doing well from this standpoint today though reports some ongoing back pain.   -Encouraged to follow up with PCP regarding back pain.   -Continue the above regimen for constipation.  -Continue CIC regimen for management of neurogenic bladder.  -Follow up with urology in 1 year with renal ultrasound and labs (creatinine) prior.        Phone call duration: 13 minutes    Jerilyn Greene PA-C

## 2020-11-17 NOTE — PATIENT INSTRUCTIONS
UROLOGY CLINIC VISIT PATIENT INSTRUCTIONS    Continue prune juice and the medications prescribed by OB/GYN for your constipation.    Follow up with urology in 1 year with a kidney ultrasound and blood work prior.    If you have any issues, questions or concerns in the meantime, do not hesitate to contact us at [phone] or via [employer].     It was a pleasure meeting with you today.  Thank you for allowing me and my team the privilege of caring for you today.  YOU are the reason we are here, and I truly hope we provided you with the excellent service you deserve.  Please let us know if there is anything else we can do for you so that we can be sure you are leaving completely satisfied with your care experience.

## 2020-12-02 ENCOUNTER — OFFICE VISIT (OUTPATIENT)
Dept: OBGYN | Facility: CLINIC | Age: 35
End: 2020-12-02
Attending: STUDENT IN AN ORGANIZED HEALTH CARE EDUCATION/TRAINING PROGRAM
Payer: COMMERCIAL

## 2020-12-02 VITALS
DIASTOLIC BLOOD PRESSURE: 83 MMHG | WEIGHT: 145.1 LBS | SYSTOLIC BLOOD PRESSURE: 115 MMHG | BODY MASS INDEX: 25.71 KG/M2 | HEART RATE: 89 BPM | HEIGHT: 63 IN

## 2020-12-02 DIAGNOSIS — Z31.9 ENCOUNTER FOR INFERTILITY: Primary | ICD-10-CM

## 2020-12-02 DIAGNOSIS — Z31.41 ENCOUNTER FOR FERTILITY TESTING: ICD-10-CM

## 2020-12-02 LAB
PROGEST SERPL-MCNC: 18.8 NG/ML
PROLACTIN SERPL-MCNC: 12 UG/L (ref 3–27)
TSH SERPL DL<=0.005 MIU/L-ACNC: 2.35 MU/L (ref 0.4–4)

## 2020-12-02 PROCEDURE — 84146 ASSAY OF PROLACTIN: CPT | Performed by: STUDENT IN AN ORGANIZED HEALTH CARE EDUCATION/TRAINING PROGRAM

## 2020-12-02 PROCEDURE — 36415 COLL VENOUS BLD VENIPUNCTURE: CPT | Performed by: STUDENT IN AN ORGANIZED HEALTH CARE EDUCATION/TRAINING PROGRAM

## 2020-12-02 PROCEDURE — 83520 IMMUNOASSAY QUANT NOS NONAB: CPT | Performed by: STUDENT IN AN ORGANIZED HEALTH CARE EDUCATION/TRAINING PROGRAM

## 2020-12-02 PROCEDURE — G0463 HOSPITAL OUTPT CLINIC VISIT: HCPCS

## 2020-12-02 PROCEDURE — 84443 ASSAY THYROID STIM HORMONE: CPT | Performed by: STUDENT IN AN ORGANIZED HEALTH CARE EDUCATION/TRAINING PROGRAM

## 2020-12-02 PROCEDURE — 99213 OFFICE O/P EST LOW 20 MIN: CPT | Mod: GE | Performed by: STUDENT IN AN ORGANIZED HEALTH CARE EDUCATION/TRAINING PROGRAM

## 2020-12-02 PROCEDURE — 84144 ASSAY OF PROGESTERONE: CPT | Performed by: STUDENT IN AN ORGANIZED HEALTH CARE EDUCATION/TRAINING PROGRAM

## 2020-12-02 ASSESSMENT — MIFFLIN-ST. JEOR: SCORE: 1332.3

## 2020-12-02 NOTE — PROGRESS NOTES
[facility]  Gynecology Visit    Reason for Consult: Secondary infertility    HPI:    Minh Altamirano is a 35 year old , here to discuss secondary infertility. She had a D&C for SAB of twins at 9w a year ago and has been having unprotected intercourse since then without becoming pregnant. She reports regular menses, every 28 days, moderate flow. She denies any changes to her health status. She did have some constipation this year but now takes a stool softener and prune juice which has helped a lot. Denies skin changes, heat/cold intolerance, hair loss, nipple discharge. She has intercourse 3-4x/week. Her  is the father of her two year old.     Current contraception: none  Number of partners in last year: 1    TUBAL, UTERINE AND CERVICAL FACTOR:   History of pelvic infection and pelvic inflammatory disease. none   History of HSG or any other evaluation of her uterus or tubes to date. none   History of prior pelvic imaging: normal pelvic US 10/2020  History of prior abdominal surgery: multiple bladder procedures including Mitrofanoff     MALE FACTOR:   Did not discuss in detail    INFERTILITY TREATMENT: She has not undergone any infertility treatment to date.         GYN History  - LMP: Patient's last menstrual period was 2020 (exact date).  - Menses: every month, regular, moderate flow  - Pap Smears:      Lab Results   Component Value Date    PAP NIL 10/23/2020    PAP NIL 2012     - Contraception: none  - Sexual Activity/Concerns:     OBHx  OB History    Para Term  AB Living   2 1 1 0 1 1   SAB TAB Ectopic Multiple Live Births   1 0 0 0 1      # Outcome Date GA Lbr Aidan/2nd Weight Sex Delivery Anes PTL Lv   2 Term 18 38w2d  3.1 kg (6 lb 13.4 oz) M CS-Classical Spinal, EPI N TJ      Name: LURDES,BABY1 MINH      Apgar1: 8  Apgar5: 9   1 SAB      AB, MISSED          PMHx:   Past Medical History:   Diagnosis Date     Anemia      Chronic infection     MRSA      Constipation, chronic      Incontinence of urine      Lipoma of spinal cord      Migraine      neurogenic bladder      Spinal dysraphism (H)        PSHx:   Past Surgical History:   Procedure Laterality Date     BLADDER AUGMENTATION  2012    Procedure:BLADDER AUGMENTATION; Surgeon:TRINA MOORE; [facility]      SECTION N/A 2018    Procedure:  SECTION;  Primary  Section  with classical incision;  Surgeon: Lily Villanueva MD;  [facility]     CYSTOSCOPY, INTRAVESICAL INJECTION N/A 2016    Procedure: CYSTOSCOPY, INTRAVESICAL INJECTION;  Surgeon: Trina Moore MD;  [facility]     DILATION AND CURETTAGE SUCTION N/A 2019    Procedure: suction dilation and curetage,;  Surgeon: Radha Hickey MD;  [facility]     EXAM UNDER ANESTHESIA PELVIC N/A 2019    Procedure: pelvic exam under anesthesia;  Surgeon: Radha Hickey MD;  [facility]     LAMINECTOMY LUMBAR TWO LEVELS       LAPAROTOMY EXPLORATORY  2012    Procedure:LAPAROTOMY EXPLORATORY; Exploratory Laparotomy with Takedown of Mitrofanoff, Ventral Hernia Repair with Strattice Mesh implantation ; Surgeon:TRINA MOORE; [facility]     MITROFANOFF PROCEDURE (APPENDIX CONDUIT)  2012    Procedure:MITROFANOFF PROCEDURE (APPENDIX CONDUIT); Bladder Augmentation with Right Colon, Appendix Conduit- Mitrofanoff, Insertion of Pubovaginal Sling using Autologous Rectus Fascia; Surgeon:TRINA MOORE; [facility]     tumor resection and cord detethering         Meds:   Past Medical History:   Diagnosis Date     Anemia      Chronic infection     MRSA     Constipation, chronic      Incontinence of urine      Lipoma of spinal cord      Migraine      neurogenic bladder      Spinal dysraphism (H)        Allergies:       Allergies   Allergen Reactions     Naproxen Hives and Nausea and Vomiting     Bactrim [Sulfamethoxazole W/Trimethoprim] Itching     Gabapentin  "Itching and Nausea     Vicodin [Hydrocodone-Acetaminophen] Itching         SocHx:   Social History     Tobacco Use     Smoking status: Never Smoker     Smokeless tobacco: Never Used   Substance Use Topics     Alcohol use: No     Drug use: No         FamHx:  No family history on file.      ROS: 10-Point ROS negative except as noted in HPI    Physical Exam  /83 (BP Location: Right arm, Patient Position: Chair)   Pulse 89   Ht 1.6 m (5' 3\")   Wt 65.8 kg (145 lb 1.6 oz)   LMP 2020 (Exact Date)   Breastfeeding No   BMI 25.70 kg/m    Gen: Well-appearing, NAD        Imaging:  Pelvic US 10/27/2020:  35 year old female with LMP 10/15/20 presents for gynecologic ultrasound indicated by pelvic pain.  This study was done transvaginally.     Uterine findings:              Presence: Visible Size: Normal 5.8 x 5.0 x 5.4 cm.  Endometrium = 14.2 mm.              Cx length = 25.1 mm.                            Flexion:  Anteverted and Midposition Position: Midline          Margins: Smooth         Shape: Normal              Contour: Regular        Texture: Homogeneous          Cavity: Normal            Masses: Normal     Pelvic findings:               Right Adnexa: Normal              Left Adnexa: Normal              Bladder:  Normal                                                           Cul - de - sac fluid: None     Ovarian follicles:              Right ovary:  2.4 x 3.2 x 1.0cm.                 0 follicles                 Left ovary:  2.7 x 2.9 x 2.5cm.                 1 cyst                  Size(s):  2.2 x 2.2 x 2.0cm        Comments: Follicle in left ovary, otherwise normal ultrasound.         SARAH Jade MD    Assessment/Plan:  Minh Altamirano is a 35 year old , referred for consultation for secondary infertility.      Discussed causes of infertility including ovarian reserve, ovulatory factor, tubal factor, male factor, unexplained.     Work-up  Ovulation: Day 21 " progesterone (goal progesterone level >3 ng/mL)  If anovulatory: Testosterone free/total, DHEAS, 17OHP, HgbA1C  ALEENA:   Day 3 FSH (goal <10, 10-15 borderline, >20 poor OR) and estradiol (goal <55-60)  AMH (reflects primordial follicle pool b/c secreted by early follicles, declines with age, undetectable in rocio; predicts IVF stim response but not live birth rate), low = 0.2-0.7 and signals likely poor response to stimulation but does not necessarily reflect live birth rate, only suggests number of follicles/not quality, AMH >1.0 ng/mL but <3.5 ng/mL suggests a good response to stimulation  check karyotype with otherwise unexplained ALEENA <39 yo  HPO Axis: TSH (normal <2.5 for fertility), Prolactin  Transvaginal Ultrasound done  Semen analysis deferred until Deko's work-up is done.  HSG ordered    Return to clinic in 4 weeks to review results.     Staffed with Dr. Hickey.    Sita Aceves MD  Ob/Gyn, PGY-4  12/2/2020, 2:57 PM    The Patient was seen in [facility] by Dr. Aceves.   I reviewed the history & exam. Assessment and plan were jointly made.   Radha Hickey MD, MPH

## 2020-12-02 NOTE — LETTER
2020       RE: Minh Altamirano  [address]  [facility] [address]     Dear Colleague,    Thank you for referring your patient, Minh Altamirano, to the [facility] [place] at [facility]. Please see a copy of my visit note below.    [facility]  Gynecology Visit    Reason for Consult: Secondary infertility    HPI:    Minh Altamirano is a 35 year old , here to discuss secondary infertility. She had a D&C for SAB of twins at 9w a year ago and has been having unprotected intercourse since then without becoming pregnant. She reports regular menses, every 28 days, moderate flow. She denies any changes to her health status. She did have some constipation this year but now takes a stool softener and prune juice which has helped a lot. Denies skin changes, heat/cold intolerance, hair loss, nipple discharge. She has intercourse 3-4x/week. Her  is the father of her two year old.     Current contraception: none  Number of partners in last year: 1    TUBAL, UTERINE AND CERVICAL FACTOR:   History of pelvic infection and pelvic inflammatory disease. none   History of HSG or any other evaluation of her uterus or tubes to date. none   History of prior pelvic imaging: normal pelvic US 10/2020  History of prior abdominal surgery: multiple bladder procedures including Mitrofanoff     MALE FACTOR:   Did not discuss in detail    INFERTILITY TREATMENT: She has not undergone any infertility treatment to date.         GYN History  - LMP: Patient's last menstrual period was 2020 (exact date).  - Menses: every month, regular, moderate flow  - Pap Smears:      Lab Results   Component Value Date    PAP NIL 10/23/2020    PAP NIL 2012     - Contraception: none  - Sexual Activity/Concerns:     OBHx  OB History    Para Term  AB Living   2 1 1 0 1 1   SAB TAB Ectopic Multiple Live Births   1 0 0 0 1      # Outcome Date GA Lbr  Aidan/2nd Weight Sex Delivery Anes PTL Lv   2 Term 18 38w2d  3.1 kg (6 lb 13.4 oz) M CS-Classical Spinal, EPI N TJ      Name: WESTLEY CHATMAN      Apgar1: 8  Apgar5: 9   1 SAB      AB, MISSED          PMHx:   Past Medical History:   Diagnosis Date     Anemia      Chronic infection     MRSA     Constipation, chronic      Incontinence of urine      Lipoma of spinal cord      Migraine      neurogenic bladder      Spinal dysraphism (H)        PSHx:   Past Surgical History:   Procedure Laterality Date     BLADDER AUGMENTATION  2012    Procedure:BLADDER AUGMENTATION; Surgeon:FADY MOORE; [facility]      SECTION N/A 2018    Procedure:  SECTION;  Primary  Section  with classical incision;  Surgeon: Lily Villanueva MD;  [facility]     CYSTOSCOPY, INTRAVESICAL INJECTION N/A 2016    Procedure: CYSTOSCOPY, INTRAVESICAL INJECTION;  Surgeon: Fady Moore MD;  [facility]     DILATION AND CURETTAGE SUCTION N/A 2019    Procedure: suction dilation and curetage,;  Surgeon: Radha Hickey MD;  [facility]     EXAM UNDER ANESTHESIA PELVIC N/A 2019    Procedure: pelvic exam under anesthesia;  Surgeon: Radha Hickey MD;  [facility]     LAMINECTOMY LUMBAR TWO LEVELS       LAPAROTOMY EXPLORATORY  2012    Procedure:LAPAROTOMY EXPLORATORY; Exploratory Laparotomy with Takedown of Mitrofanoff, Ventral Hernia Repair with Strattice Mesh implantation ; Surgeon:FADY MOORE; [facility]     MITROFANOFF PROCEDURE (APPENDIX CONDUIT)  2012    Procedure:MITROFANOFF PROCEDURE (APPENDIX CONDUIT); Bladder Augmentation with Right Colon, Appendix Conduit- Mitrofanoff, Insertion of Pubovaginal Sling using Autologous Rectus Fascia; Surgeon:FADY MOORE; [facility]     tumor resection and cord detethering         Meds:   Past Medical History:   Diagnosis Date     Anemia      Chronic infection     MRSA      "Constipation, chronic      Incontinence of urine      Lipoma of spinal cord      Migraine      neurogenic bladder      Spinal dysraphism (H)        Allergies:       Allergies   Allergen Reactions     Naproxen Hives and Nausea and Vomiting     Bactrim [Sulfamethoxazole W/Trimethoprim] Itching     Gabapentin Itching and Nausea     Vicodin [Hydrocodone-Acetaminophen] Itching         SocHx:   Social History     Tobacco Use     Smoking status: Never Smoker     Smokeless tobacco: Never Used   Substance Use Topics     Alcohol use: No     Drug use: No         FamHx:  No family history on file.      ROS: 10-Point ROS negative except as noted in HPI    Physical Exam  /83 (BP Location: Right arm, Patient Position: Chair)   Pulse 89   Ht 1.6 m (5' 3\")   Wt 65.8 kg (145 lb 1.6 oz)   LMP 11/13/2020 (Exact Date)   Breastfeeding No   BMI 25.70 kg/m    Gen: Well-appearing, NAD        Imaging:  Pelvic US 10/27/2020:  35 year old female with LMP 10/15/20 presents for gynecologic ultrasound indicated by pelvic pain.  This study was done transvaginally.     Uterine findings:              Presence: Visible Size: Normal 5.8 x 5.0 x 5.4 cm.  Endometrium = 14.2 mm.              Cx length = 25.1 mm.                            Flexion:  Anteverted and Midposition Position: Midline          Margins: Smooth         Shape: Normal              Contour: Regular        Texture: Homogeneous          Cavity: Normal            Masses: Normal     Pelvic findings:               Right Adnexa: Normal              Left Adnexa: Normal              Bladder:  Normal                                                           Cul - de - sac fluid: None     Ovarian follicles:              Right ovary:  2.4 x 3.2 x 1.0cm.                 0 follicles                 Left ovary:  2.7 x 2.9 x 2.5cm.                 1 cyst                  Size(s):  2.2 x 2.2 x 2.0cm        Comments: Follicle in left ovary, otherwise normal ultrasound.         Marquita " SARAH Jeter MD    Assessment/Plan:  Minh Altamirano is a 35 year old , referred for consultation for secondary infertility.      Discussed causes of infertility including ovarian reserve, ovulatory factor, tubal factor, male factor, unexplained.     Work-up  Ovulation: Day 21 progesterone (goal progesterone level >3 ng/mL)  If anovulatory: Testosterone free/total, DHEAS, 17OHP, HgbA1C  ALEENA:   Day 3 FSH (goal <10, 10-15 borderline, >20 poor OR) and estradiol (goal <55-60)  AMH (reflects primordial follicle pool b/c secreted by early follicles, declines with age, undetectable in rocio; predicts IVF stim response but not live birth rate), low = 0.2-0.7 and signals likely poor response to stimulation but does not necessarily reflect live birth rate, only suggests number of follicles/not quality, AMH >1.0 ng/mL but <3.5 ng/mL suggests a good response to stimulation  check karyotype with otherwise unexplained ALEENA <41 yo  HPO Axis: TSH (normal <2.5 for fertility), Prolactin  Transvaginal Ultrasound done  Semen analysis deferred until Minh's work-up is done.  HSG ordered    Return to clinic in 4 weeks to review results.     Staffed with Dr. Hickey.    Sita Aceves MD  Ob/Gyn, PGY-4  2020, 2:57 PM    The Patient was seen in [facility] by Dr. Aceves.   I reviewed the history & exam. Assessment and plan were jointly made.   Radha Hickey MD, MPH

## 2020-12-02 NOTE — NURSING NOTE
Chief Complaint   Patient presents with     Follow Up     Pt want to gets pregnant. Had D&C 11/27/2019.        See LORETO Boyd 12/2/2020

## 2020-12-02 NOTE — PATIENT INSTRUCTIONS
Come to the lab on 12/3 for Day 21 labs.  Call on the first day of your next period to schedule a hysterosalpingogram to assess your tubes.  Come on the 3rd day of your next period for Day 3 labs.  Have your  register with [employer], then call and we can order a a semen analysis.

## 2020-12-04 LAB — MIS SERPL-MCNC: 1.09 NG/ML (ref 0.18–11.71)

## 2020-12-07 DIAGNOSIS — Z31.41 ENCOUNTER FOR FERTILITY TESTING: Primary | ICD-10-CM

## 2020-12-14 ENCOUNTER — HEALTH MAINTENANCE LETTER (OUTPATIENT)
Age: 35
End: 2020-12-14

## 2020-12-14 DIAGNOSIS — Z31.41 ENCOUNTER FOR FERTILITY TESTING: ICD-10-CM

## 2020-12-14 LAB
ESTRADIOL SERPL-MCNC: 64 PG/ML
FSH SERPL-ACNC: 10.2 IU/L
LH SERPL-ACNC: 4.5 IU/L

## 2020-12-14 PROCEDURE — 36415 COLL VENOUS BLD VENIPUNCTURE: CPT | Performed by: STUDENT IN AN ORGANIZED HEALTH CARE EDUCATION/TRAINING PROGRAM

## 2020-12-14 PROCEDURE — 83002 ASSAY OF GONADOTROPIN (LH): CPT | Performed by: STUDENT IN AN ORGANIZED HEALTH CARE EDUCATION/TRAINING PROGRAM

## 2020-12-14 PROCEDURE — 83001 ASSAY OF GONADOTROPIN (FSH): CPT | Performed by: STUDENT IN AN ORGANIZED HEALTH CARE EDUCATION/TRAINING PROGRAM

## 2020-12-14 PROCEDURE — 82670 ASSAY OF TOTAL ESTRADIOL: CPT | Performed by: STUDENT IN AN ORGANIZED HEALTH CARE EDUCATION/TRAINING PROGRAM

## 2020-12-19 NOTE — PROGRESS NOTES
This is a recent snapshot of the patient's [facility] medical record.  For current drug dose and complete information and questions, call [phone] or In Basket pool, [facility] ([id number])  CSN Number:  [id number]      
This is a "65 y/o male with a h/o HTN, diabetes, and ESRD on dialysis, presenting left foot gangrene, which started 6 weeks ago. Gradual onset, moderate-severe severity, pain at left foot, associated symptoms include dark discoloration to left foot." Plan for  Left foot transmetatarsal amputation w/ achilles tendon lengthening on 12/21

## 2021-01-04 ENCOUNTER — OFFICE VISIT (OUTPATIENT)
Dept: OBGYN | Facility: CLINIC | Age: 36
End: 2021-01-04
Attending: OBSTETRICS & GYNECOLOGY
Payer: COMMERCIAL

## 2021-01-04 VITALS
WEIGHT: 146.2 LBS | DIASTOLIC BLOOD PRESSURE: 80 MMHG | HEIGHT: 63 IN | BODY MASS INDEX: 25.91 KG/M2 | HEART RATE: 97 BPM | SYSTOLIC BLOOD PRESSURE: 119 MMHG

## 2021-01-04 DIAGNOSIS — Z31.41 ENCOUNTER FOR FERTILITY TESTING: Primary | ICD-10-CM

## 2021-01-04 PROCEDURE — G0463 HOSPITAL OUTPT CLINIC VISIT: HCPCS

## 2021-01-04 PROCEDURE — 99212 OFFICE O/P EST SF 10 MIN: CPT | Performed by: OBSTETRICS & GYNECOLOGY

## 2021-01-04 ASSESSMENT — MIFFLIN-ST. JEOR: SCORE: 1332.29

## 2021-01-04 NOTE — PATIENT INSTRUCTIONS
Call on the first day you get your next period.     [phone] (nurse line) to schedule HSG (x ray test)

## 2021-01-04 NOTE — LETTER
1/4/2021       RE: Minh Altamirano  [address]  [facility] [address]     Dear Colleague,    Thank you for referring your patient, Minh Altamirano, to the [facility] [place] at [facility]. Please see a copy of my visit note below.    Pt here to f/u labs for secondary infertility. Reviewed that labs are normal. Has not had HSG yet.      Reviewed that menses are regular.  Currently day 24 of this cycle.  Minh is having regular IC w/ father of her other child.  No health changes in either of them.  They are using a ovulation tracking cricket and having IC days 11-18 about every other day.     Recommended that she call on first day of menses to schedule HSG, as she had d and c after miscarriage last pregnancy.  Pt is agreeable. HSG ordered.     Pamella Ames MD, FACOG  (she/her/hers)    Department of Ob/Gyn/Women's Health  [employer] [facility] [employer]  [place] [facility]  [address] [place] [address]. [place] [address]  [email or web address]  p. [phone]  f. [phone]  1/4/2021  1:25 PM

## 2021-01-04 NOTE — PROGRESS NOTES
Pt here to f/u labs for secondary infertility. Reviewed that labs are normal. Has not had HSG yet.      Reviewed that menses are regular.  Currently day 24 of this cycle.  Minh is having regular IC w/ father of her other child.  No health changes in either of them.  They are using a ovulation tracking cricket and having IC days 11-18 about every other day.     Recommended that she call on first day of menses to schedule HSG, as she had d and c after miscarriage last pregnancy.  Pt is agreeable. HSG ordered.     Pamella Ames MD, FACOG  (she/her/hers)    Department of Ob/Gyn/Women's Health  [employer] [facility] [employer]  [place] [facility]  [address] [place] [address]. [place] [address]  [email or web address]  p. [phone]  f. [phone]  1/4/2021  1:25 PM

## 2021-01-11 ENCOUNTER — TELEPHONE (OUTPATIENT)
Dept: OBGYN | Facility: CLINIC | Age: 36
End: 2021-01-11

## 2021-02-10 ENCOUNTER — APPOINTMENT (OUTPATIENT)
Dept: INTERPRETER SERVICES | Facility: CLINIC | Age: 36
End: 2021-02-10
Payer: COMMERCIAL

## 2021-02-10 DIAGNOSIS — Z11.59 ENCOUNTER FOR SCREENING FOR OTHER VIRAL DISEASES: ICD-10-CM

## 2021-02-14 DIAGNOSIS — Z11.59 ENCOUNTER FOR SCREENING FOR OTHER VIRAL DISEASES: ICD-10-CM

## 2021-02-14 LAB
LABORATORY COMMENT REPORT: NORMAL
SARS-COV-2 RNA RESP QL NAA+PROBE: NEGATIVE
SARS-COV-2 RNA RESP QL NAA+PROBE: NORMAL
SPECIMEN SOURCE: NORMAL
SPECIMEN SOURCE: NORMAL

## 2021-02-14 PROCEDURE — U0003 INFECTIOUS AGENT DETECTION BY NUCLEIC ACID (DNA OR RNA); SEVERE ACUTE RESPIRATORY SYNDROME CORONAVIRUS 2 (SARS-COV-2) (CORONAVIRUS DISEASE [COVID-19]), AMPLIFIED PROBE TECHNIQUE, MAKING USE OF HIGH THROUGHPUT TECHNOLOGIES AS DESCRIBED BY CMS-2020-01-R: HCPCS | Performed by: OBSTETRICS & GYNECOLOGY

## 2021-02-14 PROCEDURE — U0005 INFEC AGEN DETEC AMPLI PROBE: HCPCS | Performed by: OBSTETRICS & GYNECOLOGY

## 2021-02-18 ENCOUNTER — HOSPITAL ENCOUNTER (OUTPATIENT)
Dept: GENERAL RADIOLOGY | Facility: CLINIC | Age: 36
End: 2021-02-18
Attending: OBSTETRICS & GYNECOLOGY
Payer: COMMERCIAL

## 2021-02-18 ENCOUNTER — DOCUMENTATION ONLY (OUTPATIENT)
Dept: OBGYN | Facility: CLINIC | Age: 36
End: 2021-02-18

## 2021-02-18 ENCOUNTER — ALLIED HEALTH/NURSE VISIT (OUTPATIENT)
Dept: OBGYN | Facility: CLINIC | Age: 36
End: 2021-02-18
Payer: COMMERCIAL

## 2021-02-18 DIAGNOSIS — Z32.02 PREGNANCY EXAMINATION OR TEST, NEGATIVE RESULT: Primary | ICD-10-CM

## 2021-02-18 DIAGNOSIS — Z31.41 ENCOUNTER FOR FERTILITY TESTING: ICD-10-CM

## 2021-02-18 LAB
HCG UR QL: NEGATIVE
INTERNAL QC OK POCT: YES

## 2021-02-18 PROCEDURE — 250N000009 HC RX 250

## 2021-02-18 PROCEDURE — 74740 X-RAY FEMALE GENITAL TRACT: CPT | Mod: 26 | Performed by: RADIOLOGY

## 2021-02-18 PROCEDURE — 255N000002 HC RX 255 OP 636: Performed by: OBSTETRICS & GYNECOLOGY

## 2021-02-18 PROCEDURE — 58340 CATHETER FOR HYSTEROGRAPHY: CPT

## 2021-02-18 PROCEDURE — 81025 URINE PREGNANCY TEST: CPT

## 2021-02-18 RX ORDER — IOPAMIDOL 510 MG/ML
100 INJECTION, SOLUTION INTRAVASCULAR ONCE
Status: COMPLETED | OUTPATIENT
Start: 2021-02-18 | End: 2021-02-18

## 2021-02-18 RX ORDER — LIDOCAINE HYDROCHLORIDE 10 MG/ML
INJECTION, SOLUTION EPIDURAL; INFILTRATION; INTRACAUDAL; PERINEURAL
Status: COMPLETED
Start: 2021-02-18 | End: 2021-02-18

## 2021-02-18 RX ADMIN — IOPAMIDOL 10 ML: 510 INJECTION, SOLUTION INTRAVASCULAR at 14:54

## 2021-02-18 RX ADMIN — LIDOCAINE HYDROCHLORIDE 5 ML: 10 INJECTION, SOLUTION EPIDURAL; INFILTRATION; INTRACAUDAL; PERINEURAL at 14:53

## 2021-02-18 NOTE — PROGRESS NOTES
Procedure: Hysterosalpingogram (HSG) Contrast Injection    Name:  Minh Altamirano  MRN:  [id number]  :  1985  Date of Procedure: 21      Preoperative Diagnosis:  Secondary Infertility  Postoperative Diagnosis:  Same    UPT:  Negative  Antibiotic prophylaxis: No  :  Arianna Abbott MD  Anesthesia:  Local infiltration of 1% Xylocaine ( 5    ml)  Contrast:  ISOVUE-250 ( 30    ml)  Complications:  None    Procedure Description:  The patient was placed in the dorsolithotomy position and a single-hinged vaginal speculum was inserted.  Notable leakage of clear urine from the urethra.  The cervix was exposed and washed with Hibiclens antiseptic solution three times.  Local anesthesia was established with 1% Xylocaine injected into the anterior lip of the cervix utilizing a 22-gauge spinal needle.  The cervix was grasped with a single toothed tenaculum.  The HSG cannula was attached to a 30-cc syringe containing water-soluble contrast (ISOVUE-250) and the cannula was filled with contrast removing air from it.  The tip of the cannula was then introduced into the external cervical os with the cone of the cannula applied tightly to the cervix.  Contrast was injected to image the uterine cavity and fallopian tubes under fluoroscopic monitoring.            Findings:    Uterine cavity:  Normal  Fallopian tubes:  Bilateral patent fallopian tubes  Other:  Moderate uterine descensus nearly to the introitus.  Urinary incontinence.    Impression:  Normal HSG    Arianna Abbott MD

## 2021-03-09 ENCOUNTER — OFFICE VISIT (OUTPATIENT)
Dept: OBGYN | Facility: CLINIC | Age: 36
End: 2021-03-09
Attending: OBSTETRICS & GYNECOLOGY
Payer: COMMERCIAL

## 2021-03-09 VITALS
BODY MASS INDEX: 25.3 KG/M2 | DIASTOLIC BLOOD PRESSURE: 80 MMHG | HEIGHT: 63 IN | WEIGHT: 142.8 LBS | HEART RATE: 103 BPM | SYSTOLIC BLOOD PRESSURE: 114 MMHG

## 2021-03-09 DIAGNOSIS — Z31.41 ENCOUNTER FOR FERTILITY TESTING: Primary | ICD-10-CM

## 2021-03-09 PROCEDURE — G0463 HOSPITAL OUTPT CLINIC VISIT: HCPCS

## 2021-03-09 PROCEDURE — 99212 OFFICE O/P EST SF 10 MIN: CPT | Performed by: OBSTETRICS & GYNECOLOGY

## 2021-03-09 ASSESSMENT — MIFFLIN-ST. JEOR: SCORE: 1316.87

## 2021-03-09 NOTE — LETTER
3/9/2021       RE: Minh Altamirano  [address]  [facility] [address]     Dear Colleague,    Thank you for referring your patient, Minh Altamirano, to the [facility] [place] at [facility]. Please see a copy of my visit note below.    S: Minh Altamirano is a 37yo [place]  who presents to the clinic for secondary infertility follow up.  An  over the ipad was used for this visit.  Pt had a HSG done on 2/18/21 and wanted to know the results. Pt states that she continues to have her normal menstrual cycles with her LMP on 3/8/21. She states that she is having intercourse every other day from about Day 10-17 during her cycle. She states that her  did have a semen analysis done in [facility] that was normal.    O:  Vitals:  T: 97.8  P- 103  BP: 114/80  RR: 16  SpO2: 100    PE:  Gen- Pt talking comfortably. No signs of distress. Appears well.    Imaging:  HSG on 2/18/21 was normal with no signs of anatomic abnormalities    A/P: Minh Altamirano is a 37yo female who presents with secondary infertility. Her infertility w/u has been normal-normal day 3 labs and AMH, ovulatory luteal phase progesterone, U/S, and HSG. Her  did have a semen analysis done in [facility] that was normal but we do not have any records of this. Discussed that at this point her secondary infertility is either unexplained or male factor    #Secondary Infertility  -Encouraged her to follow up with a [facility] clinic as we do not provide treatment for unexplained or male factor infertility.  She was given information on local clinics.  Told pt that we can order a semen analysis for her  if he calls the  to get registered in the [facility] system.     Staffed with Dr. Minh Shah, MS4    Patient was seen with student who acted as my scribe and was present for learning. I personally assessed, examined and made medical decisions  reflected in the documentation. Any necessary edits to the note have been made by myself. Alondra Wilkinson MD

## 2021-03-09 NOTE — PROGRESS NOTES
S: Minh Altamirano is a 37yo Armenian  who presents to the clinic for secondary infertility follow up.  An  over the ipad was used for this visit.  Pt had a HSG done on 2/18/21 and wanted to know the results. Pt states that she continues to have her normal menstrual cycles with her LMP on 3/8/21. She states that she is having intercourse every other day from about Day 10-17 during her cycle. She states that her  did have a semen analysis done in [facility] that was normal.    O:  Vitals:  T: 97.8  P- 103  BP: 114/80  RR: 16  SpO2: 100    PE:  Gen- Pt talking comfortably. No signs of distress. Appears well.    Imaging:  HSG on 2/18/21 was normal with no signs of anatomic abnormalities    A/P: Minh Altamirano is a 37yo female who presents with secondary infertility. Her infertility w/u has been normal-normal day 3 labs and AMH, ovulatory luteal phase progesterone, U/S, and HSG. Her  did have a semen analysis done in [facility] that was normal but we do not have any records of this. Discussed that at this point her secondary infertility is either unexplained or male factor    #Secondary Infertility  -Encouraged her to follow up with a [facility] clinic as we do not provide treatment for unexplained or male factor infertility.  She was given information on local clinics.  Told pt that we can order a semen analysis for her  if he calls the  to get registered in the [facility] system.     Staffed with Dr. Minh Shah, MS4    Patient was seen with student who acted as my scribe and was present for learning. I personally assessed, examined and made medical decisions reflected in the documentation. Any necessary edits to the note have been made by myself. Alondra Wilkinson MD

## 2021-04-17 ENCOUNTER — HEALTH MAINTENANCE LETTER (OUTPATIENT)
Age: 36
End: 2021-04-17

## 2021-08-10 ENCOUNTER — TRANSFERRED RECORDS (OUTPATIENT)
Dept: HEALTH INFORMATION MANAGEMENT | Facility: CLINIC | Age: 36
End: 2021-08-10

## 2021-10-02 ENCOUNTER — HEALTH MAINTENANCE LETTER (OUTPATIENT)
Age: 36
End: 2021-10-02

## 2021-10-26 ENCOUNTER — TELEPHONE (OUTPATIENT)
Dept: OBGYN | Facility: CLINIC | Age: 36
End: 2021-10-26

## 2021-10-26 ENCOUNTER — LAB (OUTPATIENT)
Dept: LAB | Facility: CLINIC | Age: 36
End: 2021-10-26
Payer: COMMERCIAL

## 2021-10-26 DIAGNOSIS — O20.9 BLEEDING IN EARLY PREGNANCY: ICD-10-CM

## 2021-10-26 DIAGNOSIS — N31.9 NEUROGENIC BLADDER: ICD-10-CM

## 2021-10-26 LAB
B-HCG SERPL-ACNC: 15 IU/L (ref 0–5)
CREAT SERPL-MCNC: 0.92 MG/DL (ref 0.52–1.04)
GFR SERPL CREATININE-BSD FRML MDRD: 81 ML/MIN/1.73M2

## 2021-10-26 PROCEDURE — 36415 COLL VENOUS BLD VENIPUNCTURE: CPT

## 2021-10-26 PROCEDURE — 84702 CHORIONIC GONADOTROPIN TEST: CPT

## 2021-10-26 PROCEDURE — 82565 ASSAY OF CREATININE: CPT

## 2021-10-26 NOTE — TELEPHONE ENCOUNTER
Call received from patient requesting results of bHCG. Today's result 15. Pt reports continued bleeding, but lighter than yesterday. Reports mild cramping.     Instructed patient to take home UPT in 2 weeks and if positive to call clinic for follow up. If negative, can expect return of menstrual cycle within 4-8 weeks.     Pt would like to schedule follow up visit to discuss MAB and next steps as this is her second in recent years. Scheduled 11/24 at 2:30, pt confirmed appointment availability.     Reviewed bleeding precautions and when to seek emergency care. Pt verbalized understanding and agreement, denied further questions/concerns.

## 2021-11-23 NOTE — PROGRESS NOTES
[facility]  Follow-Up Visit    S: Minh Altamirano is a 36 year old  here for follow up for secondary infertility.     Briefly, she delivered her first child via CS in . She had a spontaneous  at 9w in , twin pregnancy, underwent D&C, genetic testing showed T21. In 2021 she missed her period, had a positive UPT, then began bleeding same day. She recently had a missed AB at approx 6w in 10/2021, managed expectantly.     Evaluation thus far has been normal: including unremarkable pelvic US, normal HSG, normal labs, including AMH. She reports her  had a normal semen analysis in  in [facility], report not available. (see prior notes for more history). Presumably unexplained or male factor secondary infertility.    She is frustrated and strongly desires answers for her difficulty conceiving as well as options for next steps.     O:  Blood Pressure 120/80   Pulse 79   Weight 64.7 kg (142 lb 9.6 oz)   Last Menstrual Period 2021   Body Mass Index 25.26 kg/m    Gen: Well-appearing, NAD  Remainder of exam deferred    A/P:  Minh Altamirano is a 36 year old  here for follow up for secondary infertility, at this point unexplained vs. Male factor. We discussed that all of her evaluation at this point has been normal and that there are no further tests to be offered at this time. She does not meet criteria for recurrent pregnancy loss with 1 documented loss that was trisomy 21. We discussed that recommendation at this time would be to proceed to [facility] clinic for further options.     - [facility] clinic handout given    Staffed with Dr. Villanueva. Nauruan ipad  utilized.    Jossy Sweet MD MSc  OBGYN Resident, PGY3  2021, 3:17 PM    The Patient was seen in Resident Continuity Clinic by    JOSSY SWEET, .  I reviewed the history & exam. Assessment and plan were jointly made.    Lily Villanueva MD

## 2021-11-24 ENCOUNTER — OFFICE VISIT (OUTPATIENT)
Dept: OBGYN | Facility: CLINIC | Age: 36
End: 2021-11-24
Payer: COMMERCIAL

## 2021-11-24 VITALS
HEART RATE: 79 BPM | WEIGHT: 142.6 LBS | SYSTOLIC BLOOD PRESSURE: 120 MMHG | DIASTOLIC BLOOD PRESSURE: 80 MMHG | BODY MASS INDEX: 25.26 KG/M2

## 2021-11-24 DIAGNOSIS — N97.9 FEMALE INFERTILITY, SECONDARY: Primary | ICD-10-CM

## 2021-11-24 PROCEDURE — G0463 HOSPITAL OUTPT CLINIC VISIT: HCPCS

## 2021-11-24 PROCEDURE — 99213 OFFICE O/P EST LOW 20 MIN: CPT | Mod: GE | Performed by: OBSTETRICS & GYNECOLOGY

## 2021-11-24 NOTE — LETTER
2021       RE: Minh Altamirano  [address]  [facility] [address]     Dear Colleague,    Thank you for referring your patient, Minh Altamirano, to the [facility] [place] at [facility]. Please see a copy of my visit note below.    [facility]  Follow-Up Visit    S: Minh Altamirano is a 36 year old  here for follow up for secondary infertility.     Briefly, she delivered her first child via CS in . She had a spontaneous  at 9w in , twin pregnancy, underwent D&C, genetic testing showed T21. In 2021 she missed her period, had a positive UPT, then began bleeding same day. She recently had a missed AB at approx 6w in 10/2021, managed expectantly.     Evaluation thus far has been normal: including unremarkable pelvic US, normal HSG, normal labs, including AMH. She reports her  had a normal semen analysis in  in [facility], report not available. (see prior notes for more history). Presumably unexplained or male factor secondary infertility.    She is frustrated and strongly desires answers for her difficulty conceiving as well as options for next steps.     O:  Blood Pressure 120/80   Pulse 79   Weight 64.7 kg (142 lb 9.6 oz)   Last Menstrual Period 2021   Body Mass Index 25.26 kg/m    Gen: Well-appearing, NAD  Remainder of exam deferred    A/P:  Minh Altamirano is a 36 year old  here for follow up for secondary infertility, at this point unexplained vs. Male factor. We discussed that all of her evaluation at this point has been normal and that there are no further tests to be offered at this time. She does not meet criteria for recurrent pregnancy loss with 1 documented loss that was trisomy 21. We discussed that recommendation at this time would be to proceed to [facility] clinic for further options.     - [facility] clinic handout given    Staffed with Dr. Villanueva. Rick gargd   utilized.    Jossy Sweet MD MSc  OBGYN Resident, PGY3  November 24, 2021, 3:17 PM    The Patient was seen in Resident Continuity Clinic by    JOSSY SWEET, .  I reviewed the history & exam. Assessment and plan were jointly made.    Lily Villanueva MD

## 2021-12-14 ENCOUNTER — TRANSFERRED RECORDS (OUTPATIENT)
Dept: HEALTH INFORMATION MANAGEMENT | Facility: CLINIC | Age: 36
End: 2021-12-14
Payer: COMMERCIAL

## 2022-01-04 ENCOUNTER — TRANSFERRED RECORDS (OUTPATIENT)
Dept: HEALTH INFORMATION MANAGEMENT | Facility: CLINIC | Age: 37
End: 2022-01-04
Payer: COMMERCIAL

## 2022-03-13 ENCOUNTER — HEALTH MAINTENANCE LETTER (OUTPATIENT)
Age: 37
End: 2022-03-13

## 2022-03-18 ENCOUNTER — PRE VISIT (OUTPATIENT)
Dept: UROLOGY | Facility: CLINIC | Age: 37
End: 2022-03-18
Payer: COMMERCIAL

## 2022-03-18 ENCOUNTER — TRANSFERRED RECORDS (OUTPATIENT)
Dept: HEALTH INFORMATION MANAGEMENT | Facility: CLINIC | Age: 37
End: 2022-03-18
Payer: COMMERCIAL

## 2022-03-18 DIAGNOSIS — N31.9 NEUROGENIC BLADDER: Primary | ICD-10-CM

## 2022-03-18 NOTE — TELEPHONE ENCOUNTER
Reason for visit: Neurogenic bladder follow up      Relevant information: Thao     Records/imaging/labs/orders: orders , orders placed per protocol    Pt called: message routed to CC    At Rooming: standard

## 2022-03-21 ENCOUNTER — TRANSFERRED RECORDS (OUTPATIENT)
Dept: HEALTH INFORMATION MANAGEMENT | Facility: CLINIC | Age: 37
End: 2022-03-21

## 2022-04-04 ENCOUNTER — OFFICE VISIT (OUTPATIENT)
Dept: UROLOGY | Facility: CLINIC | Age: 37
End: 2022-04-04
Payer: COMMERCIAL

## 2022-04-04 ENCOUNTER — ANCILLARY PROCEDURE (OUTPATIENT)
Dept: ULTRASOUND IMAGING | Facility: CLINIC | Age: 37
End: 2022-04-04
Attending: UROLOGY
Payer: COMMERCIAL

## 2022-04-04 ENCOUNTER — DOCUMENTATION ONLY (OUTPATIENT)
Dept: UROLOGY | Facility: CLINIC | Age: 37
End: 2022-04-04

## 2022-04-04 VITALS
BODY MASS INDEX: 25.95 KG/M2 | WEIGHT: 141 LBS | DIASTOLIC BLOOD PRESSURE: 80 MMHG | HEIGHT: 62 IN | HEART RATE: 91 BPM | SYSTOLIC BLOOD PRESSURE: 115 MMHG

## 2022-04-04 DIAGNOSIS — N31.9 NEUROGENIC BLADDER: ICD-10-CM

## 2022-04-04 DIAGNOSIS — N31.9 NEUROGENIC BLADDER: Primary | ICD-10-CM

## 2022-04-04 PROCEDURE — 76770 US EXAM ABDO BACK WALL COMP: CPT | Performed by: RADIOLOGY

## 2022-04-04 PROCEDURE — 99214 OFFICE O/P EST MOD 30 MIN: CPT | Performed by: UROLOGY

## 2022-04-04 RX ORDER — CLOMIPHENE CITRATE 50 MG/1
TABLET ORAL
COMMUNITY
End: 2022-09-20

## 2022-04-04 RX ORDER — GABAPENTIN 300 MG/1
CAPSULE ORAL
COMMUNITY
Start: 2022-03-18

## 2022-04-04 RX ORDER — FAMOTIDINE 20 MG/1
TABLET, FILM COATED ORAL
COMMUNITY
Start: 2021-07-15

## 2022-04-04 ASSESSMENT — PAIN SCALES - GENERAL: PAINLEVEL: EXTREME PAIN (8)

## 2022-04-04 NOTE — LETTER
"2022       RE: Minh Altamirano  [address]  [facility] [address]     Dear Colleague,    Thank you for referring your patient, Minh Altamirano, to the [facility] [place] at [facility]. Please see a copy of my visit note below.    HPI:  Minh Altamirano is a 37 year old female with a history of neurogenic bladder secondary to Sacral agenesis. Patient is not wheelchair bound. I did right colon augment in . Last visit with us was Oct 2020.     Right colon augment. CIC per urethra 6-7 x/d with 12F.     One healthy child by  .     Exam:  /80   Pulse 91   Ht 1.575 m (5' 2\")   Wt 64 kg (141 lb)   BMI 25.79 kg/m    GENERAL: Healthy, alert and no distress  EYES: Eyes grossly normal to inspection.  No discharge or erythema, or obvious scleral/conjunctival abnormalities.  RESP: No audible wheeze, cough, or visible cyanosis.  No visible retractions or increased work of breathing.    SKIN: Visible skin clear. No significant rash, abnormal pigmentation or lesions.  NEURO: Cranial nerves grossly intact.  Mentation and speech appropriate for age.  PSYCH: Mentation appears normal, affect normal/bright, judgement and insight intact, normal speech and appearance well-groomed.    Review of Imaging:  The following imaging exams were independently viewed and interpreted by me and discussed with patient:  Renal/Bladder Ultrasound: Normal today; mild right atrophy stable.     Review of Labs:  The following labs were reviewed by me and discussed with the patient:  Cr 0.92    Assessment & Plan   1. neurogenic bladder doing well on CIC     Fady Moore MD  [facility] [place]      ==========================      Additional Coding Information:    Problems:  3 -- one stable chronic illness    Data Reviewed  Review of the result(s) of each unique test - Cr    Independent interpretation of a test " performed by another physician/other qualified health care professional (not separately reported) - US    Level of risk:  3 -- low risk (e.g., OTC medication or observation, minor surgery without risks)    Time spent:  30 minutes spent on the date of the encounter doing chart review, history and exam, documentation and further activities per the note    Fady Moore MD

## 2022-04-04 NOTE — NURSING NOTE
"Chief Complaint   Patient presents with     RECHECK     annual neurogenic bladder follow up        Blood pressure 115/80, pulse 91, height 1.575 m (5' 2\"), weight 64 kg (141 lb), not currently breastfeeding. Body mass index is 25.79 kg/m .    Patient Active Problem List   Diagnosis     Spinal dysraphism (H)     Neurogenic bladder     Renal cyst     S/P      Missed      Caries     Female infertility     History of lumbar laminectomy     Organic sleep disorder     Uterine prolapse       Allergies   Allergen Reactions     Naproxen Hives and Nausea and Vomiting     Bactrim [Sulfamethoxazole W/Trimethoprim] Itching     Gabapentin Itching and Nausea     Vicodin [Hydrocodone-Acetaminophen] Itching       Current Outpatient Medications   Medication Sig Dispense Refill     famotidine (PEPCID) 20 MG tablet        omeprazole (PRILOSEC) 20 MG DR capsule        acetaminophen (TYLENOL) 325 MG tablet Take 1-2 tablets (325-650 mg) by mouth every 6 hours as needed for mild pain 20 tablet 0     cholecalciferol (VITAMIN D3) 125 mcg (5000 units) capsule TAKE 1 CAPSULE BY MOUTH DAILY AS DIRECTED       clomiPHENE (CLOMID) 50 MG tablet        Coenzyme Q10 (COQ10 PO)        Cyanocobalamin (VITAMIN B12 PO)  (Patient not taking: Reported on 2021)       FOLIC ACID PO Take 1 mg by mouth daily (Patient not taking: Reported on 2021)       gabapentin (NEURONTIN) 300 MG capsule TAKE 1 CAPSULE BY MOUTH THREE TIMES DAILY       ibuprofen (ADVIL/MOTRIN) 100 MG tablet Take 100 mg by mouth every 4 hours as needed       polyethylene glycol (MIRALAX) 17 g packet Take 17 g by mouth daily 72 packet 0     Prenatal Vit-Fe Fumarate-FA (PRENATAL MULTIVITAMIN W/IRON) 27-0.8 MG tablet Take 1 tablet by mouth daily 90 tablet 3     SENNA-docusate sodium (SENNA S) 8.6-50 MG tablet Take 1-2 tablets by mouth 2 times daily as needed (constipation) 60 tablet 0       Social History     Tobacco Use     Smoking status: Never Smoker     Smokeless " tobacco: Never Used   Substance Use Topics     Alcohol use: No     Drug use: No       Patricia Giraldo CMA  4/4/2022  4:21 PM

## 2022-04-04 NOTE — PROGRESS NOTES
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 Patient is not wheelchair bound. I did right colon augment in . Last visit with us was Oct 2020.     Right colon augment. CIC per urethra 6-7 x/d with 12F.     One healthy child by  .     Exam:  /80   Pulse 91   Ht 1.575 m (5' 2\")   Wt 64 kg (141 lb)   BMI 25.79 kg/m    GENERAL: Healthy, alert and no distress  EYES: Eyes grossly normal to inspection.  No discharge or erythema, or obvious scleral/conjunctival abnormalities.  RESP: No audible wheeze, cough, or visible cyanosis.  No visible retractions or increased work of breathing.    SKIN: Visible skin clear. No significant rash, abnormal pigmentation or lesions.  NEURO: Cranial nerves grossly intact.  Mentation and speech appropriate for age.  PSYCH: Mentation appears normal, affect normal/bright, judgement and insight intact, normal speech and appearance well-groomed.    Review of Imaging:  The following imaging exams were independently viewed and interpreted by me and discussed with patient:  Renal/Bladder Ultrasound: Normal today; mild right atrophy stable.     Review of Labs:  The following labs were reviewed by me and discussed with the patient:  Cr 0.92    Assessment & Plan   1. neurogenic bladder doing well on CIC     Fady Moore MD  [facility] [place]      ==========================      Additional Coding Information:    Problems:  3 -- one stable chronic illness    Data Reviewed  Review of the result(s) of each unique test - Cr    Independent interpretation of a test performed by another physician/other qualified health care professional (not separately reported) - US    Level of risk:  3 -- low risk (e.g., OTC medication or observation, minor surgery without risks)    Time spent:  30 minutes spent on the date of the encounter doing chart review, history and exam, documentation and " further activities per the note

## 2022-04-04 NOTE — PATIENT INSTRUCTIONS
Follow up in one year with Jerilyn Greene PA-C with renal US.    It was a pleasure meeting with you today.  Thank you for allowing me and my team the privilege of caring for you today.  YOU are the reason we are here, and I truly hope we provided you with the excellent service you deserve.  Please let us know if there is anything else we can do for you so that we can be sure you are leaving completely satisfied with your care experience.      Patricia Giraldo, CMA

## 2022-04-04 NOTE — PROGRESS NOTES
Action 04/04/2022 JTV 4:45pm   Action Taken CSS sent a request to [facility] for images, fax: [phone]. Once images has been received, update Patricia and Dr Cartwright.      Action 04/11/2022 JTV 10:48am   Action Taken CSS received records from [facility]. Records have been sent to scanning. Images were sent through mail and has not been received. Patricia and Dr Cartwright updated.      Action 04/15/22 [facility]   Action Taken  Images received from [facility]. Sent to scanning to be archived.

## 2022-05-08 ENCOUNTER — HEALTH MAINTENANCE LETTER (OUTPATIENT)
Age: 37
End: 2022-05-08

## 2022-05-31 ENCOUNTER — TELEPHONE (OUTPATIENT)
Dept: NEUROSURGERY | Facility: CLINIC | Age: 37
End: 2022-05-31
Payer: COMMERCIAL

## 2022-05-31 NOTE — TELEPHONE ENCOUNTER
[facility] Call Center    Phone Message    May a detailed message be left on voicemail: yes     Reason for Call: Appointment Intake    Referring Provider Name: Dr. Erwin Falk from [facility]    Diagnosis and/or Symptoms: spinal cord tumor    Deko calling to request a call back to schedule an appointment. She stated that she saw Dr. Falk today and he gave her our number to call to schedule an appointment.    Action Taken: Message routed to:  [facility] ([facility]    Travel Screening: Not Applicable

## 2022-06-01 ENCOUNTER — TRANSCRIBE ORDERS (OUTPATIENT)
Dept: OTHER | Age: 37
End: 2022-06-01
Payer: COMMERCIAL

## 2022-06-01 DIAGNOSIS — M79.604 LEG PAIN, RIGHT: Primary | ICD-10-CM

## 2022-06-02 NOTE — TELEPHONE ENCOUNTER
[facility] Call Center    Phone Message    May a detailed message be left on voicemail: yes     Reason for Call: Other: Pt called back and stated that she still has not received a call to schedule an appt for neurosurgery. Please call Pt back ASAP as she states that she wants to get this taken care of quickly.      Action Taken: Message routed to:  [facility] ([facility]    Travel Screening: Not Applicable

## 2022-06-09 NOTE — TELEPHONE ENCOUNTER
SPINE PATIENTS - NEW PROTOCOL PREVISIT    RECORDS RECEIVED FROM: external   REASON FOR VISIT: Leg pain, right/ FLUP of Tethered Spinal Cord   Date of Appt: 6/21/22   NOTES (FOR ALL VISITS) STATUS DETAILS   OFFICE NOTE from referring provider Received Dr Falk @ [facility]:  3/18/22   [facility] REPORT Received [facility]:  1/4/22   MEDICATION LIST Received    IMAGING  (FOR ALL VISITS)     MRI (HEAD, NECK, SPINE) Received [facility]:  MRI Lumbar Spine 12/14/21

## 2022-06-16 ENCOUNTER — TELEPHONE (OUTPATIENT)
Dept: NEUROSURGERY | Facility: CLINIC | Age: 37
End: 2022-06-16
Payer: COMMERCIAL

## 2022-06-21 ENCOUNTER — PRE VISIT (OUTPATIENT)
Dept: NEUROSURGERY | Facility: CLINIC | Age: 37
End: 2022-06-21
Payer: COMMERCIAL

## 2022-06-28 ENCOUNTER — OFFICE VISIT (OUTPATIENT)
Dept: NEUROSURGERY | Facility: CLINIC | Age: 37
End: 2022-06-28
Attending: NEUROLOGICAL SURGERY
Payer: COMMERCIAL

## 2022-06-28 VITALS — WEIGHT: 141.09 LBS | HEIGHT: 61 IN | BODY MASS INDEX: 26.64 KG/M2

## 2022-06-28 DIAGNOSIS — D17.79 LIPOMA OF SPINAL CORD: ICD-10-CM

## 2022-06-28 DIAGNOSIS — Q06.8 TETHERED SPINAL CORD (H): ICD-10-CM

## 2022-06-28 DIAGNOSIS — N31.9 NEUROGENIC BLADDER: Primary | ICD-10-CM

## 2022-06-28 PROCEDURE — 99204 OFFICE O/P NEW MOD 45 MIN: CPT | Performed by: NEUROLOGICAL SURGERY

## 2022-06-28 PROCEDURE — G0463 HOSPITAL OUTPT CLINIC VISIT: HCPCS

## 2022-06-28 RX ORDER — FOLLITROPIN 300 [IU]/.36ML
INJECTION, SOLUTION SUBCUTANEOUS
COMMUNITY
Start: 2022-06-20 | End: 2022-09-20

## 2022-06-28 RX ORDER — CHORIOGONADOTROPIN ALFA 250 UG/.5ML
INJECTION, SOLUTION SUBCUTANEOUS
COMMUNITY
Start: 2022-06-22 | End: 2022-09-20

## 2022-06-28 NOTE — PATIENT INSTRUCTIONS
You met with Pediatric Neurosurgery at the [facility]    EDUARD[address]ois [address]., Dr., NP      Pediatric Appointment Scheduling and Call Center:   [phone]    Nurse Practitioner  [phone]    Mailing Address  [address] [address]    Street Address   [address] [place] [address]    Fax Number  [phone]    For urgent matters that cannot wait until the next business day, occur over a holiday and/or weekend, report directly to your nearest ER or you may call [phone] and ask to page the Pediatric Neurosurgery Resident on call.

## 2022-06-28 NOTE — LETTER
6/28/2022      RE: Minh Altamirano  [address]  [facility] [address]     Dear Colleague,    Thank you for the opportunity to participate in the care of your patient, Minh Altamirano, at the [facility] at [facility]. Please see a copy of my visit note below.           Pediatric Neurosurgery Clinic    Dear Dr. Goldman,   Thank you for referring Minh Altamirano to the pediatric neurosurgery clinic at the [facility].   I had the opportunity to meet with Minh Altamirano on June 28, 2022.    As you know, Minh is a 37 year old female with a past medical history significant for partial untethering with partial resection of distal lipoma in December 2010 with Dr Garcia. She reports that she had been doing well without any pain until December 2021 when she was referred to neurology for leg pain and weakness. She states that she started to have lower back pain at and slightly below her incision that radiates on right leg, typically from her toes, upwards to her lower back. She states sometimes the pain radiates around her waist. She states the pain is worse with walking and is improved with rest. She started on Gabapentin which she states has improved her pain, as well as some of her right leg numbness (in R L5-S1 distribution). She had bladder augmentation in 2012 and has been straight cathing 6-7 times per day, frequent urinary tract infections. She states she has also been constipated and is frequently taking medications to help her move her bowels daily.     Past Medical History:   Diagnosis Date     Anemia      Chronic infection     MRSA     Constipation, chronic      Incontinence of urine      Lipoma of spinal cord      Migraine      neurogenic bladder      Spinal dysraphism (H)        Past Surgical History:   Procedure Laterality Date     BLADDER AUGMENTATION  1/5/2012     Procedure:BLADDER AUGMENTATION; Surgeon:TRINA MOORE; [facility]      SECTION N/A 2018    Procedure:  SECTION;  Primary  Section  with classical incision;  Surgeon: Lily Villanueva MD;  [facility]     CYSTOSCOPY, INTRAVESICAL INJECTION N/A 2016    Procedure: CYSTOSCOPY, INTRAVESICAL INJECTION;  Surgeon: Trina Moore MD;  [facility]     DILATION AND CURETTAGE SUCTION N/A 2019    Procedure: suction dilation and curetage,;  Surgeon: Radha Hickey MD;  [facility]     EXAM UNDER ANESTHESIA PELVIC N/A 2019    Procedure: pelvic exam under anesthesia;  Surgeon: Radha Hickey MD;  [facility]     LAMINECTOMY LUMBAR TWO LEVELS       LAPAROTOMY EXPLORATORY  2012    Procedure:LAPAROTOMY EXPLORATORY; Exploratory Laparotomy with Takedown of Mitrofanoff, Ventral Hernia Repair with Strattice Mesh implantation ; Surgeon:TRINA MOORE; [facility]     MITROFANOFF PROCEDURE (APPENDIX CONDUIT)  2012    Procedure:MITROFANOFF PROCEDURE (APPENDIX CONDUIT); Bladder Augmentation with Right Colon, Appendix Conduit- Mitrofanoff, Insertion of Pubovaginal Sling using Autologous Rectus Fascia; Surgeon:TRINA MOORE; [facility]     tumor resection and cord detethering         ALLERGIES:  Naproxen, Bactrim [sulfamethoxazole w/trimethoprim], Gabapentin, and Vicodin [hydrocodone-acetaminophen]    Current Outpatient Medications   Medication Sig Dispense Refill     acetaminophen (TYLENOL) 325 MG tablet Take 1-2 tablets (325-650 mg) by mouth every 6 hours as needed for mild pain 20 tablet 0     cholecalciferol (VITAMIN D3) 125 mcg (5000 units) capsule TAKE 1 CAPSULE BY MOUTH DAILY AS DIRECTED       clomiPHENE (CLOMID) 50 MG tablet        Coenzyme Q10 (COQ10 PO)        famotidine (PEPCID) 20 MG tablet        FOLIC ACID PO Take 1 mg by mouth daily       gabapentin (NEURONTIN) 300 MG capsule TAKE 1 CAPSULE BY MOUTH  "THREE TIMES DAILY       ibuprofen (ADVIL/MOTRIN) 100 MG tablet Take 100 mg by mouth every 4 hours as needed       omeprazole (PRILOSEC) 20 MG DR capsule        polyethylene glycol (MIRALAX) 17 g packet Take 17 g by mouth daily 72 packet 0     Prenatal Vit-Fe Fumarate-FA (PRENATAL MULTIVITAMIN W/IRON) 27-0.8 MG tablet Take 1 tablet by mouth daily 90 tablet 3     SENNA-docusate sodium (SENNA S) 8.6-50 MG tablet Take 1-2 tablets by mouth 2 times daily as needed (constipation) 60 tablet 0     Cyanocobalamin (VITAMIN B12 PO)  (Patient not taking: No sig reported)       FOLLISTIM  UNT/0.36ML cartridge INJECT 75 UNITS UNDER THE SKIN AS DIRECTED       OVIDREL 250 MCG/0.5ML syringe SC INJ          No family history on file.    Social History     Tobacco Use     Smoking status: Never Smoker     Smokeless tobacco: Never Used   Substance Use Topics     Alcohol use: No       On review of systems, a 10 point ROS of systems including Constitutional, Eyes, Respiratory, Cardiovascular, Gastroenterology, Genitourinary, Integumentary, Muscularskeletal, Psychiatric were all negative except for pertinent positives noted in my HPI.     PHYSICAL EXAM:   Ht 1.537 m (5' 0.51\")   Wt 64 kg (141 lb 1.5 oz)   HC 56.1 cm (22.09\")   BMI 27.09 kg/m      Alert and oriented to person, place, and time. No acute distress.   PERRL, EOMI. Face symmetric. Tongue midline.   No pronator drift.   BUE 5/5, RLE 4/5, LLE 5/5, R calf atrophy  Decreased sensation to RLE worse in feet. Gait is normal. Posterior back incision well healed    IMAGES: MRI reviewed with patient  Abnormal/dysplastic appearance of the sacrum and coccyx    ASSESSMENT:  Minh Altamirano, 37 year old female with a past medical history significant for partial untethering with partial resection of distal lipoma in December 2010 with Dr Garcia,    PLAN:  - we will refer Minh to non op spine for ongoing management  -we would like to see Minh back in 1 year without imaging  - Minh TREADWELL " Adarsh and family were counseled to please contact us with any acute worsening of symptoms, or with any questions or concerns.     This patient was discussed with neurosurgery faculty, who agrees with the above.  Velia Beckett NP on 6/28/2022 at 8:59 PM      Attestation signed by Richy López MD at 7/5/2022 10:31 AM:      Physician Attestation     I, Richy López MD, saw and evaluated Minh Altamirano as part of a shared APRN/PA visit.     I personally reviewed the vital signs, medications, labs, and imaging.    I personally performed the substantive portion of the medical decision making for this visit - please see the YISEL's documentation for full details.      Key management decisions made by me and carried out under my direction:     It was a pleasure meeting Deco in neurosurgery clinic today as part of a shared visit with Velia.  As noted, this is a very pleasant 37-year-old with a complex history of spinal cord lipoma.  Due to the complex nature of this lesion, it is not possible to obtain complete circumferential untethering.  Dr. Garcia did a partial untethering procedure in 2010.  She is having symptoms including pain, and some weakness.  Her symptoms are consistent with tethered cord syndrome.  She also has symptoms and concerns of neurogenic bowel and neurogenic bladder which have been stable.  Her MRI scan supports likely tethered spinal cord.      We discussed options which include control of symptoms via pain medications, as well as possible spinal cord stimulation which could control only her pain symptoms.  We also discussed the option of repeat spinal cord untethering which is likely to be unsuccessful given the complex nature of her lipoma.  We also discussed the possibility of spinal column shortening which can relieve tension on her spinal cord and could potentially relieve other symptoms in addition to pain.      As a first step, we would like to refer her to pain clinic  for further discussion of possible changes or increasing neuropathic pain medications and also to discuss potential spinal cord stimulation trial.  We are happy to see her back to discuss other surgical options if none if these other measures are not successful. We will schedule a follow up for one year.     Richy López MD  Date of Service (when I saw the patient): 6/28/2022

## 2022-06-28 NOTE — NURSING NOTE
"Chief Complaint   Patient presents with     Consult     Tethered spinal cord        Ht 5' 0.51\" (153.7 cm)   Wt 141 lb 1.5 oz (64 kg)   HC 56.1 cm (22.09\")   BMI 27.09 kg/m      Jenniffer Malagon  June 28, 2022  "

## 2022-06-29 NOTE — PROGRESS NOTES
Pediatric Neurosurgery Clinic    Dear Dr. Goldman,   Thank you for referring Minh Altamirano to the pediatric neurosurgery clinic at the [facility].   I had the opportunity to meet with Minh Altamirano on 2022.    As you know, Minh is a 37 year old female with a past medical history significant for partial untethering with partial resection of distal lipoma in 2010 with Dr Garcia. She reports that she had been doing well without any pain until 2021 when she was referred to neurology for leg pain and weakness. She states that she started to have lower back pain at and slightly below her incision that radiates on right leg, typically from her toes, upwards to her lower back. She states sometimes the pain radiates around her waist. She states the pain is worse with walking and is improved with rest. She started on Gabapentin which she states has improved her pain, as well as some of her right leg numbness (in R L5-S1 distribution). She had bladder augmentation in  and has been straight cathing 6-7 times per day, frequent urinary tract infections. She states she has also been constipated and is frequently taking medications to help her move her bowels daily.     Past Medical History:   Diagnosis Date     Anemia      Chronic infection     MRSA     Constipation, chronic      Incontinence of urine      Lipoma of spinal cord      Migraine      neurogenic bladder      Spinal dysraphism (H)        Past Surgical History:   Procedure Laterality Date     BLADDER AUGMENTATION  2012    Procedure:BLADDER AUGMENTATION; Surgeon:TRINA COLLADO; [facility]      SECTION N/A 2018    Procedure:  SECTION;  Primary  Section  with classical incision;  Surgeon: Lily Villanueva MD;  [facility]     CYSTOSCOPY, INTRAVESICAL INJECTION N/A 2016    Procedure: CYSTOSCOPY, INTRAVESICAL INJECTION;  Surgeon:  Trina Moore MD;  [facility]     DILATION AND CURETTAGE SUCTION N/A 11/27/2019    Procedure: suction dilation and curetage,;  Surgeon: Radha Hickey MD;  [facility]     EXAM UNDER ANESTHESIA PELVIC N/A 11/27/2019    Procedure: pelvic exam under anesthesia;  Surgeon: Radha Hickey MD;  [facility]     LAMINECTOMY LUMBAR TWO LEVELS       LAPAROTOMY EXPLORATORY  1/11/2012    Procedure:LAPAROTOMY EXPLORATORY; Exploratory Laparotomy with Takedown of Mitrofanoff, Ventral Hernia Repair with Strattice Mesh implantation ; Surgeon:TRINA MOORE; [facility]     MITROFANOFF PROCEDURE (APPENDIX CONDUIT)  1/5/2012    Procedure:MITROFANOFF PROCEDURE (APPENDIX CONDUIT); Bladder Augmentation with Right Colon, Appendix Conduit- Mitrofanoff, Insertion of Pubovaginal Sling using Autologous Rectus Fascia; Surgeon:TRINA MOORE; [facility]     tumor resection and cord detethering         ALLERGIES:  Naproxen, Bactrim [sulfamethoxazole w/trimethoprim], Gabapentin, and Vicodin [hydrocodone-acetaminophen]    Current Outpatient Medications   Medication Sig Dispense Refill     acetaminophen (TYLENOL) 325 MG tablet Take 1-2 tablets (325-650 mg) by mouth every 6 hours as needed for mild pain 20 tablet 0     cholecalciferol (VITAMIN D3) 125 mcg (5000 units) capsule TAKE 1 CAPSULE BY MOUTH DAILY AS DIRECTED       clomiPHENE (CLOMID) 50 MG tablet        Coenzyme Q10 (COQ10 PO)        famotidine (PEPCID) 20 MG tablet        FOLIC ACID PO Take 1 mg by mouth daily       gabapentin (NEURONTIN) 300 MG capsule TAKE 1 CAPSULE BY MOUTH THREE TIMES DAILY       ibuprofen (ADVIL/MOTRIN) 100 MG tablet Take 100 mg by mouth every 4 hours as needed       omeprazole (PRILOSEC) 20 MG DR capsule        polyethylene glycol (MIRALAX) 17 g packet Take 17 g by mouth daily 72 packet 0     Prenatal Vit-Fe Fumarate-FA (PRENATAL MULTIVITAMIN W/IRON) 27-0.8 MG tablet Take 1 tablet by mouth daily 90 tablet 3      "SENNA-docusate sodium (SENNA S) 8.6-50 MG tablet Take 1-2 tablets by mouth 2 times daily as needed (constipation) 60 tablet 0     Cyanocobalamin (VITAMIN B12 PO)  (Patient not taking: No sig reported)       FOLLISTIM  UNT/0.36ML cartridge INJECT 75 UNITS UNDER THE SKIN AS DIRECTED       OVIDREL 250 MCG/0.5ML syringe SC INJ          No family history on file.    Social History     Tobacco Use     Smoking status: Never Smoker     Smokeless tobacco: Never Used   Substance Use Topics     Alcohol use: No       On review of systems, a 10 point ROS of systems including Constitutional, Eyes, Respiratory, Cardiovascular, Gastroenterology, Genitourinary, Integumentary, Muscularskeletal, Psychiatric were all negative except for pertinent positives noted in my HPI.     PHYSICAL EXAM:   Ht 1.537 m (5' 0.51\")   Wt 64 kg (141 lb 1.5 oz)   HC 56.1 cm (22.09\")   BMI 27.09 kg/m      Alert and oriented to person, place, and time. No acute distress.   PERRL, EOMI. Face symmetric. Tongue midline.   No pronator drift.   BUE 5/5, RLE 4/5, LLE 5/5, R calf atrophy  Decreased sensation to RLE worse in feet. Gait is normal. Posterior back incision well healed    IMAGES: MRI reviewed with patient  Abnormal/dysplastic appearance of the sacrum and coccyx    ASSESSMENT:  Minh Altamirano, 37 year old female with a past medical history significant for partial untethering with partial resection of distal lipoma in December 2010 with Dr Garcia,    PLAN:  - we will refer Minh to non op spine for ongoing management  -we would like to see Minh back in 1 year without imaging  - Minh Altamirano and family were counseled to please contact us with any acute worsening of symptoms, or with any questions or concerns.     This patient was discussed with neurosurgery faculty, who agrees with the above.  Velia Beckett NP on 6/28/2022 at 8:59 PM    "

## 2022-08-30 ENCOUNTER — OFFICE VISIT (OUTPATIENT)
Dept: NEUROSURGERY | Facility: CLINIC | Age: 37
End: 2022-08-30
Payer: COMMERCIAL

## 2022-08-30 VITALS
BODY MASS INDEX: 26.17 KG/M2 | DIASTOLIC BLOOD PRESSURE: 79 MMHG | OXYGEN SATURATION: 97 % | HEIGHT: 61 IN | HEART RATE: 60 BPM | SYSTOLIC BLOOD PRESSURE: 123 MMHG | WEIGHT: 138.6 LBS

## 2022-08-30 DIAGNOSIS — Z86.69 HISTORY OF TETHERED SPINAL CORD: ICD-10-CM

## 2022-08-30 DIAGNOSIS — Q05.7 SPINA BIFIDA OF LUMBAR REGION WITHOUT HYDROCEPHALUS (H): Primary | ICD-10-CM

## 2022-08-30 PROCEDURE — 99213 OFFICE O/P EST LOW 20 MIN: CPT | Mod: GC | Performed by: NEUROLOGICAL SURGERY

## 2022-08-30 ASSESSMENT — PAIN SCALES - GENERAL: PAINLEVEL: EXTREME PAIN (8)

## 2022-08-30 NOTE — PATIENT INSTRUCTIONS
Dr Zelaya has entered a referral to our pain management providers to begin the evaluation for spinal cord stimulation.  A member of our scheduling team will reach out and help you coordinate that appointment.

## 2022-08-30 NOTE — LETTER
8/30/2022       RE: Minh Altamirano  [address]  [facility] [address]     Dear Colleague,    Thank you for referring your patient, Minh Altamirano, to the [facility]UR[facility] [place] at [facility]. Please see a copy of my visit note below.    [facility], [place]       NEUROSURGERY H&P NOTE    HPI:  Minh is a 37 year old female with a past medical history significant for partial untethering with partial resection of distal lipoma in December 2010 with Dr Garcia. She reports that she had been doing well without any pain until December 2021. She reports worsening pain in her lower back, and pain radiating down her right leg upto her toes. She reports that the pain is worse with walking and is improved with rest. She reports that the pain is life style limiting and occurs at rest and even when she sleeps. She reports that she has had bladder augmentation in 2012 and has been straight cathing 6-7 times per day. She endorses episodes of fecal incontinence since December. She reports no weakness, but does say that she has some numbness down her right leg. No recent fevers, chills, nausea, vomiting, chest pain, shortness of breath, and denies headaches, weakness, LOC, weakness/paresthesias in extremities, changes in sensation, taste, smell, nor trouble speaking or other neurologic symptoms. Her symptoms are consistent with re tethering in agreement with the imaging.    She was recently seen by Dr López, who discussed control of symptoms via pain medications, as well as possible spinal cord stimulation. He also discussed the option of repeat spinal cord untethering which is likely to be unsuccessful given the complex nature of her lipoma.  Additionally, he suggested the option of spinal column shortening which can relieve tension on her spinal cord and could potentially relieve other symptoms in  addition to pain. She was referred to Dr Zelaya for evaluation for spinal column shortening.      PAST MEDICAL HISTORY:   Past Medical History:   Diagnosis Date     Anemia      Chronic infection     MRSA     Constipation, chronic      Incontinence of urine      Lipoma of spinal cord      Migraine      neurogenic bladder      Spinal dysraphism (H)        PAST SURGICAL HISTORY:   Past Surgical History:   Procedure Laterality Date     BLADDER AUGMENTATION  2012    Procedure:BLADDER AUGMENTATION; Surgeon:TRINA MOORE; [facility]      SECTION N/A 2018    Procedure:  SECTION;  Primary  Section  with classical incision;  Surgeon: Lily Villanueva MD;  [facility]     CYSTOSCOPY, INTRAVESICAL INJECTION N/A 2016    Procedure: CYSTOSCOPY, INTRAVESICAL INJECTION;  Surgeon: Trina Moore MD;  [facility]     DILATION AND CURETTAGE SUCTION N/A 2019    Procedure: suction dilation and curetage,;  Surgeon: Radha Hickey MD;  [facility]     EXAM UNDER ANESTHESIA PELVIC N/A 2019    Procedure: pelvic exam under anesthesia;  Surgeon: Radha Hickey MD;  [facility]     LAMINECTOMY LUMBAR TWO LEVELS       LAPAROTOMY EXPLORATORY  2012    Procedure:LAPAROTOMY EXPLORATORY; Exploratory Laparotomy with Takedown of Mitrofanoff, Ventral Hernia Repair with Strattice Mesh implantation ; Surgeon:TRINA MOORE; [facility]     MITROFANOFF PROCEDURE (APPENDIX CONDUIT)  2012    Procedure:MITROFANOFF PROCEDURE (APPENDIX CONDUIT); Bladder Augmentation with Right Colon, Appendix Conduit- Mitrofanoff, Insertion of Pubovaginal Sling using Autologous Rectus Fascia; Surgeon:TRINA MOORE; [facility]     tumor resection and cord detethering         FAMILY HISTORY: No family history on file.    SOCIAL HISTORY:   Social History     Tobacco Use     Smoking status: Never Smoker     Smokeless tobacco: Never Used   Substance Use  Topics     Alcohol use: No       MEDICATIONS:  Current Outpatient Medications   Medication Sig Dispense Refill     acetaminophen (TYLENOL) 325 MG tablet Take 1-2 tablets (325-650 mg) by mouth every 6 hours as needed for mild pain 20 tablet 0     cholecalciferol (VITAMIN D3) 125 mcg (5000 units) capsule TAKE 1 CAPSULE BY MOUTH DAILY AS DIRECTED       famotidine (PEPCID) 20 MG tablet        FOLIC ACID PO Take 1 mg by mouth daily       gabapentin (NEURONTIN) 300 MG capsule TAKE 1 CAPSULE BY MOUTH THREE TIMES DAILY       ibuprofen (ADVIL/MOTRIN) 100 MG tablet Take 100 mg by mouth every 4 hours as needed       omeprazole (PRILOSEC) 20 MG DR capsule        polyethylene glycol (MIRALAX) 17 g packet Take 17 g by mouth daily 72 packet 0     Prenatal Vit-Fe Fumarate-FA (PRENATAL MULTIVITAMIN W/IRON) 27-0.8 MG tablet Take 1 tablet by mouth daily 90 tablet 3     SENNA-docusate sodium (SENNA S) 8.6-50 MG tablet Take 1-2 tablets by mouth 2 times daily as needed (constipation) 60 tablet 0     clomiPHENE (CLOMID) 50 MG tablet  (Patient not taking: Reported on 8/30/2022)       Coenzyme Q10 (COQ10 PO)  (Patient not taking: Reported on 8/30/2022)       Cyanocobalamin (VITAMIN B12 PO)  (Patient not taking: No sig reported)       FOLLISTIM  UNT/0.36ML cartridge INJECT 75 UNITS UNDER THE SKIN AS DIRECTED (Patient not taking: Reported on 8/30/2022)       OVIDREL 250 MCG/0.5ML syringe SC INJ  (Patient not taking: Reported on 8/30/2022)         Allergies:  Allergies   Allergen Reactions     Naproxen Hives and Nausea and Vomiting     Bactrim [Sulfamethoxazole W/Trimethoprim] Itching     Gabapentin Itching and Nausea     Vicodin [Hydrocodone-Acetaminophen] Itching       ROS: 10 point ROS of systems including Constitutional, Eyes, Respiratory, Cardiovascular, Gastroenterology, Genitourinary, Integumentary, Muscularskeletal, Psychiatric were all negative except for pertinent positives noted in my HPI.    Physical exam:   Blood pressure  "123/79, pulse 60, height 1.549 m (5' 1\"), weight 62.9 kg (138 lb 9.6 oz), SpO2 97 %, not currently breastfeeding.  General: awake and alert  PULM: breathing comfortably on room air  NEUROLOGIC:  -- Awake; Alert; oriented x 3  -- Follows commands briskly    Motor:  Normal bulk / tone; no tremor, rigidity, or bradykinesia.  No muscle wasting or fasciculations  No Pronator Drift     Delt Bi Tri Hand Flexion/  Extension Iliopsoas Quadriceps Hamstrings Tibialis Anterior Gastroc    C5 C6 C7 C8/T1 L2 L3 L4-S1 L4 S1   R 5 5 5 5 5 4+ 4+ 5 5   L 5 5 5 5 5 5 5 5 5     Sensory:  Decreased sensation to RLE worse in feet         Bi Tri BR Bennett Pat Ach Bab     C5-6 C7-8 C6 UMN L2-4 S1 UMN   R 2+ 2+ 2+ Norm 2+ 2+ Norm   L 2+ 2+ 2+ Norm 2+ 2+ Norm     IMAGING:  Abnormal/dysplastic appearance of the sacrum and coccyx    ASSESSMENT AND PLAN :    Minh Altamirano, 37 year old female with a past medical history significant for partial untethering with partial resection of distal lipoma in December 2010 with Dr Garcia presenting with low back pain and pain shooting down the right lower extremity with associated fecal incontinence with imaging findings consistent with re tethering. We seconded Dr López's recommendations and elaborated on the options: spinal cord stimulation, repeat spinal cord untethering or spinal column shortening. We discussed that repeat spinal cord untethering is likely to be unsuccessful given the complex nature of her lipoma. We discussed the indications, benefits and potential complications of spinal column shortening and how it can theoretically relieve tension on her spinal cord and could potentially relieve other symptoms in addition to pain. We also elaborated on spinal cord stimulation. She wanted to be evaluated for spinal cord stimulation, and wanted time to consider all her options to find the best solution for her problem. She was happy with the plan. We placed a referral for spinal cord stimulator " evaluation, and we will follow with her as needed.    The patient was examined and discussed with Dr. Zelaya, who agrees with the plan.    Ashu Dyson MD  Neurosurgery Resident  Pager- [phone]      Sincerely,    Sri Zelaya MD

## 2022-08-30 NOTE — PROGRESS NOTES
[facility]       NEUROSURGERY H&P NOTE    HPI:  Minh is a 37 year old female with a past medical history significant for partial untethering with partial resection of distal lipoma in December 2010 with Dr Garcia. She reports that she had been doing well without any pain until December 2021. She reports worsening pain in her lower back, and pain radiating down her right leg upto her toes. She reports that the pain is worse with walking and is improved with rest. She reports that the pain is life style limiting and occurs at rest and even when she sleeps. She reports that she has had bladder augmentation in 2012 and has been straight cathing 6-7 times per day. She endorses episodes of fecal incontinence since December. She reports no weakness, but does say that she has some numbness down her right leg. No recent fevers, chills, nausea, vomiting, chest pain, shortness of breath, and denies headaches, weakness, LOC, weakness/paresthesias in extremities, changes in sensation, taste, smell, nor trouble speaking or other neurologic symptoms. Her symptoms are consistent with re tethering in agreement with the imaging.    She was recently seen by Dr López, who discussed control of symptoms via pain medications, as well as possible spinal cord stimulation. He also discussed the option of repeat spinal cord untethering which is likely to be unsuccessful given the complex nature of her lipoma.  Additionally, he suggested the option of spinal column shortening which can relieve tension on her spinal cord and could potentially relieve other symptoms in addition to pain. She was referred to Dr Zelaya for evaluation for spinal column shortening.      PAST MEDICAL HISTORY:   Past Medical History:   Diagnosis Date     Anemia      Chronic infection     MRSA     Constipation, chronic      Incontinence of urine      Lipoma of spinal cord      Migraine      neurogenic bladder      Spinal  dysraphism (H)        PAST SURGICAL HISTORY:   Past Surgical History:   Procedure Laterality Date     BLADDER AUGMENTATION  2012    Procedure:BLADDER AUGMENTATION; Surgeon:TRINA MOORE; Location:[facility] OR      SECTION N/A 2018    Procedure:  SECTION;  Primary  Section  with classical incision;  Surgeon: Lily Villanueva MD;  Location: [facility] OR     CYSTOSCOPY, INTRAVESICAL INJECTION N/A 2016    Procedure: CYSTOSCOPY, INTRAVESICAL INJECTION;  Surgeon: Trina Moore MD;  Location: [facility] OR     DILATION AND CURETTAGE SUCTION N/A 2019    Procedure: suction dilation and curetage,;  Surgeon: Radha Hickey MD;  Location: [facility] OR     EXAM UNDER ANESTHESIA PELVIC N/A 2019    Procedure: pelvic exam under anesthesia;  Surgeon: Radha Hickey MD;  Location: [facility] OR     LAMINECTOMY LUMBAR TWO LEVELS       LAPAROTOMY EXPLORATORY  2012    Procedure:LAPAROTOMY EXPLORATORY; Exploratory Laparotomy with Takedown of Mitrofanoff, Ventral Hernia Repair with Strattice Mesh implantation ; Surgeon:TRINA MOORE; Location:[facility] OR     MITROFANOFF PROCEDURE (APPENDIX CONDUIT)  2012    Procedure:MITROFANOFF PROCEDURE (APPENDIX CONDUIT); Bladder Augmentation with Right Colon, Appendix Conduit- Mitrofanoff, Insertion of Pubovaginal Sling using Autologous Rectus Fascia; Surgeon:TRINA MOORE; Location:[facility] OR     tumor resection and cord detethering         FAMILY HISTORY: No family history on file.    SOCIAL HISTORY:   Social History     Tobacco Use     Smoking status: Never Smoker     Smokeless tobacco: Never Used   Substance Use Topics     Alcohol use: No       MEDICATIONS:  Current Outpatient Medications   Medication Sig Dispense Refill     acetaminophen (TYLENOL) 325 MG tablet Take 1-2 tablets (325-650 mg) by mouth every 6 hours as needed for mild pain 20 tablet 0     cholecalciferol (VITAMIN D3) 125 mcg (5000 units) capsule TAKE 1 CAPSULE BY MOUTH DAILY  "AS DIRECTED       famotidine (PEPCID) 20 MG tablet        FOLIC ACID PO Take 1 mg by mouth daily       gabapentin (NEURONTIN) 300 MG capsule TAKE 1 CAPSULE BY MOUTH THREE TIMES DAILY       ibuprofen (ADVIL/MOTRIN) 100 MG tablet Take 100 mg by mouth every 4 hours as needed       omeprazole (PRILOSEC) 20 MG DR capsule        polyethylene glycol (MIRALAX) 17 g packet Take 17 g by mouth daily 72 packet 0     Prenatal Vit-Fe Fumarate-FA (PRENATAL MULTIVITAMIN W/IRON) 27-0.8 MG tablet Take 1 tablet by mouth daily 90 tablet 3     SENNA-docusate sodium (SENNA S) 8.6-50 MG tablet Take 1-2 tablets by mouth 2 times daily as needed (constipation) 60 tablet 0     clomiPHENE (CLOMID) 50 MG tablet  (Patient not taking: Reported on 8/30/2022)       Coenzyme Q10 (COQ10 PO)  (Patient not taking: Reported on 8/30/2022)       Cyanocobalamin (VITAMIN B12 PO)  (Patient not taking: No sig reported)       FOLLISTIM  UNT/0.36ML cartridge INJECT 75 UNITS UNDER THE SKIN AS DIRECTED (Patient not taking: Reported on 8/30/2022)       OVIDREL 250 MCG/0.5ML syringe SC INJ  (Patient not taking: Reported on 8/30/2022)         Allergies:  Allergies   Allergen Reactions     Naproxen Hives and Nausea and Vomiting     Bactrim [Sulfamethoxazole W/Trimethoprim] Itching     Gabapentin Itching and Nausea     Vicodin [Hydrocodone-Acetaminophen] Itching       ROS: 10 point ROS of systems including Constitutional, Eyes, Respiratory, Cardiovascular, Gastroenterology, Genitourinary, Integumentary, Muscularskeletal, Psychiatric were all negative except for pertinent positives noted in my HPI.    Physical exam:   Blood pressure 123/79, pulse 60, height 1.549 m (5' 1\"), weight 62.9 kg (138 lb 9.6 oz), SpO2 97 %, not currently breastfeeding.  General: awake and alert  PULM: breathing comfortably on room air  NEUROLOGIC:  -- Awake; Alert; oriented x 3  -- Follows commands briskly    Motor:  Normal bulk / tone; no tremor, rigidity, or bradykinesia.  No muscle " wasting or fasciculations  No Pronator Drift     Delt Bi Tri Hand Flexion/  Extension Iliopsoas Quadriceps Hamstrings Tibialis Anterior Gastroc    C5 C6 C7 C8/T1 L2 L3 L4-S1 L4 S1   R 5 5 5 5 5 4+ 4+ 5 5   L 5 5 5 5 5 5 5 5 5     Sensory:  Decreased sensation to RLE worse in feet         Bi Tri BR Bennett Pat Ach Bab     C5-6 C7-8 C6 UMN L2-4 S1 UMN   R 2+ 2+ 2+ Norm 2+ 2+ Norm   L 2+ 2+ 2+ Norm 2+ 2+ Norm     IMAGING:  Abnormal/dysplastic appearance of the sacrum and coccyx    ASSESSMENT AND PLAN :    Minh Altamirano, 37 year old female with a past medical history significant for partial untethering with partial resection of distal lipoma in December 2010 with Dr Garcia presenting with low back pain and pain shooting down the right lower extremity with associated fecal incontinence with imaging findings consistent with re tethering. We seconded Dr López's recommendations and elaborated on the options: spinal cord stimulation, repeat spinal cord untethering or spinal column shortening. We discussed that repeat spinal cord untethering is likely to be unsuccessful given the complex nature of her lipoma. We discussed the indications, benefits and potential complications of spinal column shortening and how it can theoretically relieve tension on her spinal cord and could potentially relieve other symptoms in addition to pain. We also elaborated on spinal cord stimulation. She wanted to be evaluated for spinal cord stimulation, and wanted time to consider all her options to find the best solution for her problem. She was happy with the plan. We placed a referral for spinal cord stimulator evaluation, and we will follow with her as needed.    The patient was examined and discussed with Dr. Zelaya, who agrees with the plan.    Ashu Dyson MD  Neurosurgery Resident  Pager- [phone]    I have seen this patient with the resident and formulated a plan and agree with this note.  AMP

## 2022-09-20 ENCOUNTER — OFFICE VISIT (OUTPATIENT)
Dept: FAMILY MEDICINE | Facility: CLINIC | Age: 37
End: 2022-09-20
Payer: COMMERCIAL

## 2022-09-20 VITALS
TEMPERATURE: 97.5 F | SYSTOLIC BLOOD PRESSURE: 110 MMHG | HEART RATE: 66 BPM | HEIGHT: 61 IN | OXYGEN SATURATION: 100 % | DIASTOLIC BLOOD PRESSURE: 60 MMHG | BODY MASS INDEX: 25.98 KG/M2 | RESPIRATION RATE: 20 BRPM | WEIGHT: 137.6 LBS

## 2022-09-20 DIAGNOSIS — Z86.69 HISTORY OF TETHERED SPINAL CORD: ICD-10-CM

## 2022-09-20 DIAGNOSIS — Z00.00 ROUTINE GENERAL MEDICAL EXAMINATION AT A HEALTH CARE FACILITY: Primary | ICD-10-CM

## 2022-09-20 DIAGNOSIS — Z13.1 SCREENING FOR DIABETES MELLITUS: ICD-10-CM

## 2022-09-20 DIAGNOSIS — Q05.7 SPINA BIFIDA OF LUMBAR REGION WITHOUT HYDROCEPHALUS (H): ICD-10-CM

## 2022-09-20 DIAGNOSIS — Z13.220 LIPID SCREENING: ICD-10-CM

## 2022-09-20 DIAGNOSIS — Z11.59 NEED FOR HEPATITIS C SCREENING TEST: ICD-10-CM

## 2022-09-20 DIAGNOSIS — Z51.81 ENCOUNTER FOR THERAPEUTIC DRUG MONITORING: ICD-10-CM

## 2022-09-20 LAB
ALBUMIN SERPL-MCNC: 3.6 G/DL (ref 3.4–5)
ALP SERPL-CCNC: 65 U/L (ref 40–150)
ALT SERPL W P-5'-P-CCNC: 30 U/L (ref 0–50)
ANION GAP SERPL CALCULATED.3IONS-SCNC: 10 MMOL/L (ref 3–14)
AST SERPL W P-5'-P-CCNC: 21 U/L (ref 0–45)
BILIRUB SERPL-MCNC: 0.3 MG/DL (ref 0.2–1.3)
BUN SERPL-MCNC: 9 MG/DL (ref 7–30)
CALCIUM SERPL-MCNC: 9.5 MG/DL (ref 8.5–10.1)
CHLORIDE BLD-SCNC: 105 MMOL/L (ref 94–109)
CHOLEST SERPL-MCNC: 199 MG/DL
CO2 SERPL-SCNC: 22 MMOL/L (ref 20–32)
CREAT SERPL-MCNC: 0.62 MG/DL (ref 0.52–1.04)
ERYTHROCYTE [DISTWIDTH] IN BLOOD BY AUTOMATED COUNT: 13.8 % (ref 10–15)
FASTING STATUS PATIENT QL REPORTED: ABNORMAL
GFR SERPL CREATININE-BSD FRML MDRD: >90 ML/MIN/1.73M2
GLUCOSE BLD-MCNC: 88 MG/DL (ref 70–99)
HBA1C MFR BLD: 5.4 % (ref 0–5.6)
HCT VFR BLD AUTO: 40.6 % (ref 35–47)
HDLC SERPL-MCNC: 43 MG/DL
HGB BLD-MCNC: 13.1 G/DL (ref 11.7–15.7)
LDLC SERPL CALC-MCNC: 132 MG/DL
MCH RBC QN AUTO: 28.5 PG (ref 26.5–33)
MCHC RBC AUTO-ENTMCNC: 32.3 G/DL (ref 31.5–36.5)
MCV RBC AUTO: 88 FL (ref 78–100)
NONHDLC SERPL-MCNC: 156 MG/DL
PLATELET # BLD AUTO: 322 10E3/UL (ref 150–450)
POTASSIUM BLD-SCNC: 4 MMOL/L (ref 3.4–5.3)
PROT SERPL-MCNC: 7.6 G/DL (ref 6.8–8.8)
RBC # BLD AUTO: 4.6 10E6/UL (ref 3.8–5.2)
SODIUM SERPL-SCNC: 137 MMOL/L (ref 133–144)
TRIGL SERPL-MCNC: 120 MG/DL
WBC # BLD AUTO: 7 10E3/UL (ref 4–11)

## 2022-09-20 PROCEDURE — 86803 HEPATITIS C AB TEST: CPT | Performed by: FAMILY MEDICINE

## 2022-09-20 PROCEDURE — 80061 LIPID PANEL: CPT | Performed by: FAMILY MEDICINE

## 2022-09-20 PROCEDURE — 80053 COMPREHEN METABOLIC PANEL: CPT | Performed by: FAMILY MEDICINE

## 2022-09-20 PROCEDURE — 85027 COMPLETE CBC AUTOMATED: CPT | Performed by: FAMILY MEDICINE

## 2022-09-20 PROCEDURE — 99214 OFFICE O/P EST MOD 30 MIN: CPT | Mod: 25 | Performed by: FAMILY MEDICINE

## 2022-09-20 PROCEDURE — 36415 COLL VENOUS BLD VENIPUNCTURE: CPT | Performed by: FAMILY MEDICINE

## 2022-09-20 PROCEDURE — 83036 HEMOGLOBIN GLYCOSYLATED A1C: CPT | Performed by: FAMILY MEDICINE

## 2022-09-20 PROCEDURE — 99395 PREV VISIT EST AGE 18-39: CPT | Performed by: FAMILY MEDICINE

## 2022-09-20 ASSESSMENT — ENCOUNTER SYMPTOMS
HEMATURIA: 0
SHORTNESS OF BREATH: 0
HEARTBURN: 0
NAUSEA: 1
CHILLS: 0
COUGH: 0
PARESTHESIAS: 0
DYSURIA: 0
ABDOMINAL PAIN: 1
PALPITATIONS: 0
MYALGIAS: 1
WEAKNESS: 1
CONSTIPATION: 1
NERVOUS/ANXIOUS: 0
HEMATOCHEZIA: 1
FEVER: 0
DIZZINESS: 1
FREQUENCY: 1
EYE PAIN: 0
JOINT SWELLING: 0
HEADACHES: 1
DIARRHEA: 0
SORE THROAT: 0
ARTHRALGIAS: 1

## 2022-09-20 NOTE — PROGRESS NOTES
SUBJECTIVE:   CC: Minh is an 37 year old who presents for preventive health visit.       Patient has been advised of split billing requirements and indicates understanding: Yes  Healthy Habits:     Getting at least 3 servings of Calcium per day:  Yes    Bi-annual eye exam:  Yes    Dental care twice a year:  Yes    Sleep apnea or symptoms of sleep apnea:  Sleep apnea    Diet:  Other    Frequency of exercise:  2-3 days/week    Duration of exercise:  Less than 15 minutes    Taking medications regularly:  Yes    Medication side effects:  None    PHQ-2 Total Score: 2    Additional concerns today:  No  Reviewed echo 3/4/22 normal echo    Today's PHQ-2 Score:   PHQ-2 ( 1999 [employer]) 9/20/2022   Q1: Little interest or pleasure in doing things 1   Q2: Feeling down, depressed or hopeless 1   PHQ-2 Score 2   PHQ-2 Total Score (12-17 Years)- Positive if 3 or more points; Administer PHQ-A if positive -   Q1: Little interest or pleasure in doing things Several days   Q2: Feeling down, depressed or hopeless Several days   PHQ-2 Score 2       Abuse: Current or Past (Physical, Sexual or Emotional) - No  Do you feel safe in your environment? Yes        Social History     Tobacco Use     Smoking status: Never Smoker     Smokeless tobacco: Never Used   Substance Use Topics     Alcohol use: No     If you drink alcohol do you typically have >3 drinks per day or >7 drinks per week? No    Alcohol Use 9/20/2022   Prescreen: >3 drinks/day or >7 drinks/week? No       Reviewed orders with patient.  Reviewed health maintenance and updated orders accordingly - Yes      Breast Cancer Screening:    Breast CA Risk Assessment (FHS-7) 9/20/2022   Do you have a family history of breast, colon, or ovarian cancer? No / Unknown         Pertinent mammograms are reviewed under the imaging tab.    History of abnormal Pap smear: NO - age 30-65 PAP every 5 years with negative HPV co-testing recommended  PAP / HPV Latest Ref Rng & Units 10/23/2020 5/25/2012  "  PAP (Historical) - NIL NIL   HPV16 NEG:Negative Negative -   HPV18 NEG:Negative Negative -   HRHPV NEG:Negative Negative -     Reviewed and updated as needed this visit by clinical staff                    Reviewed and updated as needed this visit by Provider                       Review of Systems   Constitutional: Negative for chills and fever.   HENT: Negative for congestion, ear pain, hearing loss and sore throat.    Eyes: Negative for pain and visual disturbance.   Respiratory: Negative for cough and shortness of breath.    Cardiovascular: Negative for chest pain, palpitations and peripheral edema.   Gastrointestinal: Positive for abdominal pain, constipation, hematochezia and nausea. Negative for diarrhea and heartburn.   Genitourinary: Positive for frequency. Negative for dysuria, genital sores, hematuria and urgency.   Musculoskeletal: Positive for arthralgias and myalgias. Negative for joint swelling.   Skin: Negative for rash.   Neurological: Positive for dizziness, weakness and headaches. Negative for paresthesias.   Psychiatric/Behavioral: Negative for mood changes. The patient is not nervous/anxious.           OBJECTIVE:   Physical Exam   /60 (BP Location: Right arm, Patient Position: Sitting, Cuff Size: Adult Regular)   Pulse 66   Temp 97.5  F (36.4  C) (Temporal)   Resp 20   Ht 1.539 m (5' 0.59\")   Wt 62.4 kg (137 lb 9.6 oz)   SpO2 100%   BMI 26.35 kg/m    GENERAL: healthy, alert and no distress  EYES: Eyes grossly normal to inspection, conjunctivae and sclerae normal  HENT: ear canals and TM's normal  NECK: no adenopathy, no asymmetry, masses, or scars and thyroid normal to palpation  RESP: lungs clear to auscultation - no rales, rhonchi or wheezes  CV: regular rate and rhythm, normal S1 S2  ABDOMEN: soft, nontender, no hepatosplenomegaly, no masses and bowel sounds normal  SKIN: no suspicious lesions or rashes  NEURO:  mentation intact and speech normal  PSYCH: mentation appears " "normal, affect normal/bright    Diagnostic Test Results:  Labs reviewed in Epic    ASSESSMENT/PLAN:     Routine general medical examination at a health care facility  Adacel received 4 years ago during pregnancy    Spina bifida of lumbar region without hydrocephalus (H)  History of tethered spinal cord  - Pain not controlled  - Advised below  Gabapentin 300 mg in the morning, 300 mg in afternoon, 600 mg in the evening for 3 days    If you continue to have pain then Gabapentin 600 mg in the morning, 300 mg in the afternoon, 600 mg in the evening for 3 days     If you continue to have pain then Gabapentin 600 mg three times daily     Need for hepatitis C screening test  - Hepatitis C Screen Reflex to HCV RNA Quant and Genotype; Future  - Hepatitis C Screen Reflex to HCV RNA Quant and Genotype    Lipid screening  - Lipid panel reflex to direct LDL Fasting; Future  - Lipid panel reflex to direct LDL Fasting    Screening for diabetes mellitus  - Hemoglobin A1c; Future  - Hemoglobin A1c    Encounter for therapeutic drug monitoring  - CBC with platelets; Future  - Comprehensive metabolic panel (BMP + Alb, Alk Phos, ALT, AST, Total. Bili, TP); Future  - CBC with platelets  - Comprehensive metabolic panel (BMP + Alb, Alk Phos, ALT, AST, Total. Bili, TP)           Patient has been advised of split billing requirements and indicates understanding: Yes    COUNSELING:  Reviewed preventive health counseling, as reflected in patient instructions    Estimated body mass index is 26.19 kg/m  as calculated from the following:    Height as of 8/30/22: 1.549 m (5' 1\").    Weight as of 8/30/22: 62.9 kg (138 lb 9.6 oz).        She reports that she has never smoked. She has never used smokeless tobacco.      Counseling Resources:  ATP IV Guidelines  Pooled Cohorts Equation Calculator  Breast Cancer Risk Calculator  BRCA-Related Cancer Risk Assessment: FHS-7 Tool  FRAX Risk Assessment  ICSI Preventive Guidelines  Dietary Guidelines for " Americans, 2010  USDA's MyPlate  ASA Prophylaxis  Lung CA Screening    Delukasa Vivian Goldman MD  [employer]

## 2022-09-20 NOTE — PATIENT INSTRUCTIONS
Gabapentin 300 mg in the morning, 300 mg in afternoon, 600 mg in the evening for 3 days    If you continue to have pain then Gabapentin 600 mg in the morning, 300 mg in the afternoon, 600 mg in the evening for 3 days     If you continue to have pain then Gabapentin 600 mg three times daily           Preventive Health Recommendations  Female Ages 26 - 39  Yearly exam:   See your health care provider every year in order to  Review health changes.   Discuss preventive care.    Review your medicines if you your doctor has prescribed any.    Until age 30: Get a Pap test every three years (more often if you have had an abnormal result).    After age 30: Talk to your doctor about whether you should have a Pap test every 3 years or have a Pap test with HPV screening every 5 years.   You do not need a Pap test if your uterus was removed (hysterectomy) and you have not had cancer.  You should be tested each year for STDs (sexually transmitted diseases), if you're at risk.   Talk to your provider about how often to have your cholesterol checked.  If you are at risk for diabetes, you should have a diabetes test (fasting glucose).  Shots: Get a flu shot each year. Get a tetanus shot every 10 years.   Nutrition:   Eat at least 5 servings of fruits and vegetables each day.  Eat whole-grain bread, whole-wheat pasta and brown rice instead of white grains and rice.  Get adequate Calcium and Vitamin D.     Lifestyle  Exercise at least 150 minutes a week (30 minutes a day, 5 days of the week). This will help you control your weight and prevent disease.  Limit alcohol to one drink per day.  No smoking.   Wear sunscreen to prevent skin cancer.  See your dentist every six months for an exam and cleaning.

## 2022-09-21 LAB — HCV AB SERPL QL IA: NONREACTIVE

## 2022-09-27 ENCOUNTER — TELEPHONE (OUTPATIENT)
Dept: ANESTHESIOLOGY | Facility: CLINIC | Age: 37
End: 2022-09-27

## 2022-09-27 NOTE — TELEPHONE ENCOUNTER
I called patient to remind them of there appointment at 9:00 am  to arrive 15-20 minutes prior allow time for parking    Also to complete there questionnaire prior to there appointment

## 2022-09-28 ENCOUNTER — OFFICE VISIT (OUTPATIENT)
Dept: ANESTHESIOLOGY | Facility: CLINIC | Age: 37
End: 2022-09-28
Attending: NEUROLOGICAL SURGERY
Payer: COMMERCIAL

## 2022-09-28 VITALS — OXYGEN SATURATION: 99 % | SYSTOLIC BLOOD PRESSURE: 117 MMHG | HEART RATE: 73 BPM | DIASTOLIC BLOOD PRESSURE: 82 MMHG

## 2022-09-28 DIAGNOSIS — Q05.7 SPINA BIFIDA OF LUMBAR REGION WITHOUT HYDROCEPHALUS (H): ICD-10-CM

## 2022-09-28 DIAGNOSIS — Z86.69 HISTORY OF TETHERED SPINAL CORD: ICD-10-CM

## 2022-09-28 PROCEDURE — 99204 OFFICE O/P NEW MOD 45 MIN: CPT | Mod: GC | Performed by: ANESTHESIOLOGY

## 2022-09-28 ASSESSMENT — PAIN SCALES - GENERAL: PAINLEVEL: EXTREME PAIN (8)

## 2022-09-28 NOTE — NURSING NOTE
Patient presents with:  Consult: New Consult Lower Back and right leg pain level 8/10      Extreme Pain (8)     Pain Medications     Analgesics Other Refills Start End     acetaminophen (TYLENOL) 325 MG tablet    0 11/27/2019     Sig - Route: Take 1-2 tablets (325-650 mg) by mouth every 6 hours as needed for mild pain - Oral    Class: E-Prescribe          What medications are you using for pain? Ibuprofen    (New patients only) Have you been seen by another pain clinic/ provider? no    (Return Patients only) What refills are you needing today? No    Expectation Not Sure

## 2022-09-28 NOTE — NURSING NOTE
RN reviewed AVS with patient. Patient to contact clinic if any questions/concerns. Patient verbalized understanding.    Lliia Salmeron RN

## 2022-09-28 NOTE — LETTER
9/28/2022       RE: Minh Altamirano  [address]  [facility] [address]     Dear Colleague,    Thank you for referring your patient, Minh Altamirano, to the [facility] [place] at [facility]. Please see a copy of my visit note below.                          [facility] Consultation    Date of visit: 9/28/2022    Reason for consultation:    Minh Altamirano is a 37 year old female who is seen in consultation today at the request of her provider, Dr. Zelaya ([facility]).    Primary Care Provider is Jonathan Goldman.  Pain medications are being prescribed by Dr. Goldman.    Please see the [facility] health questionnaire which the patient completed and reviewed with me in detail.    Chief Complaint:    Chief Complaint   Patient presents with     Consult     New Consult Lower Back and right leg pain level 8/10       Pain history:  Minh Altamirano is a 37 year old female who first started having problems with pain in her right lower back. The pain started over a decade ago when she had surgical intervention for tethered cord and a lipoma. She has been experiencing an increase in the past year of her right lower back pain with radiation down to the bottom of her right foot. Her sensation is significantly diminished throughout the RLE and feels that her muscles are weaker than they used to be. Her right leg starts shaking after a short walk and she must sit down to rest them before continuing. The pain frequently wakes her from sleep and causes her to have issues with balance.  She has B/B incontinence which impacts her daily life. She was sent by [facility] for evaluation for SCS.    Pain rating: intensity ranges from 7/10 to 9/10, and Averages 8/10 on a 0-10 scale.  Aggravating factors include: Walking, laying down, prolonged standing  Relieving factors include: certain positions  while seated  Any bowel or bladder incontinence: Yes    Pain medication:  Ibuprofen (taking 600mg QID)  Tylenol  Gabapentin      Other treatments have included:  Minh Altamirano has not been seen at a pain clinic in the past.    PT: No  Acupuncture: No  TENs Unit: No  Injections: Yes    Past Medical History:  Past Medical History:   Diagnosis Date     Anemia      Chronic infection     MRSA     Constipation, chronic      Incontinence of urine      Lipoma of spinal cord      Migraine      neurogenic bladder      Spinal dysraphism (H)      Patient Active Problem List    Diagnosis Date Noted     Missed  2019     Priority: Medium     Added automatically from request for surgery [id number]       S/P  2018     Priority: Medium     Caries 2018     Priority: Medium     History of lumbar laminectomy 2018     Priority: Medium     Female infertility 2017     Priority: Medium     Uterine prolapse 2016     Priority: Medium     Renal cyst 2014     Priority: Medium     Organic sleep disorder 2013     Priority: Medium     Neurogenic bladder 2012     Priority: Medium     Problem list name updated by automated process. Provider to review       Spinal dysraphism (H) 2010     Priority: Medium       Past Surgical History:  Past Surgical History:   Procedure Laterality Date     BLADDER AUGMENTATION  2012    Procedure:BLADDER AUGMENTATION; Surgeon:FADY MOORE; [facility]      SECTION N/A 2018    Procedure:  SECTION;  Primary  Section  with classical incision;  Surgeon: Lily Villanueva MD;  [facility]     CYSTOSCOPY, INTRAVESICAL INJECTION N/A 2016    Procedure: CYSTOSCOPY, INTRAVESICAL INJECTION;  Surgeon: Fady Moore MD;  [facility]     DILATION AND CURETTAGE SUCTION N/A 2019    Procedure: suction dilation and curetage,;  Surgeon: Radha Hickey MD;  [facility]     EXAM  UNDER ANESTHESIA PELVIC N/A 11/27/2019    Procedure: pelvic exam under anesthesia;  Surgeon: Radha Hickey MD;  [facility]     LAMINECTOMY LUMBAR TWO LEVELS       LAPAROTOMY EXPLORATORY  1/11/2012    Procedure:LAPAROTOMY EXPLORATORY; Exploratory Laparotomy with Takedown of Mitrofanoff, Ventral Hernia Repair with Strattice Mesh implantation ; Surgeon:TRINA COLLADO; [facility]     MITROFANOFF PROCEDURE (APPENDIX CONDUIT)  1/5/2012    Procedure:MITROFANOFF PROCEDURE (APPENDIX CONDUIT); Bladder Augmentation with Right Colon, Appendix Conduit- Mitrofanoff, Insertion of Pubovaginal Sling using Autologous Rectus Fascia; Surgeon:TRINA COLLADO; [facility]     tumor resection and cord detethering       Medications:  Current Outpatient Medications   Medication Sig Dispense Refill     acetaminophen (TYLENOL) 325 MG tablet Take 1-2 tablets (325-650 mg) by mouth every 6 hours as needed for mild pain 20 tablet 0     cholecalciferol (VITAMIN D3) 125 mcg (5000 units) capsule TAKE 1 CAPSULE BY MOUTH DAILY AS DIRECTED       famotidine (PEPCID) 20 MG tablet        gabapentin (NEURONTIN) 300 MG capsule TAKE 1 CAPSULE BY MOUTH THREE TIMES DAILY       ibuprofen (ADVIL/MOTRIN) 100 MG tablet Take 100 mg by mouth every 4 hours as needed       omeprazole (PRILOSEC) 20 MG DR capsule        polyethylene glycol (MIRALAX) 17 g packet Take 17 g by mouth daily 72 packet 0     Prenatal Vit-Fe Fumarate-FA (PRENATAL MULTIVITAMIN W/IRON) 27-0.8 MG tablet Take 1 tablet by mouth daily 90 tablet 3     SENNA-docusate sodium (SENNA S) 8.6-50 MG tablet Take 1-2 tablets by mouth 2 times daily as needed (constipation) 60 tablet 0     Allergies:     Allergies   Allergen Reactions     Naproxen Hives and Nausea and Vomiting     Bactrim [Sulfamethoxazole W/Trimethoprim] Itching     Gabapentin Itching and Nausea     Vicodin [Hydrocodone-Acetaminophen] Itching     Social History:    Tobacco use: denies  Alcohol use: denies    Family  history:  No family history on file.        Physical Exam:  Vitals:    09/28/22 0908   BP: 117/82   BP Location: Right arm   Patient Position: Chair   Cuff Size: Adult Large   Pulse: 73   SpO2: 99%     Exam:    Musculoskeletal exam:  Gait/Station/Posture: antalgic    Lumbar spine: significant tenderness to palpation along Rt/Lt lumbar paraspinals   Flex: full   Ext: severely limited by pain   Rotation/ext to right: painful    Rotation/ext to left: painful     Myofascial tenderness:  Rt & Lt paraspinals    Neurologic exam:  CN:  Cranial nerves 2-12 are normal  Motor:  5/5 LLE strength. RLE: HF 4/5, KE 4/5, DF 3/5, EHL 2/5, PF 4/5  Reflexes:     Patella:  R:  0/4 L: 2/4   Achilles:  R:  0/4 L: 2/4  Clonus: absent BL    Sensory:  (upper and lower extremities):   Light touch: diminished throughout RLE   Allodynia: absent    Dysethesia: absent    Hyperalgesia: absent     Diagnostic tests:  Personally reviewed imaging          Assessment:  1. Spina bifida of lumbar region without hydrocephalus (H)    2. History of tethered spinal cord        Minh Altamirano is a 37 year old female who presents with the complaints of right low back pain with radicular symptoms.    Plan:  Diagnosis reviewed, treatment option addressed, and risk/benefits discussed.  Self-care instructions given.  I am recommending a multidisciplinary treatment plan to help this patient better manage her pain.      1. Physical Therapy: Referral made for gait training. May benefit from assistive device   2. Pain Psychologist to address issues of relaxation, behavioral change, coping style, and other factors important to improvement: Consideration for SCS placement  3. Diagnostic Studies: None at this time  4. Medication Management:   1. Recommend decreasing ibuprofen from 600 QID to the prescribed 100mg Q4H or 200mg Q4-6H or discontinuing all together. May also consider daily formulation such as meloxicam if necessary > she is having significant GI upset even  while taking w/ food and water  2. Can increase gabapentin from 300mg TID to goal of 600mg TID (300/300/600HS to start, then 600/300/600 after 5-7 days w/o SE, then 600 TID after 1-2 weeks if tolerated w/o SE)  5. Further procedures recommended: Discussing SCS trial  6. Recommendations/follow-up for PCP:  Medication change recommendations as above (4.1, 4.2). PT ordered. Pain psych eval for SCS.  7. Follow up: 4-6 weeks after patient has had multiple PT sessions and time to adjust medications so they can be assessed for efficacy and safety.    Total time spent was 50 minutes, and more than 50% of face to face time was spent in counseling and/or coordination of care regarding principles of multidisciplinary care, medication management, and therapeutic options. This time also includes time for chart review, preparation, and documentation.     I saw and examined the patient with the Pain Fellow/Resident. I have reviewed and agree with the resident's note and plan of care and made changes and corrections directly to the body of the note.    TIME SPENT:  BY FELLOW/RESIDENT ALONE 20 MIN  BY MYSELF AND FELLOW/RESIDENT TOGETHER 30 MIN          Again, thank you for allowing me to participate in the care of your patient.      Sincerely,    Cristiana Pittman MD

## 2022-09-28 NOTE — PROGRESS NOTES
[facility] Consultation    Date of visit: 9/28/2022    Reason for consultation:    Minh Altamirano is a 37 year old female who is seen in consultation today at the request of her provider, Dr. Zelaya ([facility]    Primary Care Provider is Jonathan Goldman.  Pain medications are being prescribed by Dr. Goldman.    Please see the [facility] health questionnaire which the patient completed and reviewed with me in detail.    Chief Complaint:    Chief Complaint   Patient presents with     Consult     New Consult Lower Back and right leg pain level 8/10       Pain history:  Minh Altamirano is a 37 year old female who first started having problems with pain in her right lower back. The pain started over a decade ago when she had surgical intervention for tethered cord and a lipoma. She has been experiencing an increase in the past year of her right lower back pain with radiation down to the bottom of her right foot. Her sensation is significantly diminished throughout the RLE and feels that her muscles are weaker than they used to be. Her right leg starts shaking after a short walk and she must sit down to rest them before continuing. The pain frequently wakes her from sleep and causes her to have issues with balance.  She has B/B incontinence which impacts her daily life. She [place] [facility] [place]CS.    Pain rating: intensity ranges from 7/10 to 9/10, and Averages 8/10 on a 0-10 scale.  Aggravating factors include: Walking, laying down, prolonged standing  Relieving factors include: certain positions while seated  Any bowel or bladder incontinence: Yes    Pain medication:  Ibuprofen (taking 600mg QID)  Tylenol  Gabapentin      Other treatments have included:  Minh Altamirano has not been seen at a pain clinic in the past.    PT: No  Acupuncture: No  TENs Unit: No  Injections: Yes    Past Medical History:  Past Medical History:   Diagnosis Date      Anemia      Chronic infection     MRSA     Constipation, chronic      Incontinence of urine      Lipoma of spinal cord      Migraine      neurogenic bladder      Spinal dysraphism (H)      Patient Active Problem List    Diagnosis Date Noted     Missed  2019     Priority: Medium     Added automatically from request for surgery [id number]       S/P  2018     Priority: Medium     Caries 2018     Priority: Medium     History of lumbar laminectomy 2018     Priority: Medium     Female infertility 2017     Priority: Medium     Uterine prolapse 2016     Priority: Medium     Renal cyst 2014     Priority: Medium     Organic sleep disorder 2013     Priority: Medium     Neurogenic bladder 2012     Priority: Medium     Problem list name updated by automated process. Provider to review       Spinal dysraphism (H) 2010     Priority: Medium       Past Surgical History:  Past Surgical History:   Procedure Laterality Date     BLADDER AUGMENTATION  2012    Procedure:BLADDER AUGMENTATION; Surgeon:TRINA MOORE; [facility]      SECTION N/A 2018    Procedure:  SECTION;  Primary  Section  with classical incision;  Surgeon: Lily Villanueva MD;  [facility]     CYSTOSCOPY, INTRAVESICAL INJECTION N/A 2016    Procedure: CYSTOSCOPY, INTRAVESICAL INJECTION;  Surgeon: Trina Moore MD;  [facility]     DILATION AND CURETTAGE SUCTION N/A 2019    Procedure: suction dilation and curetage,;  Surgeon: Radha Hickey MD;  [facility]     EXAM UNDER ANESTHESIA PELVIC N/A 2019    Procedure: pelvic exam under anesthesia;  Surgeon: Radha Hickey MD;  [facility]     LAMINECTOMY LUMBAR TWO LEVELS       LAPAROTOMY EXPLORATORY  2012    Procedure:LAPAROTOMY EXPLORATORY; Exploratory Laparotomy with Takedown of Mitrofanoff, Ventral Hernia Repair with Strattice Mesh implantation ;  Surgeon:TRINA COLLADO; [facility]     MITROFANOFF PROCEDURE (APPENDIX CONDUIT)  1/5/2012    Procedure:MITROFANOFF PROCEDURE (APPENDIX CONDUIT); Bladder Augmentation with Right Colon, Appendix Conduit- Mitrofanoff, Insertion of Pubovaginal Sling using Autologous Rectus Fascia; Surgeon:TRINA COLLADO; [facility]     tumor resection and cord detethering       Medications:  Current Outpatient Medications   Medication Sig Dispense Refill     acetaminophen (TYLENOL) 325 MG tablet Take 1-2 tablets (325-650 mg) by mouth every 6 hours as needed for mild pain 20 tablet 0     cholecalciferol (VITAMIN D3) 125 mcg (5000 units) capsule TAKE 1 CAPSULE BY MOUTH DAILY AS DIRECTED       famotidine (PEPCID) 20 MG tablet        gabapentin (NEURONTIN) 300 MG capsule TAKE 1 CAPSULE BY MOUTH THREE TIMES DAILY       ibuprofen (ADVIL/MOTRIN) 100 MG tablet Take 100 mg by mouth every 4 hours as needed       omeprazole (PRILOSEC) 20 MG DR capsule        polyethylene glycol (MIRALAX) 17 g packet Take 17 g by mouth daily 72 packet 0     Prenatal Vit-Fe Fumarate-FA (PRENATAL MULTIVITAMIN W/IRON) 27-0.8 MG tablet Take 1 tablet by mouth daily 90 tablet 3     SENNA-docusate sodium (SENNA S) 8.6-50 MG tablet Take 1-2 tablets by mouth 2 times daily as needed (constipation) 60 tablet 0     Allergies:     Allergies   Allergen Reactions     Naproxen Hives and Nausea and Vomiting     Bactrim [Sulfamethoxazole W/Trimethoprim] Itching     Gabapentin Itching and Nausea     Vicodin [Hydrocodone-Acetaminophen] Itching     Social History:    Tobacco use: denies  Alcohol use: denies    Family history:  No family history on file.        Physical Exam:  Vitals:    09/28/22 0908   BP: 117/82   BP Location: Right arm   Patient Position: Chair   Cuff Size: Adult Large   Pulse: 73   SpO2: 99%     Exam:    Musculoskeletal exam:  Gait/Station/Posture: antalgic    Lumbar spine: significant tenderness to palpation along Rt/Lt lumbar  paraspinals   Flex: full   Ext: severely limited by pain   Rotation/ext to right: painful    Rotation/ext to left: painful     Myofascial tenderness:  Rt & Lt paraspinals    Neurologic exam:  CN:  Cranial nerves 2-12 are normal  Motor:  5/5 LLE strength. RLE: HF 4/5, KE 4/5, DF 3/5, EHL 2/5, PF 4/5  Reflexes:     Patella:  R:  0/4 L: 2/4   Achilles:  R:  0/4 L: 2/4  Clonus: absent BL    Sensory:  (upper and lower extremities):   Light touch: diminished throughout RLE   Allodynia: absent    Dysethesia: absent    Hyperalgesia: absent     Diagnostic tests:  Personally reviewed imaging          Assessment:  1. Spina bifida of lumbar region without hydrocephalus (H)    2. History of tethered spinal cord        Minh Altamirano is a 37 year old female who presents with the complaints of right low back pain with radicular symptoms.    Plan:  Diagnosis reviewed, treatment option addressed, and risk/benefits discussed.  Self-care instructions given.  I am recommending a multidisciplinary treatment plan to help this patient better manage her pain.      1. Physical Therapy: Referral made for gait training. May benefit from assistive device   2. Pain Psychologist to address issues of relaxation, behavioral change, coping style, and other factors important to improvement: Consideration for SCS placement  3. Diagnostic Studies: None at this time  4. Medication Management:   1. Recommend decreasing ibuprofen from 600 QID to the prescribed 100mg Q4H or 200mg Q4-6H or discontinuing all together. May also consider daily formulation such as meloxicam if necessary > she is having significant GI upset even while taking w/ food and water  2. Can increase gabapentin from 300mg TID to goal of 600mg TID (300/300/600HS to start, then 600/300/600 after 5-7 days w/o SE, then 600 TID after 1-2 weeks if tolerated w/o SE)  5. Further procedures recommended: Discussing SCS trial  6. Recommendations/follow-up for PCP:  Medication change  recommendations as above (4.1, 4.2). PT ordered. Pain psych eval for SCS.  7. Follow up: 4-6 weeks after patient has had multiple PT sessions and time to adjust medications so they can be assessed for efficacy and safety.    Total time spent was 50 minutes, and more than 50% of face to face time was spent in counseling and/or coordination of care regarding principles of multidisciplinary care, medication management, and therapeutic options. This time also includes time for chart review, preparation, and documentation.     I saw and examined the patient with the Pain Fellow/Resident. I have reviewed and agree with the resident's note and plan of care and made changes and corrections directly to the body of the note.    TIME SPENT:  BY FELLOW/RESIDENT ALONE 20 MIN  BY MYSELF AND FELLOW/RESIDENT TOGETHER 30 MIN      Cristiana Pittman MD  Pain Medicine, Department of Anesthesiology  , [facility]

## 2022-09-28 NOTE — PATIENT INSTRUCTIONS
Medications:    Increase Gabapentin to 600 mg three times daily. See titration instructions below:      AM   PM   Bedtime  300   300   600mg (2 tab).  After 3-4 days, increase as tolerated to the next line  600mg (2 tab)  300   600 (2 tab).  After 3-4 days, increase as tolerated to the next line  600mg (2 tab)  600mg (2 tab)  600 (2 tab).  After 3-4 days, increase as tolerated to the next line  Call with any problems or when you are at this dose.     Caution for sedation.   Do not drive until you know how the medication affects you.   You can go slower if you need to or increasing only one dose at a time.  Do not stop abruptly once at higher doses.  This medication must be tapered off.       Referrals:    Physical Therapy Referral placed-  balance therapy, spinabifida Lumbar region.  If you have not heard from the scheduling office within 2 business days, please call [phone] for all locations         Recommended Follow up:      Return to clinic to discuss Spinal Cord Stimulation with Dr. Valladares.      Please call [phone] to schedule your clinic appointment if you don't already have an appointment scheduled.        To speak with a nurse, schedule/reschedule/cancel a clinic appointment, or request a medication refill call: [phone]    You can also reach us by [employer]: [email or web address]

## 2022-10-03 ENCOUNTER — MEDICAL CORRESPONDENCE (OUTPATIENT)
Dept: UROLOGY | Facility: CLINIC | Age: 37
End: 2022-10-03

## 2022-11-03 ENCOUNTER — TELEPHONE (OUTPATIENT)
Dept: NEUROSURGERY | Facility: CLINIC | Age: 37
End: 2022-11-03

## 2022-11-03 ENCOUNTER — OFFICE VISIT (OUTPATIENT)
Dept: ANESTHESIOLOGY | Facility: CLINIC | Age: 37
End: 2022-11-03
Payer: COMMERCIAL

## 2022-11-03 VITALS
BODY MASS INDEX: 25.32 KG/M2 | WEIGHT: 137.57 LBS | OXYGEN SATURATION: 98 % | DIASTOLIC BLOOD PRESSURE: 83 MMHG | HEIGHT: 62 IN | SYSTOLIC BLOOD PRESSURE: 133 MMHG | HEART RATE: 79 BPM

## 2022-11-03 DIAGNOSIS — M79.2 NEUROPATHIC PAIN: Primary | ICD-10-CM

## 2022-11-03 PROCEDURE — 99204 OFFICE O/P NEW MOD 45 MIN: CPT | Performed by: ANESTHESIOLOGY

## 2022-11-03 ASSESSMENT — ENCOUNTER SYMPTOMS
BLOATING: 0
RECTAL PAIN: 1
NAUSEA: 0
NERVOUS/ANXIOUS: 1
HEMATURIA: 0
PANIC: 0
DECREASED CONCENTRATION: 1
MUSCLE WEAKNESS: 1
MUSCLE CRAMPS: 1
DIFFICULTY URINATING: 0
BOWEL INCONTINENCE: 0
CONSTIPATION: 1
JOINT SWELLING: 0
MYALGIAS: 1
VOMITING: 0
ARTHRALGIAS: 1
FLANK PAIN: 1
INSOMNIA: 1
DYSURIA: 0
NECK PAIN: 0
BLOOD IN STOOL: 0
DEPRESSION: 1
ABDOMINAL PAIN: 1
DIARRHEA: 0
JAUNDICE: 0
BACK PAIN: 1
HEARTBURN: 1
STIFFNESS: 1

## 2022-11-03 ASSESSMENT — PAIN SCALES - PAIN ENJOYMENT GENERAL ACTIVITY SCALE (PEG)
INTERFERED_GENERAL_ACTIVITY: 9
PEG_TOTALSCORE: 9.67
INTERFERED_ENJOYMENT_LIFE: 10
INTERFERED_ENJOYMENT_LIFE: 10 - COMPLETELY INTERFERES
INTERFERED_GENERAL_ACTIVITY: 9
PEG_TOTALSCORE: 9.67
AVG_PAIN_PASTWEEK: 10 - PAIN AS BAD AS YOU CAN IMAGINE
AVG_PAIN_PASTWEEK: 10

## 2022-11-03 ASSESSMENT — ANXIETY QUESTIONNAIRES
GAD7 TOTAL SCORE: 5
5. BEING SO RESTLESS THAT IT IS HARD TO SIT STILL: SEVERAL DAYS
3. WORRYING TOO MUCH ABOUT DIFFERENT THINGS: SEVERAL DAYS
2. NOT BEING ABLE TO STOP OR CONTROL WORRYING: SEVERAL DAYS
6. BECOMING EASILY ANNOYED OR IRRITABLE: NOT AT ALL
7. FEELING AFRAID AS IF SOMETHING AWFUL MIGHT HAPPEN: NOT AT ALL
8. IF YOU CHECKED OFF ANY PROBLEMS, HOW DIFFICULT HAVE THESE MADE IT FOR YOU TO DO YOUR WORK, TAKE CARE OF THINGS AT HOME, OR GET ALONG WITH OTHER PEOPLE?: NOT DIFFICULT AT ALL
7. FEELING AFRAID AS IF SOMETHING AWFUL MIGHT HAPPEN: NOT AT ALL
GAD7 TOTAL SCORE: 5
1. FEELING NERVOUS, ANXIOUS, OR ON EDGE: SEVERAL DAYS
4. TROUBLE RELAXING: SEVERAL DAYS
IF YOU CHECKED OFF ANY PROBLEMS ON THIS QUESTIONNAIRE, HOW DIFFICULT HAVE THESE PROBLEMS MADE IT FOR YOU TO DO YOUR WORK, TAKE CARE OF THINGS AT HOME, OR GET ALONG WITH OTHER PEOPLE: NOT DIFFICULT AT ALL
GAD7 TOTAL SCORE: 5

## 2022-11-03 ASSESSMENT — PAIN SCALES - GENERAL: PAINLEVEL: WORST PAIN (10)

## 2022-11-03 NOTE — PATIENT INSTRUCTIONS
Treatment planning:    Recommendations will be written in the providers note for your Primary Care provider (OR other providers in your care team) to review and make changes to your therapies based on their discretion.       Recommended Follow up:      Follow up as needed.       Please call [phone] to schedule your clinic appointment if you don't already have an appointment scheduled.        To speak with a nurse, schedule/reschedule/cancel a clinic appointment, or request a medication refill call: [phone]    You can also reach us by [employer]: [email or web address]

## 2022-11-03 NOTE — TELEPHONE ENCOUNTER
[facility] Call Center    Phone Message    May a detailed message be left on voicemail: yes     Reason for Call: Other: Patient requesting to schedule office visit with aldo Douglass unable to schedule without instructions. Please review and contact patient at [phone]     Action Taken: Message routed to:  [facility] ([facility] Neurosurgery    Travel Screening: Not Applicable                                                                       3-5x/week

## 2022-11-03 NOTE — PROGRESS NOTES
Pain Clinic New Patient Consult Note:    Referring Provider: Nathalie   Primary care provider: Jonathan Goldman  Interpretation services were used today for the encounter    Minh Altamirano is a 37 year old y.o. old female who presents to the pain clinic with back and leg.     HPI:  Patient Supplied Answers To the [facility] Pain Questionnaire  [facility] -  Patient Entered Questionnaire/Answers 11/3/2022   What number best describes your pain right now:  0 = No pain  to  10 = Worst pain imaginable 10   How would you describe the pain? burning, sharp, numbness   Which of the following worsen your pain? sitting, walking   Which of the following improve or reduce your pain? medication   What number best describes your average pain for the past week:  0 = No pain  to  10 = Worst pain imaginable 10   What number best describes your LOWEST pain in past 24 hours:  0 = No pain  to  10 = Worst pain imaginable 9   What number best describes your WORST pain in past 24 hours:  0 = No pain  to  10 = Worst pain imaginable 10   When is your pain worst? AM, PM, Night, Constant   What non-medicine treatments have you already had for your pain? pain clinic     Minh Altamirano is a 37 year old female who first started having problems with pain in her right lower back. The pain started over a decade ago when she had surgical intervention for tethered cord and a lipoma. She has been experiencing an increase in the past year of her right lower back pain with radiation down to the bottom of her right foot. Her sensation is significantly diminished throughout the RLE and feels that her muscles are weaker than they used to be. Her right leg starts shaking after a short walk and she must sit down to rest them before continuing. The pain frequently wakes her from sleep and causes her to have issues with balance.  She has B/B incontinence which impacts her daily life. She was sent by NSGY for evaluation for SCS.     Pain rating: intensity ranges from  7/10 to 9/10, and Averages 8/10 on a 0-10 scale.  Aggravating factors include: Walking, laying down, prolonged standing  Relieving factors include: certain positions while seated  Any bowel or bladder incontinence: Yes    The patient is saying that her disability is reduced and she needs paperwork for the disability. I discussed with the patient that we are unable to help with this type of paperwork and I am eeing her for consultation for scs only.     Pain treatments:    Minh Altamirano has been seen at a pain clinic in the past by Dr. Pittman  PT: No, 2022  Acupuncture: No  TENs Unit: No  Injections: Yes    Tests/Imaging reviewed with the patient:    EMG confirmed chronic sacral radiculopathy    Significant Medical History:   Past Medical History:   Diagnosis Date     Anemia      Chronic infection     MRSA     Constipation, chronic      Incontinence of urine      Lipoma of spinal cord      Migraine      neurogenic bladder      Spinal dysraphism (H)           Past Surgical History:  Past Surgical History:   Procedure Laterality Date     BLADDER AUGMENTATION  2012    Procedure:BLADDER AUGMENTATION; Surgeon:FADY MOROE; [facility]      SECTION N/A 2018    Procedure:  SECTION;  Primary  Section  with classical incision;  Surgeon: Lily Villanueva MD;  [facility]     CYSTOSCOPY, INTRAVESICAL INJECTION N/A 2016    Procedure: CYSTOSCOPY, INTRAVESICAL INJECTION;  Surgeon: Fady Moore MD;  [facility]     DILATION AND CURETTAGE SUCTION N/A 2019    Procedure: suction dilation and curetage,;  Surgeon: Radha Hickey MD;  [facility]     EXAM UNDER ANESTHESIA PELVIC N/A 2019    Procedure: pelvic exam under anesthesia;  Surgeon: Radha Hickey MD;  [facility]     LAMINECTOMY LUMBAR TWO LEVELS       LAPAROTOMY EXPLORATORY  2012    Procedure:LAPAROTOMY EXPLORATORY; Exploratory Laparotomy with Takedown of  Mitrofanoff, Ventral Hernia Repair with Strattice Mesh implantation ; Surgeon:TRINA COLLADO; [facility]     MITROFANOFF PROCEDURE (APPENDIX CONDUIT)  1/5/2012    Procedure:MITROFANOFF PROCEDURE (APPENDIX CONDUIT); Bladder Augmentation with Right Colon, Appendix Conduit- Mitrofanoff, Insertion of Pubovaginal Sling using Autologous Rectus Fascia; Surgeon:TRINA COLLADO; [facility]     tumor resection and cord detethering            Family History:  No family history on file.       Social History:  Social History     Socioeconomic History     Marital status:      Spouse name: Not on file     Number of children: Not on file     Years of education: Not on file     Highest education level: Not on file   Occupational History     Not on file   Tobacco Use     Smoking status: Never     Smokeless tobacco: Never   Substance and Sexual Activity     Alcohol use: No     Drug use: No     Sexual activity: Yes     Partners: Male     Birth control/protection: Pill   Other Topics Concern     Parent/sibling w/ CABG, MI or angioplasty before 65F 55M? Not Asked   Social History Narrative    ** Merged History Encounter **          Social Determinants of Health     Financial Resource Strain: Not on file   Food Insecurity: Not on file   Transportation Needs: Not on file   Physical Activity: Not on file   Stress: Not on file   Social Connections: Not on file   Intimate Partner Violence: Not on file   Housing Stability: Not on file     Social History     Social History Narrative    ** Merged History Encounter **               Allergies:  Allergies   Allergen Reactions     Naproxen Hives and Nausea and Vomiting     Bactrim [Sulfamethoxazole W/Trimethoprim] Itching     Gabapentin Itching and Nausea     Vicodin [Hydrocodone-Acetaminophen] Itching       Current Medications:   Current Outpatient Medications   Medication Sig Dispense Refill     acetaminophen (TYLENOL) 325 MG tablet Take 1-2 tablets (325-650 mg) by  mouth every 6 hours as needed for mild pain 20 tablet 0     cholecalciferol (VITAMIN D3) 125 mcg (5000 units) capsule TAKE 1 CAPSULE BY MOUTH DAILY AS DIRECTED       famotidine (PEPCID) 20 MG tablet        gabapentin (NEURONTIN) 300 MG capsule TAKE 1 CAPSULE BY MOUTH THREE TIMES DAILY       ibuprofen (ADVIL/MOTRIN) 100 MG tablet Take 100 mg by mouth every 4 hours as needed       omeprazole (PRILOSEC) 20 MG DR capsule        polyethylene glycol (MIRALAX) 17 g packet Take 17 g by mouth daily 72 packet 0     Prenatal Vit-Fe Fumarate-FA (PRENATAL MULTIVITAMIN W/IRON) 27-0.8 MG tablet Take 1 tablet by mouth daily 90 tablet 3     SENNA-docusate sodium (SENNA S) 8.6-50 MG tablet Take 1-2 tablets by mouth 2 times daily as needed (constipation) 60 tablet 0          Current Pain Medications:  Medications related to Pain Management (From now, onward)    None           Past Pain Medications:  Low dose gabapentin    Blood thinner:    none    Work History:    Current work status: on disability      Review of Systems:  Review of Systems   Gastrointestinal: Positive for abdominal pain, constipation and heartburn. Negative for blood in stool, diarrhea, melena, nausea and vomiting.   Genitourinary: Positive for flank pain. Negative for dysuria, hematuria and urgency.   Musculoskeletal: Positive for back pain and myalgias. Negative for neck pain.   Psychiatric/Behavioral: Positive for depression. The patient is nervous/anxious and has insomnia.    All other systems reviewed and are negative.    Answers for HPI/ROS submitted by the patient on 11/3/2022  CHRISTIANA 7 TOTAL SCORE: 5  General Symptoms: No  Skin Symptoms: No  HENT Symptoms: No  EYE SYMPTOMS: No  HEART SYMPTOMS: No  LUNG SYMPTOMS: No  INTESTINAL SYMPTOMS: Yes  URINARY SYMPTOMS: Yes  GYNECOLOGIC SYMPTOMS: No  BREAST SYMPTOMS: No  SKELETAL SYMPTOMS: Yes  BLOOD SYMPTOMS: No  NERVOUS SYSTEM SYMPTOMS: No  MENTAL HEALTH SYMPTOMS: Yes  Bloating: No  Rectal or Anal pain: Yes  Fecal  "incontinence: No  Yellowing of skin or eyes: No  Vomit with blood: No  Change in stools: No  Trouble holding urine or incontinence: No  Increased frequency of urination: No  Decreased frequency of urination: No  Frequent nighttime urination: Yes  Difficulty emptying bladder: No  Swollen joints: No  Joint pain: Yes  Bone pain: No  Muscle cramps: Yes  Muscle weakness: Yes  Joint stiffness: Yes  Bone fracture: No  Trouble thinking or concentrating: Yes  Mood changes: No  Panic attacks: No    Physical Exam:     Vitals:    11/03/22 1137   BP: 133/83   BP Location: Right arm   Patient Position: Chair   Cuff Size: Adult Regular   Pulse: 79   SpO2: 98%   Weight: 62.4 kg (137 lb 9.1 oz)   Height: 1.575 m (5' 2\")       General Appearance: No distress, seated comfortably  Mood: Euthymic  HE ENT: Non constricted pupils  Respiratory: Non labored breathing    Laboratory results:  Recent Labs   Lab Test 09/20/22  1412 10/26/21  1354 11/27/19  0946 09/22/18  1115 06/18/18  1744     --   --   --  138   POTASSIUM 4.0  --   --   --  3.7   CHLORIDE 105  --   --   --  107   CO2 22  --   --   --  20   ANIONGAP 10  --   --   --  12   GLC 88  --  90  --  105*   BUN 9  --   --   --  6*   CR 0.62 0.92  --    < > 0.53   BASSAM 9.5  --   --   --  8.6    < > = values in this interval not displayed.       CBC RESULTS:   Recent Labs   Lab Test 09/20/22  1412   WBC 7.0   RBC 4.60   HGB 13.1   HCT 40.6   MCV 88   MCH 28.5   MCHC 32.3   RDW 13.8            Imaging:       ASSESSMENT AND PLAN:     Encounter Diagnosis:    1. Spina bifida of lumbar region without hydrocephalus (H)    2. History of tethered spinal cord    3. Chronic neuropathic pain  4. Sacral radiculopathy      Minh Altamirano is a 37 year old y.o. old female who presents to the pain clinic with neuropathic pain    I have summarized the patient s past medical history, discussed their clinical findings and the potential differential diagnosis with the patient. Significant past " medical history pertinent to the patient s current condition includes worsening pain, seeking full disability, and in the midst of trail of conservative therapies. The differential diagnosis discussed with the patient are listed above. I have discussed anatomy and possible sources of the pain using models and/or pictures (diagrams). I have discussed multi- disciplinary pain management options withthe patient as pertaining to their case as detailed above. The pain management options we discussed included, but were not limited to the recommendations below.  I also discussed with patient the risks, benefits and alternatives to each pain management option.  All of the patient s questions and concerns were answered to the best of my ability.    RECOMMENDATIONS:     1. Medications: We are recommending the patient to titrate gabapentin as recommended by Dr. Pittman. Dosing, side effects, risks/benefits/alternatives were discussed with the patient in detail.  The patient can consider adding adjuncts such as duloxetine and TCA to help with neuropathic pain.     2. Procedure: SCS can be considered once the patient has exhausted conservative treatments outlined by Dr. Pittman. Preliminary information of scs trial will be given to the patient.     3. Physical therapy: as recommended in the last clinic visit.       Follow up: with Dr. Pittman.

## 2022-11-03 NOTE — NURSING NOTE
Patient presents with:  Consult: New Consult Lower Back, left leg Pain      Worst Pain (10)     Pain Medications     Analgesics Other Refills Start End     acetaminophen (TYLENOL) 325 MG tablet    0 11/27/2019     Sig - Route: Take 1-2 tablets (325-650 mg) by mouth every 6 hours as needed for mild pain - Oral    Class: E-Prescribe          What medications are you using for pain? Ibuprofen,   gabapenin    (New patients only) Have you been seen by another pain clinic/ provider? no    (Return Patients only) What refills are you needing today? no

## 2022-11-03 NOTE — LETTER
11/3/2022       RE: Minh Altamirano  [address]  [facility] [address]     Dear Colleague,    Thank you for referring your patient, Minh Altamirano, to the [facility] FOR COM[facility]HENSIVE PAIN MANAG[facility]. Please see a copy of my visit note below.      Pain Clinic New Patient Consult Note:    Referring Provider: Nathalie   Primary care provider: Jonathan Goldman  Interpretation services were used today for the encounter    Minh Altamirano is a 37 year old y.o. old female who presents to the pain clinic with back and leg.     HPI:  Patient Supplied Answers To the [facility] Pain Questionnaire  [facility] -  Patient Entered Questionnaire/Answers 11/3/2022   What number best describes your pain right now:  0 = No pain  to  10 = Worst pain imaginable 10   How would you describe the pain? burning, sharp, numbness   Which of the following worsen your pain? sitting, walking   Which of the following improve or reduce your pain? medication   What number best describes your average pain for the past week:  0 = No pain  to  10 = Worst pain imaginable 10   What number best describes your LOWEST pain in past 24 hours:  0 = No pain  to  10 = Worst pain imaginable 9   What number best describes your WORST pain in past 24 hours:  0 = No pain  to  10 = Worst pain imaginable 10   When is your pain worst? AM, PM, Night, Constant   What non-medicine treatments have you already had for your pain? pain clinic     Minh Altamirano is a 37 year old female who first started having problems with pain in her right lower back. The pain started over a decade ago when she had surgical intervention for tethered cord and a lipoma. She has been experiencing an increase in the past year of her right lower back pain with radiation down to the bottom of her right foot. Her sensation is significantly diminished throughout the RLE and feels that her muscles are weaker  than they used to be. Her right leg starts shaking after a short walk and she must sit down to rest them before continuing. The pain frequently wakes her from sleep and causes her to have issues with balance.  She has B/B incontinence which impacts her daily life. She was sent by NSGY for evaluation for SCS.     Pain rating: intensity ranges from 7/10 to 9/10, and Averages 8/10 on a 0-10 scale.  Aggravating factors include: Walking, laying down, prolonged standing  Relieving factors include: certain positions while seated  Any bowel or bladder incontinence: Yes    The patient is saying that her disability is reduced and she needs paperwork for the disability. I discussed with the patient that we are unable to help with this type of paperwork and I am eeing her for consultation for scs only.     Pain treatments:    Minh Altamirano has been seen at a pain clinic in the past by Dr. Pittman  PT: No, 2022  Acupuncture: No  TENs Unit: No  Injections: Yes    Tests/Imaging reviewed with the patient:    EMG confirmed chronic sacral radiculopathy    Significant Medical History:   Past Medical History:   Diagnosis Date     Anemia      Chronic infection     MRSA     Constipation, chronic      Incontinence of urine      Lipoma of spinal cord      Migraine      neurogenic bladder      Spinal dysraphism (H)           Past Surgical History:  Past Surgical History:   Procedure Laterality Date     BLADDER AUGMENTATION  2012    Procedure:BLADDER AUGMENTATION; Surgeon:TRINA MOORE; [facility]      SECTION N/A 2018    Procedure:  SECTION;  Primary  Section  with classical incision;  Surgeon: Lily Villanueva MD;  [facility]     CYSTOSCOPY, INTRAVESICAL INJECTION N/A 2016    Procedure: CYSTOSCOPY, INTRAVESICAL INJECTION;  Surgeon: Trina Moore MD;  [facility]     DILATION AND CURETTAGE SUCTION N/A 2019    Procedure: suction dilation and curetage,;   Surgeon: Radha Hickey MD;  [facility]     EXAM UNDER ANESTHESIA PELVIC N/A 11/27/2019    Procedure: pelvic exam under anesthesia;  Surgeon: Radha Hickey MD;  [facility]     LAMINECTOMY LUMBAR TWO LEVELS       LAPAROTOMY EXPLORATORY  1/11/2012    Procedure:LAPAROTOMY EXPLORATORY; Exploratory Laparotomy with Takedown of Mitrofanoff, Ventral Hernia Repair with Strattice Mesh implantation ; Surgeon:TRINA COLLADO; [facility]     MITROFANOFF PROCEDURE (APPENDIX CONDUIT)  1/5/2012    Procedure:MITROFANOFF PROCEDURE (APPENDIX CONDUIT); Bladder Augmentation with Right Colon, Appendix Conduit- Mitrofanoff, Insertion of Pubovaginal Sling using Autologous Rectus Fascia; Surgeon:TRINA COLLADO; [facility]     tumor resection and cord detethering            Family History:  No family history on file.       Social History:  Social History     Socioeconomic History     Marital status:      Spouse name: Not on file     Number of children: Not on file     Years of education: Not on file     Highest education level: Not on file   Occupational History     Not on file   Tobacco Use     Smoking status: Never     Smokeless tobacco: Never   Substance and Sexual Activity     Alcohol use: No     Drug use: No     Sexual activity: Yes     Partners: Male     Birth control/protection: Pill   Other Topics Concern     Parent/sibling w/ CABG, MI or angioplasty before 65F 55M? Not Asked   Social History Narrative    ** Merged History Encounter **          Social Determinants of Health     Financial Resource Strain: Not on file   Food Insecurity: Not on file   Transportation Needs: Not on file   Physical Activity: Not on file   Stress: Not on file   Social Connections: Not on file   Intimate Partner Violence: Not on file   Housing Stability: Not on file     Social History     Social History Narrative    ** Merged History Encounter **               Allergies:  Allergies   Allergen Reactions      Naproxen Hives and Nausea and Vomiting     Bactrim [Sulfamethoxazole W/Trimethoprim] Itching     Gabapentin Itching and Nausea     Vicodin [Hydrocodone-Acetaminophen] Itching       Current Medications:   Current Outpatient Medications   Medication Sig Dispense Refill     acetaminophen (TYLENOL) 325 MG tablet Take 1-2 tablets (325-650 mg) by mouth every 6 hours as needed for mild pain 20 tablet 0     cholecalciferol (VITAMIN D3) 125 mcg (5000 units) capsule TAKE 1 CAPSULE BY MOUTH DAILY AS DIRECTED       famotidine (PEPCID) 20 MG tablet        gabapentin (NEURONTIN) 300 MG capsule TAKE 1 CAPSULE BY MOUTH THREE TIMES DAILY       ibuprofen (ADVIL/MOTRIN) 100 MG tablet Take 100 mg by mouth every 4 hours as needed       omeprazole (PRILOSEC) 20 MG DR capsule        polyethylene glycol (MIRALAX) 17 g packet Take 17 g by mouth daily 72 packet 0     Prenatal Vit-Fe Fumarate-FA (PRENATAL MULTIVITAMIN W/IRON) 27-0.8 MG tablet Take 1 tablet by mouth daily 90 tablet 3     SENNA-docusate sodium (SENNA S) 8.6-50 MG tablet Take 1-2 tablets by mouth 2 times daily as needed (constipation) 60 tablet 0          Current Pain Medications:  Medications related to Pain Management (From now, onward)    None           Past Pain Medications:  Low dose gabapentin    Blood thinner:    none    Work History:    Current work status: on disability      Review of Systems:  Review of Systems   Gastrointestinal: Positive for abdominal pain, constipation and heartburn. Negative for blood in stool, diarrhea, melena, nausea and vomiting.   Genitourinary: Positive for flank pain. Negative for dysuria, hematuria and urgency.   Musculoskeletal: Positive for back pain and myalgias. Negative for neck pain.   Psychiatric/Behavioral: Positive for depression. The patient is nervous/anxious and has insomnia.    All other systems reviewed and are negative.    Answers for HPI/ROS submitted by the patient on 11/3/2022  CHRISTIANA 7 TOTAL SCORE: 5  General Symptoms:  "No  Skin Symptoms: No  HENT Symptoms: No  EYE SYMPTOMS: No  HEART SYMPTOMS: No  LUNG SYMPTOMS: No  INTESTINAL SYMPTOMS: Yes  URINARY SYMPTOMS: Yes  GYNECOLOGIC SYMPTOMS: No  BREAST SYMPTOMS: No  SKELETAL SYMPTOMS: Yes  BLOOD SYMPTOMS: No  NERVOUS SYSTEM SYMPTOMS: No  MENTAL HEALTH SYMPTOMS: Yes  Bloating: No  Rectal or Anal pain: Yes  Fecal incontinence: No  Yellowing of skin or eyes: No  Vomit with blood: No  Change in stools: No  Trouble holding urine or incontinence: No  Increased frequency of urination: No  Decreased frequency of urination: No  Frequent nighttime urination: Yes  Difficulty emptying bladder: No  Swollen joints: No  Joint pain: Yes  Bone pain: No  Muscle cramps: Yes  Muscle weakness: Yes  Joint stiffness: Yes  Bone fracture: No  Trouble thinking or concentrating: Yes  Mood changes: No  Panic attacks: No    Physical Exam:     Vitals:    11/03/22 1137   BP: 133/83   BP Location: Right arm   Patient Position: Chair   Cuff Size: Adult Regular   Pulse: 79   SpO2: 98%   Weight: 62.4 kg (137 lb 9.1 oz)   Height: 1.575 m (5' 2\")       General Appearance: No distress, seated comfortably  Mood: Euthymic  HE ENT: Non constricted pupils  Respiratory: Non labored breathing    Laboratory results:  Recent Labs   Lab Test 09/20/22  1412 10/26/21  1354 11/27/19  0946 09/22/18  1115 06/18/18  1744     --   --   --  138   POTASSIUM 4.0  --   --   --  3.7   CHLORIDE 105  --   --   --  107   CO2 22  --   --   --  20   ANIONGAP 10  --   --   --  12   GLC 88  --  90  --  105*   BUN 9  --   --   --  6*   CR 0.62 0.92  --    < > 0.53   BASSAM 9.5  --   --   --  8.6    < > = values in this interval not displayed.       CBC RESULTS:   Recent Labs   Lab Test 09/20/22  1412   WBC 7.0   RBC 4.60   HGB 13.1   HCT 40.6   MCV 88   MCH 28.5   MCHC 32.3   RDW 13.8            Imaging:       ASSESSMENT AND PLAN:     Encounter Diagnosis:    1. Spina bifida of lumbar region without hydrocephalus (H)    2. History of tethered " spinal cord    3. Chronic neuropathic pain  4. Sacral radiculopathy      Minh Altamirano is a 37 year old y.o. old female who presents to the pain clinic with neuropathic pain    I have summarized the patient s past medical history, discussed their clinical findings and the potential differential diagnosis with the patient. Significant past medical history pertinent to the patient s current condition includes worsening pain, seeking full disability, and in the midst of trail of conservative therapies. The differential diagnosis discussed with the patient are listed above. I have discussed anatomy and possible sources of the pain using models and/or pictures (diagrams). I have discussed multi- disciplinary pain management options withthe patient as pertaining to their case as detailed above. The pain management options we discussed included, but were not limited to the recommendations below.  I also discussed with patient the risks, benefits and alternatives to each pain management option.  All of the patient s questions and concerns were answered to the best of my ability.    RECOMMENDATIONS:     1. Medications: We are recommending the patient to titrate gabapentin as recommended by Dr. Pittman. Dosing, side effects, risks/benefits/alternatives were discussed with the patient in detail.  The patient can consider adding adjuncts such as duloxetine and TCA to help with neuropathic pain.     2. Procedure: SCS can be considered once the patient has exhausted conservative treatments outlined by Dr. Pittman. Preliminary information of scs trial will be given to the patient.     3. Physical therapy: as recommended in the last clinic visit.       Follow up: with Dr. Pittman.         Sincerely,    Vania Valladares MD

## 2022-11-22 ENCOUNTER — OFFICE VISIT (OUTPATIENT)
Dept: NEUROSURGERY | Facility: CLINIC | Age: 37
End: 2022-11-22
Attending: NEUROLOGICAL SURGERY
Payer: COMMERCIAL

## 2022-11-22 VITALS
HEART RATE: 89 BPM | SYSTOLIC BLOOD PRESSURE: 119 MMHG | WEIGHT: 130.73 LBS | HEIGHT: 61 IN | DIASTOLIC BLOOD PRESSURE: 85 MMHG | BODY MASS INDEX: 24.68 KG/M2

## 2022-11-22 DIAGNOSIS — Q06.8 TETHERED SPINAL CORD (H): Primary | ICD-10-CM

## 2022-11-22 PROCEDURE — 99213 OFFICE O/P EST LOW 20 MIN: CPT | Mod: GC | Performed by: NEUROLOGICAL SURGERY

## 2022-11-22 PROCEDURE — G0463 HOSPITAL OUTPT CLINIC VISIT: HCPCS

## 2022-11-22 NOTE — NURSING NOTE
"Chief Complaint   Patient presents with     RECHECK     Spina bifida for lumbar region without hydrocephalus 'not sleeping well' 'pain all the way down back and legs'       Vitals:    11/22/22 1432   BP: 119/85   BP Location: Right arm   Patient Position: Sitting   Cuff Size: Adult Regular   Pulse: 89   Weight: 130 lb 11.7 oz (59.3 kg)   Height: 5' 1.02\" (155 cm)   HC: 55.5 cm (21.85\")       Zuleyka Reyes, EMT  November 22, 2022  "

## 2022-11-22 NOTE — PROGRESS NOTES
Pediatric Neurosurgery Clinic    Dear Dr. Goldman,   Thank you for referring Minh Altamirano to the pediatric neurosurgery clinic at the [facility].   I had the opportunity to meet with Minh Altamirano on 2022.    As you know, Minh is a 37 year old female with a past medical history significant for partial untethering with partial resection of distal lipoma in 2010 with Dr Garcia. She had been doing well until 2021. She last saw us 6 months ago with recommendations to see Dr. Zelaya and the pain team for assessment of SCS and possibility of spinal column shortening. She returns for follow up.    She states overall things seem to have progressed. She feels her back pain is worse and she's having neuropathic pain in both legs instead of mainly just right, which was her previous state. She says the pain has caused her to not be able to sleep as well and has affected her ambulation as well. She visited with Dr. Zelaya who recommended seeing the pain team for SCS assessment as well. She visited with the pain team and they referred her for PT for 3 months prior to SCS assessment. Her first PT session is tomorrow. They also increased her gabapentin which has a temporary effect on her pain.    Past Medical History:   Diagnosis Date     Anemia      Chronic infection     MRSA     Constipation, chronic      Incontinence of urine      Lipoma of spinal cord      Migraine      neurogenic bladder      Spinal dysraphism (H)        Past Surgical History:   Procedure Laterality Date     BLADDER AUGMENTATION  2012    Procedure:BLADDER AUGMENTATION; Surgeon:TRINA COLLADO; [facility]      SECTION N/A 2018    Procedure:  SECTION;  Primary  Section  with classical incision;  Surgeon: Lily Villanueva MD;  [facility]     CYSTOSCOPY, INTRAVESICAL INJECTION N/A 2016    Procedure: CYSTOSCOPY, INTRAVESICAL  INJECTION;  Surgeon: Trina Moore MD;  [facility]     DILATION AND CURETTAGE SUCTION N/A 11/27/2019    Procedure: suction dilation and curetage,;  Surgeon: Radha Hickey MD;  [facility]     EXAM UNDER ANESTHESIA PELVIC N/A 11/27/2019    Procedure: pelvic exam under anesthesia;  Surgeon: Radha Hickey MD;  [facility]     LAMINECTOMY LUMBAR TWO LEVELS       LAPAROTOMY EXPLORATORY  1/11/2012    Procedure:LAPAROTOMY EXPLORATORY; Exploratory Laparotomy with Takedown of Mitrofanoff, Ventral Hernia Repair with Strattice Mesh implantation ; Surgeon:TRINA MOORE; [facility]     MITROFANOFF PROCEDURE (APPENDIX CONDUIT)  1/5/2012    Procedure:MITROFANOFF PROCEDURE (APPENDIX CONDUIT); Bladder Augmentation with Right Colon, Appendix Conduit- Mitrofanoff, Insertion of Pubovaginal Sling using Autologous Rectus Fascia; Surgeon:TRINA MOORE; [facility]     tumor resection and cord detethering         ALLERGIES:  Naproxen, Bactrim [sulfamethoxazole w/trimethoprim], Gabapentin, and Vicodin [hydrocodone-acetaminophen]    Current Outpatient Medications   Medication Sig Dispense Refill     acetaminophen (TYLENOL) 325 MG tablet Take 1-2 tablets (325-650 mg) by mouth every 6 hours as needed for mild pain 20 tablet 0     cholecalciferol (VITAMIN D3) 125 mcg (5000 units) capsule TAKE 1 CAPSULE BY MOUTH DAILY AS DIRECTED       famotidine (PEPCID) 20 MG tablet        gabapentin (NEURONTIN) 300 MG capsule TAKE 1 CAPSULE BY MOUTH THREE TIMES DAILY       ibuprofen (ADVIL/MOTRIN) 100 MG tablet Take 100 mg by mouth every 4 hours as needed       omeprazole (PRILOSEC) 20 MG DR capsule        polyethylene glycol (MIRALAX) 17 g packet Take 17 g by mouth daily 72 packet 0     Prenatal Vit-Fe Fumarate-FA (PRENATAL MULTIVITAMIN W/IRON) 27-0.8 MG tablet Take 1 tablet by mouth daily 90 tablet 3     SENNA-docusate sodium (SENNA S) 8.6-50 MG tablet Take 1-2 tablets by mouth 2 times daily as needed  "(constipation) 60 tablet 0       No family history on file.    Social History     Tobacco Use     Smoking status: Never     Smokeless tobacco: Never   Substance Use Topics     Alcohol use: No         PHYSICAL EXAM:   /85 (BP Location: Right arm, Patient Position: Sitting, Cuff Size: Adult Regular)   Pulse 89   Ht 1.55 m (5' 1.02\")   Wt 59.3 kg (130 lb 11.7 oz)   HC 55.5 cm (21.85\")   BMI 24.68 kg/m      Alert and oriented. NAD.   PERRL, EOMI. Face symmetric.  4+/5 throughout RLE with 4/5 right DF. 5/5 otherwise  Numbness present in right leg worse on the inside of her leg.    IMAGES: None this visit.    ASSESSMENT:  Minh Altamirano, 37 year old with a past medical history significant for partial untethering with partial resection of distal lipoma in December 2010 with Dr Garcia, with worsening pain for routine follow up. This visit was intended to be after she completes her conservative management attempts. We discussed surgical options if conservative management fails.    PLAN:  - Follow-up in 6 months after PT and possible SCS trial  - Minh Altamirano and family were counseled to please contact us with any acute worsening of symptoms, or with any questions or concerns.     This patient was discussed with neurosurgery faculty, who agrees with the above.  Kobe Guzman MD on 11/22/2022 at 3:10 PM  "

## 2022-11-22 NOTE — PATIENT INSTRUCTIONS
You met with Pediatric Neurosurgery at the [facility]    [address] Dr., NP      Pediatric Appointment Scheduling and Call Center:   [phone]    Nurse Practitioner  [phone]    Mailing Address  [address] [address]    Street Address   [address] [place] [address]    Fax Number  [phone]    For urgent matters that cannot wait until the next business day, occur over a holiday and/or weekend, report directly to your nearest ER or you may call [phone] and ask to page the Pediatric Neurosurgery Resident on call.

## 2022-11-22 NOTE — LETTER
11/22/2022      RE: Minh Altamirano  [address]  [facility] [address]     Dear Colleague,    Thank you for the opportunity to participate in the care of your patient, Minh Altamirano, at the [facility] at [facility]. Please see a copy of my visit note below.           Pediatric Neurosurgery Clinic    Dear Dr. Goldman,   Thank you for referring Minh Altamirano to the pediatric neurosurgery clinic at the [facility].   I had the opportunity to meet with Minh Altamirano on November 22, 2022.    As you know, Minh is a 37 year old female with a past medical history significant for partial untethering with partial resection of distal lipoma in December 2010 with Dr Garcia. She had been doing well until December of 2021. She last saw us 6 months ago with recommendations to see Dr. Zelaya and the pain team for assessment of SCS and possibility of spinal column shortening. She returns for follow up.    She states overall things seem to have progressed. She feels her back pain is worse and she's having neuropathic pain in both legs instead of mainly just right, which was her previous state. She says the pain has caused her to not be able to sleep as well and has affected her ambulation as well. She visited with Dr. Zelaya who recommended seeing the pain team for SCS assessment as well. She visited with the pain team and they referred her for PT for 3 months prior to SCS assessment. Her first PT session is tomorrow. They also increased her gabapentin which has a temporary effect on her pain.    Past Medical History:   Diagnosis Date     Anemia      Chronic infection     MRSA     Constipation, chronic      Incontinence of urine      Lipoma of spinal cord      Migraine      neurogenic bladder      Spinal dysraphism (H)        Past Surgical History:   Procedure Laterality Date     BLADDER  AUGMENTATION  2012    Procedure:BLADDER AUGMENTATION; Surgeon:TRINA MOORE; [facility]      SECTION N/A 2018    Procedure:  SECTION;  Primary  Section  with classical incision;  Surgeon: Lily Villanueva MD;  [facility]     CYSTOSCOPY, INTRAVESICAL INJECTION N/A 2016    Procedure: CYSTOSCOPY, INTRAVESICAL INJECTION;  Surgeon: Trina Moore MD;  [facility]     DILATION AND CURETTAGE SUCTION N/A 2019    Procedure: suction dilation and curetage,;  Surgeon: Radha Hickey MD;  [facility]     EXAM UNDER ANESTHESIA PELVIC N/A 2019    Procedure: pelvic exam under anesthesia;  Surgeon: Radha Hickey MD;  [facility]     LAMINECTOMY LUMBAR TWO LEVELS       LAPAROTOMY EXPLORATORY  2012    Procedure:LAPAROTOMY EXPLORATORY; Exploratory Laparotomy with Takedown of Mitrofanoff, Ventral Hernia Repair with Strattice Mesh implantation ; Surgeon:TRINA MOORE; [facility]     MITROFANOFF PROCEDURE (APPENDIX CONDUIT)  2012    Procedure:MITROFANOFF PROCEDURE (APPENDIX CONDUIT); Bladder Augmentation with Right Colon, Appendix Conduit- Mitrofanoff, Insertion of Pubovaginal Sling using Autologous Rectus Fascia; Surgeon:TRINA MOORE; [facility]     tumor resection and cord detethering         ALLERGIES:  Naproxen, Bactrim [sulfamethoxazole w/trimethoprim], Gabapentin, and Vicodin [hydrocodone-acetaminophen]    Current Outpatient Medications   Medication Sig Dispense Refill     acetaminophen (TYLENOL) 325 MG tablet Take 1-2 tablets (325-650 mg) by mouth every 6 hours as needed for mild pain 20 tablet 0     cholecalciferol (VITAMIN D3) 125 mcg (5000 units) capsule TAKE 1 CAPSULE BY MOUTH DAILY AS DIRECTED       famotidine (PEPCID) 20 MG tablet        gabapentin (NEURONTIN) 300 MG capsule TAKE 1 CAPSULE BY MOUTH THREE TIMES DAILY       ibuprofen (ADVIL/MOTRIN) 100 MG tablet Take 100 mg by mouth every 4  "hours as needed       omeprazole (PRILOSEC) 20 MG DR capsule        polyethylene glycol (MIRALAX) 17 g packet Take 17 g by mouth daily 72 packet 0     Prenatal Vit-Fe Fumarate-FA (PRENATAL MULTIVITAMIN W/IRON) 27-0.8 MG tablet Take 1 tablet by mouth daily 90 tablet 3     SENNA-docusate sodium (SENNA S) 8.6-50 MG tablet Take 1-2 tablets by mouth 2 times daily as needed (constipation) 60 tablet 0       No family history on file.    Social History     Tobacco Use     Smoking status: Never     Smokeless tobacco: Never   Substance Use Topics     Alcohol use: No         PHYSICAL EXAM:   /85 (BP Location: Right arm, Patient Position: Sitting, Cuff Size: Adult Regular)   Pulse 89   Ht 1.55 m (5' 1.02\")   Wt 59.3 kg (130 lb 11.7 oz)   HC 55.5 cm (21.85\")   BMI 24.68 kg/m      Alert and oriented. NAD.   PERRL, EOMI. Face symmetric.  4+/5 throughout RLE with 4/5 right DF. 5/5 otherwise  Numbness present in right leg worse on the inside of her leg.    IMAGES: None this visit.    ASSESSMENT:  Minh Altamirano, 37 year old with a past medical history significant for partial untethering with partial resection of distal lipoma in December 2010 with Dr Garcia, with worsening pain for routine follow up. This visit was intended to be after she completes her conservative management attempts. We discussed surgical options if conservative management fails.    PLAN:  - Follow-up in 6 months after PT and possible SCS trial  - Minh Altamirano and family were counseled to please contact us with any acute worsening of symptoms, or with any questions or concerns.     This patient was discussed with neurosurgery faculty, who agrees with the above.  Kobe Guzman MD on 11/22/2022 at 3:10 PM    Attestation signed by Richy López MD at 11/29/2022  2:00 PM:  Physician Attestation  I, Richy López MD, saw this patient and agree with the findings and plan of care as documented in the note.      Items personally " reviewed/procedural attestation: vitals, labs, and imaging and agree with the interpretation documented in the note.    Is a pleasure following up with Leo in neurosurgery clinic today.  As noted, our plan following the last visit was to reevaluate following a trial of spinal cord stimulation.  She has not been able to undergo the trial, and first needs to complete physical therapy, which she is just beginning at this time.  She was able to meet with Dr. Zelaya to discuss the possible option of spinal column shortening procedure, as a last ditch effort should other things fail.  We discussed again today that further retiring procedures are unlikely to work, given the com

## 2022-11-22 NOTE — LETTER
Date: Nov 22, 2022    To whom it may concern,    Minh Altamirano is a 37 year old female with a past medical history significant for partial untethering with partial resection of distal lipoma in December 2010 and was doing well without any pain until December 2021. She reports worsening pain in her lower back, and pain radiating down her right leg up to her toes. She reports that the pain is worse with walking and is improved with rest. She reports that the pain is life style limiting and occurs at rest and even when she sleeps. She continues with many incontinence issues as well that have altered her life. She has symptoms consistent with retethering in agreement with the imaging. She has been followed closely in neurosurgery for different surgical options including spinal cord stimulation, repeat spinal cord untethering and potential for spinal column shortening. She continues to undergo work up for the best option and during that time, continues to have life altering symptoms that inhibit her from work, along with her frequent physician appointments. Please allow her adequate time and accommodations as she continues with her ongoing work up and inability to work during this time.       Sincerely      Velia Beckett NP  [phone]

## 2022-11-23 ENCOUNTER — HOSPITAL ENCOUNTER (OUTPATIENT)
Dept: PHYSICAL THERAPY | Facility: CLINIC | Age: 37
Setting detail: THERAPIES SERIES
Discharge: HOME | End: 2022-11-23
Attending: FAMILY MEDICINE
Payer: COMMERCIAL

## 2022-11-23 PROCEDURE — 97110 THERAPEUTIC EXERCISES: CPT | Mod: GP | Performed by: PHYSICAL THERAPIST

## 2022-11-23 PROCEDURE — 97162 PT EVAL MOD COMPLEX 30 MIN: CPT | Mod: GP | Performed by: PHYSICAL THERAPIST

## 2022-11-23 NOTE — PROGRESS NOTES
[facility]                                                                                   OUTPATIENT PHYSICAL THERAPY FUNCTIONAL EVALUATION  PLAN OF TREATMENT FOR OUTPATIENT REHABILITATION  (COMPLETE FOR INITIAL CLAIMS ONLY)  Patient's Last Name, First Name, M.I.  YOB: 1985  Minh Altamirano     Provider's Name   [facility]   Medical Record No.  [id number]     Start of Care Date:  11/23/22   Onset Date:  09/28/22   Type:     _X__PT   ____OT  ____SLP Medical Diagnosis:  Spina bifida of lumbar region without hydrocephalus (H) (Q05.7)     PT Diagnosis:  Back and R LE pain,  decreased strength decreased limb symmetry consistent with hx of spina bifida and decompensation after 2 pregnancies. Visits from SOC:  1                              __________________________________________________________________________________  Plan of Treatment/Functional Goals:  gait training, neuromuscular re-education, ROM, strengthening, stretching, manual therapy           GOALS  housework  Patient able to perform light housework and yard work responding with back pain no more than a 3 out of 10 and no shooting pain down the leg 4 out of 7 days/week  02/21/23    walking  patient able to walk for 15 minutes With no increase in back pain and no shooting pain down the legs during or after  02/21/23    AUTUMN  Patient will report a score of less than 20% on the AUTUMN to indicate minimal disability due to back pain.  Her daily life.  02/21/23                  Therapy Frequency:  1 time/week   Predicted Duration of Therapy Intervention:  90 days    Zuleyka Carreon, PT                                    I CERTIFY THE NEED FOR THESE SERVICES FURNISHED UNDER        THIS PLAN OF TREATMENT AND WHILE UNDER MY CARE     (Physician co-signature of this document indicates review and certification of  the therapy plan).                Certification Date From:  11/23/22   Certification Date To:  02/21/23    Referring Provider:  Cristiana Pittman MD    Initial Assessment  See Epic Evaluation- Start of Care Date: 11/23/22

## 2022-11-23 NOTE — PROGRESS NOTES
11/23/22 1023   Quick Adds   Quick Adds Certification  (ARE (PMAP) Cert required)   Type of Visit Initial OP PT Evaluation       Present Yes   Language Salvadorean  (in person 1878)   General Information   Start of Care Date 11/23/22   Referring Physician Cristiana Pittman MD   Orders Evaluate and Treat as Indicated   Order Date 08/28/22   Medical Diagnosis Spina bifida of lumbar region without hydrocephalus (H) (Q05.7)   Onset of illness/injury or Date of Surgery 09/28/22   Precautions/Limitations fall precautions   Special Instructions Balance Therapy, spinabifida lumbar region   Surgical/Medical history reviewed Yes   Pertinent history of current problem (include personal factors and/or comorbidities that impact the POC) Pt report that she had back surgery (surgical intervention for tethered cord and a lipoma) in 2010 in her back felt better. She did well until after the birth of her 4 yr old son, Back pain got worse, She then became pregnant with twins but had to be still born at 6 mo gestation and pain got even worse after losing the twins. She also has neurogenic bladder  and self caths.  trial d [employer] did not work for her (2 abdominal surgeries). Currently she has intermittent shooting electric pain down the R leg from buttock all the way down to baby toe.  Sometimes it will last 10 mins sometime shorter electric sensaion.  Does not matter what position she is in she will get the pains. Back pain is worse in sitting, back pain is right along the spinal cord/ vertebrae.  Pain is constant dull ache in back with intermittent shooting pain down R LE. Takes always IBP for pain and scheduled gabapentin. Also sleeping medication due to pain limiting sleep.   Prior level of function comment indep   Diagnostic Tests   (See EMR ffor imaging)   Previous/Current Treatment Medication(s)   Improvement after medication Moderate   Current Community Support Family/friend caregiver   Patient  role/Employment history Disabled   Living environment Apartment/condo   Home/Community Accessibility Comments no concerns   Assistive Devices Comments none today.   Patient/Family Goals Statement to be able to care for her son and to perhaps get a job eventually   Fall Risk Screen   Fall screen completed by PT   Have you fallen 2 or more times in the past year? No   Have you fallen and had an injury in the past year? No   Is patient a fall risk? No   Abuse Screen (yes response referral indicated)   Feels Unsafe at Home or Work/School no   Feels Threatened by Someone no   Does Anyone Try to Keep You From Having Contact with Others or Doing Things Outside Your Home? no   Physical Signs of Abuse Present no   Pain   Patient currently in pain Yes   Pain location Low back and R gluteal and LE   Pain rating best 3/10,  worst 7-8/10  average 5/10   Pain description Burning;Sharp;Ache;Dull  (electric)   Pain comments Pain goes all the way down leg to baby toe in spasms.  when really bad she takes the ibp   Cognitive Status Examination   Orientation orientation to person, place and time   Level of Consciousness alert   Follows Commands and Answers Questions 100% of the time   Personal Safety and Judgment intact   Memory intact   Observation   Observation NAD   Integumentary   Integumentary No deficits were identified   Posture   Posture   (Hyperlordotic)   Posture Comments slightly hyperlordotic lumbar spine.  R LE smaller than L LE.   Range of Motion (ROM)   ROM Comment hands to thighs with minimal rounding of low back in flxn, extn good curve limited slightly , side bending and rotation also only slightly limited. all cause more pain in R low back   Strength   Strength Comments LLE hip flxn4+/5 KE , KF, DF  all 4+/5 R LE hip flxn 4-/5, KF and KE 4-/5,  DF 4-/5 PF very limited (B)  _ Note: force production not as smooth on R side.   Gait   Gait Gait Skill;Gait Analysis   Gait Comments Grossly WFL.  Will continue to observe  "and assess. Long skirts today.   Gait Skills   Level of [place]: Gait independent   Weight-Bearing Restrictions: Gait full weight-bearing   Assistive Device for Transfer: Gait   (none)   Gait Distance 50 feet   Gait Analysis   Gait Pattern Used swing-through gait   Impairments Contributing to Gait Deviations decreased strength   Balance   Balance Comments good balance in sitting and standing as well as in gait   Sensory Examination   Sensory Perception Comments not fully tested today.  will keep assessing as indicated   Coordination   Coordination no deficits were identified   Muscle Tone   Muscle Tone Comments Grossly WFL, will continue to assess. (Patient did have a spasm of pain during today's evaluation.  Appears to be a nerve \"zinger\" rather than a muscle spasm.  We will try to palpate this next session if it happens.)   Modality Interventions   Planned Modality Interventions Comments PRN   Planned Therapy Interventions   Planned Therapy Interventions gait training;neuromuscular re-education;ROM;strengthening;stretching;manual therapy   Clinical Impression   Criteria for Skilled Therapeutic Interventions Met yes, treatment indicated   PT Diagnosis Back and R LE pain,  decreased strength decreased limb symmetry consistent with hx of spina bifida and decompensation after 2 pregnancies.   Influenced by the following impairments Back and R LE pain,  decreased strength decreased limb symmetry.   Functional limitations due to impairments Pain and decreased strength with ADLs and IADLs.   Clinical Presentation Evolving/Changing   Clinical Presentation Rationale History of spina bifida, multiple abdominal surgeries,   Clinical Decision Making (Complexity) Moderate complexity   Therapy Frequency 1 time/week   Predicted Duration of Therapy Intervention (days/wks) 90 days   Risk & Benefits of therapy have been explained Yes   Patient, Family & other staff in agreement with plan of care Yes   Clinical Impression " Comments PPW Back and R LE pain,  decreased strength decreased limb symmetry consistent with hx of spina bifida and decompensation after 2 pregnancies.  Patient will benefit from skilled physical therapy for instruction in HEP for core stabilization and home management of symptoms.  Patient will also benefit from skilled application of manual techniques and modalities as indicated to decrease pain and improve function.   Education Assessment   Preferred Learning Style Demonstration   Barriers to Learning Language;No barriers   GOALS   PT Eval Goals 1;2;3   Goal 1   Goal Identifier housework   Goal Description Patient able to perform light housework and yard work responding with back pain no more than a 3 out of 10 and no shooting pain down the leg 4 out of 7 days/week   Target Date 02/21/23   Goal 2   Goal Identifier walking   Goal Description patient able to walk for 15 minutes With no increase in back pain and no shooting pain down the legs during or after   Target Date 02/21/23   Goal 3   Goal Identifier AUTUMN   Goal Description Patient will report a score of less than 20% on the AUTUMN to indicate minimal disability due to back pain.  Her daily life.   Target Date 02/21/23   Total Evaluation Time   PT Eval, Moderate Complexity Minutes (61255) 35   Therapy Certification   Certification date from 11/23/22   Certification date to 02/21/23   Medical Diagnosis Spina bifida of lumbar region without hydrocephalus (H) (Q05.7)

## 2022-11-30 ENCOUNTER — HOSPITAL ENCOUNTER (OUTPATIENT)
Dept: PHYSICAL THERAPY | Facility: CLINIC | Age: 37
Setting detail: THERAPIES SERIES
Discharge: HOME | End: 2022-11-30
Attending: FAMILY MEDICINE
Payer: COMMERCIAL

## 2022-11-30 PROCEDURE — 97110 THERAPEUTIC EXERCISES: CPT | Mod: GP | Performed by: PHYSICAL THERAPIST

## 2022-11-30 PROCEDURE — 97750 PHYSICAL PERFORMANCE TEST: CPT | Mod: GP | Performed by: PHYSICAL THERAPIST

## 2022-12-16 ENCOUNTER — HOSPITAL ENCOUNTER (OUTPATIENT)
Dept: PHYSICAL THERAPY | Facility: CLINIC | Age: 37
Setting detail: THERAPIES SERIES
Discharge: HOME | End: 2022-12-16
Attending: FAMILY MEDICINE
Payer: COMMERCIAL

## 2022-12-16 PROCEDURE — 97530 THERAPEUTIC ACTIVITIES: CPT | Mod: GP | Performed by: PHYSICAL THERAPIST

## 2022-12-16 PROCEDURE — 97110 THERAPEUTIC EXERCISES: CPT | Mod: GP | Performed by: PHYSICAL THERAPIST

## 2022-12-20 ENCOUNTER — HOSPITAL ENCOUNTER (OUTPATIENT)
Dept: PHYSICAL THERAPY | Facility: CLINIC | Age: 37
Setting detail: THERAPIES SERIES
Discharge: HOME | End: 2022-12-20
Attending: FAMILY MEDICINE
Payer: COMMERCIAL

## 2022-12-20 PROCEDURE — 97110 THERAPEUTIC EXERCISES: CPT | Mod: GP | Performed by: PHYSICAL THERAPIST

## 2022-12-28 ENCOUNTER — HOSPITAL ENCOUNTER (OUTPATIENT)
Dept: PHYSICAL THERAPY | Facility: CLINIC | Age: 37
Setting detail: THERAPIES SERIES
Discharge: HOME | End: 2022-12-28
Attending: FAMILY MEDICINE
Payer: COMMERCIAL

## 2022-12-28 PROCEDURE — 97110 THERAPEUTIC EXERCISES: CPT | Mod: GP | Performed by: PHYSICAL THERAPIST

## 2022-12-28 PROCEDURE — 97530 THERAPEUTIC ACTIVITIES: CPT | Mod: GP | Performed by: PHYSICAL THERAPIST

## 2023-01-05 ENCOUNTER — HOSPITAL ENCOUNTER (OUTPATIENT)
Dept: PHYSICAL THERAPY | Facility: CLINIC | Age: 38
Setting detail: THERAPIES SERIES
Discharge: HOME | End: 2023-01-05
Attending: FAMILY MEDICINE
Payer: COMMERCIAL

## 2023-01-05 PROCEDURE — 97110 THERAPEUTIC EXERCISES: CPT | Mod: GP | Performed by: PHYSICAL THERAPIST

## 2023-01-10 ENCOUNTER — HOSPITAL ENCOUNTER (OUTPATIENT)
Dept: PHYSICAL THERAPY | Facility: CLINIC | Age: 38
Setting detail: THERAPIES SERIES
Discharge: HOME | End: 2023-01-10
Attending: FAMILY MEDICINE
Payer: COMMERCIAL

## 2023-01-10 PROCEDURE — 97110 THERAPEUTIC EXERCISES: CPT | Mod: GP | Performed by: PHYSICAL THERAPIST

## 2023-01-14 ENCOUNTER — HEALTH MAINTENANCE LETTER (OUTPATIENT)
Age: 38
End: 2023-01-14

## 2023-01-19 ENCOUNTER — HOSPITAL ENCOUNTER (OUTPATIENT)
Dept: PHYSICAL THERAPY | Facility: CLINIC | Age: 38
Setting detail: THERAPIES SERIES
Discharge: HOME | End: 2023-01-19
Attending: FAMILY MEDICINE
Payer: COMMERCIAL

## 2023-01-19 PROCEDURE — 97110 THERAPEUTIC EXERCISES: CPT | Mod: GP | Performed by: PHYSICAL THERAPIST

## 2023-02-07 ENCOUNTER — HOSPITAL ENCOUNTER (OUTPATIENT)
Dept: PHYSICAL THERAPY | Facility: CLINIC | Age: 38
Setting detail: THERAPIES SERIES
Discharge: HOME | End: 2023-02-07
Attending: FAMILY MEDICINE
Payer: COMMERCIAL

## 2023-02-07 PROCEDURE — 97110 THERAPEUTIC EXERCISES: CPT | Mod: GP | Performed by: PHYSICAL THERAPIST

## 2023-02-14 LAB
ALBUMIN SERPL BCG-MCNC: 4.3 G/DL (ref 3.5–5.2)
ALP SERPL-CCNC: 82 U/L (ref 35–104)
ALT SERPL W P-5'-P-CCNC: 17 U/L (ref 10–35)
ANION GAP SERPL CALCULATED.3IONS-SCNC: 16 MMOL/L (ref 7–15)
AST SERPL W P-5'-P-CCNC: 27 U/L (ref 10–35)
ATRIAL RATE - MUSE: 89 BPM
BASOPHILS # BLD AUTO: 0.1 10E3/UL (ref 0–0.2)
BASOPHILS NFR BLD AUTO: 1 %
BILIRUB SERPL-MCNC: 0.5 MG/DL
BUN SERPL-MCNC: 12.7 MG/DL (ref 6–20)
CALCIUM SERPL-MCNC: 9.8 MG/DL (ref 8.6–10)
CHLORIDE SERPL-SCNC: 105 MMOL/L (ref 98–107)
CREAT SERPL-MCNC: 0.54 MG/DL (ref 0.51–0.95)
DEPRECATED HCO3 PLAS-SCNC: 16 MMOL/L (ref 22–29)
DIASTOLIC BLOOD PRESSURE - MUSE: NORMAL MMHG
EOSINOPHIL # BLD AUTO: 0 10E3/UL (ref 0–0.7)
EOSINOPHIL NFR BLD AUTO: 0 %
ERYTHROCYTE [DISTWIDTH] IN BLOOD BY AUTOMATED COUNT: 14.6 % (ref 10–15)
GFR SERPL CREATININE-BSD FRML MDRD: >90 ML/MIN/1.73M2
GLUCOSE SERPL-MCNC: 90 MG/DL (ref 70–99)
HCT VFR BLD AUTO: 44.1 % (ref 35–47)
HGB BLD-MCNC: 15 G/DL (ref 11.7–15.7)
HOLD SPECIMEN: NORMAL
IMM GRANULOCYTES # BLD: 0.1 10E3/UL
IMM GRANULOCYTES NFR BLD: 1 %
INTERPRETATION ECG - MUSE: NORMAL
LIPASE SERPL-CCNC: 29 U/L (ref 13–60)
LYMPHOCYTES # BLD AUTO: 2.5 10E3/UL (ref 0.8–5.3)
LYMPHOCYTES NFR BLD AUTO: 21 %
MCH RBC QN AUTO: 29.1 PG (ref 26.5–33)
MCHC RBC AUTO-ENTMCNC: 34 G/DL (ref 31.5–36.5)
MCV RBC AUTO: 86 FL (ref 78–100)
MONOCYTES # BLD AUTO: 0.4 10E3/UL (ref 0–1.3)
MONOCYTES NFR BLD AUTO: 3 %
NEUTROPHILS # BLD AUTO: 9 10E3/UL (ref 1.6–8.3)
NEUTROPHILS NFR BLD AUTO: 74 %
NRBC # BLD AUTO: 0 10E3/UL
NRBC BLD AUTO-RTO: 0 /100
P AXIS - MUSE: 57 DEGREES
PLATELET # BLD AUTO: 297 10E3/UL (ref 150–450)
POTASSIUM SERPL-SCNC: 4.1 MMOL/L (ref 3.4–5.3)
PR INTERVAL - MUSE: 152 MS
PROT SERPL-MCNC: 7.9 G/DL (ref 6.4–8.3)
QRS DURATION - MUSE: 64 MS
QT - MUSE: 350 MS
QTC - MUSE: 425 MS
R AXIS - MUSE: 54 DEGREES
RBC # BLD AUTO: 5.15 10E6/UL (ref 3.8–5.2)
SODIUM SERPL-SCNC: 137 MMOL/L (ref 136–145)
SYSTOLIC BLOOD PRESSURE - MUSE: NORMAL MMHG
T AXIS - MUSE: 37 DEGREES
VENTRICULAR RATE- MUSE: 89 BPM
WBC # BLD AUTO: 12 10E3/UL (ref 4–11)

## 2023-02-14 PROCEDURE — 250N000011 HC RX IP 250 OP 636: Performed by: EMERGENCY MEDICINE

## 2023-02-14 PROCEDURE — 83690 ASSAY OF LIPASE: CPT | Performed by: EMERGENCY MEDICINE

## 2023-02-14 PROCEDURE — 93005 ELECTROCARDIOGRAM TRACING: CPT | Mod: RTG

## 2023-02-14 PROCEDURE — 96374 THER/PROPH/DIAG INJ IV PUSH: CPT

## 2023-02-14 PROCEDURE — 85025 COMPLETE CBC W/AUTO DIFF WBC: CPT | Performed by: EMERGENCY MEDICINE

## 2023-02-14 PROCEDURE — 99285 EMERGENCY DEPT VISIT HI MDM: CPT | Mod: 25

## 2023-02-14 PROCEDURE — 96361 HYDRATE IV INFUSION ADD-ON: CPT

## 2023-02-14 PROCEDURE — 80053 COMPREHEN METABOLIC PANEL: CPT | Performed by: EMERGENCY MEDICINE

## 2023-02-14 PROCEDURE — 84484 ASSAY OF TROPONIN QUANT: CPT | Performed by: EMERGENCY MEDICINE

## 2023-02-14 PROCEDURE — 36415 COLL VENOUS BLD VENIPUNCTURE: CPT | Performed by: EMERGENCY MEDICINE

## 2023-02-14 PROCEDURE — 258N000003 HC RX IP 258 OP 636: Performed by: EMERGENCY MEDICINE

## 2023-02-14 RX ORDER — ONDANSETRON 2 MG/ML
4 INJECTION INTRAMUSCULAR; INTRAVENOUS ONCE
Status: COMPLETED | OUTPATIENT
Start: 2023-02-14 | End: 2023-02-14

## 2023-02-14 RX ADMIN — ONDANSETRON 4 MG: 2 INJECTION INTRAMUSCULAR; INTRAVENOUS at 19:13

## 2023-02-14 RX ADMIN — SODIUM CHLORIDE 1000 ML: 9 INJECTION, SOLUTION INTRAVENOUS at 19:13

## 2023-02-15 ENCOUNTER — HOSPITAL ENCOUNTER (EMERGENCY)
Facility: CLINIC | Age: 38
Discharge: HOME | End: 2023-02-15
Attending: EMERGENCY MEDICINE | Admitting: EMERGENCY MEDICINE
Payer: COMMERCIAL

## 2023-02-15 ENCOUNTER — APPOINTMENT (OUTPATIENT)
Dept: GENERAL RADIOLOGY | Facility: CLINIC | Age: 38
End: 2023-02-15
Attending: EMERGENCY MEDICINE
Payer: COMMERCIAL

## 2023-02-15 VITALS
HEART RATE: 100 BPM | RESPIRATION RATE: 24 BRPM | TEMPERATURE: 97.9 F | DIASTOLIC BLOOD PRESSURE: 90 MMHG | OXYGEN SATURATION: 100 % | SYSTOLIC BLOOD PRESSURE: 125 MMHG

## 2023-02-15 DIAGNOSIS — R11.2 NAUSEA AND VOMITING, UNSPECIFIED VOMITING TYPE: ICD-10-CM

## 2023-02-15 DIAGNOSIS — R10.13 EPIGASTRIC PAIN: ICD-10-CM

## 2023-02-15 LAB
HCG SERPL QL: NEGATIVE
TROPONIN T SERPL HS-MCNC: <6 NG/L

## 2023-02-15 PROCEDURE — 71046 X-RAY EXAM CHEST 2 VIEWS: CPT

## 2023-02-15 PROCEDURE — 36415 COLL VENOUS BLD VENIPUNCTURE: CPT | Performed by: EMERGENCY MEDICINE

## 2023-02-15 PROCEDURE — 84703 CHORIONIC GONADOTROPIN ASSAY: CPT | Performed by: EMERGENCY MEDICINE

## 2023-02-15 PROCEDURE — 250N000009 HC RX 250: Performed by: EMERGENCY MEDICINE

## 2023-02-15 PROCEDURE — 250N000013 HC RX MED GY IP 250 OP 250 PS 637: Performed by: EMERGENCY MEDICINE

## 2023-02-15 RX ORDER — ONDANSETRON 4 MG/1
4 TABLET, ORALLY DISINTEGRATING ORAL EVERY 8 HOURS PRN
Qty: 10 TABLET | Refills: 0 | Status: SHIPPED | OUTPATIENT
Start: 2023-02-15

## 2023-02-15 RX ADMIN — LIDOCAINE HYDROCHLORIDE 30 ML: 20 SOLUTION ORAL; TOPICAL at 01:16

## 2023-02-15 ASSESSMENT — ENCOUNTER SYMPTOMS
ABDOMINAL PAIN: 1
VOMITING: 1
NAUSEA: 1
COUGH: 0

## 2023-02-15 ASSESSMENT — ACTIVITIES OF DAILY LIVING (ADL): ADLS_ACUITY_SCORE: 33

## 2023-02-15 NOTE — ED PROVIDER NOTES
History     Chief Complaint:  Abdominal pain and chest pain       The history is provided by the patient.      Minh Altamirano is a 38 year old female who presents with epigastric/chest pain. The patient reports two days of constant epigastric/chest heaviness that worsens with eating and drinking. She states that the pain is causing her to have difficulty breathing and sleeping. She also reports associated nausea and vomiting which has now resolved. She states that currently all of her symptoms have mostly subsided. She denies any cough or congestion. She reports taking ibuprofen for the pain. She reports a history of bladder surgery, but still has her gall bladder. She denies having allergies.     Independent Historian:   None - Patient Only    ROS:  Review of Systems   HENT: Negative for congestion.    Respiratory: Negative for cough.    Cardiovascular: Positive for chest pain.   Gastrointestinal: Positive for abdominal pain, nausea (resolved) and vomiting (resolved).   All other systems reviewed and are negative.      Allergies:  Naproxen  Bactrim [Sulfamethoxazole W/Trimethoprim]  Gabapentin  Vicodin [Hydrocodone-Acetaminophen]     Medications:    Pepcid  Gabapentin  Omeprazole  Senna  Miralax    Past Medical History:    Anemia  Chronic infection  Constipation, chronic  Incontinence of urine  Lipoma of spinal cord  Migraine  Neurogenic bladder  Spinal dysraphism   Renal cyst  Female infertility  Uterine prolapse  E-coli UTI  MRSA infection  Gestational diabetes mellitus (GDM)    Past Surgical History:    Bladder augmentation   section  Cystoscopy, intravesical injection  Dilation and curettage suction  Exam under anesthesia pelvic  Laminectomy lumbar two levels  Laparotomy exploratory  Mitrofanoff procedure (appendix conduit)  Tumor resection and cord detethering    Family History:    Father: osteoporosis  Brother: asthma    Social History:  Presents to the ED unaccompanied  PCP: Jonathan Goldman      Physical Exam     Patient Vitals for the past 24 hrs:   BP Temp Temp src Pulse Resp SpO2   02/15/23 0251 (!) 125/90 -- -- -- -- --   02/14/23 1901 111/80 97.9  F (36.6  C) Temporal 100 24 100 %        Physical Exam  General: Appears well-developed and well-nourished.   Head: No signs of trauma.   CV: Normal rate and regular rhythm.    Resp: Effort normal and breath sounds normal. No respiratory distress.   GI: Soft. There is epigastric tenderness.  No rebound or guarding.  Normal bowel sounds.  No CVA tenderness.  MSK: Normal range of motion.   Neuro: The patient is alert and oriented. Speech normal.  Skin: Skin is warm and dry. No rash noted.   Psych: normal mood and affect. behavior is normal.       Emergency Department Course     Imaging:  XR Chest 2 Views   Final Result   IMPRESSION: No focal airspace consolidation. No pleural effusion or pneumothorax.      Cardiomediastinal silhouette is normal.         Report per radiology    Laboratory:  Labs Ordered and Resulted from Time of ED Arrival to Time of ED Departure   COMPREHENSIVE METABOLIC PANEL - Abnormal       Result Value    Sodium 137      Potassium 4.1      Chloride 105      Carbon Dioxide (CO2) 16 (*)     Anion Gap 16 (*)     Urea Nitrogen 12.7      Creatinine 0.54      Calcium 9.8      Glucose 90      Alkaline Phosphatase 82      AST 27      ALT 17      Protein Total 7.9      Albumin 4.3      Bilirubin Total 0.5      GFR Estimate >90     CBC WITH PLATELETS AND DIFFERENTIAL - Abnormal    WBC Count 12.0 (*)     RBC Count 5.15      Hemoglobin 15.0      Hematocrit 44.1      MCV 86      MCH 29.1      MCHC 34.0      RDW 14.6      Platelet Count 297      % Neutrophils 74      % Lymphocytes 21      % Monocytes 3      % Eosinophils 0      % Basophils 1      % Immature Granulocytes 1      NRBCs per 100 WBC 0      Absolute Neutrophils 9.0 (*)     Absolute Lymphocytes 2.5      Absolute Monocytes 0.4      Absolute Eosinophils 0.0      Absolute Basophils 0.1       Absolute Immature Granulocytes 0.1      Absolute NRBCs 0.0     LIPASE - Normal    Lipase 29     HCG QUALITATIVE PREGNANCY - Normal    hCG Serum Qualitative Negative     TROPONIN T, HIGH SENSITIVITY - Normal    Troponin T, High Sensitivity <6          Emergency Department Course & Assessments:             Interventions:  Medications   0.9% sodium chloride BOLUS (0 mLs Intravenous Stopped 2/15/23 0011)   ondansetron (ZOFRAN) injection 4 mg (4 mg Intravenous Given 2/14/23 1913)   lidocaine (viscous) (XYLOCAINE) 2 % 15 mL, alum & mag hydroxide-simethicone (MAALOX) 15 mL GI Cocktail (30 mLs Oral Given 2/15/23 0116)        Independent Interpretation (X-rays, CTs, rhythm strip):  I reviewed the chest Xray- no pneumothorax.       Assessments:  0050 I obtained history and examined the patient as noted above.   0230 Patient rechecked and updated.     Disposition:  The patient was discharged to home.     Impression & Plan      Medical Decision Making:  Minh Altamirano is a 38-year-old woman who presents with epigastric/chest pain.  She states that she has had this over the last couple of days and it is worse with eating or drinking.  She has had some associated nausea and vomiting.  On my evaluation she did have some tenderness of the epigastric region, but she is nonperitoneal.  Remainder of her physical exam was reassuring.  Patient is low risk Wells and PERC negative.  With the reported chest pain, I did obtain EKG and troponin and these were negative.  Clinically I do not suspect ACS.  Remainder of blood work was also reassuring with normal LFTs.  I considered ultrasound, but given the reassuring exam did not feel that was indicated emergently.  She was given the above medications and felt better.  On chart review it appears that she has been on Pepcid and omeprazole in the past, but apparently is not currently.  She does take daily ibuprofen.  Clinically I believe this is most likely gastritis, but discussed that there is no  formal test for that emergency department.  She was discharged with a prescription for omeprazole and Zofran and I did recommend she follow-up in clinic for further evaluation.    Diagnosis:    ICD-10-CM    1. Epigastric pain  R10.13       2. Nausea and vomiting, unspecified vomiting type  R11.2            Discharge Medications:  Discharge Medication List as of 2/15/2023  2:37 AM      START taking these medications    Details   ondansetron (ZOFRAN ODT) 4 MG ODT tab Take 1 tablet (4 mg) by mouth every 8 hours as needed for nausea, Disp-10 tablet, R-0, E-Prescribe                Scribe Disclosure:  I, Fanny Loredo, am serving as a scribe at 3:06 AM on 2/15/2023 to document services personally performed by Arturo Burton MD based on my observations and the provider's statements to me.   2/15/2023   Arturo Burton MD Bergenstal, John A, MD  02/15/23 0618

## 2023-02-15 NOTE — ED TRIAGE NOTES
Patient here with epigastric pain and nausea that developed yesterday.  She said that she is dry heaving, unable to get anything up though.

## 2023-03-08 ENCOUNTER — HOSPITAL ENCOUNTER (OUTPATIENT)
Dept: PHYSICAL THERAPY | Facility: CLINIC | Age: 38
Setting detail: THERAPIES SERIES
Discharge: HOME | End: 2023-03-08
Attending: FAMILY MEDICINE
Payer: COMMERCIAL

## 2023-03-08 PROCEDURE — 97110 THERAPEUTIC EXERCISES: CPT | Mod: GP | Performed by: PHYSICAL THERAPIST

## 2023-03-08 NOTE — NURSING NOTE
Chief Complaint   Patient presents with     Prenatal Care     36w6d       See LORETO Boyd 9/10/2018  
01/19/2023 neg     Bilirubin, UA 01/19/2023 nnegg     Ketones, UA 01/19/2023 neg     Spec Grav, UA 01/19/2023 1.025     Blood, UA POC 01/19/2023 trace     pH, UA 01/19/2023 6.0     Protein, UA POC 01/19/2023 neg     Urobilinogen, UA 01/19/2023 0.2     Leukocytes, UA 01/19/2023 neg     Nitrite, UA 01/19/2023 neg     Urine Culture, Routine 01/19/2023 No growth at 18 to 36 hours    Orders Only on 01/16/2023   Component Date Value    Cholesterol, Total 01/16/2023 181     Triglycerides 01/16/2023 103     HDL 01/16/2023 50     LDL Calculated 01/16/2023 110 (A)     VLDL Cholesterol Calcula* 01/16/2023 [facility] Outpatient Visit on 01/12/2023   Component Date Value    Visual Acuity Distance R* 01/16/2023 20/30     Intraocular Pressure Eye 01/16/2023 14     Visual Acuity Distance L* 01/16/2023 20/40     Intraocular Pressure Eye 01/16/2023 14     Diabetic Retinopathy 01/16/2023 NEGATIVE     Cataracts 01/16/2023 NEGATIVE     Glaucoma 01/16/2023 NEGATIVE    Office Visit on 01/11/2023   Component Date Value    Hemoglobin A1C 01/11/2023 6.1    Orders Only on 11/29/2022   Component Date Value    WBC 11/29/2022 6.9     RBC 11/29/2022 5.02     Hemoglobin 11/29/2022 14.3     Hematocrit 11/29/2022 44.2     MCV 11/29/2022 88.1     MCH 11/29/2022 28.5     MCHC 11/29/2022 32.4     RDW 11/29/2022 14.4     Platelets 91/82/5854 251     MPV 11/29/2022 8.8    Orders Only on 11/02/2022   Component Date Value    Organism 11/02/2022 Proteus mirabilis (A)     Urine Culture, Routine 11/02/2022                      Value:<10,000 CFU/ml  No further workup     Orders Only on 10/20/2022   Component Date Value    Carboxyhemoglobin/Hemogl* 10/20/2022 2.2    There may be more visits with results that are not included. Medications, Allergies, Social history, Medical history, and results reviewed      An electronic signature was used to authenticate this note.     Rachel Brown MD

## 2023-03-10 NOTE — PROGRESS NOTES
[facility]    OUTPATIENT PHYSICAL THERAPY  PLAN OF TREATMENT FOR OUTPATIENT REHABILITATION AND PROGRESS NOTE           Patient's Last Name, First Name, Minh Mann Date of Birth  1985   Provider's Name  [facility] Medical Record No.  [id number]    Onset Date  09/28/22 Start of Care Date  11/23/22   Type:     _X_PT   ___OT   ___SLP Medical Diagnosis  Spina bifida of lumbar region without hydrocephalus (H) (Q05.7)   PT Diagnosis  Back and R LE pain,  decreased strength decreased limb symmetry consistent with hx of spina bifida and decompensation after 2 pregnancies. Plan of Treatment  Frequency/Duration: 10 visits  Certification date from 2/21/23 to 5/22/2023     Goals:  Goal Identifier housework   Goal Description Patient able to perform light housework and yard work responding with back pain no more than a 3 out of 10 and no shooting pain down the leg 4 out of 7 days/week   Target Date 05/22/23   Date Met      Progress (detail required for progress note): 2/7/23  Better than it was , but when standing for more than 5 mins - gets nerve pain in R LE that makes her sit or fall down.  she needs to rest for a while after that. 12/20/22 going better     Goal Identifier walking   Goal Description patient able to walk for 15 minutes With no increase in back pain and no shooting pain down the legs during or after   Target Date 05/22/23   Date Met      Progress (detail required for progress note): 3/8/23 not retested today.  2/7/23 pt was able to walk for 13 mins before nerve pain hit at the gym last week. 12/20/22  walking much better     Goal Identifier AUTUMN   Goal Description Patient will report a score of less than 20% on the AUTUMN to indicate minimal disability due to back pain.  Her daily life.   Target Date 05/22/23   Date Met      Progress (detail required  for progress note): 3/8/23 68%  on AUTUMN  12/28/22: 62.22%.           Beginning/End Dates of Progress Note Reporting Period:  11/23/22 to 3/8/23    Progress Toward Goals:   Progress this reporting period: good    Client Self (Subjective) Report for Progress Note Reporting Period: Pt reports that she has not been doing the homework as consistently due to pain and due to chest and epigastric pain recently (ed visit)    Outcome Measures (Most Recent Score):    Elise STarT    Elise STarT    Oswestry Score (%): 68.89 %    Objective Measurements:   Objective Measure: Spasms  Details: 7/10 pain 3 x per day on average                  I CERTIFY THE NEED FOR THESE SERVICES FURNISHED UNDER        THIS PLAN OF TREATMENT AND WHILE UNDER MY CARE     (Physician co-signature of this document indicates review and certification of the therapy plan).                Referring Provider: Cristiana Pittman MD Elizabeth Kate Soulen, PT

## 2023-03-17 ENCOUNTER — HOSPITAL ENCOUNTER (OUTPATIENT)
Dept: PHYSICAL THERAPY | Facility: CLINIC | Age: 38
Setting detail: THERAPIES SERIES
Discharge: HOME | End: 2023-03-17
Attending: FAMILY MEDICINE
Payer: COMMERCIAL

## 2023-03-17 ENCOUNTER — TELEPHONE (OUTPATIENT)
Dept: PHYSICAL THERAPY | Facility: CLINIC | Age: 38
End: 2023-03-17
Payer: COMMERCIAL

## 2023-03-17 PROCEDURE — 97116 GAIT TRAINING THERAPY: CPT | Mod: GP | Performed by: PHYSICAL THERAPIST

## 2023-03-17 PROCEDURE — 97110 THERAPEUTIC EXERCISES: CPT | Mod: GP | Performed by: PHYSICAL THERAPIST

## 2023-04-12 ENCOUNTER — HOSPITAL ENCOUNTER (OUTPATIENT)
Dept: PHYSICAL THERAPY | Facility: CLINIC | Age: 38
Setting detail: THERAPIES SERIES
Discharge: HOME | End: 2023-04-12
Attending: FAMILY MEDICINE
Payer: COMMERCIAL

## 2023-04-12 PROCEDURE — 97530 THERAPEUTIC ACTIVITIES: CPT | Mod: GP | Performed by: PHYSICAL THERAPIST

## 2023-04-12 PROCEDURE — 97110 THERAPEUTIC EXERCISES: CPT | Mod: GP | Performed by: PHYSICAL THERAPIST

## 2023-05-03 ENCOUNTER — HOSPITAL ENCOUNTER (OUTPATIENT)
Dept: PHYSICAL THERAPY | Facility: CLINIC | Age: 38
Setting detail: THERAPIES SERIES
Discharge: HOME | End: 2023-05-03
Attending: FAMILY MEDICINE
Payer: COMMERCIAL

## 2023-05-03 PROCEDURE — 97110 THERAPEUTIC EXERCISES: CPT | Mod: GP | Performed by: PHYSICAL THERAPIST

## 2023-05-03 PROCEDURE — 97530 THERAPEUTIC ACTIVITIES: CPT | Mod: GP | Performed by: PHYSICAL THERAPIST

## 2023-08-08 NOTE — TELEPHONE ENCOUNTER
Pharmacy states that only One Touch Ultra strips are covered.  A prescription for that meter was sent in. Heathre Roberto RN,CDE     LEEP

## 2023-10-09 ENCOUNTER — TELEPHONE (OUTPATIENT)
Dept: UROLOGY | Facility: CLINIC | Age: 38
End: 2023-10-09
Payer: COMMERCIAL

## 2023-10-09 NOTE — TELEPHONE ENCOUNTER
Health Call Center    Phone Message    May a detailed message be left on voicemail: yes     Reason for Call: Other: Pt is calling to reach Dr. Cartwright and care team. Pt wants to talk about neurogenic bladder diagnosis and wants to be advise in what to do. Pt does not have any current symptoms. Please call pt. Thank you.     Action Taken: Message routed to:  Other: [facility]    Travel Screening: Not Applicable

## 2023-10-09 NOTE — TELEPHONE ENCOUNTER
[facility] Call Center    Phone Message    May a detailed message be left on voicemail: yes     Reason for Call: Pt is calling due to blood in urine and possible kidney stones. According to writer's procedures, provider doesn't see for these diagnosis, however,  pt insistent on seeing Dr Moore since that who she's had surgery with him in the past.   Please call pt to discuss/schedule Thank you    Action Taken: Message routed to:  [facility] ([facility]    Travel Screening: Not Applicable

## 2023-10-10 ENCOUNTER — OFFICE VISIT (OUTPATIENT)
Dept: NEUROSURGERY | Facility: CLINIC | Age: 38
End: 2023-10-10
Attending: NEUROLOGICAL SURGERY
Payer: COMMERCIAL

## 2023-10-10 VITALS
HEIGHT: 61 IN | SYSTOLIC BLOOD PRESSURE: 129 MMHG | WEIGHT: 138.89 LBS | HEART RATE: 92 BPM | BODY MASS INDEX: 26.22 KG/M2 | DIASTOLIC BLOOD PRESSURE: 76 MMHG | RESPIRATION RATE: 20 BRPM

## 2023-10-10 DIAGNOSIS — N28.1 RENAL CYST: ICD-10-CM

## 2023-10-10 DIAGNOSIS — N81.4 UTERINE PROLAPSE: ICD-10-CM

## 2023-10-10 DIAGNOSIS — N31.9 NEUROGENIC BLADDER: ICD-10-CM

## 2023-10-10 DIAGNOSIS — Q05.2 SPINA BIFIDA OF LUMBAR REGION WITH HYDROCEPHALUS (H): Primary | ICD-10-CM

## 2023-10-10 PROCEDURE — 99214 OFFICE O/P EST MOD 30 MIN: CPT | Mod: GC | Performed by: NEUROLOGICAL SURGERY

## 2023-10-10 PROCEDURE — G0463 HOSPITAL OUTPT CLINIC VISIT: HCPCS | Performed by: NEUROLOGICAL SURGERY

## 2023-10-10 ASSESSMENT — PAIN SCALES - GENERAL: PAINLEVEL: EXTREME PAIN (8)

## 2023-10-10 NOTE — LETTER
10/10/2023      RE: Minh Altamirano  [address]  [facility] [address]     Dear Colleague,    Thank you for the opportunity to participate in the care of your patient, Minh Altamirano, at the [facility] at [facility]. Please see a copy of my visit note below.           Neurosurgery Clinic    This is a 38-year-old female with a past medical history of a partial untethering and partial resection of lipoma with Dr. Kay originally in December 2010 status post bladder augmentation in 2012.  She reported improvement and had been doing well until December 2021 when she began to complain of worsening back pain as well as right greater than left radicular pain.  Visited with Dr. Zelaya for consideration of spinal column shortening, at that visit the recommendation was for conservative management, and consultation with pain clinic regarding spinal cord stim placement versus evaluation.  She was more recently seen by Dr. Valladares who recommended physical therapy and then likely spinal cord stimulator placement.  Of note, she has completed 3 months of physical therapy but would like to keep trying.  She has not been back to see the pain doctor since then.     Overall, she reports her pain is unchanged.  It is primarily extensive back pain as well as pain into the right leg that shoots down.  She does also get these shooting pains into her left leg however they are not as bad.  It is in a nondermatomal pattern of the entire leg.  She reports that her pain is significant to the point where it limits her sleep and her ambulation.  She reported some improvement with physical therapy.  She reports the pain is mostly episodic with 20-minute episodes that occur spontaneously without any obvious triggers.  She will go anywhere from 5-10 episodes a day that can last anywhere from seconds to up to 20 minutes.  She reports the pain does  improve with pain medications and muscle relaxants but once these are out of her system the pain returns.  She has a history of a bladder augmentation and self catheterizes at home.  Of note she is very recently from a trip to [facility] where she had evaluation of her lipoma where surgery was offered however patient was quoted at 20,000 USD which she cannot afford.    Past Medical History:   Diagnosis Date    Anemia     Chronic infection     MRSA    Constipation, chronic     Incontinence of urine     Lipoma of spinal cord     Migraine     neurogenic bladder     Spinal dysraphism (H)        Past Surgical History:   Procedure Laterality Date    BLADDER AUGMENTATION  2012    Procedure:BLADDER AUGMENTATION; Surgeon:TRINA MOORE; Location:[facility] OR     SECTION N/A 2018    Procedure:  SECTION;  Primary  Section  with classical incision;  Surgeon: Lily Villanueva MD;  Location: [facility] OR    CYSTOSCOPY, INTRAVESICAL INJECTION N/A 2016    Procedure: CYSTOSCOPY, INTRAVESICAL INJECTION;  Surgeon: Trina Moore MD;  [facility]    DILATION AND CURETTAGE SUCTION N/A 2019    Procedure: suction dilation and curetage,;  Surgeon: Radha Hickey MD;  Location: [facility]    EXAM UNDER ANESTHESIA PELVIC N/A 2019    Procedure: pelvic exam under anesthesia;  Surgeon: Radha Hickey MD;  Location: [facility]    LAMINECTOMY LUMBAR TWO LEVELS      LAPAROTOMY EXPLORATORY  2012    Procedure:LAPAROTOMY EXPLORATORY; Exploratory Laparotomy with Takedown of Mitrofanoff, Ventral Hernia Repair with Strattice Mesh implantation ; Surgeon:TRINA MOORE; Location:[facility] OR    MITROFANOFF PROCEDURE (APPENDIX CONDUIT)  2012    Procedure:MITROFANOFF PROCEDURE (APPENDIX CONDUIT); Bladder Augmentation with Right Colon, Appendix Conduit- Mitrofanoff, Insertion of Pubovaginal Sling using Autologous Rectus Fascia; Surgeon:TRINA MOORE; Location:[facility] OR    tumor resection and  "cord detethering         ALLERGIES:  Naproxen, Bactrim [sulfamethoxazole w/trimethoprim], Gabapentin, and Vicodin [hydrocodone-acetaminophen]    Current Outpatient Medications   Medication Sig Dispense Refill    acetaminophen (TYLENOL) 325 MG tablet Take 1-2 tablets (325-650 mg) by mouth every 6 hours as needed for mild pain 20 tablet 0    cholecalciferol (VITAMIN D3) 125 mcg (5000 units) capsule TAKE 1 CAPSULE BY MOUTH DAILY AS DIRECTED      famotidine (PEPCID) 20 MG tablet       gabapentin (NEURONTIN) 300 MG capsule TAKE 1 CAPSULE BY MOUTH THREE TIMES DAILY      ibuprofen (ADVIL/MOTRIN) 100 MG tablet Take 100 mg by mouth every 4 hours as needed      ondansetron (ZOFRAN ODT) 4 MG ODT tab Take 1 tablet (4 mg) by mouth every 8 hours as needed for nausea 10 tablet 0    polyethylene glycol (MIRALAX) 17 g packet Take 17 g by mouth daily 72 packet 0    Prenatal Vit-Fe Fumarate-FA (PRENATAL MULTIVITAMIN W/IRON) 27-0.8 MG tablet Take 1 tablet by mouth daily 90 tablet 3    SENNA-docusate sodium (SENNA S) 8.6-50 MG tablet Take 1-2 tablets by mouth 2 times daily as needed (constipation) 60 tablet 0       No family history on file.    Social History     Tobacco Use    Smoking status: Never     Passive exposure: Never    Smokeless tobacco: Never   Substance Use Topics    Alcohol use: No         PHYSICAL EXAM:   /76 (BP Location: Right arm, Patient Position: Chair)   Pulse 92   Resp 20   Ht 1.55 m (5' 1.02\")   Wt 63 kg (138 lb 14.2 oz)   BMI 26.22 kg/m      Alert and oriented. NAD.   PERRL, EOMI. Face symmetric.  4+/5 throughout RLE with 4/5 right DF. 5/5 otherwise  Numbness present in right leg worse on the inside of her leg.    IMAGES: No new imaging at today's visit.     ASSESSMENT:  Minh Altamirano, 38-year-old female complex lipoma with spinal cord tethering status post prior untethering, presenting with several months of worsening back pain and bilateral leg pain.  Had some improvement was with physical therapy " and has been under consideration for spinal cord stim placement given the complexity of her lipoma and the risks of the resection.  While she did experience some improvement with physical therapy, her pain symptoms persist and are possibly worsening.  She relayed to us that she would like to retry physical therapy, however she did acknowledge that we should schedule surgery as her symptoms are becoming unbearable.     PLAN:  - Continue physical therapy for now  - Will try set up a time for surgery for resection and re-attempted de-tethering  - Minh Altamirano was counseled to please contact us with any acute worsening of symptoms, or with any questions or concerns.     This patient was discussed with neurosurgery faculty, who agrees with the above.    Carson Nieves MD  Neurosurgery Resident, PGY-4      Attestation signed by Richy López MD at 10/18/2023  5:04 PM:      Physician Attestation  I saw this patient with the resident and agree with the resident/fellow's findings and plan of care as documented in the note.      Key findings: It was a pleasure meeting with Minh and discussing her complex spinal cord lipoma. She continues to have symptoms, mainly pain, which can be attributed to the lipoma. This involves the cauda equina through the caudal most portion of the sacrum, and it is not possible to achieve complete circumferential untethering. We discussed options again including continued nonsurgical therapy, spinal cord stim (for pain), spinal column shortening and attempted maximal untethering which would include removal of posterior elements, adhesion lysis and likely graft placement (essentially what was offerred in [place]).  As she is also having weakness and some numbness, she would opt for the last choice but first wishes to try PT for longer. WE agree to visit with her in 6 months and if she is not satisfied with progress then at that time will schedule her for complex untethering procedure. She  will call us if things change prior to that time.       Richy López MD  Date of Service (when I saw the patient): 10/10/2023

## 2023-10-10 NOTE — NURSING NOTE
"Chief Complaint   Patient presents with    RECHECK     Tethered spinal cord.     Vitals:    10/10/23 1306   BP: 129/76   BP Location: Right arm   Patient Position: Chair   Pulse: 92   Resp: 20   Weight: 138 lb 14.2 oz (63 kg)   Height: 5' 1.02\" (1.55 m)           Kristine Olson M.A.    October 10, 2023    "

## 2023-10-10 NOTE — PATIENT INSTRUCTIONS
You met with Pediatric Neurosurgery at the [facility]    [address] Dr., NP      Pediatric Appointment Scheduling and Call Center:   [phone]    Nurse Practitioner  [phone]    Mailing Address  [address] [address]    Street Address   [address] [place] [id number]    Fax Number  [phone]    For urgent matters that cannot wait until the next business day, occur over a holiday and/or weekend, report directly to your nearest ER or you may call [phone] and ask to page the Pediatric Neurosurgery Resident on call.

## 2023-10-10 NOTE — PROGRESS NOTES
Neurosurgery Clinic    This is a 38-year-old female with a past medical history of a partial untethering and partial resection of lipoma with Dr. Kay originally in December 2010 status post bladder augmentation in 2012.  She reported improvement and had been doing well until December 2021 when she began to complain of worsening back pain as well as right greater than left radicular pain.  Visited with Dr. Zelaya for consideration of spinal column shortening, at that visit the recommendation was for conservative management, and consultation with pain clinic regarding spinal cord stim placement versus evaluation.  She was more recently seen by Dr. Valladares who recommended physical therapy and then likely spinal cord stimulator placement.  Of note, she has completed 3 months of physical therapy but would like to keep trying.  She has not been back to see the pain doctor since then.     Overall, she reports her pain is unchanged.  It is primarily extensive back pain as well as pain into the right leg that shoots down.  She does also get these shooting pains into her left leg however they are not as bad.  It is in a nondermatomal pattern of the entire leg.  She reports that her pain is significant to the point where it limits her sleep and her ambulation.  She reported some improvement with physical therapy.  She reports the pain is mostly episodic with 20-minute episodes that occur spontaneously without any obvious triggers.  She will go anywhere from 5-10 episodes a day that can last anywhere from seconds to up to 20 minutes.  She reports the pain does improve with pain medications and muscle relaxants but once these are out of her system the pain returns.  She has a history of a bladder augmentation and self catheterizes at home.  Of note she is very recently from a trip to [facility] where she had evaluation of her lipoma where surgery was offered however patient was quoted at 20,000 USD which she cannot  afford.    Past Medical History:   Diagnosis Date    Anemia     Chronic infection     MRSA    Constipation, chronic     Incontinence of urine     Lipoma of spinal cord     Migraine     neurogenic bladder     Spinal dysraphism (H)        Past Surgical History:   Procedure Laterality Date    BLADDER AUGMENTATION  2012    Procedure:BLADDER AUGMENTATION; Surgeon:TRINA MOORE; Location:[facility] OR     SECTION N/A 2018    Procedure:  SECTION;  Primary  Section  with classical incision;  Surgeon: Lily Villanueva MD;  [facility]    CYSTOSCOPY, INTRAVESICAL INJECTION N/A 2016    Procedure: CYSTOSCOPY, INTRAVESICAL INJECTION;  Surgeon: Trina Moore MD;  [facility]    DILATION AND CURETTAGE SUCTION N/A 2019    Procedure: suction dilation and curetage,;  Surgeon: Radha Hickey MD;  [facility]    EXAM UNDER ANESTHESIA PELVIC N/A 2019    Procedure: pelvic exam under anesthesia;  Surgeon: Radha Hickey MD;  [facility]    LAMINECTOMY LUMBAR TWO LEVELS      LAPAROTOMY EXPLORATORY  2012    Procedure:LAPAROTOMY EXPLORATORY; Exploratory Laparotomy with Takedown of Mitrofanoff, Ventral Hernia Repair with Strattice Mesh implantation ; Surgeon:TRINA MOORE; [facility]    MITROFANOFF PROCEDURE (APPENDIX CONDUIT)  2012    Procedure:MITROFANOFF PROCEDURE (APPENDIX CONDUIT); Bladder Augmentation with Right Colon, Appendix Conduit- Mitrofanoff, Insertion of Pubovaginal Sling using Autologous Rectus Fascia; Surgeon:TRINA MOORE; Location[facility] OR    tumor resection and cord detethering         ALLERGIES:  Naproxen, Bactrim [sulfamethoxazole w/trimethoprim], Gabapentin, and Vicodin [hydrocodone-acetaminophen]    Current Outpatient Medications   Medication Sig Dispense Refill    acetaminophen (TYLENOL) 325 MG tablet Take 1-2 tablets (325-650 mg) by mouth every 6 hours as needed for mild pain 20 tablet 0     "cholecalciferol (VITAMIN D3) 125 mcg (5000 units) capsule TAKE 1 CAPSULE BY MOUTH DAILY AS DIRECTED      famotidine (PEPCID) 20 MG tablet       gabapentin (NEURONTIN) 300 MG capsule TAKE 1 CAPSULE BY MOUTH THREE TIMES DAILY      ibuprofen (ADVIL/MOTRIN) 100 MG tablet Take 100 mg by mouth every 4 hours as needed      ondansetron (ZOFRAN ODT) 4 MG ODT tab Take 1 tablet (4 mg) by mouth every 8 hours as needed for nausea 10 tablet 0    polyethylene glycol (MIRALAX) 17 g packet Take 17 g by mouth daily 72 packet 0    Prenatal Vit-Fe Fumarate-FA (PRENATAL MULTIVITAMIN W/IRON) 27-0.8 MG tablet Take 1 tablet by mouth daily 90 tablet 3    SENNA-docusate sodium (SENNA S) 8.6-50 MG tablet Take 1-2 tablets by mouth 2 times daily as needed (constipation) 60 tablet 0       No family history on file.    Social History     Tobacco Use    Smoking status: Never     Passive exposure: Never    Smokeless tobacco: Never   Substance Use Topics    Alcohol use: No         PHYSICAL EXAM:   /76 (BP Location: Right arm, Patient Position: Chair)   Pulse 92   Resp 20   Ht 1.55 m (5' 1.02\")   Wt 63 kg (138 lb 14.2 oz)   BMI 26.22 kg/m      Alert and oriented. NAD.   PERRL, EOMI. Face symmetric.  4+/5 throughout RLE with 4/5 right DF. 5/5 otherwise  Numbness present in right leg worse on the inside of her leg.    IMAGES: No new imaging at today's visit.     ASSESSMENT:  Minh Altamirano, 38-year-old female complex lipoma with spinal cord tethering status post prior untethering, presenting with several months of worsening back pain and bilateral leg pain.  Had some improvement was with physical therapy and has been under consideration for spinal cord stim placement given the complexity of her lipoma and the risks of the resection.  While she did experience some improvement with physical therapy, her pain symptoms persist and are possibly worsening.  She relayed to us that she would like to retry physical therapy, however she did " acknowledge that we should schedule surgery as her symptoms are becoming unbearable.     PLAN:  - Continue physical therapy for now  - Will try set up a time for surgery for resection and re-attempted de-tethering  - Minh Altamirano was counseled to please contact us with any acute worsening of symptoms, or with any questions or concerns.     This patient was discussed with neurosurgery faculty, who agrees with the above.    Carson Nieves MD  Neurosurgery Resident, PGY-4

## 2023-10-10 NOTE — TELEPHONE ENCOUNTER
Reason for Call: Other: Pt is calling to reach Dr. Cartwright and care team. Pt wants to talk about neurogenic bladder diagnosis and wants to be advise in what to do. Pt does not have any current symptoms. Please call pt. Thank you.

## 2023-10-11 NOTE — TELEPHONE ENCOUNTER
Martita Dukes RN  You22 hours ago (2:05 PM)     BL  No evidence in chart that patient has stones, appropriate to schedule with YISEL    :)

## 2023-10-17 ENCOUNTER — PRE VISIT (OUTPATIENT)
Dept: UROLOGY | Facility: CLINIC | Age: 38
End: 2023-10-17
Payer: COMMERCIAL

## 2023-10-17 DIAGNOSIS — N31.9 NEUROGENIC BLADDER: Primary | ICD-10-CM

## 2023-10-17 NOTE — TELEPHONE ENCOUNTER
Reason for visit: painful urination      Relevant information: pt is due for annual follow up     Records/imaging/labs/orders: orders placed for renal US    Pt called: Message sent to scheduling team    At Rooming: stephan Giraldo CMA  10/17/2023  3:37 PM

## 2023-10-18 ENCOUNTER — MYC MEDICAL ADVICE (OUTPATIENT)
Dept: UROLOGY | Facility: CLINIC | Age: 38
End: 2023-10-18
Payer: COMMERCIAL

## 2023-10-18 ENCOUNTER — DOCUMENTATION ONLY (OUTPATIENT)
Dept: UROLOGY | Facility: CLINIC | Age: 38
End: 2023-10-18
Payer: COMMERCIAL

## 2023-10-18 NOTE — PROGRESS NOTES
Type of Form Received: Order (self-cath)    Form Received (Date) 10/18/23   Form Filled out Yes, date 10/18/23   Placed in provider folder Yes       Received Completed forms Yes   Faxed Forms Faxed To: [facility]  Fax Number: [phone]   Sent to HIM (Date) 10/24/23

## 2023-10-19 NOTE — PROGRESS NOTES
Pt called. Provider type and appointment need reviewed. Pt is appropriate for PA-C, pt to keep appointment as scheduled. Pt expressed understanding.    Patricia Giraldo CMA  10/19/23  10:02 AM

## 2023-10-19 NOTE — TELEPHONE ENCOUNTER
----- Message from Laura Mccord sent at 10/17/2023  4:03 PM CDT -----  Regarding: RE: URGENT - pt needs imaging  I rescheduled patient but she wants a nurse to call her so she can get a better understanding to why she has to see kuldeep laguerre why she can't see the surgeon who performed the surgery. She is upset she has to wait till December to be seen. These were questions I could not answer.    ----- Message -----  From: Patricia Giraldo CMA  Sent: 10/17/2023   3:41 PM CDT  To: Clinic Coordinators[facility]  Subject: URGENT - pt needs imaging                        Deko needs a renal US and to be rescheduled to see Kuldeep Greene PA-C, especially for her symptoms - per Dr. Moore. Please call to reschedule her.    Thank you,  Patricia Giraldo CMA

## 2023-11-28 ENCOUNTER — THERAPY VISIT (OUTPATIENT)
Dept: PHYSICAL THERAPY | Facility: CLINIC | Age: 38
End: 2023-11-28
Payer: COMMERCIAL

## 2023-11-28 DIAGNOSIS — Q05.2 SPINA BIFIDA APERTA OF LUMBAR REGION WITH HYDROCEPHALUS (H): Primary | ICD-10-CM

## 2023-11-28 PROCEDURE — 97110 THERAPEUTIC EXERCISES: CPT | Mod: GP | Performed by: PHYSICAL THERAPIST

## 2023-11-28 PROCEDURE — 97162 PT EVAL MOD COMPLEX 30 MIN: CPT | Mod: GP | Performed by: PHYSICAL THERAPIST

## 2023-11-28 NOTE — PROGRESS NOTES
PHYSICAL THERAPY EVALUATION  Type of Visit: Evaluation, Re-evaluation, and Gap in care    See electronic medical record for Abuse and Falls Screening details.    Subjective  Pt reports that she and her  decided to seek treatment in [place] for several months due to lack of progress.  Her back and nerve pain continue to be very painful.   Feels like the therapy before [place] with this writer was helpful for the nerve pain.  It is still a lot better than it was before previous session of therapy.  Still has good days and bad days. Some days she can not get out of bed.  Nerve pain is still really bad  and definitely interrupts her sleep.  With sleeping medication yesterday slept 2 hours. A good night would be 4 hours.  Reports Dr. Mo recommended return to PT.         Presenting condition or subjective complaint: back and R leg pain. Resume PT  Date of onset: 09/28/22    Relevant medical history: Bladder or bowel problems; Dizziness; Kidney disease; Migraines or headaches; Pain at night or rest   Dates & types of surgery: Tethered cord and  removal of lipoma surgery 2010    Prior diagnostic imaging/testing results: MRI     Prior therapy history for the same diagnosis, illness or injury: Yes PT from 111/23/22-5/3/23    Prior Level of Function  Transfers: Independent  Ambulation: Independent  ADL: Independent, Sometimes pain requires assistive person  IADL: Childcare, Driving, Housekeeping, Laundry, Meal preparation    Living Environment  Social support: With family members   Type of home: Apartment/condo   Stairs to enter the home: No       Ramp: No   Stairs inside the home: No       Help at home: Self Cares (home health aide/personal care attendant, family, etc); Home management tasks (cooking, cleaning); Home and Yard maintenance tasks; Assist for driving and community activities  Equipment owned:       Employment: No    Hobbies/Interests:      Patient goals for therapy: Sleep, walk, be able to care for her  son and perhaps get a job eventually    Pain assessment: Location: Low back R Gluteal area and LE/Rating: 3/10 best, 7-8/110 worst and average 5/10     Objective      Cognitive Status Examination  Orientation: Oriented to person, place and time   Level of Consciousness: Alert  Follows Commands and Answers Questions: 100% of the time  Personal Safety and Judgement: Intact  Memory: Intact    OBSERVATION: 2 spasms with acute distress duration approximately 15 sec each during today's session   INTEGUMENTARY: in tact where visible, wears hijab, no complaints  POSTURE:  hyperlordotic lumbar spine,  R LE smaller than L LE.   PALPATION: NT   RANGE OF MOTION:  Not retested today, see previous eval  STRENGTH:  Not re retested today see previous eval     BED MOBILITY: Independent    TRANSFERS: Independent      GAIT:   Level of [place]: SBA  Assistive Device(s): None  Gait Deviations: Antalgic  Decreased stance time on R.   Gait Distance: 20'  Stairs: NT today    BALANCE:  Sitting balance is indep. Stnding balance not fully assessed today nor was dynamic. Will continue to assess.   No LOBs noted this session       SENSATION:  not fully assessed today. No complaints of lack of sensation    REFLEXES: NT  COORDINATION: grossly in tact in upper extremities. Not assessed in LE's   MUSCLE TONE:  Appears WFL      Assessment & Plan   CLINICAL IMPRESSIONS  Medical Diagnosis: Spina bifida of lumbar region with hydrocephalus (H) (Q05.2)  - Primary    Treatment Diagnosis: Back and R LE pain,  decreased strength decreased limb symmetry consistent with hx of spina bifida and decompensation after 2 pregnancies.   Impression/Assessment: Patient is a 38 year old female with Pain in Low back and R LE, sharp spasms which cause her to fall. complaints.  The following significant findings have been identified: Pain, Decreased ROM/flexibility, Decreased strength, Impaired balance, Impaired gait, Impaired muscle performance, Decreased activity  tolerance, and Impaired posture. These impairments interfere with their ability to perform self care tasks, work tasks, recreational activities, household chores, household mobility, and community mobility as compared to previous level of function.     Clinical Decision Making (Complexity):  Clinical Presentation: Evolving/Changing  Clinical Presentation Rationale: based on medical and personal factors listed in PT evaluation  Clinical Decision Making (Complexity): Moderate complexity    PLAN OF CARE  Treatment Interventions:  Interventions: Gait Training, Manual Therapy, Neuromuscular Re-education, Therapeutic Activity, Therapeutic Exercise    Long Term Goals     PT Goal 1  Goal Identifier: housework  Goal Description: Patient able to perform light housework and yard work responding with back pain no more than a 3 out of 10 and no shooting pain down the leg 4 out of 7 days/week  Target Date: 02/26/24  PT Goal 2  Goal Identifier: walking  Goal Description: patient able to walk for 15 minutes With no increase in back pain and no shooting pain down the legs during or after  Target Date: 02/26/24  PT Goal 3  Goal Identifier: AUTUMN  Goal Description: Patient will report a score of less than 20% on the AUTUMN to indicate minimal disability due to back pain.  Her daily life.  Goal Progress: 11/28/23 73.33%  Target Date: 02/26/24      Frequency of Treatment: 1x per week  Duration of Treatment: 90 days    Recommended Referrals to Other Professionals:  Pt may benefit from built up shoe to improve alignment. Will continue to assess and request orders as indicated  Education Assessment:   Learner/Method: Patient    Risks and benefits of evaluation/treatment have been explained.   Patient/Family/caregiver agrees with Plan of Care.     Evaluation Time:     PT Eval, Moderate Complexity Minutes (49024): 30   Present: Yes: Language: Faroese, ID Number/Identifier: [id number]     Signing Clinician: Zuleyka Carreon, PT      [facility]                                                                                   OUTPATIENT PHYSICAL THERAPY      PLAN OF TREATMENT FOR OUTPATIENT REHABILITATION   Patient's Last Name, First Name, Minh Mann YOB: 1985   Provider's Name   [facility]   Medical Record No.  [id number]     Onset Date: 09/28/22  Start of Care Date: 11/28/23     Medical Diagnosis:  Spina bifida of lumbar region with hydrocephalus (H) (Q05.2)  - Primary      PT Treatment Diagnosis:  Back and R LE pain,  decreased strength decreased limb symmetry consistent with hx of spina bifida and decompensation after 2 pregnancies. Plan of Treatment  Frequency/Duration: 1x per week/ 90 days    Certification date from 11/28/23 to 02/26/24         See note for plan of treatment details and functional goals     Zuleyka Carreon, PT                         I CERTIFY THE NEED FOR THESE SERVICES FURNISHED UNDER        THIS PLAN OF TREATMENT AND WHILE UNDER MY CARE     (Physician attestation of this document indicates review and certification of the therapy plan).              Referring Provider: Velia Beckett NP       Initial Assessment  See Epic Evaluation- Start of Care Date: 11/28/23

## 2023-12-01 ENCOUNTER — OFFICE VISIT (OUTPATIENT)
Dept: UROLOGY | Facility: CLINIC | Age: 38
End: 2023-12-01
Attending: NURSE PRACTITIONER
Payer: COMMERCIAL

## 2023-12-01 ENCOUNTER — ANCILLARY PROCEDURE (OUTPATIENT)
Dept: ULTRASOUND IMAGING | Facility: CLINIC | Age: 38
End: 2023-12-01
Attending: UROLOGY
Payer: COMMERCIAL

## 2023-12-01 ENCOUNTER — MEDICAL CORRESPONDENCE (OUTPATIENT)
Dept: UROLOGY | Facility: CLINIC | Age: 38
End: 2023-12-01

## 2023-12-01 DIAGNOSIS — N31.9 NEUROGENIC BLADDER: ICD-10-CM

## 2023-12-01 DIAGNOSIS — Q05.2 SPINA BIFIDA OF LUMBAR REGION WITH HYDROCEPHALUS (H): ICD-10-CM

## 2023-12-01 DIAGNOSIS — N13.30 HYDRONEPHROSIS, UNSPECIFIED HYDRONEPHROSIS TYPE: ICD-10-CM

## 2023-12-01 DIAGNOSIS — N39.46 MIXED STRESS AND URGE URINARY INCONTINENCE: ICD-10-CM

## 2023-12-01 DIAGNOSIS — N31.9 NEUROGENIC BLADDER: Primary | ICD-10-CM

## 2023-12-01 LAB
ALBUMIN UR-MCNC: NEGATIVE MG/DL
APPEARANCE UR: CLEAR
BACTERIA #/AREA URNS HPF: ABNORMAL /HPF
BILIRUB UR QL STRIP: NEGATIVE
COLOR UR AUTO: ABNORMAL
CREAT SERPL-MCNC: 0.61 MG/DL (ref 0.51–0.95)
EGFRCR SERPLBLD CKD-EPI 2021: >90 ML/MIN/1.73M2
GLUCOSE UR STRIP-MCNC: NEGATIVE MG/DL
HGB UR QL STRIP: NEGATIVE
KETONES UR STRIP-MCNC: NEGATIVE MG/DL
LEUKOCYTE ESTERASE UR QL STRIP: ABNORMAL
NITRATE UR QL: NEGATIVE
PH UR STRIP: 5.5 [PH] (ref 5–7)
RADIOLOGIST FLAGS: ABNORMAL
RBC #/AREA URNS AUTO: ABNORMAL /HPF
SKIP: ABNORMAL
SP GR UR STRIP: 1.01 (ref 1–1.03)
SQUAMOUS #/AREA URNS AUTO: ABNORMAL /LPF
UROBILINOGEN UR STRIP-MCNC: NORMAL MG/DL
WBC #/AREA URNS AUTO: ABNORMAL /HPF
WBC CLUMPS #/AREA URNS HPF: PRESENT /HPF

## 2023-12-01 PROCEDURE — 81001 URINALYSIS AUTO W/SCOPE: CPT | Performed by: PHYSICIAN ASSISTANT

## 2023-12-01 PROCEDURE — 87086 URINE CULTURE/COLONY COUNT: CPT | Performed by: PHYSICIAN ASSISTANT

## 2023-12-01 PROCEDURE — 82565 ASSAY OF CREATININE: CPT | Performed by: PHYSICIAN ASSISTANT

## 2023-12-01 PROCEDURE — 99214 OFFICE O/P EST MOD 30 MIN: CPT | Mod: 25 | Performed by: PHYSICIAN ASSISTANT

## 2023-12-01 PROCEDURE — 76770 US EXAM ABDO BACK WALL COMP: CPT | Performed by: STUDENT IN AN ORGANIZED HEALTH CARE EDUCATION/TRAINING PROGRAM

## 2023-12-01 PROCEDURE — 87186 SC STD MICRODIL/AGAR DIL: CPT | Performed by: PHYSICIAN ASSISTANT

## 2023-12-01 PROCEDURE — 36415 COLL VENOUS BLD VENIPUNCTURE: CPT | Performed by: PHYSICIAN ASSISTANT

## 2023-12-01 NOTE — PATIENT INSTRUCTIONS
UROLOGY CLINIC VISIT PATIENT INSTRUCTIONS    Blood work today to check kidney function.     Schedule urodynamics with Jerilyn Greene PA-C next available.    URODYNAMIC TESTING    Where should I go for this test?  The procedure is performed at:     [facility]   [address] [place] [id number]   [address]    If you have questions about your test, please call our nurse triage line at [phone], option #2. If you need to cancel or reschedule your test for any reason, please notify us as soon as possible.    Please check in approximately 15 minutes prior to your procedure time.      What is urodynamic testing?   Urodynamic testing refers to a group of tests used to assess bladder function by measuring various aspects of urine storage and emptying. The test takes about 75 minutes. For most patients, the test is not painful.     How should I get ready for this test?  Please try to arrive with a comfortably full bladder if possible because you will be asked to try and urinate prior to your study.  If you received a bladder diary, please complete this prior to your urodynamic test and bring it with you to your appointment. A bladder diary measures how much fluid you are drinking and how often and how much you are urinating. You can also record any urinary leakage that may have occurred and what you were doing when you leaked.    If you have chronic constipation, please take stool softeners for two days before your test.    What happens during the test?  You will first be asked to empty your bladder in a private restroom into a special toilet called a uroflow machine which measures the rate of urine flow.  A nurse will then place a very small tube (called a catheter) into your bladder. This drains any urine left over after urinating and also measures the pressures inside of your bladder during your test. Another small catheter will then be placed into your rectum  to measure abdominal pressures. Two small sticky patches will be placed on the skin near your anus to measure pelvic floor function.   We will then instill contrast dye into your bladder through the bladder catheter. The contrast is very dense and will allow us to take x-ray pictures of your bladder intermittently during your test.  You will be asked to tell us when you first start to feel like your bladder is filling up, when you have moderate urgency to urinate, when you have very strong urgency to urinate and finally when you feel that your bladder is full. Once you feel full you will be asked to try and urinate.    You may be asked to cough or bear down several times during your procedure. The provider running your study will be looking for urine leakage during this time.      What happens after the test?  The provider running your urodynamic study will share the results of your test on the day of your procedure or very soon after.  After your test, you may go about your day as normal. You may notice some blood in your urine for a couple of days which should clear up on its own. You may also feel a more urgent need to use the toilet or you may need to go more often - this is due to having a catheter placed and should resolve on its own in a few days.      If you have any issues, questions or concerns in the meantime, do not hesitate to contact us at [phone] or via "[employer].     It was a pleasure meeting with you today.  Thank you for allowing me and my team the privilege of caring for you today.  YOU are the reason we are here, and I truly hope we provided you with the excellent service you deserve.  Please let us know if there is anything else we can do for you so that we can be sure you are leaving completely satisfied with your care experience.

## 2023-12-01 NOTE — NURSING NOTE
Minh Altamirano's goals for this visit include:   Chief Complaint   Patient presents with    RECHECK     Pain with urination leaking        She requests these members of her care team be copied on today's visit information:       PCP: Jonathan Goldman    Referring Provider:  Fady Moore MD  [address]  [place] [address]    There were no vitals taken for this visit.    Do you need any medication refills at today's visit?     Shanel Vaz LPN on 12/1/2023 at 9:11 AM

## 2023-12-01 NOTE — PROGRESS NOTES
Urology Office Visit - Follow Up    Reason for visit: flank pain    HPI:  Minh Altamirano is a 38 year old female with a history of neurogenic bladder secondary to sacral agenesis. Patient is not wheelchair bound. Dr. Moore performed a right colon augment and autologous rectus fascial pubovaginal sling in 2012. Last visit with us on 4/4/2022 at which time she was doing well with CIC per urethra 6-7 times per day.     She follows up today with symptoms of right flank and bladder pain x 1 year as well as significantly worsening urinary incontinence. She has incontinence when her bladder is full as well as with intercourse. She recently tried to have a PAP smear completed but this could not be done due to the significant amount of urinary incontinence. She continues to catheterize per urethra 6-7 times per day without issue.     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 She had a positive empty supine stress test with very minimal Valsalva effort. In fact, pelvic examination alone was enough to elicit leakage. Her exam was somewhat challenging due to the fact that her anatomy was quite distorted. The urethra appears to be in sort of a retracted position with minimal suburethral tissue. It was somewhat challenging to find the urethral meatus (external). She did have a foreshortened vagina approximately 6 to 7 cm of total vaginal length and she has cervical elongation with the cervix measuring likely 4 to 5 cm. The remainder of the pelvic examination was normal.    Today, we did trial a pessary. Unfortunately, due to her pelvic anatomy, she is not a pessary candidate. A #3 ring with support was attempted, but it became very clear that a pessary is not going to be effective treatment for her prolapse.       Exam:  There were no vitals taken for this visit.  GENERAL: Healthy, alert and no  distress  EYES: Eyes grossly normal to inspection.  No discharge or erythema, or obvious scleral/conjunctival abnormalities.  RESP: No audible wheeze, cough, or visible cyanosis.  No visible retractions or increased work of breathing.    SKIN: Visible skin clear. No significant rash, abnormal pigmentation or lesions.  NEURO: Cranial nerves grossly intact.  Mentation and speech appropriate for age.  PSYCH: Mentation appears normal, affect normal/bright, judgement and insight intact, normal speech and appearance well-groomed.    Review of Imaging:  US RENAL COMPLETE NON-VASCULAR, 12/1/2023    FINDINGS:     Right kidney: Measures 10.0 cm in length. Parenchyma is of normal  thickness and echogenicity. No focal mass. No hydronephrosis.     Left kidney: Measures 10.8 cm in length. Parenchyma is of normal  thickness and echogenicity. No focal mass. Mild hydronephrosis.      Bladder: Partially distended.     Patient unable to void without catheter.    Review of Labs:  Creatinine   Date Value Ref Range Status   12/01/2023 0.61 0.51 - 0.95 mg/dL Final   09/22/2018 0.72 0.52 - 1.04 mg/dL Final       Color Urine (no units)   Date Value   12/01/2023 Light Yellow   12/29/2015 Light Yellow     Appearance Urine (no units)   Date Value   12/01/2023 Clear   12/29/2015 Clear     Glucose Urine (mg/dL)   Date Value   12/01/2023 Negative   12/29/2015 Negative     Bilirubin Urine (no units)   Date Value   12/01/2023 Negative   12/29/2015 Negative     Ketones Urine (mg/dL)   Date Value   12/01/2023 Negative   12/29/2015 Negative     Specific Gravity Urine (no units)   Date Value   12/01/2023 1.010   12/29/2015 1.002 (L)     pH Urine   Date Value   12/01/2023 5.5   12/29/2015 5.5 pH     Protein Albumin Urine (mg/dL)   Date Value   12/01/2023 Negative   12/29/2015 Negative     Nitrite Urine (no units)   Date Value   12/01/2023 Negative   12/29/2015 Negative     Leukocyte Esterase Urine (no units)   Date Value   12/01/2023 Small (A)    12/29/2015 Trace (A)         Assessment & Plan   38 year old female with neurogenic bladder secondary to sacral agenesis s/p right colon augment and autologous rectus fascial pubovaginal sling in 2012 (Dr. Moore). She performs CIC per urethra 6-7 times per. Today, she complains of bladder and RIGHT flank pain x 1 year as well as significantly worsening urinary incontinence which sounds to be stress-predominant. Evaluation with [facility] last year demonstrated florid incontinence on exam with minimal Valsalva. They attempted to fit her with a pessary but she could not retain this due to distorted pelvic anatomy from prior female circumcision. She is particularly bothered by coital incontinence. Renal ultrasound today demonstrates new mild LEFT hydronephrosis, but no right-sided abnormalities (her pain is on the right). She has no history of kidney stones, but we discussed further evaluation with CT scan to ensure no distal ureteral stones or other obstructing lesion. Also discussed video urodynamics to assess for vesicoureteral reflux, possible change in bladder compliance or DO which could also explain her worsening urinary incontinence. Will start with VUDS and if no explanation found, then plan for CT urogram. Reassuringly, her kidney function today is normal. Lastly, urine dip shows small leukocyte esterase so not overly convincing for UTI. Will await final culture results.     Plan:  -Video urodynamics next available.   -Await urine culture results. Treat accordingly.     Jerilyn Greene PA-C  Department of Urology    32 minutes spent on the date of the encounter doing chart review, review of outside records, review of test results, interpretation of tests, patient visit, and documentation

## 2023-12-05 ENCOUNTER — TELEPHONE (OUTPATIENT)
Dept: UROLOGY | Facility: CLINIC | Age: 38
End: 2023-12-05
Payer: COMMERCIAL

## 2023-12-05 DIAGNOSIS — Q05.2 SPINA BIFIDA OF LUMBAR REGION WITH HYDROCEPHALUS (H): Primary | ICD-10-CM

## 2023-12-05 DIAGNOSIS — N39.0 URINARY TRACT INFECTION: Primary | ICD-10-CM

## 2023-12-05 NOTE — TELEPHONE ENCOUNTER
Jerilyn Greene PA-C  P [facility] Adult [place]  Could we ask the infectious disease lab to add susceptibility testing for fosfomycin? No other oral options to treat.  Thanks!  Jerilyn Greene PA-C    Received the 12/1/23 urine culture results and the above result note from Jerilyn Greene PA-C. Called and spoke to microbiology lab who will be adding on fosfomycin to susceptibility testing. Will watch for results and discuss with Jerilyn Greene PA-C.    Suzette Campa RN, BSN

## 2023-12-07 ENCOUNTER — PATIENT OUTREACH (OUTPATIENT)
Dept: UROLOGY | Facility: CLINIC | Age: 38
End: 2023-12-07
Payer: COMMERCIAL

## 2023-12-07 LAB — BACTERIA UR CULT: ABNORMAL

## 2023-12-07 NOTE — TELEPHONE ENCOUNTER
Writer reached out to pt with the assistance of  services to answer questions regarding most recent UC.     Writer notes that pt does have an infection that needs to be treated with abx. Pt has has for abx to be sent to [facility].     Writer reached out to provider for advisement on abx choice. Provider notes that communication was made to MG team regarding abx.     Will continue to follow as needed.

## 2023-12-08 ENCOUNTER — TELEPHONE (OUTPATIENT)
Dept: UROLOGY | Facility: CLINIC | Age: 38
End: 2023-12-08
Payer: COMMERCIAL

## 2023-12-08 RX ORDER — GRANULES FOR ORAL 3 G/1
3 POWDER ORAL
Qty: 3 PACKET | Refills: 0 | Status: SHIPPED | OUTPATIENT
Start: 2023-12-08 | End: 2024-05-02

## 2023-12-08 NOTE — TELEPHONE ENCOUNTER
Jerilyn Greene PA-C  P [facility] Adult [place]  Please send Fosfomycin 3 gram packet once every 3 days (72 hours) for 3 doses (#3, 0 refills) to treat UTI.  Thanks!  Jerilyn Greene PA-C    Received the final 12/1/23  results and the above result note from Jerilyn Greene PA-C. With [employer] , attempted to reach patient by phone, but no answer. Left generic message with request to return call to clinic. When patient returns call, will inform her of the above information and confirm patient's preferred pharmacy.    Suzette Campa RN, BSN

## 2023-12-08 NOTE — TELEPHONE ENCOUNTER
With [place]  services, patient was contacted and is aware that the antibiotic has been sent to [facility]. Patient's questions answered.     Suzette Campa RN, BSN

## 2023-12-08 NOTE — TELEPHONE ENCOUNTER
[facility] Call Center    Phone Message    May a detailed message be left on voicemail: yes     Reason for Call: Pt called and states no med was sent to [facility]. From previous TE:   Writer reached out to pt with the assistance of  services to answer questions regarding most recent UC.      Writer notes that pt does have an infection that needs to be treated with abx. Pt has has for abx to be sent to [facility].      Writer reached out to provider for advisement on abx choice. Provider notes that communication was made to MG team regarding abx.        Pleasse review and reach out to pt.     Action Taken: Message routed to:  [facility] Urology    Travel Screening: Not Applicable

## 2023-12-08 NOTE — TELEPHONE ENCOUNTER
Centralized Medication Refill Team note:  Central refills cannot prescribe  nor refill antibiotics for urology as this class of medications are not on any of our refill protocols.   Addressed / patient informed in a different encounter> Fosfomycin Tromethamine 3 g Oral EVERY 3 DAYS sent to [facility] @ 4:29 /23      DEMETRIA Dunaway R.N.   [facility] Centralized Medication Refill Department     12/1/23 Urine Culture:   >100,000 CFU/mL Escherichia coli ESBL Abnormal          Susceptibility testing requested by Jerilyn GARZA per Suzette BONDS for Fosfomycin. [phone].        Resulting Agency: [facility]     Susceptibility     Escherichia coli ESBL     ROSY     Amikacin <=2 ug/mL Susceptible     Ampicillin >=32 ug/mL Resistant     Cefazolin >=64 ug/mL Resistant 1     Cefepime  Resistant     Cefoxitin <=4 ug/mL Susceptible     Ceftazidime  Resistant     Ceftriaxone  Resistant     Ciprofloxacin >=4 ug/mL Resistant     Fosfomycin <=32 ug/mL Susceptible     Gentamicin >=16 ug/mL Resistant     Levofloxacin >=8 ug/mL Resistant     Meropenem <=0.25 ug/mL Susceptible 2     Nitrofurantoin 128 ug/mL Resistant     Piperacillin/Tazobactam 8 ug/mL Susceptible     Tobramycin >=16 ug/mL Resistant     Trimethoprim/Sulfamethoxazole >16/304 ug/mL Resistant                       UA RESULTS:  Recent Labs   Lab Test 12/01/23  0918   COLOR Light Yellow   APPEARANCE Clear   URINEGLC Negative   URINEBILI Negative   URINEKETONE Negative   SG 1.010   UBLD Negative   URINEPH 5.5   PROTEIN Negative   NITRITE Negative   LEUKEST Small*   RBCU 0-2   WBCU 25-50*

## 2023-12-09 ENCOUNTER — HEALTH MAINTENANCE LETTER (OUTPATIENT)
Age: 38
End: 2023-12-09

## 2023-12-19 ENCOUNTER — THERAPY VISIT (OUTPATIENT)
Dept: PHYSICAL THERAPY | Facility: CLINIC | Age: 38
End: 2023-12-19
Payer: COMMERCIAL

## 2023-12-19 DIAGNOSIS — Q05.2 SPINA BIFIDA APERTA OF LUMBAR REGION WITH HYDROCEPHALUS (H): Primary | ICD-10-CM

## 2023-12-19 PROCEDURE — 97116 GAIT TRAINING THERAPY: CPT | Mod: GP | Performed by: PHYSICAL THERAPIST

## 2023-12-19 PROCEDURE — 97110 THERAPEUTIC EXERCISES: CPT | Mod: GP | Performed by: PHYSICAL THERAPIST

## 2023-12-27 ENCOUNTER — THERAPY VISIT (OUTPATIENT)
Dept: PHYSICAL THERAPY | Facility: CLINIC | Age: 38
End: 2023-12-27
Payer: COMMERCIAL

## 2023-12-27 DIAGNOSIS — Q05.2 SPINA BIFIDA APERTA OF LUMBAR REGION WITH HYDROCEPHALUS (H): Primary | ICD-10-CM

## 2023-12-27 PROCEDURE — 97110 THERAPEUTIC EXERCISES: CPT | Mod: GP | Performed by: PHYSICAL THERAPIST

## 2023-12-29 ENCOUNTER — PRE VISIT (OUTPATIENT)
Dept: UROLOGY | Facility: CLINIC | Age: 38
End: 2023-12-29
Payer: COMMERCIAL

## 2024-01-03 ENCOUNTER — THERAPY VISIT (OUTPATIENT)
Dept: PHYSICAL THERAPY | Facility: CLINIC | Age: 39
End: 2024-01-03
Payer: COMMERCIAL

## 2024-01-03 DIAGNOSIS — Z98.891 S/P C-SECTION: ICD-10-CM

## 2024-01-03 DIAGNOSIS — Q05.2 SPINA BIFIDA OF LUMBAR REGION WITH HYDROCEPHALUS (H): Primary | ICD-10-CM

## 2024-01-03 DIAGNOSIS — Q05.2 SPINA BIFIDA APERTA OF LUMBAR REGION WITH HYDROCEPHALUS (H): Primary | ICD-10-CM

## 2024-01-03 DIAGNOSIS — Z98.890 HISTORY OF LUMBAR LAMINECTOMY: ICD-10-CM

## 2024-01-03 PROCEDURE — 97530 THERAPEUTIC ACTIVITIES: CPT | Mod: GP | Performed by: PHYSICAL THERAPIST

## 2024-01-03 PROCEDURE — 97110 THERAPEUTIC EXERCISES: CPT | Mod: GP | Performed by: PHYSICAL THERAPIST

## 2024-01-03 NOTE — PATIENT INSTRUCTIONS
1/3/24    Bring cane next time    Try a chair just to sit on to rest a bit - while cooking- stand/ cook/ sit /rest.      Try the binder only when walking or cooking/ cleaning -see if it helps with pain.

## 2024-01-16 ENCOUNTER — TELEPHONE (OUTPATIENT)
Dept: UROLOGY | Facility: CLINIC | Age: 39
End: 2024-01-16
Payer: COMMERCIAL

## 2024-01-16 NOTE — TELEPHONE ENCOUNTER
Spoke with patient today with the help of  services. Patient is scheduled for Urodynamics testing with Jerilyn Greene PA-C on 1/17 and performs CIC several times per day.  Currently, patient is feeling well- no UTI symptoms.  She states she is just having urinary leakage, which is not new.  Patient denies any fever, chills, hematuria, flank/bladder pain.    Aurora Mendoza, CMA

## 2024-01-17 ENCOUNTER — ANCILLARY PROCEDURE (OUTPATIENT)
Dept: RADIOLOGY | Facility: AMBULATORY SURGERY CENTER | Age: 39
End: 2024-01-17
Attending: PHYSICIAN ASSISTANT
Payer: COMMERCIAL

## 2024-01-17 ENCOUNTER — TRANSFERRED RECORDS (OUTPATIENT)
Dept: UROLOGY | Facility: CLINIC | Age: 39
End: 2024-01-17

## 2024-01-17 ENCOUNTER — ALLIED HEALTH/NURSE VISIT (OUTPATIENT)
Dept: UROLOGY | Facility: CLINIC | Age: 39
End: 2024-01-17
Payer: COMMERCIAL

## 2024-01-17 VITALS
SYSTOLIC BLOOD PRESSURE: 111 MMHG | HEIGHT: 61 IN | BODY MASS INDEX: 26.06 KG/M2 | HEART RATE: 72 BPM | WEIGHT: 138 LBS | DIASTOLIC BLOOD PRESSURE: 80 MMHG

## 2024-01-17 DIAGNOSIS — N31.9 NEUROGENIC BLADDER: Primary | ICD-10-CM

## 2024-01-17 DIAGNOSIS — N39.9 UROLOGIC DISORDER: ICD-10-CM

## 2024-01-17 LAB
ALBUMIN UR-MCNC: NEGATIVE MG/DL
ALBUMIN UR-MCNC: NEGATIVE MG/DL
APPEARANCE UR: CLEAR
APPEARANCE UR: CLEAR
BILIRUB UR QL STRIP: NEGATIVE
BILIRUB UR QL STRIP: NEGATIVE
COLOR UR AUTO: ABNORMAL
COLOR UR AUTO: YELLOW
GLUCOSE UR STRIP-MCNC: NEGATIVE MG/DL
GLUCOSE UR STRIP-MCNC: NEGATIVE MG/DL
HGB UR QL STRIP: ABNORMAL
HGB UR QL STRIP: NEGATIVE
KETONES UR STRIP-MCNC: NEGATIVE MG/DL
KETONES UR STRIP-MCNC: NEGATIVE MG/DL
LEUKOCYTE ESTERASE UR QL STRIP: ABNORMAL
LEUKOCYTE ESTERASE UR QL STRIP: NEGATIVE
NITRATE UR QL: NEGATIVE
NITRATE UR QL: POSITIVE
PH UR STRIP: 7.5 [PH] (ref 5–7)
PH UR STRIP: 7.5 [PH] (ref 5–8)
RBC URINE: 0 /HPF
SP GR UR STRIP: 1 (ref 1–1.03)
SP GR UR STRIP: 1.01 (ref 1–1.03)
SQUAMOUS EPITHELIAL: <1 /HPF
UROBILINOGEN UR STRIP-ACNC: 0.2 E.U./DL
UROBILINOGEN UR STRIP-MCNC: NORMAL MG/DL
WBC URINE: 9 /HPF

## 2024-01-17 PROCEDURE — 87186 SC STD MICRODIL/AGAR DIL: CPT | Performed by: PHYSICIAN ASSISTANT

## 2024-01-17 PROCEDURE — 51784 ANAL/URINARY MUSCLE STUDY: CPT | Performed by: PHYSICIAN ASSISTANT

## 2024-01-17 PROCEDURE — 51726 COMPLEX CYSTOMETROGRAM: CPT | Performed by: PHYSICIAN ASSISTANT

## 2024-01-17 PROCEDURE — 81001 URINALYSIS AUTO W/SCOPE: CPT | Performed by: PATHOLOGY

## 2024-01-17 PROCEDURE — 51600 INJECTION FOR BLADDER X-RAY: CPT | Performed by: PHYSICIAN ASSISTANT

## 2024-01-17 PROCEDURE — 87086 URINE CULTURE/COLONY COUNT: CPT | Performed by: PHYSICIAN ASSISTANT

## 2024-01-17 PROCEDURE — 74430 CONTRAST X-RAY BLADDER: CPT | Performed by: PHYSICIAN ASSISTANT

## 2024-01-17 PROCEDURE — 99000 SPECIMEN HANDLING OFFICE-LAB: CPT | Performed by: PATHOLOGY

## 2024-01-17 RX ORDER — CEPHALEXIN 500 MG/1
500 CAPSULE ORAL ONCE
Status: COMPLETED | OUTPATIENT
Start: 2024-01-17 | End: 2024-01-17

## 2024-01-17 RX ADMIN — CEPHALEXIN 500 MG: 500 CAPSULE ORAL at 12:14

## 2024-01-17 ASSESSMENT — PAIN SCALES - GENERAL: PAINLEVEL: NO PAIN (0)

## 2024-01-17 NOTE — PATIENT INSTRUCTIONS
UROLOGY CLINIC VISIT PATIENT INSTRUCTIONS    Follow up with Dr. Moore next available to discuss treatment options for urinary incontinence.     If you have any issues, questions or concerns in the meantime, do not hesitate to contact us at [phone] or via [employer].     It was a pleasure meeting with you today.  Thank you for allowing me and my team the privilege of caring for you today.  YOU are the reason we are here, and I truly hope we provided you with the excellent service you deserve.  Please let us know if there is anything else we can do for you so that we can be sure you are leaving completely satisfied with your care experience.

## 2024-01-17 NOTE — NURSING NOTE
"  Chief Complaint   Patient presents with    Urodynamics Study     Urinary incontinence  Neurogenic bladder       Height 1.549 m (5' 1\"), weight 62.6 kg (138 lb), not currently breastfeeding. Body mass index is 26.07 kg/m .    Patient Active Problem List   Diagnosis    Spinal dysraphism (H)    Neurogenic bladder    Renal cyst    S/P     Missed     Caries    Female infertility    History of lumbar laminectomy    Organic sleep disorder    Uterine prolapse       Allergies   Allergen Reactions    Naproxen Hives and Nausea and Vomiting    Bactrim [Sulfamethoxazole W/Trimethoprim] Itching    Gabapentin Itching and Nausea    Vicodin [Hydrocodone-Acetaminophen] Itching       Current Outpatient Medications   Medication Sig Dispense Refill    acetaminophen (TYLENOL) 325 MG tablet Take 1-2 tablets (325-650 mg) by mouth every 6 hours as needed for mild pain 20 tablet 0    cholecalciferol (VITAMIN D3) 125 mcg (5000 units) capsule TAKE 1 CAPSULE BY MOUTH DAILY AS DIRECTED      famotidine (PEPCID) 20 MG tablet       fosfomycin (MONUROL) 3 g Packet Take 1 packet (3 g) by mouth every 3 days 3 packet 0    gabapentin (NEURONTIN) 300 MG capsule TAKE 1 CAPSULE BY MOUTH THREE TIMES DAILY      ibuprofen (ADVIL/MOTRIN) 100 MG tablet Take 100 mg by mouth every 4 hours as needed      ondansetron (ZOFRAN ODT) 4 MG ODT tab Take 1 tablet (4 mg) by mouth every 8 hours as needed for nausea 10 tablet 0    polyethylene glycol (MIRALAX) 17 g packet Take 17 g by mouth daily 72 packet 0    Prenatal Vit-Fe Fumarate-FA (PRENATAL MULTIVITAMIN W/IRON) 27-0.8 MG tablet Take 1 tablet by mouth daily 90 tablet 3    SENNA-docusate sodium (SENNA S) 8.6-50 MG tablet Take 1-2 tablets by mouth 2 times daily as needed (constipation) 60 tablet 0       Social History     Tobacco Use    Smoking status: Never     Passive exposure: Never    Smokeless tobacco: Never   Substance Use Topics    Alcohol use: No    Drug use: No       Invasive Procedure Safety " Checklist:    Procedure: Urodynamics    Action: Complete sections and checkboxes as appropriate.    Pre-procedure:    1. Patient ID Verified with 2 identifiers (Ilsa and  or MRN) : YES    2. Procedure and site verified with patient/designee (when able) : YES    3. Accurate consent documentation in medical record : YES    4. H&P (or appropriate assessment) documented in medical record : N/A    H&P must be up to 30 days prior to procedure an updated within 24 hours of Procedure as applicable.     5. Relevant diagnostic and radiology test results appropriately labeled and displayed as applicable : YES    6. Blood products, implants, devices, and/or special equipment available for the procedure as applicable : YES    7. Procedure site(s) marked with provider initials [Exclusions: none] : NO    8. Marking not required. Reason : Yes  Procedure does not require site marking    Time Out:     Time-Out performed immediately prior to starting procedure, including verbal and active participation of all team members addressing: YES    1. Correct patient identity.  2. Confirmed that the correct side and site are marked.  3. An accurate procedure to be done.  4. Agreement on the procedure to be done.  5. Correct patient position.  6. Relevant images and results are properly labeled and appropriately displayed.  7. The need to administer antibiotics or fluids for irrigation purposes during the procedure as applicable.  8. Safety precautions based on patient history or medication use.    During Procedure: Verification of correct person, site, and procedure occurs any time the responsibility for care of the patient is transferred to another member of the care team.          The following medication was given:     MEDICATION:  Keflex (Cefalexin)  ROUTE: PO  SITE: Medication was given orally  DOSE: 500mg  LOT #: [id number]  : [employer]   EXPIRATION DATE: 2024  NDC#:  [id number]   Was there drug  waste? No      The following medication was given:     MEDICATION:  Omnipaque (Iohexol Injection) (240mgI/mL)  ROUTE: Provider Administered  SITE: Provider Administered via catheter  DOSE: 100mL  LOT #: [id number]  : [employer]cam  EXPIRATION DATE: 4/11/2026  NDC#: 1833-2989-96   Was there drug waste? No      Aurora Mendoza CMA  1/17/2024  11:18 AM

## 2024-01-18 LAB — BACTERIA UR CULT: ABNORMAL

## 2024-01-19 ENCOUNTER — TELEPHONE (OUTPATIENT)
Dept: UROLOGY | Facility: CLINIC | Age: 39
End: 2024-01-19
Payer: COMMERCIAL

## 2024-01-19 DIAGNOSIS — N39.0 URINARY TRACT INFECTION WITHOUT HEMATURIA, SITE UNSPECIFIED: Primary | ICD-10-CM

## 2024-01-19 RX ORDER — NITROFURANTOIN 25; 75 MG/1; MG/1
100 CAPSULE ORAL 2 TIMES DAILY
Qty: 14 CAPSULE | Refills: 0 | Status: SHIPPED | OUTPATIENT
Start: 2024-01-19

## 2024-01-19 NOTE — TELEPHONE ENCOUNTER
Patient notified of result and macrobid sent via telephone with [facility] . Patient had no further questions.   Michaela Johnson RN on 1/19/2024 at 11:16 AM

## 2024-01-19 NOTE — TELEPHONE ENCOUNTER
Urine culture results reviewed: E. coli susceptible to Macrobid.  Will send a 7-day course to her preferred pharmacy.    And, would you please let this patient know that I am covering for Jerilyn and sending her medication based on the urine culture results?  Thank you!

## 2024-01-24 ENCOUNTER — THERAPY VISIT (OUTPATIENT)
Dept: PHYSICAL THERAPY | Facility: CLINIC | Age: 39
End: 2024-01-24
Payer: COMMERCIAL

## 2024-01-24 DIAGNOSIS — Q05.2 SPINA BIFIDA APERTA OF LUMBAR REGION WITH HYDROCEPHALUS (H): Primary | ICD-10-CM

## 2024-01-24 PROCEDURE — 97110 THERAPEUTIC EXERCISES: CPT | Mod: GP | Performed by: PHYSICAL THERAPIST

## 2024-01-24 PROCEDURE — 97530 THERAPEUTIC ACTIVITIES: CPT | Mod: GP | Performed by: PHYSICAL THERAPIST

## 2024-01-24 NOTE — PATIENT INSTRUCTIONS
https://www.myplate.gov/eat-healthy/what-is-myplate    Call Primary doctor to ask for dietician consult to help with cholestorol.    Weight with clothes on 134.8lbs today

## 2024-02-13 ENCOUNTER — THERAPY VISIT (OUTPATIENT)
Dept: PHYSICAL THERAPY | Facility: CLINIC | Age: 39
End: 2024-02-13
Attending: ANESTHESIOLOGY
Payer: COMMERCIAL

## 2024-02-13 DIAGNOSIS — Q05.2 SPINA BIFIDA APERTA OF LUMBAR REGION WITH HYDROCEPHALUS (H): Primary | ICD-10-CM

## 2024-02-13 PROCEDURE — 97110 THERAPEUTIC EXERCISES: CPT | Mod: GP | Performed by: PHYSICAL THERAPIST

## 2024-02-27 ENCOUNTER — PRE VISIT (OUTPATIENT)
Dept: UROLOGY | Facility: CLINIC | Age: 39
End: 2024-02-27
Payer: COMMERCIAL

## 2024-02-27 NOTE — TELEPHONE ENCOUNTER
Reason for visit: UDS follow up     Relevant information: was seen on 1/17/24 by  for urinary incontinence, hx of neurogenic bladder, and UTI. Uterine prolapse,     Records/imaging/labs/orders: all records available    Pt called: no need for a call    At Rooming: have pt empty bladder/pvr,  standard rooming/check media or imaging.      Christiano Colmenares  2/27/2024  9:22 AM

## 2024-03-01 NOTE — PROGRESS NOTES
"HPI:  Minh Altamirano is a 39 year old female w/ NGB 2/2 SB being seen for UDS follow up. Her history is remarkable for s/p right colon augment and autologous rectus fascial pubovaginal sling () with urinary retention managed with CIC per urethra, as well as severe urinary incontinence and history of UTIs.      Appreciate the service of Babita      History  -  right colon augment with mitrofanoff  -  autologous rectus fascial pubovaginal sling  -  ventral hernia repair with mesh  -  botox injection to the bladder  -      -UDS 24: Capacity 515, good compliance w/o detrusor overactivity, complete urinary retention secondary to acontractile detrusor.       Today  -persistent UI despite PV sling,   - UI happens when from sitting to standing, and coughing when standing, and during sexual intercourse  - Pads 3x/day  - CIC 12 Fr 6x/day      -UTI multiple times in the past year treated with antibiotics. EHR showed 24 culture grew e coli, 23 e coli, but patient reports that there were various bacteria.  Nas sx include dysuria pelvic and urethral pain. Unsure if got UTIs after sexual intercourse          Exam:  /79   Pulse 88   Ht 1.575 m (5' 2\")   Wt 59 kg (130 lb)   SpO2 98%   BMI 23.78 kg/m    General: age-appropriate appearing female in NAD sitting in an exam chair  HEENT: Head AT/NC, EOMI, CN Grossly intact.  Resp: no respiratory distress  CV: heart rate regular  Abdomen: Degree of obesity is none. Abdomen is soft and nontender. No organomegaly.   LE: Edema is none   Neuro: grossly non focal. Normal reflexes  Skin: clear of rashes or ecchymoses. No sacral decubitus ulcer.  Motor: excellent strength throughout    Review of Imaging:  The following imaging exams were independently viewed and interpreted by me and discussed with patient:    23 SARAH  IMPRESSION:  Mild left hydronephrosis.  No right hydronephrosis.        Review of Labs:  The following " labs were reviewed by me and discussed with the patient:  No results found for this or any previous visit (from the past 720 hour(s)).      Assessment & Plan   Neurogenic bladder 2/2 SB  KATELYNN      1 permanent solution and 1 temporary solution discussed.    She can get another sling surgery to pinch off the urethra. The same surgery was done 2012 when mitshavonff. It may get loosened up during her previous pregnancy. Since patient wants to have more child, I recommend something temporary for now and perform the surgery after the next pregnancy.    Urethral bulking agent would be a temporary solution. It usually lasts 1-3 years. Complications include but not limited to narrowing of urethra and difficulty with CIC.      Discussed that the success rate of PFPT is not high for her due to multiple abdominal surgeries, but she would like to proceed with PFPT for her prolapse          ======================  Beckie Fisher, EDUARDO  [facility]     I acted as a scribe for this note. All portions of the documented history and physical were personally performed by Fady Moore MD as the attending physician.       Fady Moore MD  [facility] [place]    ==========================      Additional Coding Information:    Problems:  4 -- one or more chronic illnesses with exacerbation or side effects        Level of risk:  4 -- major surgery without patient or procedure risks    Time spent:  I spent a total of 35 minutes on the day of the visit.   Time spent by me doing chart review, history and exam, documentation and further activities per the note

## 2024-03-04 ENCOUNTER — OFFICE VISIT (OUTPATIENT)
Dept: UROLOGY | Facility: CLINIC | Age: 39
End: 2024-03-04
Payer: COMMERCIAL

## 2024-03-04 VITALS
WEIGHT: 130 LBS | OXYGEN SATURATION: 98 % | HEART RATE: 88 BPM | BODY MASS INDEX: 23.92 KG/M2 | HEIGHT: 62 IN | SYSTOLIC BLOOD PRESSURE: 114 MMHG | DIASTOLIC BLOOD PRESSURE: 79 MMHG

## 2024-03-04 DIAGNOSIS — N31.9 NEUROGENIC BLADDER: ICD-10-CM

## 2024-03-04 DIAGNOSIS — R33.9 URINARY RETENTION: Primary | ICD-10-CM

## 2024-03-04 DIAGNOSIS — N39.3 FEMALE STRESS INCONTINENCE: ICD-10-CM

## 2024-03-04 PROCEDURE — 99214 OFFICE O/P EST MOD 30 MIN: CPT | Performed by: UROLOGY

## 2024-03-04 RX ORDER — TRAZODONE HYDROCHLORIDE 100 MG/1
TABLET ORAL
COMMUNITY
Start: 2024-01-30

## 2024-03-04 RX ORDER — CEFAZOLIN SODIUM 2 G/50ML
2 SOLUTION INTRAVENOUS SEE ADMIN INSTRUCTIONS
Status: CANCELLED | OUTPATIENT
Start: 2024-03-04

## 2024-03-04 RX ORDER — CEFAZOLIN SODIUM 2 G/50ML
2 SOLUTION INTRAVENOUS
Status: CANCELLED | OUTPATIENT
Start: 2024-03-04

## 2024-03-04 RX ORDER — ACETAMINOPHEN 160 MG
1 TABLET,DISINTEGRATING ORAL DAILY
COMMUNITY
Start: 2024-01-25

## 2024-03-04 RX ORDER — SULFAMETHOXAZOLE/TRIMETHOPRIM 800-160 MG
TABLET ORAL
COMMUNITY
Start: 2023-10-09

## 2024-03-04 RX ORDER — PNV NO.95/FERROUS FUM/FOLIC AC 28MG-0.8MG
TABLET ORAL
COMMUNITY
Start: 2023-04-17

## 2024-03-04 RX ORDER — ALBUTEROL SULFATE 90 UG/1
2 AEROSOL, METERED RESPIRATORY (INHALATION) EVERY 4 HOURS
COMMUNITY
Start: 2024-02-03

## 2024-03-04 RX ORDER — GABAPENTIN 600 MG/1
TABLET ORAL
COMMUNITY
Start: 2024-02-29

## 2024-03-04 RX ORDER — B-COMPLEX WITH VITAMIN C
TABLET ORAL
COMMUNITY
Start: 2022-10-24

## 2024-03-04 RX ORDER — SIMVASTATIN 10 MG
TABLET ORAL
COMMUNITY
Start: 2023-10-17

## 2024-03-04 RX ORDER — CHOLECALCIFEROL (VITAMIN D3) 50 MCG
TABLET ORAL
COMMUNITY
Start: 2023-06-26

## 2024-03-04 RX ORDER — TRAZODONE HYDROCHLORIDE 50 MG/1
50 TABLET, FILM COATED ORAL
COMMUNITY
Start: 2023-10-13

## 2024-03-04 ASSESSMENT — PAIN SCALES - GENERAL: PAINLEVEL: SEVERE PAIN (7)

## 2024-03-04 NOTE — LETTER
"3/4/2024       RE: Minh Altamirano  [address]  [facility] [address]     Dear Colleague,    Thank you for referring your patient, Minh Altamirano, to the [facility]OLOGY [facility]IN[facility] [place] at [facility]. Please see a copy of my visit note below.    HPI:  Minh Altamirano is a 39 year old female w/ NGB 2/2 SB being seen for UDS follow up. Her history is remarkable for s/p right colon augment and autologous rectus fascial pubovaginal sling () with urinary retention managed with CIC per urethra, as well as severe urinary incontinence and history of UTIs.      Appreciate the service of Babita      History  -  right colon augment with mitrofanoff  -  autologous rectus fascial pubovaginal sling  -  ventral hernia repair with mesh  -  botox injection to the bladder  -      -UDS 24: Capacity 515, good compliance w/o detrusor overactivity, complete urinary retention secondary to acontractile detrusor.       Today  -persistent UI despite PV sling,   - UI happens when from sitting to standing, and coughing when standing, and during sexual intercourse  - Pads 3x/day  - CIC 12 Fr 6x/day      -UTI multiple times in the past year treated with antibiotics. EHR showed 24 culture grew e coli, 23 e coli, but patient reports that there were various bacteria.  Nas sx include dysuria pelvic and urethral pain. Unsure if got UTIs after sexual intercourse          Exam:  /79   Pulse 88   Ht 1.575 m (5' 2\")   Wt 59 kg (130 lb)   SpO2 98%   BMI 23.78 kg/m    General: age-appropriate appearing female in NAD sitting in an exam chair  HEENT: Head AT/NC, EOMI, CN Grossly intact.  Resp: no respiratory distress  CV: heart rate regular  Abdomen: Degree of obesity is none. Abdomen is soft and nontender. No organomegaly.   LE: Edema is none   Neuro: grossly non focal. Normal reflexes  Skin: clear of rashes or " ecchymoses. No sacral decubitus ulcer.  Motor: excellent strength throughout    Review of Imaging:  The following imaging exams were independently viewed and interpreted by me and discussed with patient:    12/1/23 SARAH  IMPRESSION:  Mild left hydronephrosis.  No right hydronephrosis.        Review of Labs:  The following labs were reviewed by me and discussed with the patient:  No results found for this or any previous visit (from the past 720 hour(s)).      Assessment & Plan  Neurogenic bladder 2/2 SB  KATELYNN      1 permanent solution and 1 temporary solution discussed.    She can get another sling surgery to pinch off the urethra. The same surgery was done 2012 when don. It may get loosened up during her previous pregnancy. Since patient wants to have more child, I recommend something temporary for now and perform the surgery after the next pregnancy.    Urethral bulking agent would be a temporary solution. It usually lasts 1-3 years. Complications include but not limited to narrowing of urethra and difficulty with CIC.      Discussed that the success rate of PFPT is not high for her due to multiple abdominal surgeries, but she would like to proceed with PFPT for her prolapse          ======================  Beckie Fisher, EDUARDO  [facility] Urology     I acted as a scribe for this note. All portions of the documented history and physical were personally performed by Fady Moore MD as the attending physician.       Fady Moore MD  [facility] UROLOGY [facility]IN[facility] [place]    ==========================      Additional Coding Information:    Problems:  4 -- one or more chronic illnesses with exacerbation or side effects        Level of risk:  4 -- major surgery without patient or procedure risks    Time spent:  I spent a total of 35 minutes on the day of the visit.   Time spent by me doing chart review, history and exam, documentation and further activities per the  note              Pre Op Teaching Flowsheet       Pre and Post op Patient Education  Relevant Diagnosis:  Urinary incontinence   Surgical procedure:  Cysto with bulking   Teaching Topic:  Pre and post op teaching  Person Involved in teaching: Yes    Motivation Level:  Asks Questions: Yes  Eager to Learn: Yes  Cooperative: Yes  Receptive (willing/able to accept information):  Yes    Patient demonstrates understanding of the following:  Date of surgery:  TBD   Location of surgery:  [facility] - 5th Floor  History and Physical and any other testing necessary prior to surgery: Yes  Required time line for completion of History and Physical and any pre-op testing: Yes    Patient demonstrates understanding of the following:  Pre-op bowel prep:  N/A  Pre-op showering/scrub information with PCMX Soap: Yes  Blood thinner medications discussed and when to stop (if applicable):  Yes, discussed NDSAIDS, multivitamins, and fish oils.     Infection Prevention:   Patient demonstrates understanding of the following:  Surgical procedure site care taught: Yes  Signs and symptoms of infection taught: Yes      Post-op follow-up:  Discussed how to contact the hospital, nurse, and clinic scheduling staff if necessary. (See packet information)    Instructional materials used/given/mailed:  [facility] Packet, post op teaching sheet, Map, Soap, and with the arrival/location information to come closer to the surgery date.    Surgical instructions packet given to patient in office:  N/A    Follow up: Discussed arranging for someone to drive you home. ( No public transportation)  Someone needed to stay the first twenty hours after surgery: Yes     referral: No      home:  Yes,      Care Giver:  Yes,      PCP:  Dr. Jones Russo, RN

## 2024-03-04 NOTE — NURSING NOTE
"Chief Complaint   Patient presents with    Consult     Pt states she can't hold her urine       Blood pressure 114/79, pulse 88, height 1.575 m (5' 2\"), weight 59 kg (130 lb), SpO2 98%, not currently breastfeeding. Body mass index is 23.78 kg/m .    back and nerve pain, is like a shooting pain. feels like leg muscle is getting pulled. is constant. has been for most of her life.walking makes pain worse. when standing gets shooting pan in leg nerves. bends over from pain. had spina bifida occulta   had bladder surgery in [facility] as well as in the [facility] in this hospital.     Patient Active Problem List   Diagnosis    Spinal dysraphism (H)    Neurogenic bladder    Renal cyst    S/P     Missed     Caries    Female infertility    History of lumbar laminectomy    Organic sleep disorder    Uterine prolapse       Allergies   Allergen Reactions    Naproxen Hives and Nausea and Vomiting    Bactrim [Sulfamethoxazole W/Trimethoprim] Itching    Hydrocodone-Acetaminophen Itching    Vicodin [Hydrocodone-Acetaminophen] Itching       Current Outpatient Medications   Medication Sig Dispense Refill    acetaminophen (TYLENOL) 325 MG tablet Take 1-2 tablets (325-650 mg) by mouth every 6 hours as needed for mild pain 20 tablet 0    albuterol (PROAIR HFA/PROVENTIL HFA/VENTOLIN HFA) 108 (90 Base) MCG/ACT inhaler Inhale 2 puffs into the lungs every 4 hours      cholecalciferol (VITAMIN D3) 125 mcg (5000 units) capsule TAKE 1 CAPSULE BY MOUTH DAILY AS DIRECTED      famotidine (PEPCID) 20 MG tablet       fosfomycin (MONUROL) 3 g Packet Take 1 packet (3 g) by mouth every 3 days 3 packet 0    gabapentin (NEURONTIN) 300 MG capsule TAKE 1 CAPSULE BY MOUTH THREE TIMES DAILY      gabapentin (NEURONTIN) 600 MG tablet TAKE ONE TABLET BY MOUTH THREE TIMES A DAY FOR 30 DAYS      ibprofen (MOTRIN) 600 MG ED starter pack Ibuprofen      ibuprofen (ADVIL/MOTRIN) 100 MG tablet Take 100 mg by mouth every 4 hours as needed      nitroFURantoin " macrocrystal-monohydrate (MACROBID) 100 MG capsule Take 1 capsule (100 mg) by mouth 2 times daily 14 capsule 0    ondansetron (ZOFRAN ODT) 4 MG ODT tab Take 1 tablet (4 mg) by mouth every 8 hours as needed for nausea 10 tablet 0    polyethylene glycol (MIRALAX) 17 g packet Take 17 g by mouth daily 72 packet 0    Prenatal Multivit-Min-Fe-FA (PRENATAL 1 + IRON OR) Prenatal      Prenatal Multivit-Min-Fe-FA (PRENATAL, W/IRON & FA,) 27-0.8 MG TABS 1 tablet Orally Once a day for 30 days      Prenatal Vit-Fe Fumarate-FA (PRENATAL MULTIVITAMIN W/IRON) 27-0.8 MG tablet Take 1 tablet by mouth daily 90 tablet 3    Prenatal Vit-Fe Fumarate-FA (PRENATAL VITAMINS) 28-0.8 MG TABS TAKE ONE (1) TABLET BY MOUTH ONCE A DAY      SENNA-docusate sodium (SENNA S) 8.6-50 MG tablet Take 1-2 tablets by mouth 2 times daily as needed (constipation) 60 tablet 0    simvastatin (ZOCOR) 10 MG tablet 1 tablet in the evening Orally Once a day for 90 days      sulfamethoxazole-trimethoprim (BACTRIM DS) 800-160 MG tablet TAKE ONE TABLET BY MOUTH TWICE A DAY FOR 10 DAY(S)      traZODone (DESYREL) 100 MG tablet TAKE ONE (1) TABLET BY MOUTH ONCE DAILY AT BEDTIME AS NEEDED FOR SLEEP FOR 90 DAYS      traZODone (DESYREL) 50 MG tablet 50 mg      vitamin D3 (CHOLECALCIFEROL) 50 mcg (2000 units) tablet TAKE ONE (1) TABLET BY MOUTH ONCE A DAY      VITAMIN D3 50 MCG (2000 UT) CAPS Take 1 capsule by mouth daily         Social History     Tobacco Use    Smoking status: Never     Passive exposure: Never    Smokeless tobacco: Never   Substance Use Topics    Alcohol use: No    Drug use: No       Christiano Colmenares  3/4/2024  10:53 AM

## 2024-03-04 NOTE — PROGRESS NOTES
Pre Op Teaching Flowsheet       Pre and Post op Patient Education  Relevant Diagnosis:  Urinary incontinence   Surgical procedure:  Cysto with bulking   Teaching Topic:  Pre and post op teaching  Person Involved in teaching: Yes    Motivation Level:  Asks Questions: Yes  Eager to Learn: Yes  Cooperative: Yes  Receptive (willing/able to accept information):  Yes    Patient demonstrates understanding of the following:  Date of surgery:  TBD   Location of surgery:  [facility] - 5th Floor  History and Physical and any other testing necessary prior to surgery: Yes  Required time line for completion of History and Physical and any pre-op testing: Yes    Patient demonstrates understanding of the following:  Pre-op bowel prep:  N/A  Pre-op showering/scrub information with PCMX Soap: Yes  Blood thinner medications discussed and when to stop (if applicable):  Yes, discussed NDSAIDS, multivitamins, and fish oils.     Infection Prevention:   Patient demonstrates understanding of the following:  Surgical procedure site care taught: Yes  Signs and symptoms of infection taught: Yes      Post-op follow-up:  Discussed how to contact the hospital, nurse, and clinic scheduling staff if necessary. (See packet information)    Instructional materials used/given/mailed:  [facility] Packet, post op teaching sheet, Map, Soap, and with the arrival/location information to come closer to the surgery date.    Surgical instructions packet given to patient in office:  N/A    Follow up: Discussed arranging for someone to drive you home. ( No public transportation)  Someone needed to stay the first twenty hours after surgery: Yes     referral: No      home:  Yes,      Care Giver:  Yes,      PCP:  Dr. Jones Russo, RN

## 2024-03-07 ENCOUNTER — THERAPY VISIT (OUTPATIENT)
Dept: PHYSICAL THERAPY | Facility: CLINIC | Age: 39
End: 2024-03-07
Payer: COMMERCIAL

## 2024-03-07 DIAGNOSIS — Q05.2 SPINA BIFIDA APERTA OF LUMBAR REGION WITH HYDROCEPHALUS (H): Primary | ICD-10-CM

## 2024-03-07 PROCEDURE — 97110 THERAPEUTIC EXERCISES: CPT | Mod: GP | Performed by: PHYSICAL THERAPIST

## 2024-03-09 NOTE — PROGRESS NOTES
DISCHARGE  Reason for Discharge: HEP indep/ using abdominal binder, amber duran- 1/3 goals met    Equipment Issued: abd binder    Discharge Plan: Patient to continue home program.    Referring Provider:  Cristiana Pittman

## 2024-03-18 ENCOUNTER — TELEPHONE (OUTPATIENT)
Dept: UROLOGY | Facility: CLINIC | Age: 39
End: 2024-03-18
Payer: COMMERCIAL

## 2024-03-18 PROBLEM — N39.3 FEMALE STRESS INCONTINENCE: Status: ACTIVE | Noted: 2024-03-04

## 2024-03-18 NOTE — TELEPHONE ENCOUNTER
Patient is scheduled for a surgical procedure with Dr. Moore      Spoke with: Patient via phone      Date of Surgery/Procedure: Thursday May 02, 2024    [facility]: [facility]      Informed patient they will need an adult : Yes     Pre-op: Yes      H&P: Patient to schedule    Additional imaging/appointments:     Post-op: Monday June 03, 2024     Additional comments:      Surgery packet: Sent via [facility]     Patient is aware that surgery time is tentative to change and to expect a call 3-1 business days from Pre Admission Nursing for instructions and arrival time

## 2024-04-10 ENCOUNTER — VIRTUAL VISIT (OUTPATIENT)
Dept: INTERPRETER SERVICES | Facility: CLINIC | Age: 39
End: 2024-04-10
Payer: COMMERCIAL

## 2024-04-10 ENCOUNTER — OFFICE VISIT (OUTPATIENT)
Dept: NEUROSURGERY | Facility: CLINIC | Age: 39
End: 2024-04-10
Attending: NEUROLOGICAL SURGERY
Payer: COMMERCIAL

## 2024-04-10 VITALS — WEIGHT: 130.95 LBS | BODY MASS INDEX: 24.72 KG/M2 | HEIGHT: 61 IN

## 2024-04-10 DIAGNOSIS — Q05.2 SPINA BIFIDA OF LUMBAR REGION WITH HYDROCEPHALUS (H): Primary | ICD-10-CM

## 2024-04-10 PROCEDURE — T1013 SIGN LANG/ORAL INTERPRETER: HCPCS

## 2024-04-10 PROCEDURE — G0463 HOSPITAL OUTPT CLINIC VISIT: HCPCS | Performed by: NEUROLOGICAL SURGERY

## 2024-04-10 PROCEDURE — 99212 OFFICE O/P EST SF 10 MIN: CPT | Mod: FS | Performed by: NURSE PRACTITIONER

## 2024-04-10 NOTE — NURSING NOTE
"Chief Complaint   Patient presents with    Follow Up     6 month follow up       Vitals:    04/10/24 0921   Weight: 130 lb 15.3 oz (59.4 kg)   Height: 5' 0.63\" (154 cm)   HC: 62.5 cm (24.61\")         Patient MyChart Active? Yes  If no, would they like to sign up? N/A      Does patient need PHQ-2 completed today? Yes    Depression Response    Patient completed the PHQ-9 assessment for depression and scored >9? No  Question 9 on the PHQ-9 was positive for suicidality? No      Mariluz Ryder  April 10, 2024  "

## 2024-04-10 NOTE — PROGRESS NOTES
Neurosurgery Clinic    Thank you for referring Minh Altamirano to the neurosurgery clinic at the [facility] and [facility] [place].   I had the opportunity to meet with Minh Altamirano on April 10, 2024.    As you know, Minh is a 39 year old female with past medical history of a partial untethering and partial resection of lipoma with Dr. Garcia originally in December 2010 status post bladder augmentation in 2012. She reported improvement and had been doing well until December 2021 when she began to complain of worsening back pain as well as right greater than left radicular pain. Visited with Dr. Zelaya for consideration of spinal column shortening, at that visit the recommendation was for conservative management, and consultation with pain clinic regarding spinal cord stim placement versus evaluation. She was more recently seen by Dr. Valladares who recommended physical therapy and then likely spinal cord stimulator placement.     She notes she has been doing okay, although notes her nerves have gotten slightly worse. She notes that prior she just was experiencing pain, but since she was last seen she experiences numbness and shakiness of her leg. She notes she has numbness from below her right knee to her toes. She notes that her right leg has been getting weaker. She notes that she has severe lower back pain. She notes when she is at physical therapy her back feels better but she has not been back in 1 month. She has been having worsening bladder incontinence and pain. She notes she always has a urinary tract infection because she caths. She notes there have been no changes to cathing. She underwent urodynamics in January 2024. She will be undergoing a cystoscopy in May with Dr. Nolan in urology. She notes that she also has concerns with bowel continence.       Past Medical History:   Diagnosis Date    Anemia     Chronic infection     MRSA    Constipation, chronic     Incontinence of urine      Lipoma of spinal cord     Migraine     neurogenic bladder     Spinal dysraphism (H)        Past Surgical History:   Procedure Laterality Date    BLADDER AUGMENTATION  2012    Procedure:BLADDER AUGMENTATION; Surgeon:TRINA MOORE; Location:[facility] OR     SECTION N/A 2018    Procedure:  SECTION;  Primary  Section  with classical incision;  Surgeon: Lily Villanueva MD;  [facility] OR    CYSTOSCOPY, INTRAVESICAL INJECTION N/A 2016    Procedure: CYSTOSCOPY, INTRAVESICAL INJECTION;  Surgeon: Trina Moore MD;  [facility]    DILATION AND CURETTAGE SUCTION N/A 2019    Procedure: suction dilation and curetage,;  Surgeon: Radha Hickey MD;  Location[facility] OR    EXAM UNDER ANESTHESIA PELVIC N/A 2019    Procedure: pelvic exam under anesthesia;  Surgeon: Radha Hickey MD;  [facility] OR    LAMINECTOMY LUMBAR TWO LEVELS      LAPAROTOMY EXPLORATORY  2012    Procedure:LAPAROTOMY EXPLORATORY; Exploratory Laparotomy with Takedown of Mitrofanoff, Ventral Hernia Repair with Strattice Mesh implantation ; Surgeon:TRINA MOORE; Location:[facility] OR    MITROFANOFF PROCEDURE (APPENDIX CONDUIT)  2012    Procedure:MITROFANOFF PROCEDURE (APPENDIX CONDUIT); Bladder Augmentation with Right Colon, Appendix Conduit- Mitrofanoff, Insertion of Pubovaginal Sling using Autologous Rectus Fascia; Surgeon:TRINA MOORE; Location:[facility] OR    tumor resection and cord detethering         ALLERGIES:  Naproxen, Bactrim [sulfamethoxazole w/trimethoprim], Hydrocodone-acetaminophen, and Vicodin [hydrocodone-acetaminophen]    Current Outpatient Medications   Medication Sig Dispense Refill    acetaminophen (TYLENOL) 325 MG tablet Take 1-2 tablets (325-650 mg) by mouth every 6 hours as needed for mild pain 20 tablet 0    albuterol (PROAIR HFA/PROVENTIL HFA/VENTOLIN HFA) 108 (90 Base) MCG/ACT inhaler Inhale 2 puffs into the lungs every 4 hours      cholecalciferol  (VITAMIN D3) 125 mcg (5000 units) capsule TAKE 1 CAPSULE BY MOUTH DAILY AS DIRECTED      famotidine (PEPCID) 20 MG tablet       fosfomycin (MONUROL) 3 g Packet Take 1 packet (3 g) by mouth every 3 days 3 packet 0    gabapentin (NEURONTIN) 300 MG capsule TAKE 1 CAPSULE BY MOUTH THREE TIMES DAILY      gabapentin (NEURONTIN) 600 MG tablet TAKE ONE TABLET BY MOUTH THREE TIMES A DAY FOR 30 DAYS      ibprofen (MOTRIN) 600 MG ED starter pack Ibuprofen      ibuprofen (ADVIL/MOTRIN) 100 MG tablet Take 100 mg by mouth every 4 hours as needed      nitroFURantoin macrocrystal-monohydrate (MACROBID) 100 MG capsule Take 1 capsule (100 mg) by mouth 2 times daily 14 capsule 0    ondansetron (ZOFRAN ODT) 4 MG ODT tab Take 1 tablet (4 mg) by mouth every 8 hours as needed for nausea 10 tablet 0    polyethylene glycol (MIRALAX) 17 g packet Take 17 g by mouth daily 72 packet 0    Prenatal Multivit-Min-Fe-FA (PRENATAL 1 + IRON OR) Prenatal      Prenatal Multivit-Min-Fe-FA (PRENATAL, W/IRON & FA,) 27-0.8 MG TABS 1 tablet Orally Once a day for 30 days      Prenatal Vit-Fe Fumarate-FA (PRENATAL MULTIVITAMIN W/IRON) 27-0.8 MG tablet Take 1 tablet by mouth daily 90 tablet 3    Prenatal Vit-Fe Fumarate-FA (PRENATAL VITAMINS) 28-0.8 MG TABS TAKE ONE (1) TABLET BY MOUTH ONCE A DAY      SENNA-docusate sodium (SENNA S) 8.6-50 MG tablet Take 1-2 tablets by mouth 2 times daily as needed (constipation) 60 tablet 0    simvastatin (ZOCOR) 10 MG tablet 1 tablet in the evening Orally Once a day for 90 days      sulfamethoxazole-trimethoprim (BACTRIM DS) 800-160 MG tablet TAKE ONE TABLET BY MOUTH TWICE A DAY FOR 10 DAY(S)      traZODone (DESYREL) 100 MG tablet TAKE ONE (1) TABLET BY MOUTH ONCE DAILY AT BEDTIME AS NEEDED FOR SLEEP FOR 90 DAYS      traZODone (DESYREL) 50 MG tablet 50 mg      vitamin D3 (CHOLECALCIFEROL) 50 mcg (2000 units) tablet TAKE ONE (1) TABLET BY MOUTH ONCE A DAY      VITAMIN D3 50 MCG (2000 UT) CAPS Take 1 capsule by mouth daily    "      PHYSICAL EXAM:   Ht 1.54 m (5' 0.63\")   Wt 59.4 kg (130 lb 15.3 oz)   HC 62.5 cm (24.61\")   BMI 25.05 kg/m      Alert and oriented to person, place, and time. No acute distress.   PERRL, EOMI. Face symmetric. Tongue midline.   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 It was discussed that there is potential for spinal column shortening procedure to take the tension off her spinal cord by removing length. She would like to continue to try physical therapy for 6 months to determine whether or not that will continue to help with her pain management    PLAN:  - we would like for her to meet with adult spine colleagues to discuss procedure   -physical therapy referral, will try for 6 months and then will follow up in clinic  - Minh Altamirano and family were counseled to please contact us with any acute worsening of symptoms, or with any questions or concerns.       Velia Beckett NP  Department of Neurosurgery  [phone]    This patient was discussed with neurosurgery faculty, who agrees with the above.  Velia Beckett NP on 4/10/2024 at 9:40 AM    "

## 2024-04-10 NOTE — PATIENT INSTRUCTIONS
You met with Pediatric Neurosurgery at the [facility]    EDUARDO Dubois Dr., Dr., NP      Pediatric Appointment Scheduling and Call Center:   [phone]    Nurse Practitioner  [phone]    Mailing Address  [address] [address]    Street Address   [address] [place] [address]    Fax Number  [phone]    For urgent matters that cannot wait until the next business day, occur over a holiday and/or weekend, report directly to your nearest ER or you may call [phone] and ask to page the Pediatric Neurosurgery Resident on call.

## 2024-04-10 NOTE — LETTER
4/10/2024      RE: Minh Altamirano  [address]  [facility] [address]     Dear Colleague,    Thank you for the opportunity to participate in the care of your patient, Minh Altamirano, at the [facility] at [facility]. Please see a copy of my visit note below.           Neurosurgery Clinic    Thank you for referring Minh Altamirano to the neurosurgery clinic at the [facility].   I had the opportunity to meet with Minh Altamirano on April 10, 2024.    As you know, Minh is a 39 year old female with past medical history of a partial untethering and partial resection of lipoma with Dr. Garcia originally in December 2010 status post bladder augmentation in 2012. She reported improvement and had been doing well until December 2021 when she began to complain of worsening back pain as well as right greater than left radicular pain. Visited with Dr. Zelaya for consideration of spinal column shortening, at that visit the recommendation was for conservative management, and consultation with pain clinic regarding spinal cord stim placement versus evaluation. She was more recently seen by Dr. Valladares who recommended physical therapy and then likely spinal cord stimulator placement.     She notes she has been doing okay, although notes her nerves have gotten slightly worse. She notes that prior she just was experiencing pain, but since she was last seen she experiences numbness and shakiness of her leg. She notes she has numbness from below her right knee to her toes. She notes that her right leg has been getting weaker. She notes that she has severe lower back pain. She notes when she is at physical therapy her back feels better but she has not been back in 1 month. She has been having worsening bladder incontinence and pain. She notes she always has a urinary tract infection because she caths.  She notes there have been no changes to cathing. She underwent urodynamics in 2024. She will be undergoing a cystoscopy in May with Dr. Nolan in urology. She notes that she also has concerns with bowel continence.       Past Medical History:   Diagnosis Date     Anemia      Chronic infection     MRSA     Constipation, chronic      Incontinence of urine      Lipoma of spinal cord      Migraine      neurogenic bladder      Spinal dysraphism (H)        Past Surgical History:   Procedure Laterality Date     BLADDER AUGMENTATION  2012    Procedure:BLADDER AUGMENTATION; Surgeon:TRINA MOORE; Location:[facility] OR      SECTION N/A 2018    Procedure:  SECTION;  Primary  Section  with classical incision;  Surgeon: Lily Villanueva MD;  [facility]: [facility] OR     CYSTOSCOPY, INTRAVESICAL INJECTION N/A 2016    Procedure: CYSTOSCOPY, INTRAVESICAL INJECTION;  Surgeon: Trina Moore MD;  [facility]     DILATION AND CURETTAGE SUCTION N/A 2019    Procedure: suction dilation and curetage,;  Surgeon: Radha Hickey MD;  [facility]: [facility] OR     EXAM UNDER ANESTHESIA PELVIC N/A 2019    Procedure: pelvic exam under anesthesia;  Surgeon: Radha Hickey MD;  [facility]: [facility]     LAMINECTOMY LUMBAR TWO LEVELS       LAPAROTOMY EXPLORATORY  2012    Procedure:LAPAROTOMY EXPLORATORY; Exploratory Laparotomy with Takedown of Mitrofanoff, Ventral Hernia Repair with Strattice Mesh implantation ; Surgeon:TRINA MOORE; Location[facility]     MITROFANOFF PROCEDURE (APPENDIX CONDUIT)  2012    Procedure:MITROFANOFF PROCEDURE (APPENDIX CONDUIT); Bladder Augmentation with Right Colon, Appendix Conduit- Mitrofanoff, Insertion of Pubovaginal Sling using Autologous Rectus Fascia; Surgeon:TRINA MOORE; Location:[facility] OR     tumor resection and cord detethering         ALLERGIES:  Naproxen, Bactrim [sulfamethoxazole w/trimethoprim], Hydrocodone-acetaminophen, and  Vicodin [hydrocodone-acetaminophen]    Current Outpatient Medications   Medication Sig Dispense Refill     acetaminophen (TYLENOL) 325 MG tablet Take 1-2 tablets (325-650 mg) by mouth every 6 hours as needed for mild pain 20 tablet 0     albuterol (PROAIR HFA/PROVENTIL HFA/VENTOLIN HFA) 108 (90 Base) MCG/ACT inhaler Inhale 2 puffs into the lungs every 4 hours       cholecalciferol (VITAMIN D3) 125 mcg (5000 units) capsule TAKE 1 CAPSULE BY MOUTH DAILY AS DIRECTED       famotidine (PEPCID) 20 MG tablet        fosfomycin (MONUROL) 3 g Packet Take 1 packet (3 g) by mouth every 3 days 3 packet 0     gabapentin (NEURONTIN) 300 MG capsule TAKE 1 CAPSULE BY MOUTH THREE TIMES DAILY       gabapentin (NEURONTIN) 600 MG tablet TAKE ONE TABLET BY MOUTH THREE TIMES A DAY FOR 30 DAYS       ibprofen (MOTRIN) 600 MG ED starter pack Ibuprofen       ibuprofen (ADVIL/MOTRIN) 100 MG tablet Take 100 mg by mouth every 4 hours as needed       nitroFURantoin macrocrystal-monohydrate (MACROBID) 100 MG capsule Take 1 capsule (100 mg) by mouth 2 times daily 14 capsule 0     ondansetron (ZOFRAN ODT) 4 MG ODT tab Take 1 tablet (4 mg) by mouth every 8 hours as needed for nausea 10 tablet 0     polyethylene glycol (MIRALAX) 17 g packet Take 17 g by mouth daily 72 packet 0     Prenatal Multivit-Min-Fe-FA (PRENATAL 1 + IRON OR) Prenatal       Prenatal Multivit-Min-Fe-FA (PRENATAL, W/IRON & FA,) 27-0.8 MG TABS 1 tablet Orally Once a day for 30 days       Prenatal Vit-Fe Fumarate-FA (PRENATAL MULTIVITAMIN W/IRON) 27-0.8 MG tablet Take 1 tablet by mouth daily 90 tablet 3     Prenatal Vit-Fe Fumarate-FA (PRENATAL VITAMINS) 28-0.8 MG TABS TAKE ONE (1) TABLET BY MOUTH ONCE A DAY       SENNA-docusate sodium (SENNA S) 8.6-50 MG tablet Take 1-2 tablets by mouth 2 times daily as needed (constipation) 60 tablet 0     simvastatin (ZOCOR) 10 MG tablet 1 tablet in the evening Orally Once a day for 90 days       sulfamethoxazole-trimethoprim (BACTRIM DS) 800-160  "MG tablet TAKE ONE TABLET BY MOUTH TWICE A DAY FOR 10 DAY(S)       traZODone (DESYREL) 100 MG tablet TAKE ONE (1) TABLET BY MOUTH ONCE DAILY AT BEDTIME AS NEEDED FOR SLEEP FOR 90 DAYS       traZODone (DESYREL) 50 MG tablet 50 mg       vitamin D3 (CHOLECALCIFEROL) 50 mcg (2000 units) tablet TAKE ONE (1) TABLET BY MOUTH ONCE A DAY       VITAMIN D3 50 MCG (2000 UT) CAPS Take 1 capsule by mouth daily         PHYSICAL EXAM:   Ht 1.54 m (5' 0.63\")   Wt 59.4 kg (130 lb 15.3 oz)   HC 62.5 cm (24.61\")   BMI 25.05 kg/m      Alert and oriented to person, place, and time. No acute distress.   PERRL, EOMI. Face symmetric. Tongue midline.   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 It was discussed that there is potential for spinal column shortening procedure to take the tension off her spinal cord by removing length. She would like to continue to try physical therapy for 6 months to determine whether or not that will continue to help with her pain management    PLAN:  - we would like for her to meet with adult spine colleagues to discuss procedure   -physical therapy referral, will try for 6 months and then will follow up in clinic  - Minh Altamirano and family were counseled to please contact us with any acute worsening of symptoms, or with any questions or concerns.       Velia Beckett NP  Department of Neurosurgery  [phone]    This patient was discussed with neurosurgery faculty, who agrees with the above.  Velia" EDUARDO Beckett on 4/10/2024 at 9:40 AM      Attestation signed by Richy López MD at 4/12/2024  2:31 PM:      Physician Attestation    I saw and evaluated Minh Altamirano as part of a shared APRN/PA visit.     I personally reviewed the vital signs, medications, labs, and imaging.    I personally provided a substantive portion of care for this patient and I approve the care plan as written by the YISEL.  I was involved with Medical Decision Making including: Please see A&P for additional details of medical decision making.    As noted, she feels there has been some progression in her symptoms overall.  We discussed options again including re-do attempt at untethering versus spinal column shortening. We discussed that I think it is unlikely that another untethering procedure would have long-lasting benefit while spinal column shortening may benefit her as a final procedure. She would like to hold off on surgery for now and check back with us in 6 months. We feel this is reasonable and will see her back in 6 months or sooner if there is clinical concern.     Richy López MD  Date of Service (when I saw the patient): 4/10/2024

## 2024-04-16 ENCOUNTER — THERAPY VISIT (OUTPATIENT)
Dept: PHYSICAL THERAPY | Facility: CLINIC | Age: 39
End: 2024-04-16
Attending: NURSE PRACTITIONER
Payer: COMMERCIAL

## 2024-04-16 DIAGNOSIS — Q05.2 SPINA BIFIDA OF LUMBAR REGION WITH HYDROCEPHALUS (H): ICD-10-CM

## 2024-04-16 PROCEDURE — 97110 THERAPEUTIC EXERCISES: CPT | Mod: GP | Performed by: PHYSICAL THERAPIST

## 2024-04-16 PROCEDURE — 97162 PT EVAL MOD COMPLEX 30 MIN: CPT | Mod: GP | Performed by: PHYSICAL THERAPIST

## 2024-04-16 PROCEDURE — 97530 THERAPEUTIC ACTIVITIES: CPT | Mod: GP | Performed by: PHYSICAL THERAPIST

## 2024-04-16 NOTE — PROGRESS NOTES
PHYSICAL THERAPY EVALUATION  Type of Visit: Evaluation    See electronic medical record for Abuse and Falls Screening details.    Subjective   Minh Altamirano, 39 year old female with tethered cord and spinal cord lipoma. She reports she is having more pain than she had been having past 2 episodes of care. Requires medication to sleep has been dx'd with asthma  which bothers her when walking. She states that the pain is now in both legs. R still worse than L.  Since she was last seen she experiences numbness and shakiness of her leg. She notes she has numbness from below her right knee to her toes. She notes that her right leg has been getting weaker. She notes that she has severe lower back pain. She notes when she is at physical therapy her back feels better but she has not been back in 1 month. She has been having worsening bladder incontinence and pain.  She will be undergoing a cystoscopy in May with Dr. Nolan in urology. She notes that she also has concerns with bowel continence.     PAST HISTORY  From 11/23/22  Pt report that she had back surgery (surgical intervention for tethered cord and a lipoma) in 2010 in her back felt better. She did well until after the birth of her 4 yr old son, Back pain got worse, She then became pregnant with twins but had to be still born at 6 mo gestation and pain got even worse after losing the twins. She also has neurogenic bladder and self caths. trial d metrofenof did not work for her (2 abdominal surgeries). Currently she has intermittent shooting electric pain down the R leg from buttock all the way down to baby toe. Sometimes it will last 10 mins sometime shorter electric sensaion. Does not matter what position she is in she will get the pains. Back pain is worse in sitting, back pain is right along the spinal cord/ vertebrae. Pain is constant dull ache in back with intermittent shooting pain down R LE. Takes always IBP for pain and scheduled gabapentin. Also sleeping  medication due to pain limiting sleep.      From 23  Pt reports that she and her  decided to seek treatment in [place] for several months due to lack of progress. Her back and nerve pain continue to be very painful. Feels like the therapy before [place] with this writer was helpful for the nerve pain. It is still a lot better than it was before previous session of therapy. Still has good days and bad days. Some days she can not get out of bed. Nerve pain is still really bad and definitely interrupts her sleep. With sleeping medication yesterday slept 2 hours. A good night would be 4 hours. Reports Dr. Mo recommended return to PT.       Son has seizures morning he takes the bus to school., afternoon she picks up.  (She does not drive due to legs. Feels it is unsafe.)     Has help with lower body dressing.   Cane and furniture walks in apartment.          Presenting condition or subjective complaint: back pain, leg pain  Date of onset: 04/10/24    Relevant medical history: Asthma; Bladder or bowel problems Spinal dysraphism,  neurogenic bladder, S/P C- section, missed , hx of lumbar laminectomy,     Dates & types of surgery:  Lumber  Laminectomy , C section ,  uterine prolapse,  Renal cyst , Tethered cord and removal of lipoma surgery      Prior diagnostic imaging/testing results: MRI; CT scan     Prior therapy history for the same diagnosis, illness or injury: yes 2 episodes since  and sought tx in [place] also for same DX       Prior Level of Function  Transfers: Independent  Ambulation: Independent  ADL: Independent  IADL: Childcare, Driving, Finances, Housekeeping, Laundry, Meal preparation, Medication management    Living Environment  Social support: With family members   Type of home: Apartment/condo   Stairs to enter the home: No       Ramp: No   Stairs inside the home: No       Help at home: Self Cares (home health aide/personal care attendant, family, etc); Home  "management tasks (cooking, cleaning); Medication and/or finances; Home and Yard maintenance tasks; Assist for driving and community activities  Equipment owned:   SPC sometimes she uses the cane when the pain is bad. Has a walker but mostly she uses the cane.   Has abdominal binder.  (Is not wearing today wears about 4-5x per week)     Employment: No    Hobbies/Interests: watching TVread quran, talk on phone, TV     Patient goals for therapy: no physical activities    Pain assessment:  On Gabapentin for nerve pain.  Some days she cannot get out of bed. On a good day may have 9-11  \" zingers\" of shooting pain down her legs.     Objective      Cognitive Status Examination  Orientation: Oriented to person, place and time   Level of Consciousness: Alert  Follows Commands and Answers Questions: 100% of the time  Personal Safety and Judgement: Intact  Memory: Intact    OBSERVATION: R LE smaller, decreased mm bulk to L LE  INTEGUMENTARY: Intact  POSTURE:  hyperlordotic lumbar spine,  R LE smaller than L LE.   PALPATION: NT  RANGE OF MOTION:   Not retested today. From Novermber 2022 eval:  hands to thighs with minimal rounding of low back in flxn, extn good curve limited slightly , side bending and rotation also only slightly limited. all cause more pain in R low back    STRENGTH:  Left LE:  4/5 not exactly smooth recruitment for hip flxn, KE, KF, and great toe, DF and PF weaker3+/5 to 4-, can't take much resistance.    R LE: Clearly weaker than L.  3-/5 for Hi p flxn, KE, KF, DF, PF and great toe.   Sometimes catches R foot when walking    BED MOBILITY: Independent, heavy use of UE's difficult    TRANSFERS: Independent, Requires use of UEs    WHEELCHAIR MOBILITY: NA    GAIT:   Level of [place]: SBA  Assistive Device(s): None  Gait Deviations: Antalgic  Gait Distance: Decreased stance time on R.   Stairs: NR    BALANCE:  Sitting balance is good. Standing balance and dynamic balance not fully assessed this date. She " "experiences give way episodes when she get sharp nerve pain in her legs. These sometimes cause falls.      SPECIAL TESTS  Functional Gait Assessment (FGA)      10 Meter Walk Test (Comfortable)     10 Meter Walk Test (Fast)  25' timed walk 12:17s   gait speed 0.624m/s   6 Minute Walk Test (6MWT)        Goetz Balance Scale (BBS)     5 Times Sit-to-Stand (5TSTS)  Unable, able to stand with HHA from 21\" height mat     Dynamic Gait Index (DGI)     Timed Up and Go (TUG) - sec    Single Leg Stance Right (sec)    Single Leg Stance Left (sec)    Modified CTSIB Conditions (sec) Cond 1:   Cond 2:   Cond 4:   Cond 5 :    Romberg  (sec)    Sharpened Romberg (sec)    30 Second Sit to Stand (reps/height)    Mini-BESTest              SENSATION:  Not assessed today. Will assess at next session    REFLEXES: Not assessed today. Will assess at next session  COORDINATION: In tact in upper extremities. No gross incoordination noted in lower extremities. Will continue to assess  MUSCLE TONE:  will continue to assess. No clonus noted.  No velocity dependent tone today.         Assessment & Plan   CLINICAL IMPRESSIONS  Medical Diagnosis: Spina bifida of lumbar region with hydrocephalus (H) (Q05.2)    Treatment Diagnosis: force production deficit.   Impression/Assessment: Patient is a 39 year old female with Pain, decreased strength complaints. Of note overall she is weaker and relies on UEs for mobility more than she did with me 1 year ago.  The following significant findings have been identified: Pain, Decreased strength, Decreased proprioception, Impaired gait, Impaired muscle performance, Decreased activity tolerance, and Impaired posture. These impairments interfere with their ability to perform self care tasks, recreational activities, household chores, driving , household mobility, and community mobility as compared to previous level of function.     Clinical Decision Making (Complexity):  Clinical Presentation: " Evolving/Changing  Clinical Presentation Rationale: based on medical and personal factors listed in PT evaluation  Clinical Decision Making (Complexity): Moderate complexity    PLAN OF CARE  Treatment Interventions:  Interventions: Gait Training, Manual Therapy, Neuromuscular Re-education, Therapeutic Activity, Therapeutic Exercise    Long Term Goals            Frequency of Treatment: 1 x per week x 4 weeks  Duration of Treatment: 90 days    Recommended Referrals to Other Professionals: Occupational Therapy  Pt may benefit from an OT consult for education and ideas for adapting tasks so that she can be more indep with self care and  at home.  Education Assessment:        Risks and benefits of evaluation/treatment have been explained.   Patient/Family/caregiver agrees with Plan of Care.     Evaluation Time:         Present: Yes: Language: Israeli, ID Number/Identifier: in person interpretor  health services     Signing Clinician: Zuleyka Carreon, PT      [facility]                                                                                   OUTPATIENT PHYSICAL THERAPY      PLAN OF TREATMENT FOR OUTPATIENT REHABILITATION   Patient's Last Name, First Name, NINOMinh Capone YOB: 1985   Provider's Name   [facility]   Medical Record No.  [id number]     Onset Date: 04/10/24  Start of Care Date: 04/16/24     Medical Diagnosis:  Spina bifida of lumbar region with hydrocephalus (H) (Q05.2)      PT Treatment Diagnosis:  force production deficit. Plan of Treatment  Frequency/Duration: 1 x per week x 4 weeks/ 90 days    Certification date from 04/16/24 to 07/15/24         See note for plan of treatment details and functional goals     Zuleyka Carreon, PT                         I CERTIFY THE NEED FOR THESE SERVICES FURNISHED UNDER        THIS PLAN OF TREATMENT AND WHILE UNDER MY CARE     (Physician attestation  of this document indicates review and certification of the therapy plan).              Referring Provider:  Veila Beckett    Initial Assessment  See Epic Evaluation- Start of Care Date: 04/16/24

## 2024-04-16 NOTE — PATIENT INSTRUCTIONS
[email or web address]      [email or web address]

## 2024-04-18 ENCOUNTER — TRANSFERRED RECORDS (OUTPATIENT)
Dept: HEALTH INFORMATION MANAGEMENT | Facility: CLINIC | Age: 39
End: 2024-04-18
Payer: COMMERCIAL

## 2024-04-29 ENCOUNTER — ANESTHESIA EVENT (OUTPATIENT)
Dept: SURGERY | Facility: AMBULATORY SURGERY CENTER | Age: 39
End: 2024-04-29
Payer: COMMERCIAL

## 2024-05-02 ENCOUNTER — HOSPITAL ENCOUNTER (OUTPATIENT)
Facility: AMBULATORY SURGERY CENTER | Age: 39
Discharge: HOME | End: 2024-05-02
Attending: UROLOGY
Payer: COMMERCIAL

## 2024-05-02 ENCOUNTER — ANESTHESIA (OUTPATIENT)
Dept: SURGERY | Facility: AMBULATORY SURGERY CENTER | Age: 39
End: 2024-05-02
Payer: COMMERCIAL

## 2024-05-02 VITALS
BODY MASS INDEX: 23.92 KG/M2 | TEMPERATURE: 98 F | RESPIRATION RATE: 14 BRPM | SYSTOLIC BLOOD PRESSURE: 121 MMHG | OXYGEN SATURATION: 100 % | HEART RATE: 80 BPM | DIASTOLIC BLOOD PRESSURE: 86 MMHG | WEIGHT: 130 LBS | HEIGHT: 62 IN

## 2024-05-02 DIAGNOSIS — N39.3 FEMALE STRESS INCONTINENCE: ICD-10-CM

## 2024-05-02 DIAGNOSIS — N31.9 NEUROGENIC BLADDER: ICD-10-CM

## 2024-05-02 DIAGNOSIS — R33.9 URINARY RETENTION: ICD-10-CM

## 2024-05-02 PROCEDURE — 51715 ENDOSCOPIC INJECTION/IMPLANT: CPT

## 2024-05-02 PROCEDURE — 51715 ENDOSCOPIC INJECTION/IMPLANT: CPT | Mod: GC | Performed by: UROLOGY

## 2024-05-02 PROCEDURE — 51715 ENDOSCOPIC INJECTION/IMPLANT: CPT | Performed by: ANESTHESIOLOGY

## 2024-05-02 PROCEDURE — L8606 SYNTHETIC IMPLNT URINARY 1ML: HCPCS

## 2024-05-02 PROCEDURE — 51715 ENDOSCOPIC INJECTION/IMPLANT: CPT | Performed by: NURSE ANESTHETIST, CERTIFIED REGISTERED

## 2024-05-02 RX ORDER — NALOXONE HYDROCHLORIDE 0.4 MG/ML
0.1 INJECTION, SOLUTION INTRAMUSCULAR; INTRAVENOUS; SUBCUTANEOUS
Status: DISCONTINUED | OUTPATIENT
Start: 2024-05-02 | End: 2024-05-03 | Stop reason: HOSPADM

## 2024-05-02 RX ORDER — CEFAZOLIN SODIUM 2 G/50ML
2 SOLUTION INTRAVENOUS SEE ADMIN INSTRUCTIONS
Status: DISCONTINUED | OUTPATIENT
Start: 2024-05-02 | End: 2024-05-03 | Stop reason: HOSPADM

## 2024-05-02 RX ORDER — LIDOCAINE HYDROCHLORIDE 20 MG/ML
INJECTION, SOLUTION INFILTRATION; PERINEURAL PRN
Status: DISCONTINUED | OUTPATIENT
Start: 2024-05-02 | End: 2024-05-02

## 2024-05-02 RX ORDER — PROPOFOL 10 MG/ML
INJECTION, EMULSION INTRAVENOUS CONTINUOUS PRN
Status: DISCONTINUED | OUTPATIENT
Start: 2024-05-02 | End: 2024-05-02

## 2024-05-02 RX ORDER — ONDANSETRON 4 MG/1
4 TABLET, ORALLY DISINTEGRATING ORAL EVERY 30 MIN PRN
Status: DISCONTINUED | OUTPATIENT
Start: 2024-05-02 | End: 2024-05-03 | Stop reason: HOSPADM

## 2024-05-02 RX ORDER — FENTANYL CITRATE 50 UG/ML
50 INJECTION, SOLUTION INTRAMUSCULAR; INTRAVENOUS EVERY 5 MIN PRN
Status: DISCONTINUED | OUTPATIENT
Start: 2024-05-02 | End: 2024-05-03 | Stop reason: HOSPADM

## 2024-05-02 RX ORDER — OXYCODONE HYDROCHLORIDE 5 MG/1
10 TABLET ORAL
Status: DISCONTINUED | OUTPATIENT
Start: 2024-05-02 | End: 2024-05-03 | Stop reason: HOSPADM

## 2024-05-02 RX ORDER — CEFAZOLIN SODIUM 2 G/50ML
2 SOLUTION INTRAVENOUS
Status: COMPLETED | OUTPATIENT
Start: 2024-05-02 | End: 2024-05-02

## 2024-05-02 RX ORDER — SODIUM CHLORIDE, SODIUM LACTATE, POTASSIUM CHLORIDE, CALCIUM CHLORIDE 600; 310; 30; 20 MG/100ML; MG/100ML; MG/100ML; MG/100ML
INJECTION, SOLUTION INTRAVENOUS CONTINUOUS
Status: DISCONTINUED | OUTPATIENT
Start: 2024-05-02 | End: 2024-05-03 | Stop reason: HOSPADM

## 2024-05-02 RX ORDER — FENTANYL CITRATE 50 UG/ML
25 INJECTION, SOLUTION INTRAMUSCULAR; INTRAVENOUS EVERY 5 MIN PRN
Status: DISCONTINUED | OUTPATIENT
Start: 2024-05-02 | End: 2024-05-03 | Stop reason: HOSPADM

## 2024-05-02 RX ORDER — LIDOCAINE 40 MG/G
CREAM TOPICAL
Status: DISCONTINUED | OUTPATIENT
Start: 2024-05-02 | End: 2024-05-03 | Stop reason: HOSPADM

## 2024-05-02 RX ORDER — SCOLOPAMINE TRANSDERMAL SYSTEM 1 MG/1
1 PATCH, EXTENDED RELEASE TRANSDERMAL ONCE
Status: DISCONTINUED | OUTPATIENT
Start: 2024-05-02 | End: 2024-05-03 | Stop reason: HOSPADM

## 2024-05-02 RX ORDER — ONDANSETRON 2 MG/ML
4 INJECTION INTRAMUSCULAR; INTRAVENOUS EVERY 30 MIN PRN
Status: DISCONTINUED | OUTPATIENT
Start: 2024-05-02 | End: 2024-05-03 | Stop reason: HOSPADM

## 2024-05-02 RX ORDER — OXYCODONE HYDROCHLORIDE 5 MG/1
5 TABLET ORAL
Status: DISCONTINUED | OUTPATIENT
Start: 2024-05-02 | End: 2024-05-03 | Stop reason: HOSPADM

## 2024-05-02 RX ADMIN — PROPOFOL 350 MCG/KG/MIN: 10 INJECTION, EMULSION INTRAVENOUS at 10:36

## 2024-05-02 RX ADMIN — CEFAZOLIN SODIUM 2 G: 2 SOLUTION INTRAVENOUS at 10:29

## 2024-05-02 RX ADMIN — SCOLOPAMINE TRANSDERMAL SYSTEM 1 PATCH: 1 PATCH, EXTENDED RELEASE TRANSDERMAL at 09:51

## 2024-05-02 RX ADMIN — LIDOCAINE HYDROCHLORIDE 40 MG: 20 INJECTION, SOLUTION INFILTRATION; PERINEURAL at 10:36

## 2024-05-02 RX ADMIN — SODIUM CHLORIDE, SODIUM LACTATE, POTASSIUM CHLORIDE, CALCIUM CHLORIDE: 600; 310; 30; 20 INJECTION, SOLUTION INTRAVENOUS at 09:49

## 2024-05-02 NOTE — ANESTHESIA POSTPROCEDURE EVALUATION
Patient: Minh Altamirano    Procedure: Procedure(s):  CYSTOSCOPY, WITH PERIURETHRAL BULKING AGENT INJECTION (BULKAMID)       Anesthesia Type:  MAC    Note:  Disposition: Outpatient   Postop Pain Control: Uneventful            Sign Out: Well controlled pain   PONV: No   Neuro/Psych: Uneventful            Sign Out: Acceptable/Baseline neuro status   Airway/Respiratory: Uneventful            Sign Out: Acceptable/Baseline resp. status   CV/Hemodynamics: Uneventful            Sign Out: Acceptable CV status; No obvious hypovolemia; No obvious fluid overload   Other NRE: NONE   DID A NON-ROUTINE EVENT OCCUR? No       Last vitals:  Vitals Value Taken Time   /86 05/02/24 1130   Temp 36.7  C (98  F) 05/02/24 1130   Pulse 80 05/02/24 1130   Resp 14 05/02/24 1130   SpO2 100 % 05/02/24 1130       Electronically Signed By: Neida Cruz MD  May 2, 2024  12:55 PM

## 2024-05-02 NOTE — DISCHARGE INSTRUCTIONS
"Resume your catheterizations as normal  No restrictions  No new medications    Follow-Up:  - Follow up with Dr. Moore  - Call or return sooner than your regularly scheduled visit if you develop any of the following: fever (greater than 101.5), uncontrolled pain, uncontrolled nausea or vomiting, or inability to urinate.    Phone numbers:   - Monday through Friday 8am to 4:30pm: Call [phone] with questions, requests for medication refills, or to schedule or confirm an appointment.  - Nights or weekends: call the after hours emergency pager - [phone] and tell the  \"I would like to page the Urology Resident on call.\" Please note, due to prescribing laws, resident physicians are unable to prescribe narcotics after-hours. If you feel as though you will need a refill of a narcotic pain medication, you will need to call the clinic during business hours OR seek emergency care.  - For emergencies, call 911  [facility]  Home Care Following Anesthesia  For 24 hours after surgery:  Get plenty of rest.  A responsible adult must stay with you for at least 24 hours after you leave the surgery center.  Do not drive or use heavy equipment.  If you have weakness or tingling, don't drive or use heavy equipment until this feeling goes away.   Do not drink alcohol.   Avoid strenuous or risky activities.  Ask for help when climbing stairs.  You may feel lightheaded.  IF so, sit for a few minutes before standing.  Have someone help you get up.   If you have nausea (feel sick to your stomach): Drink only clear liquids such as apple juice, ginger ale, broth or 7-Up.  Rest may also help.  Be sure to drink enough fluids.  Move to a regular diet as you feel able.   You may have a slight fever.  Call the doctor if your fever is over 100 F (37.7 C) (taken under the tongue) or lasts longer than 24 hours.  You may have a dry mouth, a sore throat, muscle aches or trouble sleeping. These " should go away after 24 hours.  Do not make important or legal decisions.   It is recommended to avoid smoking.               Tips for taking pain medications  To get the best pain relief possible, remember these points:  Take pain medications as directed, before pain becomes severe.  Pain medication can upset your stomach: taking it with food may help.  Constipation is a common side effect of pain medication. Drink plenty of  fluids.  Eat foods high in fiber. Take a stool softener if recommended by your doctor or pharmacist.  Do not drink alcohol, drive or operate machinery while taking pain medications.  Ask about other ways to control pain, such as with heat, ice or relaxation.    Tylenol/Acetaminophen Consumption    If you feel your pain relief is insufficient, you may take Tylenol/Acetaminophen in addition to your narcotic pain medication.   Be careful not to exceed 4,000 mg of Tylenol/Acetaminophen in a 24 hour period from all sources.  If you are taking extra strength Tylenol/acetaminophen (500 mg), the maximum dose is 8 tablets in 24 hours.  If you are taking regular strength acetaminophen (325 mg), the maximum dose is 12 tablets in 24 hours.    Call a doctor for any of the following:  Signs of infection (fever, growing tenderness at the surgery site, a large amount of drainage or bleeding, severe pain, foul-smelling drainage, redness, swelling).  It has been over 8 to 10 hours since surgery and you are still not able to urinate (pass water).  Headache for over 24 hours.  Numbness, tingling or weakness the day after surgery (if you had spinal anesthesia).  Signs of Covid-19 infection (temperature over 100 degrees, shortness of breath, cough, loss of taste/smell, generalized body aches, persistent headache, chills, sore throat, nausea/vomiting/diarrhea)  Your doctor is:  Dr. Fady Cartwright, Prostate and Urology: [phone]                    Or dial [phone] and ask for the resident on call for:  Prostate  Urology  For emergency care, call the:  [facility]:  [phone] (TTY for hearing impaired: [phone])

## 2024-05-02 NOTE — ANESTHESIA PREPROCEDURE EVALUATION
Anesthesia Pre-Procedure Evaluation    Patient: Minh Altamirano   MRN: [id number] : 1985        Procedure : Procedure(s):  CYSTOSCOPY, WITH PERIURETHRAL BULKING AGENT INJECTION (BULKAMID)          Past Medical History:   Diagnosis Date     Anemia      Chronic infection     MRSA     Constipation, chronic      Incontinence of urine      Lipoma of spinal cord      Migraine      neurogenic bladder      Spinal dysraphism (H)       Past Surgical History:   Procedure Laterality Date     BLADDER AUGMENTATION  2012    Procedure:BLADDER AUGMENTATION; Surgeon:FADY MOORE; Location:[facility]      SECTION N/A 2018    Procedure:  SECTION;  Primary  Section  with classical incision;  Surgeon: Lily Villanueva MD;  Location: [facility]     CYSTOSCOPY, INTRAVESICAL INJECTION N/A 2016    Procedure: CYSTOSCOPY, INTRAVESICAL INJECTION;  Surgeon: Fady Moore MD;  [facility]     DILATION AND CURETTAGE SUCTION N/A 2019    Procedure: suction dilation and curetage,;  Surgeon: Radha Hickey MD;  Location: [facility]     EXAM UNDER ANESTHESIA PELVIC N/A 2019    Procedure: pelvic exam under anesthesia;  Surgeon: Radha Hickey MD;  Location: [facility]     LAMINECTOMY LUMBAR TWO LEVELS       LAPAROTOMY EXPLORATORY  2012    Procedure:LAPAROTOMY EXPLORATORY; Exploratory Laparotomy with Takedown of Mitrofanoff, Ventral Hernia Repair with Strattice Mesh implantation ; Surgeon:FADY MOORE; Location[facility]     MITROFANOFF PROCEDURE (APPENDIX CONDUIT)  2012    Procedure:MITROFANOFF PROCEDURE (APPENDIX CONDUIT); Bladder Augmentation with Right Colon, Appendix Conduit- Mitrofanoff, Insertion of Pubovaginal Sling using Autologous Rectus Fascia; Surgeon:FADY MOORE; Location:[facility]     tumor resection and cord detethering        Allergies   Allergen Reactions     Naproxen Hives and Nausea and Vomiting     Bactrim [Sulfamethoxazole W/Trimethoprim]  Itching     Hydrocodone-Acetaminophen Itching     Vicodin [Hydrocodone-Acetaminophen] Itching      Social History     Tobacco Use     Smoking status: Never     Passive exposure: Never     Smokeless tobacco: Never   Substance Use Topics     Alcohol use: No      Wt Readings from Last 1 Encounters:   04/10/24 59.4 kg (130 lb 15.3 oz)        Anesthesia Evaluation   Pt has had prior anesthetic. Type: General and MAC.    History of anesthetic complications  - PONV.      ROS/MED HX  ENT/Pulmonary:  - neg pulmonary ROS     Neurologic:  - neg neurologic ROS     Cardiovascular:  - neg cardiovascular ROS     METS/Exercise Tolerance: 3 - Able to walk 1-2 blocks without stopping    Hematologic:  - neg hematologic  ROS     Musculoskeletal: Comment: Spinal dysraphism, spinal lipoma(sacral), neurogenic bladder, headaches,      GI/Hepatic:  - neg GI/hepatic ROS     Renal/Genitourinary:  - neg Renal ROS     Endo: Comment: Neurogenic bladder      Psychiatric/Substance Use:  - neg psychiatric ROS     Infectious Disease:  - neg infectious disease ROS     Malignancy:  - neg malignancy ROS     Other:            Physical Exam    Airway        Mallampati: I   TM distance: > 3 FB   Neck ROM: full   Mouth opening: > 3 cm    Respiratory Devices and Support         Dental       (+) Minor Abnormalities - some fillings, tiny chips      Cardiovascular   cardiovascular exam normal       Rhythm and rate: regular and normal     Pulmonary   pulmonary exam normal        breath sounds clear to auscultation       OUTSIDE LABS:  CBC:   Lab Results   Component Value Date    WBC 12.0 (H) 02/14/2023    WBC 7.0 09/20/2022    HGB 15.0 02/14/2023    HGB 13.1 09/20/2022    HCT 44.1 02/14/2023    HCT 40.6 09/20/2022     02/14/2023     09/20/2022     BMP:   Lab Results   Component Value Date     02/14/2023     09/20/2022    POTASSIUM 4.1 02/14/2023    POTASSIUM 4.0 09/20/2022    CHLORIDE 105 02/14/2023    CHLORIDE 105 09/20/2022    CO2 16  "(L) 02/14/2023    CO2 22 09/20/2022    BUN 12.7 02/14/2023    BUN 9 09/20/2022    CR 0.61 12/01/2023    CR 0.54 02/14/2023    GLC 90 02/14/2023    GLC 88 09/20/2022     COAGS:   Lab Results   Component Value Date    INR 1.23 (H) 01/16/2012     POC:   Lab Results   Component Value Date     (H) 09/21/2018    HCG Negative 02/18/2021    HCGS Negative 02/15/2023     HEPATIC:   Lab Results   Component Value Date    ALBUMIN 4.3 02/14/2023    PROTTOTAL 7.9 02/14/2023    ALT 17 02/14/2023    AST 27 02/14/2023    ALKPHOS 82 02/14/2023    BILITOTAL 0.5 02/14/2023     OTHER:   Lab Results   Component Value Date    A1C 5.4 09/20/2022    BASSAM 9.8 02/14/2023    PHOS 5.8 (H) 01/20/2012    MAG 2.1 01/20/2012    LIPASE 29 02/14/2023    TSH 2.35 12/02/2020       Anesthesia Plan    ASA Status:  2    NPO Status:  NPO Appropriate    Anesthesia Type: MAC.     - Reason for MAC: straight local not clinically adequate   Induction: Intravenous.   Maintenance: TIVA.        Consents    Anesthesia Plan(s) and associated risks, benefits, and realistic alternatives discussed. Questions answered and patient/representative(s) expressed understanding.     - Discussed:     - Discussed with:  Patient      - Extended Intubation/Ventilatory Support Discussed: No.      - Patient is DNR/DNI Status: No     Use of blood products discussed: No .     Postoperative Care    Pain management: IV analgesics, Oral pain medications.   PONV prophylaxis: Ondansetron (or other 5HT-3), Scopolamine patch, Background Propofol Infusion     Comments:               Neida Cruz MD    I have reviewed the pertinent notes and labs in the chart from the past 30 days and (re)examined the patient.  Any updates or changes from those notes are reflected in this note.              # Overweight: Estimated body mass index is 25.05 kg/m  as calculated from the following:    Height as of 4/10/24: 1.54 m (5' 0.63\").    Weight as of 4/10/24: 59.4 kg (130 lb 15.3 oz).      "

## 2024-05-02 NOTE — ANESTHESIA CARE TRANSFER NOTE
Patient: Minh Altamirano    Procedure: Procedure(s):  CYSTOSCOPY, WITH PERIURETHRAL BULKING AGENT INJECTION (BULKAMID)       Diagnosis: Female stress incontinence [N39.3]  Diagnosis Additional Information: No value filed.    Anesthesia Type:   MAC     Note:    Oropharynx: oropharynx clear of all foreign objects and spontaneously breathing  Level of Consciousness: awake  Oxygen Supplementation: room air    Independent Airway: airway patency satisfactory and stable  Dentition: dentition unchanged  Vital Signs Stable: post-procedure vital signs reviewed and stable  Report to RN Given: handoff report given  Patient transferred to: Phase II  Comments: Report to Phase II RN. Resps easy and regular.   Handoff Report: Identifed the Patient, Identified the Reponsible Provider, Reviewed the pertinent medical history, Discussed the surgical course, Reviewed Intra-OP anesthesia mangement and issues during anesthesia, Set expectations for post-procedure period and Allowed opportunity for questions and acknowledgement of understanding      Vitals:  Vitals Value Taken Time   /77 05/02/24 1108   Temp 36.6  C (97.9  F) 05/02/24 1108   Pulse 91 05/02/24 1108   Resp 16 05/02/24 1108   SpO2 98 % 05/02/24 1108       Electronically Signed By: GEORGINA CRUZ CRNA  May 2, 2024  11:11 AM

## 2024-05-02 NOTE — OP NOTE
Operative Report    Pre-op Dx: stress incontinence and intrinsic sphincter deficiency  Post-op Dx: Same     Procedure performed: cystoscopy and periurethral bulking with Bulkamid    Surgeon: Fady Moore MD   Assistant surgeons: Nicholas Olivas    Anesthesia: mac   EBL: 0 cc   Complications: None     Disposition: Stable to PACU     Clinical Indication: Ms. Minh Altamirano is a 39 year old female with a hx of stress incontinence from ISD and neurogenic bladder.  We discussed options and elected to proceed with the above.    Clinical Procedure:   Pt. Was identified correctly, consented and placed in the lithotomy position.  She was cleaned and preparred in the usual sterile fashion. The small bulkamid cystoscope was then inserted through the urethra and into the bladder.  The urethra was wnl, open but high bladder neck.  The bladder was with 1+ trabeculation.  No tumors, diverticulae, or stones, colonic conduit without abnormality.  Bilateral u/o's were effluxing clear urine.   A needle was then inserted and the scope was withdrawn to the area of the mid urethra and inserted at the 7, 5, 2, and 10 o'clock positions until a nice coaptation of the urethra was observed.  A total of 2 syringes was injected in total.  There was very good coaptation at the end of the procedure. The cystoscope was then withdrawn.  The pt. Tolerated the procedure well.      Plan:  Discharge, resume home cath regimen    Nicholas Olivas MD  Urology PGY-4

## 2024-05-09 ENCOUNTER — THERAPY VISIT (OUTPATIENT)
Dept: PHYSICAL THERAPY | Facility: CLINIC | Age: 39
End: 2024-05-09
Attending: NURSE PRACTITIONER
Payer: COMMERCIAL

## 2024-05-09 DIAGNOSIS — Q05.2 SPINA BIFIDA OF LUMBAR REGION WITH HYDROCEPHALUS (H): Primary | ICD-10-CM

## 2024-05-09 PROCEDURE — 97530 THERAPEUTIC ACTIVITIES: CPT | Mod: GP | Performed by: PHYSICAL THERAPIST

## 2024-05-10 DIAGNOSIS — Z86.69 HISTORY OF TETHERED SPINAL CORD: ICD-10-CM

## 2024-05-10 DIAGNOSIS — Q05.7 SPINA BIFIDA OF LUMBAR REGION WITHOUT HYDROCEPHALUS (H): Primary | ICD-10-CM

## 2024-05-13 ENCOUNTER — THERAPY VISIT (OUTPATIENT)
Dept: PHYSICAL THERAPY | Facility: CLINIC | Age: 39
End: 2024-05-13
Attending: NURSE PRACTITIONER
Payer: COMMERCIAL

## 2024-05-13 DIAGNOSIS — Q05.2 SPINA BIFIDA OF LUMBAR REGION WITH HYDROCEPHALUS (H): Primary | ICD-10-CM

## 2024-05-13 PROCEDURE — 97110 THERAPEUTIC EXERCISES: CPT | Mod: GP | Performed by: PHYSICAL THERAPIST

## 2024-05-21 ENCOUNTER — THERAPY VISIT (OUTPATIENT)
Dept: PHYSICAL THERAPY | Facility: CLINIC | Age: 39
End: 2024-05-21
Attending: NURSE PRACTITIONER
Payer: COMMERCIAL

## 2024-05-21 DIAGNOSIS — Q05.2 SPINA BIFIDA OF LUMBAR REGION WITH HYDROCEPHALUS (H): Primary | ICD-10-CM

## 2024-05-21 PROCEDURE — 97110 THERAPEUTIC EXERCISES: CPT | Mod: GP | Performed by: PHYSICAL THERAPIST

## 2024-05-30 ENCOUNTER — APPOINTMENT (OUTPATIENT)
Dept: INTERPRETER SERVICES | Facility: CLINIC | Age: 39
End: 2024-05-30
Payer: COMMERCIAL

## 2024-05-31 ENCOUNTER — PRE VISIT (OUTPATIENT)
Dept: UROLOGY | Facility: CLINIC | Age: 39
End: 2024-05-31
Payer: COMMERCIAL

## 2024-05-31 NOTE — TELEPHONE ENCOUNTER
Reason for visit: 4 week post op (DOS 5/20)     Relevant information: urinary retention, neurogenic bladder, voiding dysfunction, incomplete uterovaginal prolapse    Records/imaging/labs/orders: all records available    Pt called: no need for a call    At Rooming: have pt empty bladder/pvr,  Standard rooming      Christiano Colmenares  5/31/2024  11:18 AM

## 2024-06-03 ENCOUNTER — OFFICE VISIT (OUTPATIENT)
Dept: UROLOGY | Facility: CLINIC | Age: 39
End: 2024-06-03
Payer: COMMERCIAL

## 2024-06-03 VITALS
WEIGHT: 130 LBS | HEART RATE: 82 BPM | BODY MASS INDEX: 23.92 KG/M2 | SYSTOLIC BLOOD PRESSURE: 108 MMHG | DIASTOLIC BLOOD PRESSURE: 79 MMHG | HEIGHT: 62 IN

## 2024-06-03 DIAGNOSIS — N39.3 FEMALE STRESS INCONTINENCE: ICD-10-CM

## 2024-06-03 DIAGNOSIS — N31.9 NEUROGENIC BLADDER: Primary | ICD-10-CM

## 2024-06-03 PROCEDURE — 99212 OFFICE O/P EST SF 10 MIN: CPT | Performed by: STUDENT IN AN ORGANIZED HEALTH CARE EDUCATION/TRAINING PROGRAM

## 2024-06-03 ASSESSMENT — PAIN SCALES - GENERAL: PAINLEVEL: NO PAIN (0)

## 2024-06-03 NOTE — LETTER
"6/3/2024       RE: Minh Altamirano  [address]  [facility] [address]     Dear Colleague,    Thank you for referring your patient, Minh Altamirano, to the [facility] [place] [facility]. Please see a copy of my visit note below.    HPI:  Minh Altamirano is a 39 year old female w/ NGB 2/2 SB being seen for follow up after injection of bulkamid agent to her urethral sphincter for incontinence. Her history is remarkable for s/p right colon augment and autologous rectus fascial pubovaginal sling () with urinary retention managed with CIC per urethra, as well as severe urinary incontinence and history of UTIs.     History  -  right colon augment with mitrofanoff  -  autologous rectus fascial pubovaginal sling  -  ventral hernia repair with mesh  -  botox injection to the bladder  -    -  bulking agent to urethra    -UDS 24: Capacity 515, good compliance w/o detrusor overactivity, complete urinary retention secondary to acontractile detrusor.     Today  - she is very happy with the results of her bulking procedure  - she reports no leakage during the day and a few drops of leakage at night on some occasions when her bladder is very full  - continues CIC 12 Fr 6x/day without difficulty    Exam:  /79   Pulse 82   Ht 1.575 m (5' 2\")   Wt 59 kg (130 lb)   BMI 23.78 kg/m    General: age-appropriate appearing female in NAD sitting in an exam chair  HEENT: Head AT/NC, EOMI, CN Grossly intact.  Resp: no respiratory distress  CV: heart rate regular  Abdomen: Degree of obesity is none. Abdomen is soft and nontender. No organomegaly.   LE: Edema is none   Neuro: grossly non focal. Normal reflexes  Skin: clear of rashes or ecchymoses. No sacral decubitus ulcer.  Motor: excellent strength throughout    Review of Imaging:  The following imaging exams were independently viewed and interpreted by me " and discussed with patient:    12/1/23 SARAH  IMPRESSION:  Mild left hydronephrosis.  No right hydronephrosis.      Review of Labs:  The following labs were reviewed by me and discussed with the patient:  No results found for this or any previous visit (from the past 720 hour(s)).      Assessment & Plan  Neurogenic bladder 2/2 SB. Plan for follow up in 1 year with RBUS, patient will call  KATELYNN s/p sling that has improved greatly with recent bulking agent to urethra. We discussed again that the bulking agent usually lasts 1-3years. She will call if she is having return of symptoms.       Len Staples MD

## 2024-06-03 NOTE — NURSING NOTE
"Chief Complaint   Patient presents with    Follow Up     Post op          Blood pressure 108/79, pulse 82, height 1.575 m (5' 2\"), weight 59 kg (130 lb), not currently breastfeeding. Body mass index is 23.78 kg/m .    Patient Active Problem List   Diagnosis    Spinal dysraphism (H)    Neurogenic bladder    Renal cyst    S/P     Missed     Caries    Female infertility    History of lumbar laminectomy    Organic sleep disorder    Uterine prolapse    Female stress incontinence       Allergies   Allergen Reactions    Naproxen Hives and Nausea and Vomiting    Bactrim [Sulfamethoxazole W/Trimethoprim] Itching    Hydrocodone-Acetaminophen Itching    Vicodin [Hydrocodone-Acetaminophen] Itching       Current Outpatient Medications   Medication Sig Dispense Refill    acetaminophen (TYLENOL) 325 MG tablet Take 1-2 tablets (325-650 mg) by mouth every 6 hours as needed for mild pain 20 tablet 0    albuterol (PROAIR HFA/PROVENTIL HFA/VENTOLIN HFA) 108 (90 Base) MCG/ACT inhaler Inhale 2 puffs into the lungs every 4 hours      cholecalciferol (VITAMIN D3) 125 mcg (5000 units) capsule TAKE 1 CAPSULE BY MOUTH DAILY AS DIRECTED      famotidine (PEPCID) 20 MG tablet       gabapentin (NEURONTIN) 300 MG capsule TAKE 1 CAPSULE BY MOUTH THREE TIMES DAILY      gabapentin (NEURONTIN) 600 MG tablet TAKE ONE TABLET BY MOUTH THREE TIMES A DAY FOR 30 DAYS      ibprofen (MOTRIN) 600 MG ED starter pack Ibuprofen      ibuprofen (ADVIL/MOTRIN) 100 MG tablet Take 100 mg by mouth every 4 hours as needed      nitroFURantoin macrocrystal-monohydrate (MACROBID) 100 MG capsule Take 1 capsule (100 mg) by mouth 2 times daily 14 capsule 0    ondansetron (ZOFRAN ODT) 4 MG ODT tab Take 1 tablet (4 mg) by mouth every 8 hours as needed for nausea 10 tablet 0    polyethylene glycol (MIRALAX) 17 g packet Take 17 g by mouth daily 72 packet 0    Prenatal Multivit-Min-Fe-FA (PRENATAL 1 + IRON OR) Prenatal      Prenatal Multivit-Min-Fe-FA (PRENATAL, " W/IRON & FA,) 27-0.8 MG TABS 1 tablet Orally Once a day for 30 days      Prenatal Vit-Fe Fumarate-FA (PRENATAL MULTIVITAMIN W/IRON) 27-0.8 MG tablet Take 1 tablet by mouth daily 90 tablet 3    Prenatal Vit-Fe Fumarate-FA (PRENATAL VITAMINS) 28-0.8 MG TABS TAKE ONE (1) TABLET BY MOUTH ONCE A DAY      SENNA-docusate sodium (SENNA S) 8.6-50 MG tablet Take 1-2 tablets by mouth 2 times daily as needed (constipation) 60 tablet 0    simvastatin (ZOCOR) 10 MG tablet 1 tablet in the evening Orally Once a day for 90 days      sulfamethoxazole-trimethoprim (BACTRIM DS) 800-160 MG tablet TAKE ONE TABLET BY MOUTH TWICE A DAY FOR 10 DAY(S)      traZODone (DESYREL) 100 MG tablet TAKE ONE (1) TABLET BY MOUTH ONCE DAILY AT BEDTIME AS NEEDED FOR SLEEP FOR 90 DAYS      traZODone (DESYREL) 50 MG tablet 50 mg      vitamin D3 (CHOLECALCIFEROL) 50 mcg (2000 units) tablet TAKE ONE (1) TABLET BY MOUTH ONCE A DAY      VITAMIN D3 50 MCG (2000 UT) CAPS Take 1 capsule by mouth daily         Social History     Tobacco Use    Smoking status: Never     Passive exposure: Never    Smokeless tobacco: Never   Substance Use Topics    Alcohol use: No    Drug use: No       Lori Francis  6/3/2024  12:50 PM

## 2024-06-03 NOTE — PROGRESS NOTES
"HPI:  Minh Altamirano is a 39 year old female w/ NGB 2/2 SB being seen for follow up after injection of bulkamid agent to her urethral sphincter for incontinence. Her history is remarkable for s/p right colon augment and autologous rectus fascial pubovaginal sling () with urinary retention managed with CIC per urethra, as well as severe urinary incontinence and history of UTIs.     History  -  right colon augment with mitrofanoff  -  autologous rectus fascial pubovaginal sling  -  ventral hernia repair with mesh  -  botox injection to the bladder  -    -  bulking agent to urethra    -UDS 24: Capacity 515, good compliance w/o detrusor overactivity, complete urinary retention secondary to acontractile detrusor.     Today  - she is very happy with the results of her bulking procedure  - she reports no leakage during the day and a few drops of leakage at night on some occasions when her bladder is very full  - continues CIC 12 Fr 6x/day without difficulty    Exam:  /79   Pulse 82   Ht 1.575 m (5' 2\")   Wt 59 kg (130 lb)   BMI 23.78 kg/m    General: age-appropriate appearing female in NAD sitting in an exam chair  HEENT: Head AT/NC, EOMI, CN Grossly intact.  Resp: no respiratory distress  CV: heart rate regular  Abdomen: Degree of obesity is none. Abdomen is soft and nontender. No organomegaly.   LE: Edema is none   Neuro: grossly non focal. Normal reflexes  Skin: clear of rashes or ecchymoses. No sacral decubitus ulcer.  Motor: excellent strength throughout    Review of Imaging:  The following imaging exams were independently viewed and interpreted by me and discussed with patient:    23 SARAH  IMPRESSION:  Mild left hydronephrosis.  No right hydronephrosis.      Review of Labs:  The following labs were reviewed by me and discussed with the patient:  No results found for this or any previous visit (from the past 720 hour(s)).      Assessment & Plan   Neurogenic " bladder 2/2 SB. Plan for follow up in 1 year with RBUS, patient will call  KATELYNN s/p sling that has improved greatly with recent bulking agent to urethra. We discussed again that the bulking agent usually lasts 1-3years. She will call if she is having return of symptoms.

## 2024-06-20 ENCOUNTER — THERAPY VISIT (OUTPATIENT)
Dept: PHYSICAL THERAPY | Facility: CLINIC | Age: 39
End: 2024-06-20
Attending: NURSE PRACTITIONER
Payer: COMMERCIAL

## 2024-06-20 DIAGNOSIS — Q05.2 SPINA BIFIDA OF LUMBAR REGION WITH HYDROCEPHALUS (H): Primary | ICD-10-CM

## 2024-06-20 PROCEDURE — 97110 THERAPEUTIC EXERCISES: CPT | Mod: GP

## 2024-06-20 PROCEDURE — 97750 PHYSICAL PERFORMANCE TEST: CPT | Mod: GP

## 2024-07-11 ENCOUNTER — THERAPY VISIT (OUTPATIENT)
Dept: PHYSICAL THERAPY | Facility: CLINIC | Age: 39
End: 2024-07-11
Attending: NURSE PRACTITIONER
Payer: COMMERCIAL

## 2024-07-11 DIAGNOSIS — Q05.2 SPINA BIFIDA OF LUMBAR REGION WITH HYDROCEPHALUS (H): Primary | ICD-10-CM

## 2024-07-11 PROCEDURE — 97110 THERAPEUTIC EXERCISES: CPT | Mod: GP

## 2024-07-15 ENCOUNTER — THERAPY VISIT (OUTPATIENT)
Dept: OCCUPATIONAL THERAPY | Facility: CLINIC | Age: 39
End: 2024-07-15
Attending: NURSE PRACTITIONER
Payer: COMMERCIAL

## 2024-07-15 DIAGNOSIS — Q05.7 SPINA BIFIDA OF LUMBAR REGION WITHOUT HYDROCEPHALUS (H): Primary | ICD-10-CM

## 2024-07-15 DIAGNOSIS — Z78.9 IMPAIRED MOBILITY AND ADLS: ICD-10-CM

## 2024-07-15 DIAGNOSIS — Z74.09 IMPAIRED MOBILITY AND ADLS: ICD-10-CM

## 2024-07-15 DIAGNOSIS — M62.81 MUSCLE WEAKNESS (GENERALIZED): ICD-10-CM

## 2024-07-15 PROCEDURE — 97166 OT EVAL MOD COMPLEX 45 MIN: CPT | Mod: GO | Performed by: OCCUPATIONAL THERAPIST

## 2024-07-15 PROCEDURE — 97535 SELF CARE MNGMENT TRAINING: CPT | Mod: GO | Performed by: OCCUPATIONAL THERAPIST

## 2024-07-15 NOTE — PROGRESS NOTES
OCCUPATIONAL THERAPY EVALUATION  Type of Visit: Evaluation             Subjective        Minh Altamirano is a 39 y.o. female with referral for OP occupational therapy from Velia Beckett NP.     Per chart:   As you know, Minh is a 39 year old female with past medical history of a partial untethering and partial resection of lipoma with Dr. Garcia originally in December 2010 status post bladder augmentation in 2012. She reported improvement and had been doing well until December 2021 when she began to complain of worsening back pain as well as right greater than left radicular pain. Visited with Dr. Zelaya for consideration of spinal column shortening, at that visit the recommendation was for conservative management, and consultation with pain clinic regarding spinal cord stim placement versus evaluation. She was more recently seen by Dr. Valladares who recommended physical therapy and then likely spinal cord stimulator placement.     Pt had been seeing PT, just discharged. Pt arrives on time, with . Pt reports having PCA 5 hrs/day. Pt resides at home with spouse and son (6 y/o). Pt reports significant pain that is variable throughout day, sometimes will onset and cause fall 2x/day. Would like to get left arm stronger. Pt would like to be able to play with her son, lift son. Evaluation completed as ordered.     Presenting condition or subjective complaint:    Date of onset: 05/10/24    Relevant medical history:     Dates & types of surgery:  Tethered cord and removal of lipoma surgery 2010     Prior diagnostic imaging/testing results:       Prior therapy history for the same diagnosis, illness or injury:    PT from 11/28/23-7/11/24    Prior Level of Function  Transfers: Independent  Ambulation: Independent  ADL: Assistive equipment, Assistive person  IADL: Childcare, Housekeeping, Meal preparation, Medication management    Living Environment  Social support:  spouse and son  Type of home:   apt  Stairs to enter the  home:         Ramp:     Stairs inside the home:         Help at home:  PCA 5 hrs/day, spouse  Equipment owned:       Employment:      Hobbies/Interests:      Patient goals for therapy:      Pain assessment: Pain present  Location: 7/10/Rating: Both LE, right being worse than left, Left arm     Objective   Cognitive Status Examination  Orientation: Oriented to person, place and time   Level of Consciousness: Alert  Follows Commands and Answers Questions: 100% of the time  Personal Safety and Judgement: Intact  Memory: Intact  Attention: No deficits identified  Organization/Problem Solving: No deficits identified  Executive Function: No deficits identified    VISUAL SKILLS  Visual Acuity: No deficits identified  Visual Field: Appears normal  Visual Attention: Appears normal  Oculomotor: intact    SENSATION:  left hand ulnar nerve deficits     POSTURE: Sitting Posture: rounded shoulders  RANGE OF MOTION:  RUE WFL, LUE limited AROM at shoulder approx. 80-90 degrees shoulder flexion, shoulder abduction. Pt tolerates PROM of shoulder flexion of approx. 110 degrees, shoulder abduction of approx. 100 degrees. Pt with pain at end ROM.   STRENGTH:   Pain: - none + mild ++ moderate +++ severe  Strength Scale: 0-5/5 Left Right   Shoulder Flexion 2- 4   Shoulder Extension 2- 4   Shoulder Abduction 2- 4   Shoulder Adduction 2- 4   Shoulder Internal Rotation 2- 4   Shoulder External Rotation 2- 4   Shoulder Horizontal Abduction 2- 4   Shoulder Horizontal Adduction 2- 4   Elbow Flexion 2- 4+   Elbow Extension 2- 4+     Pain: - none + mild ++ moderate +++ severe  Strength Scale: 0-5/5 Left Right   Wrist Flexion 3+ 4   Wrist Extension 3+ 4   Supination 3+ 4   Pronation 3+ 4   Ulnar Deviation 3+ 4   Radial Deviation 3+ 4    Strength (lbs) 6 (N: 66) 24 (N: 74)   Lateral Pinch (lbs) 3.5 (N: 16) 12 (N: 16)   3 Point Pinch (lbs) 4 (N: 17) 9 (N: 17)            MUSCLE TONE: WFL  COORDINATION: Left Hand, Nine Hole Peg Test (sec): 39, N:  17 sec  Right Hand, Nine Hole Peg Test (sec): 19, N: 16 sec  BALANCE: WFL  Standing balance variable due to pain    FUNCTIONAL MOBILITY  Assistive Device(s): Cane (single end), not using today  Ambulation: use of cane at home, did not bring due to limited need for mobility.  Wheelchair: n/a    BED MOBILITY: Independent    TRANSFERS: Independent    BATHING: Mod Assist, for LB bathing  Equipment: Shower chair/Tub bench    UPPER BODY DRESSING: Independent    LOWER BODY DRESSING: Mod Assist  Equipment: Reacher    TOILETING:  variable assist - has a catheter bag that she will empty, sometimes has help to empty. Pt reports taking stool softeners to aid in constipation and avoid increased pain. Pt reports she is unable to exert to have bowel movements. Pt will use water and wash cloth for toilet hygiene.   Equipment:  3-in-1 commode    GROOMING: Independent    EATING/SELF FEEDING: Independent     ACTIVITY TOLERANCE: Limited due to pain. Will have days where she has a hard time doing anything due to pain.     INSTRUMENTAL ACTIVITIES OF DAILY LIVING (IADL):   Meal Planning/Prep: has assist, sometimes able to make tea  Home/Financial Management: assisted  Communication/Computer Use: n/a  Community Mobility: does not drive.   Care of Others:- pt has 6 y/o son who she cares for.     Assessment & Plan   CLINICAL IMPRESSIONS  Medical Diagnosis: Spina bifida of lumbar region without hydrocephalus (H) (Q05.7)  History of tethered spinal cord (Z86.69)    Treatment Diagnosis: Spina bifida of lumbar region without hydrocephalus (H) (Q05.7) History of tethered spinal cord (Z86.69), muscle weakness, decline in mobility and ADLs    Impression/Assessment: Pt is a 39 year old female presenting to Occupational Therapy due to Spina bifida of lumbar region without hydrocephalus (H) (Q05.7) History of tethered spinal cord (Z86.69), muscle weakness, decline in mobility and ADLs .  The following significant findings have been identified:  Impaired coordination, Impaired ROM, Impaired sensation, Impaired strength, and Pain.  These identified deficits interfere with their ability to perform self care tasks, recreational activities, household chores, household mobility, community mobility, care of others, and meal planning and preparation as compared to previous level of function.     Clinical Decision Making (Complexity):  Assessment of Occupational Performance: 3-5 Performance Deficits  Occupational Performance Limitations: bathing/showering, toileting, dressing, child rearing, sleep, and leisure activities  Clinical Decision Making (Complexity): Moderate complexity    PLAN OF CARE  Treatment Interventions:  Interventions: Self-Care/Home Management, Therapeutic Activity, Therapeutic Exercise, Neuromuscular Re-education    Long Term Goals   OT Goal 1  Goal Identifier: AD/AE  Goal Description: Patient to verbalize understanding of and demonstrate use of 3-5 new pieces of adaptive equipment and/or adapted techniques to increase independence and safety with ADLs and IADLs.  Target Date: 10/13/24  OT Goal 2  Goal Identifier: BUE HEP  Goal Description: Pt will demonstrate understanding of BUE HEP in order to increase safety and independence with ADL tasks.  Target Date: 10/13/24  OT Goal 3  Goal Identifier:   Goal Description: Pt will demonstrate increased bilateral hand  strength by 15% in order to increase safety with ADL tasks.  Target Date: 10/13/24  OT Goal 4  Goal Identifier: nerve pain  Goal Description: Pt to identify 3-5 self nerve pain mgmt strategies in order to manage symptoms for increased independence with ADL/IADL tasks.  Target Date: 10/13/24  OT Goal 5  Goal Identifier: sleep  Goal Description: Pt will identify 3-5 sleep hygiene techniques in order to further manage fatigue for increased safety and independence with ADL/IADL tasks.  Target Date: 10/13/24      Frequency of Treatment: one time per week  Duration of Treatment: up to 90  days     Recommended Referrals to Other Professionals:  none  Education Assessment: Learner/Method: Patient;Listening     Risks and benefits of evaluation/treatment have been explained.   Patient/Family/caregiver agrees with Plan of Care.     Evaluation Time:    OT Eval, Moderate Complexity Minutes (70982): 35   Present: Yes: Language: Grenadian, ID Number/Identifier: [id number]      Signing Clinician: Harry Wheeler OT        [facility]                                                                                   OUTPATIENT OCCUPATIONAL THERAPY      PLAN OF TREATMENT FOR OUTPATIENT REHABILITATION   Patient's Last Name, First Name, CINDY AltamiranoMinh YOB: 1985   Provider's Name   [facility]   Medical Record No.  [id number]     Onset Date: 05/10/24 Start of Care Date: 07/15/24     Medical Diagnosis:  Spina bifida of lumbar region without hydrocephalus (H) (Q05.7)  History of tethered spinal cord (Z86.69)      OT Treatment Diagnosis:  Spina bifida of lumbar region without hydrocephalus (H) (Q05.7) History of tethered spinal cord (Z86.69), muscle weakness, decline in mobility and ADLs Plan of Treatment  Frequency/Duration:one time per week/up to 90 days    Certification date from 07/15/24   To 10/13/24        See note for plan of treatment details and functional goals     Harry Wheeler OT                         I CERTIFY THE NEED FOR THESE SERVICES FURNISHED UNDER        THIS PLAN OF TREATMENT AND WHILE UNDER MY CARE     (Physician attestation of this document indicates review and certification of the therapy plan).              Referring Provider:  Velia Beckett    Initial Assessment  See Epic Evaluation- 07/15/24

## 2024-07-26 ENCOUNTER — THERAPY VISIT (OUTPATIENT)
Dept: OCCUPATIONAL THERAPY | Facility: CLINIC | Age: 39
End: 2024-07-26
Attending: NURSE PRACTITIONER
Payer: COMMERCIAL

## 2024-07-26 DIAGNOSIS — Z78.9 IMPAIRED MOBILITY AND ADLS: ICD-10-CM

## 2024-07-26 DIAGNOSIS — M62.81 MUSCLE WEAKNESS (GENERALIZED): ICD-10-CM

## 2024-07-26 DIAGNOSIS — Q05.7 SPINA BIFIDA OF LUMBAR REGION WITHOUT HYDROCEPHALUS (H): Primary | ICD-10-CM

## 2024-07-26 DIAGNOSIS — Z74.09 IMPAIRED MOBILITY AND ADLS: ICD-10-CM

## 2024-07-26 PROCEDURE — 97110 THERAPEUTIC EXERCISES: CPT | Mod: GO | Performed by: OCCUPATIONAL THERAPIST

## 2024-08-09 ENCOUNTER — THERAPY VISIT (OUTPATIENT)
Dept: OCCUPATIONAL THERAPY | Facility: CLINIC | Age: 39
End: 2024-08-09
Attending: NURSE PRACTITIONER
Payer: COMMERCIAL

## 2024-08-09 DIAGNOSIS — M62.81 MUSCLE WEAKNESS (GENERALIZED): ICD-10-CM

## 2024-08-09 DIAGNOSIS — Z74.09 IMPAIRED MOBILITY AND ADLS: ICD-10-CM

## 2024-08-09 DIAGNOSIS — Q05.7 SPINA BIFIDA OF LUMBAR REGION WITHOUT HYDROCEPHALUS (H): Primary | ICD-10-CM

## 2024-08-09 DIAGNOSIS — Z78.9 IMPAIRED MOBILITY AND ADLS: ICD-10-CM

## 2024-08-09 PROCEDURE — 97110 THERAPEUTIC EXERCISES: CPT | Mod: GO | Performed by: OCCUPATIONAL THERAPIST

## 2024-08-16 ENCOUNTER — THERAPY VISIT (OUTPATIENT)
Dept: OCCUPATIONAL THERAPY | Facility: CLINIC | Age: 39
End: 2024-08-16
Attending: NURSE PRACTITIONER
Payer: COMMERCIAL

## 2024-08-16 DIAGNOSIS — Q05.7 SPINA BIFIDA OF LUMBAR REGION WITHOUT HYDROCEPHALUS (H): Primary | ICD-10-CM

## 2024-08-16 DIAGNOSIS — M62.81 MUSCLE WEAKNESS (GENERALIZED): ICD-10-CM

## 2024-08-16 PROCEDURE — 97110 THERAPEUTIC EXERCISES: CPT | Mod: GO | Performed by: OCCUPATIONAL THERAPIST

## 2024-08-23 ENCOUNTER — THERAPY VISIT (OUTPATIENT)
Dept: OCCUPATIONAL THERAPY | Facility: CLINIC | Age: 39
End: 2024-08-23
Attending: NURSE PRACTITIONER
Payer: COMMERCIAL

## 2024-08-23 DIAGNOSIS — Q05.7 SPINA BIFIDA OF LUMBAR REGION WITHOUT HYDROCEPHALUS (H): Primary | ICD-10-CM

## 2024-08-23 DIAGNOSIS — M62.81 MUSCLE WEAKNESS (GENERALIZED): ICD-10-CM

## 2024-08-23 PROCEDURE — 97110 THERAPEUTIC EXERCISES: CPT | Mod: GO | Performed by: OCCUPATIONAL THERAPIST

## 2024-08-26 ENCOUNTER — MEDICAL CORRESPONDENCE (OUTPATIENT)
Dept: HEALTH INFORMATION MANAGEMENT | Facility: CLINIC | Age: 39
End: 2024-08-26
Payer: COMMERCIAL

## 2024-08-26 ENCOUNTER — DOCUMENTATION ONLY (OUTPATIENT)
Dept: UROLOGY | Facility: CLINIC | Age: 39
End: 2024-08-26
Payer: COMMERCIAL

## 2024-08-26 NOTE — PROGRESS NOTES
Type of Form Received: Order (self-cath)    Form Received (Date) 8/26/24   Form Filled out Yes, date 8/26/24   Placed in provider folder Yes       Received Completed forms Yes   Faxed Forms Faxed To: [facility]  Fax Number: [phone]   Sent to HIM (Date) 8/26/24

## 2024-09-16 ENCOUNTER — THERAPY VISIT (OUTPATIENT)
Dept: OCCUPATIONAL THERAPY | Facility: CLINIC | Age: 39
End: 2024-09-16
Attending: NURSE PRACTITIONER
Payer: COMMERCIAL

## 2024-09-16 DIAGNOSIS — Q05.7 SPINA BIFIDA OF LUMBAR REGION WITHOUT HYDROCEPHALUS (H): Primary | ICD-10-CM

## 2024-09-16 DIAGNOSIS — M62.81 MUSCLE WEAKNESS (GENERALIZED): ICD-10-CM

## 2024-09-16 DIAGNOSIS — Z74.09 IMPAIRED MOBILITY AND ADLS: ICD-10-CM

## 2024-09-16 DIAGNOSIS — Z78.9 IMPAIRED MOBILITY AND ADLS: ICD-10-CM

## 2024-09-16 PROCEDURE — 97110 THERAPEUTIC EXERCISES: CPT | Mod: GO | Performed by: OCCUPATIONAL THERAPIST

## 2024-09-20 ENCOUNTER — OFFICE VISIT (OUTPATIENT)
Dept: NEUROSURGERY | Facility: CLINIC | Age: 39
End: 2024-09-20
Payer: COMMERCIAL

## 2024-09-20 VITALS
HEART RATE: 78 BPM | DIASTOLIC BLOOD PRESSURE: 86 MMHG | OXYGEN SATURATION: 99 % | SYSTOLIC BLOOD PRESSURE: 119 MMHG | RESPIRATION RATE: 16 BRPM

## 2024-09-20 DIAGNOSIS — Q05.7 SPINA BIFIDA OF LUMBAR REGION WITHOUT HYDROCEPHALUS (H): ICD-10-CM

## 2024-09-20 DIAGNOSIS — Q05.2 SPINA BIFIDA OF LUMBAR REGION WITH HYDROCEPHALUS (H): Primary | ICD-10-CM

## 2024-09-20 DIAGNOSIS — Z86.69 HISTORY OF TETHERED SPINAL CORD: ICD-10-CM

## 2024-09-20 PROCEDURE — T1013 SIGN LANG/ORAL INTERPRETER: HCPCS | Mod: U3

## 2024-09-20 PROCEDURE — 99213 OFFICE O/P EST LOW 20 MIN: CPT | Performed by: NURSE PRACTITIONER

## 2024-09-20 ASSESSMENT — PAIN SCALES - GENERAL: PAINLEVEL: EXTREME PAIN (8)

## 2024-09-20 NOTE — LETTER
9/20/2024       RE: Minh Altamirano  [address]  [facility] [address]     Dear Colleague,    Thank you for referring your patient, Minh Altamirano, to the [facility]UROSURGERY [facility] [place] [facility]. Please see a copy of my visit note below.           Neurosurgery Clinic    Thank you for referring Minh Altamirano to the neurosurgery clinic at the [facility].   I had the opportunity to meet with Minh Altamirano on September 20, 2024.    As you know, Minh is a 39 year old female with past medical history of a partial untethering and partial resection of lipoma with Dr. Garcia originally in December 2010 status post bladder augmentation in 2012. She reported improvement and had been doing well until December 2021 when she began to complain of worsening back pain as well as right greater than left radicular pain. Visited with Dr. Zelaya for consideration of spinal column shortening, at that visit the recommendation was for conservative management, and consultation with pain clinic regarding spinal cord stim placement versus evaluation. She was more recently seen by Dr. Valladares who recommended physical therapy and then likely spinal cord stimulator placement. She has been following with physical therapy, occupational therapy and urology. She underwent a cystoscopy and periurethral bulking with Bulkamid in May 2024 with Dr. Moore.       She notes that her back pain has gotten worse since she was last seen in our clinic in April. She notes it hurts worse midline and over to the right side. She notes that she feels like she has a pinched nerve in her right hip where her leg gets bent and stuck. She notes that with movement she has upper back pain that radiates down her left arm. She notes that she is having difficulty sitting for longer periods of time. She notes that her right leg weakness is increasingly  worse and will get numb and she will fall to the ground. She notes she has shakiness in her right leg and numbness from her ankle to buttock. She has been following with OT. She notes that she has finished physical therapy sessions and she notes her pain was less, but now that she has stopped her symptoms have gotten worse. She notes that she will also develop headaches, she notes if her headache is bad, then she will vomit. She reports that she feels that she has a urinary tract infection.  She notes that she also has concerns with bowel continence.       Past Medical History:   Diagnosis Date     Anemia      Chronic infection     MRSA     Constipation, chronic      Incontinence of urine      Lipoma of spinal cord      Migraine      neurogenic bladder      Spinal dysraphism (H)        Past Surgical History:   Procedure Laterality Date     BLADDER AUGMENTATION  2012    Procedure:BLADDER AUGMENTATION; Surgeon:TRINA MOORE; Location:[facility] OR      SECTION N/A 2018    Procedure:  SECTION;  Primary  Section  with classical incision;  Surgeon: Lily Villanueva MD;  Location: [facility] OR     CYSTOSCOPY, INJECT COLLAGEN, COMBINED N/A 2024    Procedure: CYSTOSCOPY, WITH PERIURETHRAL BULKING AGENT INJECTION (BULKAMID);  Surgeon: Trina Moore MD;  Location: [facility] OR     CYSTOSCOPY, INTRAVESICAL INJECTION N/A 2016    Procedure: CYSTOSCOPY, INTRAVESICAL INJECTION;  Surgeon: Trina Moore MD;  [facility]     DILATION AND CURETTAGE SUCTION N/A 2019    Procedure: suction dilation and curetage,;  Surgeon: Radha Hickey MD;  Location: [facility] OR     EXAM UNDER ANESTHESIA PELVIC N/A 2019    Procedure: pelvic exam under anesthesia;  Surgeon: Radha Hickey MD;  Location: [facility] OR     LAMINECTOMY LUMBAR TWO LEVELS       LAPAROTOMY EXPLORATORY  2012    Procedure:LAPAROTOMY EXPLORATORY; Exploratory Laparotomy with Takedown of Mitrofanoff, Ventral  Hernia Repair with Strattice Mesh implantation ; Surgeon:TRINA COLLADO; Location:[facility] OR     MITROFANOFF PROCEDURE (APPENDIX CONDUIT)  1/5/2012    Procedure:MITROFANOFF PROCEDURE (APPENDIX CONDUIT); Bladder Augmentation with Right Colon, Appendix Conduit- Mitrofanoff, Insertion of Pubovaginal Sling using Autologous Rectus Fascia; Surgeon:TRINA COLLADO; Location:[facility] OR     tumor resection and cord detethering         ALLERGIES:  Naproxen, Bactrim [sulfamethoxazole w/trimethoprim], Hydrocodone-acetaminophen, and Vicodin [hydrocodone-acetaminophen]    Current Outpatient Medications   Medication Sig Dispense Refill     acetaminophen (TYLENOL) 325 MG tablet Take 1-2 tablets (325-650 mg) by mouth every 6 hours as needed for mild pain 20 tablet 0     albuterol (PROAIR HFA/PROVENTIL HFA/VENTOLIN HFA) 108 (90 Base) MCG/ACT inhaler Inhale 2 puffs into the lungs every 4 hours       cholecalciferol (VITAMIN D3) 125 mcg (5000 units) capsule TAKE 1 CAPSULE BY MOUTH DAILY AS DIRECTED       famotidine (PEPCID) 20 MG tablet        gabapentin (NEURONTIN) 300 MG capsule TAKE 1 CAPSULE BY MOUTH THREE TIMES DAILY       gabapentin (NEURONTIN) 600 MG tablet TAKE ONE TABLET BY MOUTH THREE TIMES A DAY FOR 30 DAYS       ibprofen (MOTRIN) 600 MG ED starter pack Ibuprofen       ibuprofen (ADVIL/MOTRIN) 100 MG tablet Take 100 mg by mouth every 4 hours as needed       nitroFURantoin macrocrystal-monohydrate (MACROBID) 100 MG capsule Take 1 capsule (100 mg) by mouth 2 times daily 14 capsule 0     ondansetron (ZOFRAN ODT) 4 MG ODT tab Take 1 tablet (4 mg) by mouth every 8 hours as needed for nausea 10 tablet 0     polyethylene glycol (MIRALAX) 17 g packet Take 17 g by mouth daily 72 packet 0     Prenatal Multivit-Min-Fe-FA (PRENATAL 1 + IRON OR) Prenatal       Prenatal Multivit-Min-Fe-FA (PRENATAL, W/IRON & FA,) 27-0.8 MG TABS 1 tablet Orally Once a day for 30 days       Prenatal Vit-Fe Fumarate-FA (PRENATAL MULTIVITAMIN W/IRON)  27-0.8 MG tablet Take 1 tablet by mouth daily 90 tablet 3     Prenatal Vit-Fe Fumarate-FA (PRENATAL VITAMINS) 28-0.8 MG TABS TAKE ONE (1) TABLET BY MOUTH ONCE A DAY       SENNA-docusate sodium (SENNA S) 8.6-50 MG tablet Take 1-2 tablets by mouth 2 times daily as needed (constipation) 60 tablet 0     simvastatin (ZOCOR) 10 MG tablet 1 tablet in the evening Orally Once a day for 90 days       sulfamethoxazole-trimethoprim (BACTRIM DS) 800-160 MG tablet TAKE ONE TABLET BY MOUTH TWICE A DAY FOR 10 DAY(S)       traZODone (DESYREL) 100 MG tablet TAKE ONE (1) TABLET BY MOUTH ONCE DAILY AT BEDTIME AS NEEDED FOR SLEEP FOR 90 DAYS       traZODone (DESYREL) 50 MG tablet 50 mg       vitamin D3 (CHOLECALCIFEROL) 50 mcg (2000 units) tablet TAKE ONE (1) TABLET BY MOUTH ONCE A DAY       VITAMIN D3 50 MCG (2000 UT) CAPS Take 1 capsule by mouth daily         No family history on file.    Social History     Tobacco Use     Smoking status: Never     Passive exposure: Never     Smokeless tobacco: Never   Substance Use Topics     Alcohol use: No         PHYSICAL EXAM:   /86 (BP Location: Right arm, Patient Position: Sitting)   Pulse 78   Resp 16   SpO2 99%      Alert and oriented to person, place, and time. No acute distress  PERRL, EOMI. Face symmetric. Tongue midline.   No pronator drift.   BUE and BLE 5/5 throughout.   Reflexes 2+ throughout.   Sensation intact and symmetric to light touch throughout.   Normal FNF, normal HTS test. Gait is normal.     ASSESSMENT:  Minh Altamirano, 39 year old female with past medical history of a partial untethering and partial resection of lipoma with Dr. Garcia originally in December 2010 status post bladder augmentation in 2012. She reported improvement and had been doing well until December 2021 when she began to complain of worsening back pain as well as right greater than left radicular pain, she has been followed by PT/OT/pain management    PLAN:  - advised Minh to follow up with PCP  for urinary tract infection  -would like for Minh to follow up with pain management and to continue with PT  -we would like to see her back in 6-9 months if ongoing concerns  - Minh Altamirano and family were counseled to please contact us with any acute worsening of symptoms, or with any questions or concerns.       Velia Beckett NP  Department of Neurosurgery  [phone]    This patient was discussed with neurosurgery faculty, who agrees with the above.  Velia Beckett NP on 9/20/2024 at 9:13 AM      Again, thank you for allowing me to participate in the care of your patient.      Sincerely,    Velia Beckett NP

## 2024-09-20 NOTE — PROGRESS NOTES
Neurosurgery Clinic    Thank you for referring Minh Altamirano to the neurosurgery clinic at the [facility].   I had the opportunity to meet with Minh Altamirano on September 20, 2024.    As you know, Minh is a 39 year old female with past medical history of a partial untethering and partial resection of lipoma with Dr. Garcia originally in December 2010 status post bladder augmentation in 2012. She reported improvement and had been doing well until December 2021 when she began to complain of worsening back pain as well as right greater than left radicular pain. Visited with Dr. Zelaya for consideration of spinal column shortening, at that visit the recommendation was for conservative management, and consultation with pain clinic regarding spinal cord stim placement versus evaluation. She was more recently seen by Dr. Valladares who recommended physical therapy and then likely spinal cord stimulator placement. She has been following with physical therapy, occupational therapy and urology. She underwent a cystoscopy and periurethral bulking with Bulkamid in May 2024 with Dr. Moore.       She notes that her back pain has gotten worse since she was last seen in our clinic in April. She notes it hurts worse midline and over to the right side. She notes that she feels like she has a pinched nerve in her right hip where her leg gets bent and stuck. She notes that with movement she has upper back pain that radiates down her left arm. She notes that she is having difficulty sitting for longer periods of time. She notes that her right leg weakness is increasingly worse and will get numb and she will fall to the ground. She notes she has shakiness in her right leg and numbness from her ankle to buttock. She has been following with OT. She notes that she has finished physical therapy sessions and she notes her pain was less, but now that she has stopped her symptoms have gotten worse. She  notes that she will also develop headaches, she notes if her headache is bad, then she will vomit. She reports that she feels that she has a urinary tract infection.  She notes that she also has concerns with bowel continence.       Past Medical History:   Diagnosis Date    Anemia     Chronic infection     MRSA    Constipation, chronic     Incontinence of urine     Lipoma of spinal cord     Migraine     neurogenic bladder     Spinal dysraphism (H)        Past Surgical History:   Procedure Laterality Date    BLADDER AUGMENTATION  2012    Procedure:BLADDER AUGMENTATION; Surgeon:TRINA MOORE; Location:[facility] OR     SECTION N/A 2018    Procedure:  SECTION;  Primary  Section  with classical incision;  Surgeon: Lily Villanueva MD;  Location: [facility] OR    CYSTOSCOPY, INJECT COLLAGEN, COMBINED N/A 2024    Procedure: CYSTOSCOPY, WITH PERIURETHRAL BULKING AGENT INJECTION (BULKAMID);  Surgeon: Trina Moore MD;  Location: [facility] OR    CYSTOSCOPY, INTRAVESICAL INJECTION N/A 2016    Procedure: CYSTOSCOPY, INTRAVESICAL INJECTION;  Surgeon: Trina Moore MD;  [facility]: [facility]    DILATION AND CURETTAGE SUCTION N/A 2019    Procedure: suction dilation and curetage,;  Surgeon: Radha Hickey MD;  Location: [facility] OR    EXAM UNDER ANESTHESIA PELVIC N/A 2019    Procedure: pelvic exam under anesthesia;  Surgeon: Radha Hickey MD;  Location: [facility]    LAMINECTOMY LUMBAR TWO LEVELS      LAPAROTOMY EXPLORATORY  2012    Procedure:LAPAROTOMY EXPLORATORY; Exploratory Laparotomy with Takedown of Mitrofanoff, Ventral Hernia Repair with Strattice Mesh implantation ; Surgeon:TRINA MOORE; Location:[facility]    MITROFANOFF PROCEDURE (APPENDIX CONDUIT)  2012    Procedure:MITROFANOFF PROCEDURE (APPENDIX CONDUIT); Bladder Augmentation with Right Colon, Appendix Conduit- Mitrofanoff, Insertion of Pubovaginal Sling using Autologous Rectus Fascia;  Surgeon:TRINA COLLADO; Location:[facility] OR    tumor resection and cord detethering         ALLERGIES:  Naproxen, Bactrim [sulfamethoxazole w/trimethoprim], Hydrocodone-acetaminophen, and Vicodin [hydrocodone-acetaminophen]    Current Outpatient Medications   Medication Sig Dispense Refill    acetaminophen (TYLENOL) 325 MG tablet Take 1-2 tablets (325-650 mg) by mouth every 6 hours as needed for mild pain 20 tablet 0    albuterol (PROAIR HFA/PROVENTIL HFA/VENTOLIN HFA) 108 (90 Base) MCG/ACT inhaler Inhale 2 puffs into the lungs every 4 hours      cholecalciferol (VITAMIN D3) 125 mcg (5000 units) capsule TAKE 1 CAPSULE BY MOUTH DAILY AS DIRECTED      famotidine (PEPCID) 20 MG tablet       gabapentin (NEURONTIN) 300 MG capsule TAKE 1 CAPSULE BY MOUTH THREE TIMES DAILY      gabapentin (NEURONTIN) 600 MG tablet TAKE ONE TABLET BY MOUTH THREE TIMES A DAY FOR 30 DAYS      ibprofen (MOTRIN) 600 MG ED starter pack Ibuprofen      ibuprofen (ADVIL/MOTRIN) 100 MG tablet Take 100 mg by mouth every 4 hours as needed      nitroFURantoin macrocrystal-monohydrate (MACROBID) 100 MG capsule Take 1 capsule (100 mg) by mouth 2 times daily 14 capsule 0    ondansetron (ZOFRAN ODT) 4 MG ODT tab Take 1 tablet (4 mg) by mouth every 8 hours as needed for nausea 10 tablet 0    polyethylene glycol (MIRALAX) 17 g packet Take 17 g by mouth daily 72 packet 0    Prenatal Multivit-Min-Fe-FA (PRENATAL 1 + IRON OR) Prenatal      Prenatal Multivit-Min-Fe-FA (PRENATAL, W/IRON & FA,) 27-0.8 MG TABS 1 tablet Orally Once a day for 30 days      Prenatal Vit-Fe Fumarate-FA (PRENATAL MULTIVITAMIN W/IRON) 27-0.8 MG tablet Take 1 tablet by mouth daily 90 tablet 3    Prenatal Vit-Fe Fumarate-FA (PRENATAL VITAMINS) 28-0.8 MG TABS TAKE ONE (1) TABLET BY MOUTH ONCE A DAY      SENNA-docusate sodium (SENNA S) 8.6-50 MG tablet Take 1-2 tablets by mouth 2 times daily as needed (constipation) 60 tablet 0    simvastatin (ZOCOR) 10 MG tablet 1 tablet in the evening  Orally Once a day for 90 days      sulfamethoxazole-trimethoprim (BACTRIM DS) 800-160 MG tablet TAKE ONE TABLET BY MOUTH TWICE A DAY FOR 10 DAY(S)      traZODone (DESYREL) 100 MG tablet TAKE ONE (1) TABLET BY MOUTH ONCE DAILY AT BEDTIME AS NEEDED FOR SLEEP FOR 90 DAYS      traZODone (DESYREL) 50 MG tablet 50 mg      vitamin D3 (CHOLECALCIFEROL) 50 mcg (2000 units) tablet TAKE ONE (1) TABLET BY MOUTH ONCE A DAY      VITAMIN D3 50 MCG (2000 UT) CAPS Take 1 capsule by mouth daily         No family history on file.    Social History     Tobacco Use    Smoking status: Never     Passive exposure: Never    Smokeless tobacco: Never   Substance Use Topics    Alcohol use: No         PHYSICAL EXAM:   /86 (BP Location: Right arm, Patient Position: Sitting)   Pulse 78   Resp 16   SpO2 99%      Alert and oriented to person, place, and time. No acute distress  PERRL, EOMI. Face symmetric. Tongue midline.   No pronator drift.   BUE and BLE 5/5 throughout.   Reflexes 2+ throughout.   Sensation intact and symmetric to light touch throughout.   Normal FNF, normal HTS test. Gait is normal.     ASSESSMENT:  Minh Altamirano, 39 year old female with past medical history of a partial untethering and partial resection of lipoma with Dr. Garcia originally in December 2010 status post bladder augmentation in 2012. She reported improvement and had been doing well until December 2021 when she began to complain of worsening back pain as well as right greater than left radicular pain, she has been followed by PT/OT/pain management    PLAN:  - advised Minh to follow up with PCP for urinary tract infection  -would like for Minh to follow up with pain management and to continue with PT  -we would like to see her back in 6-9 months if ongoing concerns  - Minh Altamirano and family were counseled to please contact us with any acute worsening of symptoms, or with any questions or concerns.       Velia Beckett NP  Department of  Neurosurgery  [phone]    This patient was discussed with neurosurgery faculty, who agrees with the above.  Velia Beckett NP on 9/20/2024 at 9:13 AM

## 2024-09-25 ENCOUNTER — THERAPY VISIT (OUTPATIENT)
Dept: PHYSICAL THERAPY | Facility: CLINIC | Age: 39
End: 2024-09-25
Attending: NURSE PRACTITIONER
Payer: COMMERCIAL

## 2024-09-25 DIAGNOSIS — Q05.2 SPINA BIFIDA OF LUMBAR REGION WITH HYDROCEPHALUS (H): Primary | ICD-10-CM

## 2024-09-25 PROCEDURE — 97116 GAIT TRAINING THERAPY: CPT | Mod: GP | Performed by: PHYSICAL THERAPIST

## 2024-09-25 PROCEDURE — 97162 PT EVAL MOD COMPLEX 30 MIN: CPT | Mod: GP | Performed by: PHYSICAL THERAPIST

## 2024-09-25 NOTE — PROGRESS NOTES
PHYSICAL THERAPY EVALUATION  Type of Visit: Evaluation    Fall Risk Screen:  Fall screen completed by: PT  Have you fallen 2 or more times in the past year?: Yes  Have you fallen and had an injury in the past year?: Yes  Timed Up and Go score (seconds): 20.25  Is patient a fall risk?: Yes  Fall screen comments: Falls 1-2x/week. Reports when trying to walk, the right leg nerve pain, gets confused and falls. It happens fast. Reports hit to the head.    Subjective       Presenting condition or subjective complaint:   Went to doctor. Referred me back to PT for at least 6 months. Has a couple braces, some from [employer]. Makes it tight for my back, helps when I stand up. Supports a little bit. I have a cane. Have to use a cane if I want to walk for a little bit. Has a walker, but doesn't use. One with a seat on it.   Date of onset: 09/20/24 (order date)    Relevant medical history:   spina bifida of lumbar region with hydrocephalus  Dates & types of surgery:  partial untethering and partial resection of lipoma with Dr. Garcia originally in December 2010 status post bladder augmentation in 2012. cystoscopy and periurethral bulking with Bulkamid in May 2024.    Prior diagnostic imaging/testing results:       Prior therapy history for the same diagnosis, illness or injury:        Prior Level of Function  Transfers: Independent  Ambulation: Independent  ADL:  Assist from family.     Living Environment  Social support:   Lives with family.   Type of home:   Apartment   Stairs to enter the home:       No   Ramp:   No   Stairs inside the home:         Help at home:  Yes   Equipment owned:   Has cane and uses sometimes. Has a 4WW but does not use. Has not used since 2012.     Employment:     N/A  Hobbies/Interests:      Patient goals for therapy:  To not have surgery, right nerve pain reduction, reduce falls, standing for more than 2 minutes.     Pain assessment: Pain present - Pain in low back, always there. Tyelnol helps. Worse:  cannot do much at all, making tea, coffee: standing for more than 2 minutes. No bending, no anything.      Objective      Cognitive Status Examination  Orientation: Oriented to person, place and time   Level of Consciousness: Alert  Follows Commands and Answers Questions: 100% of the time  Personal Safety and Judgement: Intact  Memory: Intact    OBSERVATION: Far left lean when sitting in chair during evaluation.   INTEGUMENTARY: Intact  POSTURE:  Left lateral tilt of pelvis in sitting d/t pain on right side. Anterior pelvic tilt in sitting and standing.   PALPATION: N/A  RANGE OF MOTION:  WFLs   STRENGTH:  Not formally assessed today, but right LE much weaker than LLE per patient report and review of PT noted.     BED MOBILITY:  Bed is okay, and toilet transfers okay. Reports with medication, sleeping is okay. Sometimes wakes up with nerve pain. Starting in .     TRANSFERS: Independent Sit to stand mecanics: no lean forward, significant anterior pelvic tilt, heavy use of [place] and imbalance noted upon standing up. TU.25 sec with 4WW.     GAIT: door to door:   Level of [place]: Independent  Assistive Device(s): None, Cane (single end), Walker (four wheeled)  Gait Deviations:  Patient ambulates with antalgic gait noting reduced stance time on RLE and moderate imbalance, reduced gait speed   Gait Distance: Not fully tested, but patients reports limited walking due to pain.   Stairs: N/A     BALANCE:  Static and dynamic balance impaired     SPECIAL TESTS  10 Meter Walk Test (Comfortable)  10.65sec use of wall; 10.5 w/4WW   TUG  20.25 - with use of 4WW     SENSATION:  Complete RLE numbness from hip to foot. LLE intact per patient report.     Assessment & Plan   CLINICAL IMPRESSIONS  Medical Diagnosis: Spina bifida of lumbar region with hydrocephalus (H) (Q05.2)    Treatment Diagnosis: force production deficit.   Impression/Assessment: Patient is a 39 year old female with significant low back and R sided leg  pain, imbalance, reduced strength and endurance complaints.  The following significant findings have been identified: Pain, Decreased ROM/flexibility, Decreased strength, Impaired balance, Decreased proprioception, Impaired sensation, Impaired gait, Impaired muscle performance, Decreased activity tolerance, and Impaired posture. These impairments interfere with their ability to perform self care tasks, recreational activities, household chores, household mobility, and community mobility as compared to previous level of function.     Clinical Decision Making (Complexity):  Clinical Presentation: Evolving/Changing  Clinical Presentation Rationale: based on medical and personal factors listed in PT evaluation  Clinical Decision Making (Complexity): Moderate complexity    PLAN OF CARE  Treatment Interventions:  Modalities: Cryotherapy, per therapist discretion   Interventions: Gait Training, Manual Therapy, Neuromuscular Re-education, Therapeutic Activity, Therapeutic Exercise, Self-Care/Home Management, Orthotic Fitting/Training    Long Term Goals     PT Goal 1  Goal Identifier: Transfers  Goal Description: Patient to demo the ability to perform STS transfer with normal lean forward, equal weight bearing on LEs and minimal back pain with AAD for ease of movement and improved functional mobility/strengthening of LEs.  Target Date: 12/23/24  PT Goal 2  Goal Identifier: Standing tolerance  Goal Description: Patient to improve the ability to stand from 2 minutes up to 5+ minutes with minimal back pain in order to make her tea/coffee in the morning with ease.  Target Date: 12/23/24  PT Goal 3  Goal Identifier: Pain management  Goal Description: Patient to demo and/or verbalize implementing up to 3 new pain management strategies to reduce low back and R leg pain to manageable level to improve all functional mobility and the ability to sit with equal weight distribution on both sides of body.  Target Date: 12/23/24  PT Goal  4  Goal Identifier: Fall Risk  Goal Description: Patient to perform TUG in 13.5 sec or less with AAD and report no falls for 3+ weeks as a result of improved balance, pain and strength  Target Date: 12/23/24  PT Goal 5  Goal Identifier: Walking  Goal Description: Patient to demo the ability to ambulate for up to 6 minutes with AAD and reports of tolerable levels of back pain as a result of improve endurance and strength  Target Date: 12/23/24      Frequency of Treatment: 1x/week  Duration of Treatment: Up to 90 days    Education Assessment:   Learner/Method: Patient  Education Comments: Walker education    Risks and benefits of evaluation/treatment have been explained.   Patient/Family/caregiver agrees with Plan of Care.     Evaluation Time:     PT Eval, Moderate Complexity Minutes (97525): 35     Signing Clinician: Nichol Luis PT        [facility]                                                                                   OUTPATIENT PHYSICAL THERAPY      PLAN OF TREATMENT FOR OUTPATIENT REHABILITATION   Patient's Last Name, First Name, NINOMinh Capone YOB: 1985   Provider's Name   [facility]   Medical Record No.  [id number]     Onset Date: 09/20/24 (order date)  Start of Care Date: 09/25/24     Medical Diagnosis:  Spina bifida of lumbar region with hydrocephalus (H) (Q05.2)      PT Treatment Diagnosis:  force production deficit. Plan of Treatment  Frequency/Duration: 1x/week/ Up to 90 days    Certification date from 09/25/24 to 12/23/24         See note for plan of treatment details and functional goals     Nichol Luis, PT                         I CERTIFY THE NEED FOR THESE SERVICES FURNISHED UNDER        THIS PLAN OF TREATMENT AND WHILE UNDER MY CARE     (Physician attestation of this document indicates review and certification of the therapy plan).              Referring Provider:  Velia Sams  Assessment  See Epic Evaluation- Start of Care Date: 09/25/24

## 2024-09-25 NOTE — PATIENT INSTRUCTIONS
- Can ask doctor if you could take magnesium supplement or any muscle relaxant before bed to help relax muscles.   - Focus on hydration this week. Goal: 1/2 your body weight in oz   - Use your walker at home this week to help with back pain, energy conservation and fall risk.

## 2024-09-27 DIAGNOSIS — Q05.2 SPINA BIFIDA OF LUMBAR REGION WITH HYDROCEPHALUS (H): Primary | ICD-10-CM

## 2024-10-01 ENCOUNTER — THERAPY VISIT (OUTPATIENT)
Dept: PHYSICAL THERAPY | Facility: CLINIC | Age: 39
End: 2024-10-01
Attending: NURSE PRACTITIONER
Payer: COMMERCIAL

## 2024-10-01 DIAGNOSIS — Q05.2 SPINA BIFIDA OF LUMBAR REGION WITH HYDROCEPHALUS (H): Primary | ICD-10-CM

## 2024-10-01 PROCEDURE — 97110 THERAPEUTIC EXERCISES: CPT | Mod: GP | Performed by: PHYSICAL THERAPIST

## 2024-10-01 PROCEDURE — 97140 MANUAL THERAPY 1/> REGIONS: CPT | Mod: GP | Performed by: PHYSICAL THERAPIST

## 2024-10-16 ENCOUNTER — THERAPY VISIT (OUTPATIENT)
Dept: OCCUPATIONAL THERAPY | Facility: CLINIC | Age: 39
End: 2024-10-16
Attending: NURSE PRACTITIONER
Payer: COMMERCIAL

## 2024-10-16 DIAGNOSIS — Z78.9 IMPAIRED MOBILITY AND ADLS: ICD-10-CM

## 2024-10-16 DIAGNOSIS — Z74.09 IMPAIRED MOBILITY AND ADLS: ICD-10-CM

## 2024-10-16 DIAGNOSIS — Q05.7 SPINA BIFIDA OF LUMBAR REGION WITHOUT HYDROCEPHALUS (H): Primary | ICD-10-CM

## 2024-10-16 DIAGNOSIS — M62.81 MUSCLE WEAKNESS (GENERALIZED): ICD-10-CM

## 2024-10-16 PROCEDURE — 97110 THERAPEUTIC EXERCISES: CPT | Mod: GO | Performed by: OCCUPATIONAL THERAPIST

## 2024-10-16 NOTE — PROGRESS NOTES
10/16/24 0500   Appointment Info   Treating Provider Harry Wheeler, OTR/L   Total/Authorized Visits [facility]   Visits Used Visit 7, 7 of 10   Medical Diagnosis Spina bifida of lumbar region without hydrocephalus (H) (Q05.7)  History of tethered spinal cord (Z86.69)   OT Tx Diagnosis Spina bifida of lumbar region without hydrocephalus (H) (Q05.7) History of tethered spinal cord (Z86.69), muscle weakness, decline in mobility and ADLs   Progress Note/Certification   Start Of Care Date 07/15/24   Onset of Illness/Injury or Date of Surgery 05/10/24   Therapy Frequency one time per week   Predicted Duration up to 90 days   Certification date from 07/15/24   Certification date to 10/13/24   KX Modifier Statement I certify the need for these services furnished under this plan of treatment and while under my care.  (Physician co-signature of this document indicates review and certification of the therapy plan)       Present No   Goals   OT Goals 1;2;3;4;5   OT Goal 1   Goal Identifier AD/AE   Goal Description Patient to verbalize understanding of and demonstrate use of 3-5 new pieces of adaptive equipment and/or adapted techniques to increase independence and safety with ADLs and IADLs.   Goal Progress Pt educated on positioning, AD available for safety at home setting. Pt to continue with PT for mobility AD. Goal MET   Target Date 01/11/25   Date Met 10/16/24   OT Goal 2   Goal Identifier BUE HEP   Goal Description Pt will demonstrate understanding of BUE HEP in order to increase safety and independence with ADL tasks.   Goal Progress progressing BUE HEP, continue   Target Date 01/11/25   OT Goal 3   Goal Identifier    Goal Description Pt will demonstrate increased bilateral hand  strength by 15% in order to increase safety with ADL tasks.   Goal Progress theraputty HEP implemented, continue   Target Date 01/11/25   OT Goal 4   Goal Identifier nerve pain   Goal Description Pt to identify 3-5  self nerve pain mgmt strategies in order to manage symptoms for increased independence with ADL/IADL tasks.   Goal Progress pt educated with positioning techniques, nerve gliding exercises. Continue   Target Date 01/11/25   OT Goal 5   Goal Identifier sleep   Goal Description Pt will identify 3-5 sleep hygiene techniques in order to further manage fatigue for increased safety and independence with ADL/IADL tasks.   Goal Progress Education provided, pt continues to have variable sleep performance, seeing PT now for continued fall and pain mgmt. Continue   Target Date 01/11/25   Subjective Report   Subjective Report Pt arrives on time. Pt reports 7/10 pain in the back. Pt now seeing PT again.   Objective Measures   Objective Measures Objective Measure 1   Treatment Interventions (OT)   Interventions Self Care/Home Management;Therapeutic Procedure/Exercise   Therapeutic Procedure/Exercise   Therapeutic Procedure: strength, endurance, ROM, flexibillity minutes (72059) 45   Ther Proc 1 free weight exercises, arm bike   Ther Proc 1 - Details Pt instructed with use of BUE arm bike for bilatearly UE exercise, educated with combination of warm up, stretch, and endurance exericse. Pt completes 10 min while seated with min c/o fatigue, 5 min level 2, 5 min level 1. Pt instructed with use of BUE for daily use to promote strengthening with UE and increase safety and independence with ADL/IADL tasks. Pt educated with use of 2 lb dowel bar and 1 lb free weight resistance with shoulder flex/ext, shoulder ab/adduction, shoulder int/ext rotation, elbow flex/ext, forearm pro/supination. Pt returns demonstration with competency, including 10 reps each exercise. Pt advised to listen to their body, take rest breaks as needed throughout completion, stop activity if there is pain. Pt recommended to complete daily, 1-3 sets, 8-10 reps.   Skilled Intervention Skilled education and training provided to increase  strength and increase  safety and independence with ADL/IADL tasks.   Education   Learner/Method Patient;Listening   Plan   Home program BUE HEP, dowel exercises, stretching   Plan for next session f/u free weights, review and d/c. F/U HEP (theraputty, dowel ex's, nerve gliding, shoulder stretches), positioning for nerve pain, BUE HEP, breathing techniques, sleep hygiene   Comments   Comments Pt has been seen for 6 visits from 7/15/24-10/13/24 to address UE muscle weakness. Pt demonstrates excellent participation with BUE HEP and therapy interventions. Pt is making good progress toward goal areas. Pt continues to demonstrate need for skilled OP occupational therapy for two follow up visits in order to confirm and demonstrate BUE HEP, in order to increase safety and independence with ADL/IADL tasks.   Total Session Time   Timed Code Treatment Minutes 45   Total Treatment Time (sum of timed and untimed services) 45         [facility]                                                                                   OUTPATIENT OCCUPATIONAL THERAPY  {Disappearing TIP  Cert Quick Add :[id number]}  PLAN OF TREATMENT FOR OUTPATIENT REHABILITATION   Patient's Last Name, First Name, Minh Mann YOB: 1985   Provider's Name   [facility]   Medical Record No.  [id number]     Onset Date: 05/10/24 Start of Care Date: 07/15/24     Medical Diagnosis:  Spina bifida of lumbar region without hydrocephalus (H) (Q05.7)  History of tethered spinal cord (Z86.69)      OT Treatment Diagnosis:  Spina bifida of lumbar region without hydrocephalus (H) (Q05.7) History of tethered spinal cord (Z86.69), muscle weakness, decline in mobility and ADLs Plan of Treatment  Frequency/Duration:one time per week/up to 90 days    Certification date from 07/15/24   To 10/13/24        See note for plan of treatment details and functional goals     Harry Wheeler, OT                         I CERTIFY  THE NEED FOR THESE SERVICES FURNISHED UNDER        THIS PLAN OF TREATMENT AND WHILE UNDER MY CARE     (Physician attestation of this document indicates review and certification of the therapy plan).              Referring Provider:  Velia Beckett    Initial Assessment  See Epic Evaluation- 07/15/24

## 2024-10-16 NOTE — PROGRESS NOTES
10/16/24 0500   Appointment Info   Treating Provider Harry Wheeler, OTR/L   Total/Authorized Visits [facility]   Visits Used Visit 7, 7 of 10   Medical Diagnosis Spina bifida of lumbar region without hydrocephalus (H) (Q05.7)  History of tethered spinal cord (Z86.69)   OT Tx Diagnosis Spina bifida of lumbar region without hydrocephalus (H) (Q05.7) History of tethered spinal cord (Z86.69), muscle weakness, decline in mobility and ADLs   Progress Note/Certification   Start Of Care Date 07/15/24   Onset of Illness/Injury or Date of Surgery 05/10/24   Therapy Frequency one time per week   Predicted Duration up to 90 days   Certification date from 10/13/24   Certification date to 01/11/25   KX Modifier Statement I certify the need for these services furnished under this plan of treatment and while under my care.  (Physician co-signature of this document indicates review and certification of the therapy plan)       Present No   Goals   OT Goals 1;2;3;4;5   OT Goal 1   Goal Identifier AD/AE   Goal Description Patient to verbalize understanding of and demonstrate use of 3-5 new pieces of adaptive equipment and/or adapted techniques to increase independence and safety with ADLs and IADLs.   Goal Progress Pt educated on positioning, AD available for safety at home setting. Pt to continue with PT for mobility AD. Goal MET   Target Date 01/11/25   Date Met 10/16/24   OT Goal 2   Goal Identifier BUE HEP   Goal Description Pt will demonstrate understanding of BUE HEP in order to increase safety and independence with ADL tasks.   Goal Progress progressing BUE HEP, continue   Target Date 01/11/25   OT Goal 3   Goal Identifier    Goal Description Pt will demonstrate increased bilateral hand  strength by 15% in order to increase safety with ADL tasks.   Goal Progress theraputty HEP implemented, continue   Target Date 01/11/25   OT Goal 4   Goal Identifier nerve pain   Goal Description Pt to identify 3-5  self nerve pain mgmt strategies in order to manage symptoms for increased independence with ADL/IADL tasks.   Goal Progress pt educated with positioning techniques, nerve gliding exercises. Continue   Target Date 01/11/25   OT Goal 5   Goal Identifier sleep   Goal Description Pt will identify 3-5 sleep hygiene techniques in order to further manage fatigue for increased safety and independence with ADL/IADL tasks.   Goal Progress Education provided, pt continues to have variable sleep performance, seeing PT now for continued fall and pain mgmt. Continue   Target Date 01/11/25   Subjective Report   Subjective Report Pt arrives on time. Pt reports 7/10 pain in the back. Pt now seeing PT again.   Objective Measures   Objective Measures Objective Measure 1   Treatment Interventions (OT)   Interventions Self Care/Home Management;Therapeutic Procedure/Exercise   Therapeutic Procedure/Exercise   Therapeutic Procedure: strength, endurance, ROM, flexibillity minutes (88304) 45   Ther Proc 1 free weight exercises, arm bike   Ther Proc 1 - Details Pt instructed with use of BUE arm bike for bilatearly UE exercise, educated with combination of warm up, stretch, and endurance exericse. Pt completes 10 min while seated with min c/o fatigue, 5 min level 2, 5 min level 1. Pt instructed with use of BUE for daily use to promote strengthening with UE and increase safety and independence with ADL/IADL tasks. Pt educated with use of 2 lb dowel bar and 1 lb free weight resistance with shoulder flex/ext, shoulder ab/adduction, shoulder int/ext rotation, elbow flex/ext, forearm pro/supination. Pt returns demonstration with competency, including 10 reps each exercise. Pt advised to listen to their body, take rest breaks as needed throughout completion, stop activity if there is pain. Pt recommended to complete daily, 1-3 sets, 8-10 reps.   Skilled Intervention Skilled education and training provided to increase  strength and increase  safety and independence with ADL/IADL tasks.   Education   Learner/Method Patient;Listening   Plan   Home program BUE HEP, dowel exercises, stretching   Plan for next session f/u free weights, review and d/c. F/U HEP (theraputty, dowel ex's, nerve gliding, shoulder stretches), positioning for nerve pain, BUE HEP, breathing techniques, sleep hygiene   Comments   Comments Pt has been seen for 6 visits from 7/15/24-10/13/24 to address UE muscle weakness. Pt demonstrates excellent participation with BUE HEP and therapy interventions. Pt is making good progress toward goal areas. Pt continues to demonstrate need for skilled OP occupational therapy for two follow up visits in order to confirm and demonstrate BUE HEP, in order to increase safety and independence with ADL/IADL tasks.   Total Session Time   Timed Code Treatment Minutes 45   Total Treatment Time (sum of timed and untimed services) 45         [facility]                                                                                   OUTPATIENT OCCUPATIONAL THERAPY    PLAN OF TREATMENT FOR OUTPATIENT REHABILITATION   Patient's Last Name, First Name, Minh Mann YOB: 1985   Provider's Name   [facility]   Medical Record No.  [id number]     Onset Date: 05/10/24 Start of Care Date: 07/15/24     Medical Diagnosis:  Spina bifida of lumbar region without hydrocephalus (H) (Q05.7)  History of tethered spinal cord (Z86.69)      OT Treatment Diagnosis:  Spina bifida of lumbar region without hydrocephalus (H) (Q05.7) History of tethered spinal cord (Z86.69), muscle weakness, decline in mobility and ADLs Plan of Treatment  Frequency/Duration:one time per week/up to 90 days    Certification date from 10/13/24   To 01/11/25        See note for plan of treatment details and functional goals     Harry Wheeler, OT                         I CERTIFY THE NEED FOR THESE SERVICES FURNISHED  UNDER        THIS PLAN OF TREATMENT AND WHILE UNDER MY CARE     (Physician attestation of this document indicates review and certification of the therapy plan).              Referring Provider:  Velia Beckett    Initial Assessment  See Epic Evaluation- 07/15/24

## 2024-10-23 ENCOUNTER — THERAPY VISIT (OUTPATIENT)
Dept: OCCUPATIONAL THERAPY | Facility: CLINIC | Age: 39
End: 2024-10-23
Attending: NURSE PRACTITIONER
Payer: COMMERCIAL

## 2024-10-23 DIAGNOSIS — M62.81 MUSCLE WEAKNESS (GENERALIZED): ICD-10-CM

## 2024-10-23 DIAGNOSIS — Q05.7 SPINA BIFIDA OF LUMBAR REGION WITHOUT HYDROCEPHALUS (H): Primary | ICD-10-CM

## 2024-10-23 DIAGNOSIS — Z74.09 IMPAIRED MOBILITY AND ADLS: ICD-10-CM

## 2024-10-23 DIAGNOSIS — Z78.9 IMPAIRED MOBILITY AND ADLS: ICD-10-CM

## 2024-10-23 PROCEDURE — 97110 THERAPEUTIC EXERCISES: CPT | Mod: GO | Performed by: OCCUPATIONAL THERAPIST

## 2024-10-23 NOTE — PROGRESS NOTES
10/23/24 0500   Appointment Info   Treating Provider Harry Wheeler, OTR/L   Total/Authorized Visits [facility]   Visits Used Visit 8, 8 of 10   Medical Diagnosis Spina bifida of lumbar region without hydrocephalus (H) (Q05.7)  History of tethered spinal cord (Z86.69)   OT Tx Diagnosis Spina bifida of lumbar region without hydrocephalus (H) (Q05.7) History of tethered spinal cord (Z86.69), muscle weakness, decline in mobility and ADLs   Progress Note/Certification   Start Of Care Date 07/15/24   Onset of Illness/Injury or Date of Surgery 05/10/24   Therapy Frequency one time per week   Predicted Duration up to 90 days   Certification date from 10/13/24   Certification date to 01/11/25   KX Modifier Statement I certify the need for these services furnished under this plan of treatment and while under my care.  (Physician co-signature of this document indicates review and certification of the therapy plan)       Present No   Goals   OT Goals 1;2;3;4;5   OT Goal 1   Goal Identifier AD/AE   Goal Description Patient to verbalize understanding of and demonstrate use of 3-5 new pieces of adaptive equipment and/or adapted techniques to increase independence and safety with ADLs and IADLs.   Goal Progress Pt educated on positioning, AD available for safety at home setting. Pt to continue with PT for mobility AD. Goal MET   Target Date 01/11/25   Date Met 10/16/24   OT Goal 2   Goal Identifier BUE HEP   Goal Description Pt will demonstrate understanding of BUE HEP in order to increase safety and independence with ADL tasks.   Goal Progress BUE dowel and free weight HEP, GOAL MET   Target Date 01/11/25   Date Met 10/23/24   OT Goal 3   Goal Identifier    Goal Description Pt will demonstrate increased bilateral hand  strength by 15% in order to increase safety with ADL tasks.   Goal Progress theraputty HEP implemented, R: 28 lbs, L: 5 lbs. NOT MET. Pt to continue with HEP.   Target Date 01/11/25   OT  Goal 4   Goal Identifier nerve pain   Goal Description Pt to identify 3-5 self nerve pain mgmt strategies in order to manage symptoms for increased independence with ADL/IADL tasks.   Goal Progress pt educated with positioning techniques, nerve gliding exercises. Pt to continue with PT. GOAL MET.   Target Date 01/11/25   OT Goal 5   Goal Identifier sleep   Goal Description Pt will identify 3-5 sleep hygiene techniques in order to further manage fatigue for increased safety and independence with ADL/IADL tasks.   Goal Progress Education provided, pt continues to have variable sleep performance, seeing PT now for continued fall and pain mgmt. GOAL NOT MET   Target Date 01/11/25   Subjective Report   Subjective Report Pt arrives on time. Pt reports 7/10 pain in the back. Pt needs to  4WW from store.   Objective Measures   Objective Measures Objective Measure 1;Objective Measure 2;Objective Measure 3   Objective Measure 1   Objective Measure  Strength   Details R: 28 lbs, L: 5 lbs   Objective Measure 2   Objective Measure Pinch Strength   Details Lateral - R: 12 lbs, L: 4lbs, 3 pt - R: 10 lbs, L: 4 lbs.   Treatment Interventions (OT)   Interventions Self Care/Home Management;Therapeutic Procedure/Exercise   Therapeutic Procedure/Exercise   Therapeutic Procedure: strength, endurance, ROM, flexibillity minutes (25191) 35   Ther Proc 1 free weight exercises, arm bike   Ther Proc 1 - Details Pt instructed with use of BUE arm bike for bilatearly UE exercise, educated with combination of warm up, stretch, and endurance exericse. Pt completes 5 min while standing with min c/o fatigue, 5 min level 2. Pt instructed with use of BUE for daily use to promote strengthening with UE and increase safety and independence with ADL/IADL tasks. Pt educated with use of 1 lb free weight resistance with shoulder flex/ext, shoulder ab/adduction, shoulder int/ext rotation, elbow flex/ext, forearm pro/supination. Pt returns  demonstration with competency, including 10 reps each exercise. Pt advised to listen to their body, take rest breaks as needed throughout completion, stop activity if there is pain. Pt recommended to complete daily, 1-3 sets, 8-10 reps.   Skilled Intervention Skilled education and training provided to increase  strength and increase safety and independence with ADL/IADL tasks.   Education   Learner/Method Patient;Listening   Plan   Home program BUE HEP, dowel exercises, stretching   Plan for next session discharge   Comments   Comments Pt has been seen for 7 visits from 7/15/24-10/23/24 to address UE muscle weakness. Pt demonstrates excellent participation with BUE HEP and therapy interventions. Pt has made good progress toward goal areas including sleep hygiene interventions, and BUE HEP adherence. Pt with limited progress toward  strength goal areas and nerve pain mgmt areas. Pt to continue with physical therapy to address areas. Pt to discharge from OT at this time.   Total Session Time   Timed Code Treatment Minutes 35   Total Treatment Time (sum of timed and untimed services) 35         DISCHARGE  Reason for Discharge: No further expectation of progress.    Equipment Issued: BUE HEP    Discharge Plan: Patient to continue home program.    Referring Provider:  Velia Beckett

## 2024-10-30 ENCOUNTER — THERAPY VISIT (OUTPATIENT)
Dept: PHYSICAL THERAPY | Facility: CLINIC | Age: 39
End: 2024-10-30
Attending: NURSE PRACTITIONER
Payer: COMMERCIAL

## 2024-10-30 DIAGNOSIS — Q05.2 SPINA BIFIDA OF LUMBAR REGION WITH HYDROCEPHALUS (H): Primary | ICD-10-CM

## 2024-10-30 PROCEDURE — 97140 MANUAL THERAPY 1/> REGIONS: CPT | Mod: GP | Performed by: PHYSICAL THERAPIST

## 2024-10-30 PROCEDURE — 97110 THERAPEUTIC EXERCISES: CPT | Mod: GP | Performed by: PHYSICAL THERAPIST

## 2024-11-12 ENCOUNTER — THERAPY VISIT (OUTPATIENT)
Dept: PHYSICAL THERAPY | Facility: CLINIC | Age: 39
End: 2024-11-12
Attending: NURSE PRACTITIONER
Payer: COMMERCIAL

## 2024-11-12 DIAGNOSIS — Q05.2 SPINA BIFIDA OF LUMBAR REGION WITH HYDROCEPHALUS (H): Primary | ICD-10-CM

## 2024-11-12 PROCEDURE — 97140 MANUAL THERAPY 1/> REGIONS: CPT | Mod: GP | Performed by: PHYSICAL THERAPIST

## 2025-01-05 ENCOUNTER — HEALTH MAINTENANCE LETTER (OUTPATIENT)
Age: 40
End: 2025-01-05

## 2025-01-07 ENCOUNTER — THERAPY VISIT (OUTPATIENT)
Dept: PHYSICAL THERAPY | Facility: CLINIC | Age: 40
End: 2025-01-07
Attending: NURSE PRACTITIONER
Payer: COMMERCIAL

## 2025-01-07 DIAGNOSIS — Q05.2 SPINA BIFIDA OF LUMBAR REGION WITH HYDROCEPHALUS (H): Primary | ICD-10-CM

## 2025-01-07 PROCEDURE — 97110 THERAPEUTIC EXERCISES: CPT | Mod: GP | Performed by: PHYSICAL THERAPIST

## 2025-01-07 NOTE — PATIENT INSTRUCTIONS
- Call neurology to schedule a follow up appointment in March.   - Can request new orders for orthotics appointment to get heel lift for left shoe, as your left leg is slightly shorter than your right.

## 2025-01-08 NOTE — PROGRESS NOTES
01/07/25 0500   Appointment Info   Signing clinician's name / credentials Nichol Luis DPT   Total/Authorized Visits [facility]   Visits Used 6/10   Medical Diagnosis Spina bifida of lumbar region with hydrocephalus (H) (Q05.2)   PT Tx Diagnosis force production deficit.   Other pertinent information Order date: 9/20/24   Progress Note/Certification   Start of Care Date 09/25/24   Onset of illness/injury or Date of Surgery 09/20/24  (order date)   Therapy Frequency 1x/week   Predicted Duration Up to 90 days   Certification date from 12/23/24   Certification date to 03/13/25       Present No    ID or First/Last Name Patient declining intrepreter use - waiver signed   GOALS   PT Goals 4;5   PT Goal 1   Goal Identifier Transfers   Goal Description Patient to demo the ability to perform STS transfer with normal lean forward, equal weight bearing on LEs and minimal back pain with AAD for ease of movement and improved functional mobility/strengthening of LEs.   Goal Progress 01/07/25 - Working on this at home, still very difficult when pain is bad. 10/30 - progressing: less back extension, noted.   Target Date 03/13/25   PT Goal 2   Goal Identifier Standing tolerance   Goal Description Patient to improve the ability to stand from 2 minutes up to 5+ minutes with minimal back pain in order to make her tea/coffee in the morning with ease.   Goal Progress 1/7/25 - Reports cannot stand very long. Limited due to nerve pain. About the same.   Target Date 03/13/25   PT Goal 3   Goal Identifier Pain management   Goal Description Patient to demo and/or verbalize implementing up to 3 new pain management strategies to reduce low back and R leg pain to manageable level to improve all functional mobility and the ability to sit with equal weight distribution on both sides of body.   Goal Progress 1/7/25 - Little bit different for sleep: medication before bed. Sit on ball and move back. using walker  helps pain.  - added tennis ball releases to HEP, stretching and heat also.   Target Date 25   PT Goal 4   Goal Identifier Fall Risk   Goal Description Patient to perform TUG in 13.5 sec or less with AAD and report no falls for 3+ weeks as a result of improved balance, pain and strength   Goal Progress 25 - TU.3 sec with 4WW, much more stable. Only 1 fall/week now at home, much improved from eval.  - picked up new walker, reports less falls than before.   Target Date 25   PT Goal 5   Goal Identifier Walking   Goal Description Patient to demo the ability to ambulate for up to 6 minutes with AAD and reports of tolerable levels of back pain as a result of improve endurance and strength   Goal Progress 25 - not tested today due to flare in LB pain symptoms. 10/30 - patient ambulated 292 feet today with 4WW, see below.   Target Date 25   Subjective Report   Subjective Report This week, pain is bad hard time going to the bathroom. The week before was better. Use a high chair at home, using walker using walker. Last week only 1 fall. Yesterday 1 fall. Not bad, got caught.   Objective Measure 1   Objective Measure NuStep   Details Seat 7 arms 8, level 3 x 10:34 min, 0.44 miles.   Objective Measure 2   Objective Measure TUG   Details 18.3 sec with use of 4WW   Therapeutic Procedure/Exercise   Therapeutic Procedures: strength, endurance, ROM, flexibility minutes (22320) 38   Ther Proc 1 NuStep   Ther Proc 1 - Details For strength and conditioning as noted above.   Ther Proc 2 TUG for reassessment   Ther Proc 2 - Details See above.   PTRx Ther Proc 1 Leg length check   PTRx Ther Proc 1 - Details In supine checked leg length difference - left leg about 3cm shorter, likely due to structural abnormalities in low back/pelvis. Tried small wedge for shoe and patient notes improvement. Will be seeing PCP tomorrow and PT recommends asking for orthotics orders to get heel wedge for L shoe.  Patient voices understanding.   Education   Learner/Method Patient   Education Comments above.   Plan   Plan for next session Falls? Cont MT, review HEP vs. NuStep, 6MWt with walker after manual work?   Total Session Time   Timed Code Treatment Minutes 38   Total Treatment Time (sum of timed and untimed services) 38         [facility]                                                                                   OUTPATIENT PHYSICAL THERAPY    PLAN OF TREATMENT FOR OUTPATIENT REHABILITATION   Patient's Last Name, First Name, Minh Mann YOB: 1985   Provider's Name   [facility]   Medical Record No.  [id number]     Onset Date: 09/20/24 (order date)  Start of Care Date: 09/25/24     Medical Diagnosis:  Spina bifida of lumbar region with hydrocephalus (H) (Q05.2)      PT Treatment Diagnosis:  force production deficit. Plan of Treatment  Frequency/Duration: 1x/week/ Up to 90 days    Certification date from 12/23/24 to 03/13/25         See note for plan of treatment details and functional goals     Nichol Luis, PT                         I CERTIFY THE NEED FOR THESE SERVICES FURNISHED UNDER        THIS PLAN OF TREATMENT AND WHILE UNDER MY CARE     (Physician attestation of this document indicates review and certification of the therapy plan).              Referring Provider:  Velia Beckett    Initial Assessment  See Epic Evaluation- Start of Care Date: 09/25/24

## 2025-02-03 ENCOUNTER — MEDICAL CORRESPONDENCE (OUTPATIENT)
Dept: HEALTH INFORMATION MANAGEMENT | Facility: CLINIC | Age: 40
End: 2025-02-03
Payer: COMMERCIAL

## 2025-02-24 ENCOUNTER — THERAPY VISIT (OUTPATIENT)
Dept: PHYSICAL THERAPY | Facility: CLINIC | Age: 40
End: 2025-02-24
Attending: NURSE PRACTITIONER
Payer: COMMERCIAL

## 2025-02-24 DIAGNOSIS — Q05.2 SPINA BIFIDA OF LUMBAR REGION WITH HYDROCEPHALUS (H): Primary | ICD-10-CM

## 2025-02-24 PROCEDURE — 97110 THERAPEUTIC EXERCISES: CPT | Mod: GP | Performed by: PHYSICAL THERAPIST

## 2025-02-24 NOTE — PROGRESS NOTES
02/24/25 0500   Appointment Info   Signing clinician's name / credentials Nichol Luis DPT   Total/Authorized Visits [facility]   Visits Used 7/10   Medical Diagnosis Spina bifida of lumbar region with hydrocephalus (H) (Q05.2)   PT Tx Diagnosis force production deficit.   Other pertinent information Order date: 9/20/24   Progress Note/Certification   Start of Care Date 09/25/24   Onset of illness/injury or Date of Surgery 09/20/24  (order date)   Therapy Frequency 1x/week   Predicted Duration Up to 90 days   Certification date from 12/23/24   Certification date to 03/13/25       Present No    ID or First/Last Name Patient declining intrepreter use - waiver signed   GOALS   PT Goals 4;5   PT Goal 1   Goal Identifier Transfers   Goal Description Patient to demo the ability to perform STS transfer with normal lean forward, equal weight bearing on LEs and minimal back pain with AAD for ease of movement and improved functional mobility/strengthening of LEs.   Goal Progress 2/24/25 - no change in transfer noted. 01/07/25 - Working on this at home, still very difficult when pain is bad. 10/30 - progressing: less back extension, noted.   Target Date 03/13/25   PT Goal 2   Goal Identifier Standing tolerance   Goal Description Patient to improve the ability to stand from 2 minutes up to 5+ minutes with minimal back pain in order to make her tea/coffee in the morning with ease.   Goal Progress 2/24/25 - Patient reports she can stand 10 minuts before needs to sit. 1/7/25 - Reports cannot stand very long. Limited due to nerve pain. About the same.   Target Date 03/13/25   PT Goal 3   Goal Identifier Pain management   Goal Description Patient to demo and/or verbalize implementing up to 3 new pain management strategies to reduce low back and R leg pain to manageable level to improve all functional mobility and the ability to sit with equal weight distribution on both sides of body.   Goal  Progress  - No change. LB pain about 5-6/10. Reports sleep is different. Can sleep better about 4-5 hours with medication. 25 - Little bit different for sleep: medication before bed. Sit on ball and move back. using walker helps pain.  - added tennis ball releases to HEP, stretching and heat also.   Target Date 25   PT Goal 4   Goal Identifier Fall Risk   Goal Description Patient to perform TUG in 13.5 sec or less with AAD and report no falls for 3+ weeks as a result of improved balance, pain and strength   Goal Progress 25 - TU.3 sec with 4WW, much more stable. Only 1 fall/week now at home, much improved from eval.  - picked up new walker, reports less falls than before.   Target Date 25   Date Met 25   PT Goal 5   Goal Identifier Walking   Goal Description Patient to demo the ability to ambulate for up to 6 minutes with AAD and reports of tolerable levels of back pain as a result of improve endurance and strength   Goal Progress 25 - not tested today due to flare in LB pain symptoms. 10/30 - patient ambulated 292 feet today with 4WW, see below.   Target Date 25   Subjective Report   Subjective Report Reports still falling daily. 1-3x/day. When she feels off balace, sometimes can sit down before she falls. Last Tue fell and hit head, had imaging (okay) neck muscle strain (went to [facility]). better with medication.   Objective Measure 2   Objective Measure TUG   Details 13.2 with 4WW   Objective Measure 3   Objective Measure walking distance:   Details 100 feet, then sat due to R hip pain.   Therapeutic Procedure/Exercise   Therapeutic Procedures: strength, endurance, ROM, flexibility minutes (13732) 42   PTRx Ther Proc 1 Pubic Shot Gun MET   PTRx Ther Proc 1 - Details seated x 5 reps prior to MET   PTRx Ther Proc 2 Seated MET For Anterior Posterior Innominate Rotation   PTRx Ther Proc 2 - Details Seated with hands vs with dowel x 3 reps each. Educ: perform  when back pain is high   PTRx Ther Proc 3 Seated Piriformis Stretch   PTRx Ther Proc 3 - Details 2x 30 sec on R, 1x30 sec on L   PTRx Ther Proc 4 Bridge   PTRx Ther Proc 4 - Details x 8 eps, rest d/t back pain, then 3 more. added to HEP   PTRx Ther Proc 5 Supine Abdominal Exercise #3 (Marching)   PTRx Ther Proc 5 - Details Performed x 15 reps alternating with cues for core engagement   PTRx Ther Proc 6 Roll Outs Hooklying   PTRx Ther Proc 6 - Details with red band x 15 reps with cues for 3 sec hold on outside.   PTRx Ther Proc 7 Clamshell with Theraband   PTRx Ther Proc 7 - Details With yellow band x 10 reps B - cues to keep pelvis stable.   Education   Learner/Method Patient   Education Comments Discharge instructions   Plan   Plan for next session Discharged from PT         DISCHARGE  Reason for Discharge: No further expectation of progress. PT recommends following up with neurology or PCP for better pain management, as patient has not made significant progress in PT despite working with me since 9/25/24. Can return to PT once, pain management is improved.     Equipment Issued: N/A    Discharge Plan: Patient to continue home program.    Referring Provider:  Velia Beckett

## 2025-02-27 ENCOUNTER — PRE VISIT (OUTPATIENT)
Dept: UROLOGY | Facility: CLINIC | Age: 40
End: 2025-02-27
Payer: COMMERCIAL

## 2025-02-27 DIAGNOSIS — N31.9 NEUROGENIC BLADDER: Primary | ICD-10-CM

## 2025-02-27 NOTE — TELEPHONE ENCOUNTER
Reason for visit: Follow-up     Relevant information: NGB    Records/imaging/labs/orders: Getting orders and attempting to contact pt to schedule SARAH    Pt called: No need for a call    At Rooming: Virtual Visit    Carlos Manuel Hernandez, EMT-P  2/27/2025  2:39 PM

## 2025-03-03 ENCOUNTER — VIRTUAL VISIT (OUTPATIENT)
Dept: UROLOGY | Facility: CLINIC | Age: 40
End: 2025-03-03
Payer: COMMERCIAL

## 2025-03-03 DIAGNOSIS — N31.9 NEUROGENIC BLADDER: Primary | ICD-10-CM

## 2025-03-03 NOTE — LETTER
3/3/2025       RE: Minh Altamirano  [address]  [facility] [address]     Dear Colleague,    Thank you for referring your patient, Minh Altamirano, to the [facility] [place] at [facility]. Please see a copy of my visit note below.    HPI:  iMnh Altamirano is a 40 year old female w/ NGB 2/2 SB s/p colon augment + mitrofanoff and autologous rectus fascial pubovaginal sling, KATELYNN s/p bulking agent to urethra, history of UTI being seen for follow-up.      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   2024-bulking agent to urethra, effective  6/3/2024-visit with Dr. Staples, [facility] 6x/day    Today  3/3/25  -- complains of incontinence. Would like order for diapers. Feels pressure in kidneys. Catheterization is 7-8x/day.     Exam:  There were no vitals taken for this visit.  A full physical exam could not be performed as this was a telephone visit  General:  Alert and oriented  // Respiratory: No coughing, wheezing, or shortness of breath // Psychiatric: Normal affect, tone, and pace of words    Review of Imaging:  The following imaging exams were independently viewed and interpreted by me and discussed with patient:  R US 2023-IMPRESSION: Mild left hydronephrosis. No right hydronephrosis.     Review of Labs:  The following labs were reviewed by me and discussed with the patient:  No results found for this or any previous visit (from the past 720 hours).  Cr 0.79 25    Assessment & Plan  NGB 2/2 SB s/p colon augment and autologous rectus fascial pubovaginal sling.   KATELYNN s/p bulking agent to urethra -- could consider additional bulking agent  vs. BN closure and Mitrofanoff. But has fascial dehiscence history so prefer not to do another laparotomy. She prefers to just get diapers.   Renal US ordered for now  Will order diapers 2 per day for stress urinary incontinence.    RTC 1 year with renal US with Beckie Fisher      ========================  Beckie Fisher, EDUARDO  [facility]     I acted as a scribe for this note. All portions of the documented history and physical were personally performed by Fady Moore MD as the attending physician.     All portions of the documented history and physical were personally performed by me as the attending physician. I have reviewed and edited the scribe's note as appropriate to reflect my personal interactions with the patient.      Fady Moore MD  [facility] [place]    ==========================      Additional Coding Information:    Problems:  4 -- two or more stable chronic illnesses    Data Reviewed      Tests ordered: renal US    Level of risk:  3 -- low risk (e.g., OTC medication or observation, minor surgery without risks)                Again, thank you for allowing me to participate in the care of your patient.      Sincerely,    Fady Moore MD

## 2025-03-03 NOTE — PROGRESS NOTES
HPI:  Minh Altamirano is a 40 year old female w/ NGB 2/2 SB s/p colon augment + mitrofanoff and autologous rectus fascial pubovaginal sling, KATELYNN s/p bulking agent to urethra, history of UTI being seen for follow-up.      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   2024-bulking agent to urethra, effective  6/3/2024-visit with Dr. Staples, [facility] 6x/day    Today  3/3/25  -- complains of incontinence. Would like order for diapers. Feels pressure in kidneys. Catheterization is 7-8x/day.     Exam:  There were no vitals taken for this visit.  A full physical exam could not be performed as this was a telephone visit  General:  Alert and oriented  // Respiratory: No coughing, wheezing, or shortness of breath // Psychiatric: Normal affect, tone, and pace of words    Review of Imaging:  The following imaging exams were independently viewed and interpreted by me and discussed with patient:  R US 2023-IMPRESSION: Mild left hydronephrosis. No right hydronephrosis.     Review of Labs:  The following labs were reviewed by me and discussed with the patient:  No results found for this or any previous visit (from the past 720 hours).  Cr 0.79 25    Assessment & Plan   NGB 2/2 SB s/p colon augment and autologous rectus fascial pubovaginal sling.   KATELYNN s/p bulking agent to urethra -- could consider additional bulking agent vs. BN closure and Mitrofanoff. But has fascial dehiscence history so prefer not to do another laparotomy. She prefers to just get diapers.   Renal US ordered for now  Will order diapers 2 per day for stress urinary incontinence.    RTC 1 year with renal US with Beckie Fisher      ========================  Beckie Ha  EDUARDO Fisher  [facility]     I acted as a scribe for this note. All portions of the documented history and physical were personally performed by Fady Moore MD as the attending physician.     All portions of the documented history and physical were personally performed by me as the attending physician. I have reviewed and edited the scribe's note as appropriate to reflect my personal interactions with the patient.      Fady Moore MD  [facility] [place]    ==========================      Additional Coding Information:    Problems:  4 -- two or more stable chronic illnesses    Data Reviewed      Tests ordered: renal US    Level of risk:  3 -- low risk (e.g., OTC medication or observation, minor surgery without risks)

## 2025-03-03 NOTE — NURSING NOTE
Current patient location: [address]  [facility] [address]    Is the patient currently in the state of [place]? YES    Visit mode: VIDEO    If the visit is dropped, the patient can be reconnected by:VIDEO VISIT: Text to cell phone:   Telephone Information:   Mobile [phone]       Will anyone else be joining the visit? NO  (If patient encounters technical issues they should call [phone]    Are changes needed to the allergy or medication list? Yes pt has medications she is not taking     Are refills needed on medications prescribed by this physician? NO    Rooming Documentation:  Not applicable    Reason for visit: RECHTAWANA MENDOZA

## 2025-04-16 ENCOUNTER — THERAPY VISIT (OUTPATIENT)
Dept: PHYSICAL THERAPY | Facility: CLINIC | Age: 40
End: 2025-04-16
Attending: NURSE PRACTITIONER
Payer: COMMERCIAL

## 2025-04-16 DIAGNOSIS — Q05.2 SPINA BIFIDA OF LUMBAR REGION WITH HYDROCEPHALUS (H): Primary | ICD-10-CM

## 2025-04-16 PROCEDURE — 97110 THERAPEUTIC EXERCISES: CPT | Mod: GP | Performed by: PHYSICAL THERAPIST

## 2025-04-16 PROCEDURE — 97161 PT EVAL LOW COMPLEX 20 MIN: CPT | Mod: GP | Performed by: PHYSICAL THERAPIST

## 2025-04-16 NOTE — PROGRESS NOTES
PHYSICAL THERAPY EVALUATION  Type of Visit: Evaluation        Fall Risk Screen:  Have you fallen 2 or more times in the past year?: Yes  Have you fallen and had an injury in the past year?: Yes      Subjective         Presenting condition or subjective complaint: pain  Date of onset: 03/21/25    Relevant medical history: Asthma   Dates & types of surgery: yes    Prior diagnostic imaging/testing results: MRI; CT scan     Prior therapy history for the same diagnosis, illness or injury: Yes yes    Prior Level of Function  Transfers: Independent  Ambulation: Assistive equipment  ADL: Assistive person  IADL: Childcare, Laundry, Meal preparation    Living Environment  Social support: With family members   Type of home: Apartment/condo   Stairs to enter the home: No       Ramp: No   Stairs inside the home:         Help at home:    Equipment owned: Four-point cane     Employment: No    Hobbies/Interests: swiming reasding    Patient goals for therapy: walking    Pain assessment: Pain present  6/10 low back.  Hips.      Objective     Cognitive Status Examination  Orientation: Oriented to person, place and time   Level of Consciousness: Alert  Follows Commands and Answers Questions: 100% of the time  Personal Safety and Judgement: Intact  Memory: Intact    OBSERVATION: antalgic gait  INTEGUMENTARY: Intact  POSTURE: WFL  PALPATION: na  RANGE OF MOTION: LE ROM WFL  UE ROM WFL  STRENGTH:  NT    BED MOBILITY: Independent    TRANSFERS: Independent    WHEELCHAIR MOBILITY: na    GAIT:   Level of [place]: SBA  Assistive Device(s): None  but forgot cane and walker today - recommend she use either.    Gait Deviations: Antalgic  Gait Distance: 150 ft  Stairs: nt  Lost balance 2x no fall.  RECOMMEND use of her cane or 4ww at home and out of home  See daily note for objective measures.     COORDINATION: ok UE's  MUSCLE TONE: WFL  Back pain limits balance and gait today and at last episode    Assessment & Plan   CLINICAL  IMPRESSIONS  Medical Diagnosis: Spina bifida of lumbar region with hydrocephalus (H) (Q05.2)    Treatment Diagnosis: force production deficit.   Impression/Assessment: Patient is a 40 year old female with pain, decreased gait. Fell yesterday complaints.  The following significant findings have been identified: Pain, Impaired balance, Impaired gait, Impaired muscle performance, and Decreased activity tolerance. These impairments interfere with their ability to perform self care tasks, work tasks, recreational activities, household chores, household mobility, and community mobility as compared to previous level of function.     Clinical Decision Making (Complexity):  Clinical Presentation: Stable/Uncomplicated  Clinical Presentation Rationale: based on medical and personal factors listed in PT evaluation  Clinical Decision Making (Complexity): Low complexity    PLAN OF CARE  Treatment Interventions:  Interventions: Gait Training, Neuromuscular Re-education, Therapeutic Activity, Therapeutic Exercise    Long Term Goals     PT Goal 1  Goal Identifier: Assistive device  Goal Description: pt to report using cane or 4ww at least 50% of time for safety  Target Date: 07/14/25  PT Goal 2  Goal Identifier: HEP  Goal Description: pt to be indep w/ HEP for LE /core strengthening  Target Date: 07/14/25  PT Goal 3  Goal Identifier: Gait  Goal Description: pt to walk 500ft w/ 4ww for short community mobility  Target Date: 07/14/25      Frequency of Treatment: up to 5x  Duration of Treatment: 90 days    Recommended Referrals to Other Professionals:  see pain stimulator doctor 4/24/25  Education Assessment:        Risks and benefits of evaluation/treatment have been explained.   Patient/Family/caregiver agrees with Plan of Care.     Evaluation Time:     PT Eval, Low Complexity Minutes (11546): 24       Signing Clinician: Mary Daniels PT        [facility]                                                                                    OUTPATIENT PHYSICAL THERAPY      PLAN OF TREATMENT FOR OUTPATIENT REHABILITATION   Patient's Last Name, First Name, Minh Mann YOB: 1985   Provider's Name   [facility]   Medical Record No.  [id number]     Onset Date: 03/21/25  Start of Care Date: 04/16/25     Medical Diagnosis:  Spina bifida of lumbar region with hydrocephalus (H) (Q05.2)      PT Treatment Diagnosis:  force production deficit. Plan of Treatment  Frequency/Duration: up to 5x/ 90 days    Certification date from 04/16/25 to 07/14/25         See note for plan of treatment details and functional goals     Mary Daniels, PT                         I CERTIFY THE NEED FOR THESE SERVICES FURNISHED UNDER        THIS PLAN OF TREATMENT AND WHILE UNDER MY CARE     (Physician attestation of this document indicates review and certification of the therapy plan).              Referring Provider:  Velia Beckett    Initial Assessment  See Epic Evaluation- Start of Care Date: 04/16/25

## 2025-04-17 ENCOUNTER — PRE VISIT (OUTPATIENT)
Dept: UROLOGY | Facility: CLINIC | Age: 40
End: 2025-04-17
Payer: COMMERCIAL

## 2025-04-21 ENCOUNTER — VIRTUAL VISIT (OUTPATIENT)
Dept: UROLOGY | Facility: CLINIC | Age: 40
End: 2025-04-21
Payer: COMMERCIAL

## 2025-04-21 DIAGNOSIS — N39.3 FEMALE STRESS INCONTINENCE: ICD-10-CM

## 2025-04-21 DIAGNOSIS — N31.9 NEUROGENIC BLADDER: Primary | ICD-10-CM

## 2025-04-21 RX ORDER — CEFAZOLIN SODIUM 2 G/50ML
2 SOLUTION INTRAVENOUS
OUTPATIENT
Start: 2025-04-21

## 2025-04-21 RX ORDER — CEFAZOLIN SODIUM 2 G/50ML
2 SOLUTION INTRAVENOUS SEE ADMIN INSTRUCTIONS
OUTPATIENT
Start: 2025-04-21

## 2025-04-21 NOTE — NURSING NOTE
Current patient location: [address]  [facility] [address]    Is the patient currently in the state of [place]? YES    Visit mode: VIDEO    If the visit is dropped, the patient can be reconnected by:VIDEO VISIT: Text to cell phone:   Telephone Information:   Mobile [phone]       Will anyone else be joining the visit? NO  (If patient encounters technical issues they should call [phone]    Are changes needed to the allergy or medication list? Pt stated no med changes    Are refills needed on medications prescribed by this physician? NO    Rooming Documentation:  Not applicable    Reason for visit: ENRICO REALF

## 2025-04-21 NOTE — PROGRESS NOTES
HPI:  Minh Altamirano is a 40 year old female w/ NGB 2/2 SB s/p colon augment + mitrofanoff and autologous rectus fascial pubovaginal sling, KATELYNN s/p bulking agent to urethra, history of UTI being seen for follow-up.        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   2024-bulking agent to urethra, effective  6/3/2024-visit with Dr. Staples, [facility] 6x/day     3/3/25  -- complains of incontinence. Feels pressure in kidneys. Catheterization is 7-8x/day.     KATELYNN s/p bulking agent to urethra -- could consider additional bulking agent vs. BN closure and Mitrofanoff. But has fascial dehiscence history so prefer not to do another laparotomy. She prefers to just get diapers.     25:   Incontinence -- using diapers. Had some improvement in the past with bulking agent.   Abdominal or back pain -- catheterizing 6x/day. Feels like she is getting bladder empty. No UTIs or stones.   Fall -- hit her head on Fri. Has headache now. No other symptoms.     Exam:  There were no vitals taken for this visit.  GENERAL: alert and no distress  EYES: Eyes grossly normal to inspection.  No discharge or erythema, or obvious scleral/conjunctival abnormalities.  RESP: No audible wheeze, cough, or visible cyanosis.    SKIN: Visible skin clear. No significant rash, abnormal pigmentation or lesions.  NEURO: Cranial nerves grossly intact.  Mentation and speech appropriate for age.  PSYCH: Appropriate affect, tone, and pace of words    Review of Imaging:  The following imaging exams were independently viewed and interpreted by me and discussed with patient:  SAEED CONTEH on 2025  No hydro, multiple urinary bladder diverticuli    Review of Labs:  The following  labs were reviewed by me and discussed with the patient:  No results found for this or any previous visit (from the past 720 hours).  Creatinine 0.79 02/22/2025    Assessment & Plan   Headache after fall -- I recommend she see her PCP or urgent care today to evaluate. We discussed the risks of head trauma  KATELYNN -- she would like to repeat the bulking agent injection  NGB -- Bladder and kidneys look good on US      ========================  Beckie Fisher, EDUARDO  [facility]     I acted as a scribe for this note. All portions of the documented history and physical were personally performed by Fady Moore MD as the attending physician.     All portions of the documented history and physical were personally performed by me as the attending physician. I have reviewed and edited the scribe's note as appropriate to reflect my personal interactions with the patient.    Fady Moore MD  [facility] [place]    ==========================      Additional Coding Information:    Problems:  4 -- two or more stable chronic illnesses    Data Reviewed    Tests ordered: none    Independent interpretation of a test performed by another physician/other qualified health care professional (not separately reported) - US    Level of risk:  4 -- minor surgery with patient or procedure risks

## 2025-04-21 NOTE — LETTER
2025       RE: Minh Altamirano  [address]  [facility] [address]     Dear Colleague,    Thank you for referring your patient, Minh Altamirano, to the [facility] [place] at [facility]. Please see a copy of my visit note below.    HPI:  Minh Altamirano is a 40 year old female w/ NGB 2/2 SB s/p colon augment + mitrofanoff and autologous rectus fascial pubovaginal sling, KATELYNN s/p bulking agent to urethra, history of UTI being seen for follow-up.        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   2024-bulking agent to urethra, effective  6/3/2024-visit with Dr. Staples, [facility] 6x/day     3/3/25  -- complains of incontinence. Feels pressure in kidneys. Catheterization is 7-8x/day.     KATELYNN s/p bulking agent to urethra -- could consider additional bulking agent vs. BN closure and Mitrofanoff. But has fascial dehiscence history so prefer not to do another laparotomy. She prefers to just get diapers.     25:   Incontinence -- using diapers. Had some improvement in the past with bulking agent.   Abdominal or back pain -- catheterizing 6x/day. Feels like she is getting bladder empty. No UTIs or stones.   Fall -- hit her head on Fri. Has headache now. No other symptoms.     Exam:  There were no vitals taken for this visit.  GENERAL: alert and no distress  EYES: Eyes grossly normal to inspection.  No discharge or erythema, or obvious scleral/conjunctival abnormalities.  RESP: No audible wheeze, cough, or visible cyanosis.    SKIN: Visible skin clear. No significant rash, abnormal pigmentation or lesions.  NEURO: Cranial nerves grossly intact.   Mentation and speech appropriate for age.  PSYCH: Appropriate affect, tone, and pace of words    Review of Imaging:  The following imaging exams were independently viewed and interpreted by me and discussed with patient:  R US on 4/14/2025  No hydro, multiple urinary bladder diverticuli    Review of Labs:  The following labs were reviewed by me and discussed with the patient:  No results found for this or any previous visit (from the past 720 hours).  Creatinine 0.79 02/22/2025    Assessment & Plan  Headache after fall -- I recommend she see her PCP or urgent care today to evaluate. We discussed the risks of head trauma  KATELYNN -- she would like to repeat the bulking agent injection  NGB -- Bladder and kidneys look good on US      ========================  Beckie Fisher, EDUARDO  [facility]     I acted as a scribe for this note. All portions of the documented history and physical were personally performed by Fady Moore MD as the attending physician.     All portions of the documented history and physical were personally performed by me as the attending physician. I have reviewed and edited the scribe's note as appropriate to reflect my personal interactions with the patient.    Fady Moore MD  [facility] [place]    ==========================      Additional Coding Information:    Problems:  4 -- two or more stable chronic illnesses    Data Reviewed    Tests ordered: none    Independent interpretation of a test performed by another physician/other qualified health care professional (not separately reported) - US    Level of risk:  4 -- minor surgery with patient or procedure risks                  Again, thank you for allowing me to participate in the care of your patient.      Sincerely,    Fady Moore MD

## 2025-04-23 ENCOUNTER — THERAPY VISIT (OUTPATIENT)
Dept: PHYSICAL THERAPY | Facility: CLINIC | Age: 40
End: 2025-04-23
Attending: NURSE PRACTITIONER
Payer: COMMERCIAL

## 2025-04-23 DIAGNOSIS — Q05.2 SPINA BIFIDA OF LUMBAR REGION WITH HYDROCEPHALUS (H): Primary | ICD-10-CM

## 2025-04-23 PROCEDURE — 97110 THERAPEUTIC EXERCISES: CPT | Mod: GP | Performed by: PHYSICAL THERAPIST

## 2025-04-23 NOTE — PATIENT INSTRUCTIONS
4/23/25    Tomorrow 4/24 you have pain stimulator appt at 9am.  This is for an evaluation and explanation of the pain stimulator.     I see you 4/30 and then we will be done with PT.     See what the doctor says about the pain stimulator.    PT has not helped your pain unfortunately.

## 2025-04-24 ENCOUNTER — OFFICE VISIT (OUTPATIENT)
Dept: ANESTHESIOLOGY | Facility: CLINIC | Age: 40
End: 2025-04-24
Attending: NURSE PRACTITIONER
Payer: COMMERCIAL

## 2025-04-24 ENCOUNTER — TELEPHONE (OUTPATIENT)
Dept: UROLOGY | Facility: CLINIC | Age: 40
End: 2025-04-24

## 2025-04-24 VITALS
SYSTOLIC BLOOD PRESSURE: 104 MMHG | RESPIRATION RATE: 16 BRPM | DIASTOLIC BLOOD PRESSURE: 72 MMHG | HEART RATE: 83 BPM | OXYGEN SATURATION: 96 %

## 2025-04-24 DIAGNOSIS — Z86.69 HISTORY OF TETHERED SPINAL CORD: ICD-10-CM

## 2025-04-24 DIAGNOSIS — M96.1 POSTLAMINECTOMY SYNDROME: Primary | ICD-10-CM

## 2025-04-24 DIAGNOSIS — Q05.2 SPINA BIFIDA OF LUMBAR REGION WITH HYDROCEPHALUS (H): ICD-10-CM

## 2025-04-24 ASSESSMENT — PAIN SCALES - GENERAL: PAINLEVEL_OUTOF10: SEVERE PAIN (7)

## 2025-04-24 NOTE — PROGRESS NOTES
[facility] Interventional Evaluation    Date of visit: 4/24/2025    Reason for consultation:    Minh Altamirano is a 40-year-old female who is seen in consultation today for evaluation of an interventional strategy for pain management.    PCP is Jonathan Goldman.    Please see the [facility] health questionnaire which the patient completed and reviewed with me in detail.    Chief Complaint:    Chief Complaint   Patient presents with    Pain    Back Pain    Consult     Chronic back pain       Pain history:  Minh Altamirano is a 40-year-old female presenting with a chief pain complaint of chronic back pain in the setting of spina bifida and tethered spinal cord s/p laminectomy in 2018 who presents as a consult from neurosurgery for evaluation for SCS.    She has been seen before in this clinic in 2022 by Dr. Pittman and later by Dr. Valladares. At that time conservative management was recommended before considering SCS. PT ordered. Titrated gabapentin and also considered duloxetine and TCAs.    Onset: Bilateral back pain since childhood, Had improved after surgery in 2010, now with return of pain around 2021  Location and Radiation: Bilateral low back pain with BLE radiating pain (right>left)  Provoking: Walking, prolonged sitting, prolonged standing  Palliating: Lying down  Quality: BLE pain felt as an electric shock type pain, dull ache in the back,  Severity: Ranges from 6/10 to 9/10, average of 7-8  Timing: Constant but varies in intensity  Numbness: Posterior RLE numbness down to the plantar and dorsal foot. This occurs occasionally on the LLE as well on days with more intense pain in a similar pattern.  Weakness: Some global weakness, especially RLE with walking, no change in this weakness    Reports trouble sleeping. No side effects noted from current medications.    Patient denies red flag symptoms of corresponding bowel/bladder symptoms,  fever/chills, saddle anesthesia, profound motor loss, weight loss, or sudden unremitting increase in pain.    Current pain medications include:  Ibuprofen 400 mg qNoon PRN, helpful  Tylenol 650 mg BID PRN, helpful  Gabapentin 600 mg TID, helpful    Previous medication treatments included:  None    Other treatments have included:  Minh Altamirano has been seen at a pain clinic in the past, here in  with Dr. Valladares and Dr. Pittman at separate visits.  PT: Ongoing still, helpful especially when she goes regularly or does HEP regularly  Injections: None  Surgery:  L5- S1 laminectomy with partial untethering of spinal cord and partial lipoma removal   Alternative: None    Past Medical History:  Past Medical History:   Diagnosis Date    Anemia     Chronic infection     MRSA    Constipation, chronic     Incontinence of urine     Lipoma of spinal cord     Migraine     neurogenic bladder     Spinal dysraphism (H)      Patient Active Problem List    Diagnosis Date Noted    Female stress incontinence 2024     Priority: Medium    Missed  2019     Priority: Medium     Added automatically from request for surgery [id number]      S/P  2018     Priority: Medium    Caries 2018     Priority: Medium    History of lumbar laminectomy 2018     Priority: Medium    Female infertility 2017     Priority: Medium    Uterine prolapse 2016     Priority: Medium    Renal cyst 2014     Priority: Medium    Organic sleep disorder 2013     Priority: Medium    Neurogenic bladder 2012     Priority: Medium     Problem list name updated by automated process. Provider to review      Spinal dysraphism (H) 2010     Priority: Medium       Past Surgical History:  Past Surgical History:   Procedure Laterality Date    BLADDER AUGMENTATION  2012    Procedure:BLADDER AUGMENTATION; Surgeon:TRINA COLLADO; [facility]     SECTION N/A 2018    Procedure:   SECTION;  Primary  Section  with classical incision;  Surgeon: Lily Villanueva MD;  [facility]    CYSTOSCOPY, INJECT COLLAGEN, COMBINED N/A 2024    Procedure: CYSTOSCOPY, WITH PERIURETHRAL BULKING AGENT INJECTION (BULKAMID);  Surgeon: Trina Moore MD;  [facility]    CYSTOSCOPY, INTRAVESICAL INJECTION N/A 2016    Procedure: CYSTOSCOPY, INTRAVESICAL INJECTION;  Surgeon: Trina Moore MD;  [facility]    DILATION AND CURETTAGE SUCTION N/A 2019    Procedure: suction dilation and curetage,;  Surgeon: Radha Hickey MD;  [facility]    EXAM UNDER ANESTHESIA PELVIC N/A 2019    Procedure: pelvic exam under anesthesia;  Surgeon: Radha Hickey MD;  [facility]    LAMINECTOMY LUMBAR TWO LEVELS      LAPAROTOMY EXPLORATORY  2012    Procedure:LAPAROTOMY EXPLORATORY; Exploratory Laparotomy with Takedown of Mitrofanoff, Ventral Hernia Repair with Strattice Mesh implantation ; Surgeon:TRINA MOORE; [facility]    MITROFANOFF PROCEDURE (APPENDIX CONDUIT)  2012    Procedure:MITROFANOFF PROCEDURE (APPENDIX CONDUIT); Bladder Augmentation with Right Colon, Appendix Conduit- Mitrofanoff, Insertion of Pubovaginal Sling using Autologous Rectus Fascia; Surgeon:TRINA MOORE; [facility]    tumor resection and cord detethering       Medications:  Current Outpatient Medications   Medication Sig Dispense Refill    acetaminophen (TYLENOL) 325 MG tablet Take 1-2 tablets (325-650 mg) by mouth every 6 hours as needed for mild pain 20 tablet 0    albuterol (PROAIR HFA/PROVENTIL HFA/VENTOLIN HFA) 108 (90 Base) MCG/ACT inhaler Inhale 2 puffs into the lungs every 4 hours      cholecalciferol (VITAMIN D3) 125 mcg (5000 units) capsule TAKE 1 CAPSULE BY MOUTH DAILY AS DIRECTED      famotidine (PEPCID) 20 MG tablet       gabapentin (NEURONTIN) 300 MG capsule TAKE 1 CAPSULE BY MOUTH THREE TIMES DAILY      gabapentin (NEURONTIN)  600 MG tablet TAKE ONE TABLET BY MOUTH THREE TIMES A DAY FOR 30 DAYS      ibprofen (MOTRIN) 600 MG ED starter pack Ibuprofen      ibuprofen (ADVIL/MOTRIN) 100 MG tablet Take 100 mg by mouth every 4 hours as needed      nitroFURantoin macrocrystal-monohydrate (MACROBID) 100 MG capsule Take 1 capsule (100 mg) by mouth 2 times daily 14 capsule 0    ondansetron (ZOFRAN ODT) 4 MG ODT tab Take 1 tablet (4 mg) by mouth every 8 hours as needed for nausea 10 tablet 0    polyethylene glycol (MIRALAX) 17 g packet Take 17 g by mouth daily 72 packet 0    Prenatal Multivit-Min-Fe-FA (PRENATAL 1 + IRON OR) Prenatal      Prenatal Multivit-Min-Fe-FA (PRENATAL, W/IRON & FA,) 27-0.8 MG TABS 1 tablet Orally Once a day for 30 days      Prenatal Vit-Fe Fumarate-FA (PRENATAL MULTIVITAMIN W/IRON) 27-0.8 MG tablet Take 1 tablet by mouth daily 90 tablet 3    Prenatal Vit-Fe Fumarate-FA (PRENATAL VITAMINS) 28-0.8 MG TABS TAKE ONE (1) TABLET BY MOUTH ONCE A DAY      SENNA-docusate sodium (SENNA S) 8.6-50 MG tablet Take 1-2 tablets by mouth 2 times daily as needed (constipation) 60 tablet 0    simvastatin (ZOCOR) 10 MG tablet 1 tablet in the evening Orally Once a day for 90 days      sulfamethoxazole-trimethoprim (BACTRIM DS) 800-160 MG tablet TAKE ONE TABLET BY MOUTH TWICE A DAY FOR 10 DAY(S)      traZODone (DESYREL) 100 MG tablet TAKE ONE (1) TABLET BY MOUTH ONCE DAILY AT BEDTIME AS NEEDED FOR SLEEP FOR 90 DAYS      traZODone (DESYREL) 50 MG tablet 50 mg.      vitamin D3 (CHOLECALCIFEROL) 50 mcg (2000 units) tablet TAKE ONE (1) TABLET BY MOUTH ONCE A DAY      VITAMIN D3 50 MCG (2000 UT) CAPS Take 1 capsule by mouth daily       Allergies:     Allergies   Allergen Reactions    Naproxen Hives and Nausea and Vomiting    Bactrim [Sulfamethoxazole W/Trimethoprim] Itching    Hydrocodone-Acetaminophen Itching    Vicodin [Hydrocodone-Acetaminophen] Itching         Family history:  No family history on file.    Physical Exam:  Vitals:    04/24/25 0920    BP: 104/72   Pulse: 83   Resp: 16   SpO2: 96%     Exam:  Constitutional: Well developed, NAD, uses cane for ambulation  Head: normocephalic. Atraumatic.   Eyes: no redness or jaundice noted   CV: warm, well perfused extremities   Skin: no suspicious lesions or rashes   Psychiatric: mentation and affect appears normal     Neuromuscular Examination:  Gait: antalgic, using cane for support  Lumbar spine: More pain with flexion than extension but both painful. Mild flexion limitation of ROM.  Bilateral lumbar paraspinal tenderness, SI joint tenderness and GT tenderness  Light touch diminished in RLE throughout, mainly posterior thigh, all of calf and throughout the foot  RLE 4+/5 throughout in HF, KE, DF, PF. LLE 5/5 throughout. She reports this is not new      Other testing (labs, diagnostics) reviewed:  Labs  Lab Results   Component Value Date    WBC 12.0 (H) 02/14/2023    HGB 15.0 02/14/2023    HCT 44.1 02/14/2023     02/14/2023     02/14/2023    POTASSIUM 4.1 02/14/2023    CHLORIDE 105 02/14/2023    CO2 16 (L) 02/14/2023    BUN 12.7 02/14/2023    CR 0.61 12/01/2023    GLC 90 02/14/2023    DD 2.4 (H) 01/27/2012    NTBNPI 58 01/27/2012    TROPONIN 0.00 01/27/2012    TROPI <0.012 12/17/2010    AST 27 02/14/2023    ALT 17 02/14/2023    ALKPHOS 82 02/14/2023    BILITOTAL 0.5 02/14/2023    INR 1.23 (H) 01/16/2012         Assessment:    1. Spina bifida of lumbar region with hydrocephalus (H)  2. History of tethered spinal cord  3. Postlaminectomy syndrome (Primary)  4. Lumbosacral radiculopathy    Minh Altamirano is a 40-year-old female presenting with a chief pain complaint of chronic back with BLE radicular symptoms (R>L)  in the setting of spina bifida and tethered spinal cord s/p laminectomy in 2018 who presents as a consult from neurosurgery for evaluation for SCS. Her radicular pain is more bothersome than her back pain. Her presentation is most consistent with postlaminectomy syndrome with concurrent  lumbar radiculopathy. Discussed treatment options including therapies, medication changes and interventions. She would be a candidate for an SCS trial though she is not interested in it at this time. She would like to further try conservative management first. Recommend continuing PT and HEP and increasing gabapentin HS dose to 1200 mg. Placed chiropractic referral per patient request. She is potentially interested in a caudal JOEY but would also like to wait on that. Follow up as needed.    Plan:  Diagnosis reviewed, treatment option addressed, and risk/benefits discussed.     Procedures:   Future caudal JOEY, when patient is ready for this  Can still consider SCS in the future, though patient is not interested in a trial at this time.  Physical Therapy:   Continue PT and HEP  Diagnostic Studies:   Reviewed lumbar MRI today  Medication Management:   Increase gabapentin to 600 mg-600 mg -1200 mg. Will use existing supply at this time but if needed, we could take over the prescription in the future.  Referral  Chiropractic referral  Follow up:   As needed.    Kg Fulton MD  Pain Fellow, [facility]

## 2025-04-24 NOTE — PATIENT INSTRUCTIONS
Medications:    Gabapentin 600 mg in the am, 600 mg in the afternoon, and increase the night time dose to 1200 mg. Please discuss this with your current prescriber about refilling or let us know if you have issues and Dr. Smith can prescribe.      Referrals:    Chiropractor Referral placed.        Treatment planning:    Call us if you would like a Caudal Epidural Injection.      Recommended Follow up:      Follow up as needed.      To speak with a nurse, schedule/reschedule/cancel a clinic appointment, or request a medication refill call: [phone]    You can also reach us by [employer]: [email or web address]

## 2025-04-24 NOTE — NURSING NOTE
Patient presents with:  Pain  Back Pain  Consult: Chronic back pain      Severe Pain (7)     Pain Medications       Analgesics Other Refills Start End     acetaminophen (TYLENOL) 325 MG tablet 0 11/27/2019 --    Sig - Route: Take 1-2 tablets (325-650 mg) by mouth every 6 hours as needed for mild pain - Oral    Class: E-Prescribe            What medications are you using for pain? IBU, Tylenol    Have you been seen by another pain clinic/ provider? no    Expectations: evaulation

## 2025-04-24 NOTE — NURSING NOTE
RN reviewed AVS with patient. Patient to contact clinic if any questions/concerns. Patient verbalized understanding.    Lilia Salmeron RN

## 2025-04-24 NOTE — LETTER
4/24/2025       RE: Minh Altamirano  [address]  [facility] [place] [facility] [address]     Dear Colleague,    Thank you for referring your patient, Minh Altamirano, to the [facility] [place] at [facility]. Please see a copy of my visit note below.                          [facility] [place] Interventional Evaluation    Date of visit: 4/24/2025    Reason for consultation:    Minh Altamirano is a 40-year-old female who is seen in consultation today for evaluation of an interventional strategy for pain management.    PCP is Jonathan Goldman.    Please see the [facility] health questionnaire which the patient completed and reviewed with me in detail.    Chief Complaint:    Chief Complaint   Patient presents with     Pain     Back Pain     Consult     Chronic back pain       Pain history:  Minh Altamirano is a 40-year-old female presenting with a chief pain complaint of chronic back pain in the setting of spina bifida and tethered spinal cord s/p laminectomy in 2018 who presents as a consult from neurosurgery for evaluation for SCS.    She has been seen before in this clinic in 2022 by Dr. Pittman and later by Dr. Valladares. At that time conservative management was recommended before considering SCS. PT ordered. Titrated gabapentin and also considered duloxetine and TCAs.    Onset: Bilateral back pain since childhood, Had improved after surgery in 2010, now with return of pain around 2021  Location and Radiation: Bilateral low back pain with BLE radiating pain (right>left)  Provoking: Walking, prolonged sitting, prolonged standing  Palliating: Lying down  Quality: BLE pain felt as an electric shock type pain, dull ache in the back,  Severity: Ranges from 6/10 to 9/10, average of 7-8  Timing: Constant but varies in intensity  Numbness: Posterior RLE numbness down to the plantar and  dorsal foot. This occurs occasionally on the LLE as well on days with more intense pain in a similar pattern.  Weakness: Some global weakness, especially RLE with walking, no change in this weakness    Reports trouble sleeping. No side effects noted from current medications.    Patient denies red flag symptoms of corresponding bowel/bladder symptoms, fever/chills, saddle anesthesia, profound motor loss, weight loss, or sudden unremitting increase in pain.    Current pain medications include:  Ibuprofen 400 mg qNoon PRN, helpful  Tylenol 650 mg BID PRN, helpful  Gabapentin 600 mg TID, helpful    Previous medication treatments included:  None    Other treatments have included:  Minh Altamirano has been seen at a pain clinic in the past, here in  with Dr. Valladares and Dr. Pittman at separate visits.  PT: Ongoing still, helpful especially when she goes regularly or does HEP regularly  Injections: None  Surgery:  L5- S1 laminectomy with partial untethering of spinal cord and partial lipoma removal   Alternative: None    Past Medical History:  Past Medical History:   Diagnosis Date     Anemia      Chronic infection     MRSA     Constipation, chronic      Incontinence of urine      Lipoma of spinal cord      Migraine      neurogenic bladder      Spinal dysraphism (H)      Patient Active Problem List    Diagnosis Date Noted     Female stress incontinence 2024     Priority: Medium     Missed  2019     Priority: Medium     Added automatically from request for surgery [id number]       S/P  2018     Priority: Medium     Caries 2018     Priority: Medium     History of lumbar laminectomy 2018     Priority: Medium     Female infertility 2017     Priority: Medium     Uterine prolapse 2016     Priority: Medium     Renal cyst 2014     Priority: Medium     Organic sleep disorder 2013     Priority: Medium     Neurogenic bladder 2012     Priority: Medium      Problem list name updated by automated process. Provider to review       Spinal dysraphism (H) 2010     Priority: Medium       Past Surgical History:  Past Surgical History:   Procedure Laterality Date     BLADDER AUGMENTATION  2012    Procedure:BLADDER AUGMENTATION; Surgeon:TRINA MOORE; [facility]      SECTION N/A 2018    Procedure:  SECTION;  Primary  Section  with classical incision;  Surgeon: Lily Villanueva MD;  [facility]     CYSTOSCOPY, INJECT COLLAGEN, COMBINED N/A 2024    Procedure: CYSTOSCOPY, WITH PERIURETHRAL BULKING AGENT INJECTION (BULKAMID);  Surgeon: Trina Moore MD;  [facility]     CYSTOSCOPY, INTRAVESICAL INJECTION N/A 2016    Procedure: CYSTOSCOPY, INTRAVESICAL INJECTION;  Surgeon: Trina Moore MD;  [facility]     DILATION AND CURETTAGE SUCTION N/A 2019    Procedure: suction dilation and curetage,;  Surgeon: Radha Hickey MD;  [facility]     EXAM UNDER ANESTHESIA PELVIC N/A 2019    Procedure: pelvic exam under anesthesia;  Surgeon: Radha Hickey MD;  [facility]     LAMINECTOMY LUMBAR TWO LEVELS       LAPAROTOMY EXPLORATORY  2012    Procedure:LAPAROTOMY EXPLORATORY; Exploratory Laparotomy with Takedown of Mitrofanoff, Ventral Hernia Repair with Strattice Mesh implantation ; Surgeon:TRINA MOORE; [facility]     MITROFANOFF PROCEDURE (APPENDIX CONDUIT)  2012    Procedure:MITROFANOFF PROCEDURE (APPENDIX CONDUIT); Bladder Augmentation with Right Colon, Appendix Conduit- Mitrofanoff, Insertion of Pubovaginal Sling using Autologous Rectus Fascia; Surgeon:TRINA MOORE; [facility]     tumor resection and cord detethering       Medications:  Current Outpatient Medications   Medication Sig Dispense Refill     acetaminophen (TYLENOL) 325 MG tablet Take 1-2 tablets (325-650 mg) by mouth every 6 hours as needed for mild pain 20 tablet 0      albuterol (PROAIR HFA/PROVENTIL HFA/VENTOLIN HFA) 108 (90 Base) MCG/ACT inhaler Inhale 2 puffs into the lungs every 4 hours       cholecalciferol (VITAMIN D3) 125 mcg (5000 units) capsule TAKE 1 CAPSULE BY MOUTH DAILY AS DIRECTED       famotidine (PEPCID) 20 MG tablet        gabapentin (NEURONTIN) 300 MG capsule TAKE 1 CAPSULE BY MOUTH THREE TIMES DAILY       gabapentin (NEURONTIN) 600 MG tablet TAKE ONE TABLET BY MOUTH THREE TIMES A DAY FOR 30 DAYS       ibprofen (MOTRIN) 600 MG ED starter pack Ibuprofen       ibuprofen (ADVIL/MOTRIN) 100 MG tablet Take 100 mg by mouth every 4 hours as needed       nitroFURantoin macrocrystal-monohydrate (MACROBID) 100 MG capsule Take 1 capsule (100 mg) by mouth 2 times daily 14 capsule 0     ondansetron (ZOFRAN ODT) 4 MG ODT tab Take 1 tablet (4 mg) by mouth every 8 hours as needed for nausea 10 tablet 0     polyethylene glycol (MIRALAX) 17 g packet Take 17 g by mouth daily 72 packet 0     Prenatal Multivit-Min-Fe-FA (PRENATAL 1 + IRON OR) Prenatal       Prenatal Multivit-Min-Fe-FA (PRENATAL, W/IRON & FA,) 27-0.8 MG TABS 1 tablet Orally Once a day for 30 days       Prenatal Vit-Fe Fumarate-FA (PRENATAL MULTIVITAMIN W/IRON) 27-0.8 MG tablet Take 1 tablet by mouth daily 90 tablet 3     Prenatal Vit-Fe Fumarate-FA (PRENATAL VITAMINS) 28-0.8 MG TABS TAKE ONE (1) TABLET BY MOUTH ONCE A DAY       SENNA-docusate sodium (SENNA S) 8.6-50 MG tablet Take 1-2 tablets by mouth 2 times daily as needed (constipation) 60 tablet 0     simvastatin (ZOCOR) 10 MG tablet 1 tablet in the evening Orally Once a day for 90 days       sulfamethoxazole-trimethoprim (BACTRIM DS) 800-160 MG tablet TAKE ONE TABLET BY MOUTH TWICE A DAY FOR 10 DAY(S)       traZODone (DESYREL) 100 MG tablet TAKE ONE (1) TABLET BY MOUTH ONCE DAILY AT BEDTIME AS NEEDED FOR SLEEP FOR 90 DAYS       traZODone (DESYREL) 50 MG tablet 50 mg.       vitamin D3 (CHOLECALCIFEROL) 50 mcg (2000 units) tablet TAKE ONE (1) TABLET BY MOUTH ONCE A  DAY       VITAMIN D3 50 MCG (2000 UT) CAPS Take 1 capsule by mouth daily       Allergies:     Allergies   Allergen Reactions     Naproxen Hives and Nausea and Vomiting     Bactrim [Sulfamethoxazole W/Trimethoprim] Itching     Hydrocodone-Acetaminophen Itching     Vicodin [Hydrocodone-Acetaminophen] Itching         Family history:  No family history on file.    Physical Exam:  Vitals:    04/24/25 0920   BP: 104/72   Pulse: 83   Resp: 16   SpO2: 96%     Exam:  Constitutional: Well developed, NAD, uses cane for ambulation  Head: normocephalic. Atraumatic.   Eyes: no redness or jaundice noted   CV: warm, well perfused extremities   Skin: no suspicious lesions or rashes   Psychiatric: mentation and affect appears normal     Neuromuscular Examination:  Gait: antalgic, using cane for support  Lumbar spine: More pain with flexion than extension but both painful. Mild flexion limitation of ROM.  Bilateral lumbar paraspinal tenderness, SI joint tenderness and GT tenderness  Light touch diminished in RLE throughout, mainly posterior thigh, all of calf and throughout the foot  RLE 4+/5 throughout in HF, KE, DF, PF. LLE 5/5 throughout. She reports this is not new      Other testing (labs, diagnostics) reviewed:  Labs  Lab Results   Component Value Date    WBC 12.0 (H) 02/14/2023    HGB 15.0 02/14/2023    HCT 44.1 02/14/2023     02/14/2023     02/14/2023    POTASSIUM 4.1 02/14/2023    CHLORIDE 105 02/14/2023    CO2 16 (L) 02/14/2023    BUN 12.7 02/14/2023    CR 0.61 12/01/2023    GLC 90 02/14/2023    DD 2.4 (H) 01/27/2012    NTBNPI 58 01/27/2012    TROPONIN 0.00 01/27/2012    TROPI <0.012 12/17/2010    AST 27 02/14/2023    ALT 17 02/14/2023    ALKPHOS 82 02/14/2023    BILITOTAL 0.5 02/14/2023    INR 1.23 (H) 01/16/2012         Assessment:    1. Spina bifida of lumbar region with hydrocephalus (H)  2. History of tethered spinal cord  3. Postlaminectomy syndrome (Primary)  4. Lumbosacral radiculopathy    Minh Altamirano  is a 40-year-old female presenting with a chief pain complaint of chronic back with BLE radicular symptoms (R>L)  in the setting of spina bifida and tethered spinal cord s/p laminectomy in 2018 who presents as a consult from neurosurgery for evaluation for SCS. Her radicular pain is more bothersome than her back pain. Her presentation is most consistent with postlaminectomy syndrome with concurrent lumbar radiculopathy. Discussed treatment options including therapies, medication changes and interventions. She would be a candidate for an SCS trial though she is not interested in it at this time. She would like to further try conservative management first. Recommend continuing PT and HEP and increasing gabapentin HS dose to 1200 mg. Placed chiropractic referral per patient request. She is potentially interested in a caudal JOEY but would also like to wait on that. Follow up as needed.    Plan:  Diagnosis reviewed, treatment option addressed, and risk/benefits discussed.     Procedures:   Future caudal JOEY, when patient is ready for this  Can still consider SCS in the future, though patient is not interested in a trial at this time.  Physical Therapy:   Continue PT and HEP  Diagnostic Studies:   Reviewed lumbar MRI today  Medication Management:   Increase gabapentin to 600 mg-600 mg -1200 mg. Will use existing supply at this time but if needed, we could take over the prescription in the future.  Referral  Chiropractic referral  Follow up:   As needed.    Kg Fulton MD  Pain Fellow, [facility]        Attestation signed by Zhou Smith MD at 4/24/2025 11:14 AM:  Physician Attestation  I saw and evaluated Minh Altamirano.  I agree with above history, review of systems, physical exam and plan. I have reviewed the content of the documentation and have edited it as needed. I have personally performed the services documented here and the documentation accurately represents those services and the decisions I  have made.       Zhou Smith MD, PhD    [facility] [address]  5TH FLOOR  [facility] [id number] [phone]  Dept: [phone]                Again, thank you for allowing me to participate in the care of your patient.      Sincerely,    Zhou Smith MD

## 2025-04-24 NOTE — TELEPHONE ENCOUNTER
UMA via  asking for a call back to schedule with Dr. Moore. Provided call back number of [phone]. Clara Almaraz on 4/24/2025 at 10:05 AM

## 2025-05-15 DIAGNOSIS — R39.89 SUSPECTED UTI: Primary | ICD-10-CM

## 2025-06-04 ENCOUNTER — TELEPHONE (OUTPATIENT)
Dept: UROLOGY | Facility: CLINIC | Age: 40
End: 2025-06-04
Payer: COMMERCIAL

## 2025-06-10 ENCOUNTER — TELEPHONE (OUTPATIENT)
Dept: UROLOGY | Facility: CLINIC | Age: 40
End: 2025-06-10
Payer: COMMERCIAL

## 2025-06-10 NOTE — TELEPHONE ENCOUNTER
Called patient to reschedule surgery on 6/13 with Dr. Moore per patient request as she is sick.     Spoke with: Patient     Date of Surgery: 08/08/2025 - pt request    Estimated Arrival time Discussed with Patient:  No    [place] [facility]     Pre-Op H&P: Instructed to schedule w/ PCP - Jonathan Goldman MD within 10-30 days of surgery     Labs: No     Imaging: No     Post-Op Appt Date: Not indicated in Case Request       Post-Op Imaging Date:  No     Discussed with patient pre-op RN will call 2-3 days prior to surgery with arrival time and instructions:  Yes     Standard Surgery Packet Sent: Yes 06/10/25  via [facility]      Additional Comments: Patient requested to schedule for July/August. Offered additional surgery dates of 7/11, 7/18, and 8/1.     No  used as patient states she does not need an  for scheduling.       All patients questions were answered and was instructed to review surgical packet and call back with any questions or concerns.       Zoey Vila on 6/10/2025 at 12:14 PM

## 2025-06-13 ENCOUNTER — THERAPY VISIT (OUTPATIENT)
Dept: PHYSICAL THERAPY | Facility: CLINIC | Age: 40
End: 2025-06-13
Attending: NURSE PRACTITIONER
Payer: COMMERCIAL

## 2025-06-13 DIAGNOSIS — Q05.2 SPINA BIFIDA OF LUMBAR REGION WITH HYDROCEPHALUS (H): Primary | ICD-10-CM

## 2025-06-13 PROCEDURE — 97530 THERAPEUTIC ACTIVITIES: CPT | Mod: GP | Performed by: PHYSICAL THERAPIST

## 2025-06-14 NOTE — PROGRESS NOTES
DISCHARGE  Reason for Discharge: Patient has met all goals.  No further expectation of progress.    Equipment Issued: has cane and 4ww    Discharge Plan: Patient to continue home program.    Referring Provider:  Velia Beckett

## 2025-07-09 ENCOUNTER — APPOINTMENT (OUTPATIENT)
Dept: INTERPRETER SERVICES | Facility: CLINIC | Age: 40
End: 2025-07-09
Payer: COMMERCIAL

## 2025-07-10 ENCOUNTER — TELEPHONE (OUTPATIENT)
Dept: UROLOGY | Facility: CLINIC | Age: 40
End: 2025-07-10
Payer: COMMERCIAL

## 2025-07-10 NOTE — TELEPHONE ENCOUNTER
Action July 10, 2025 MARISOL 9:08 AM   Action Taken UA/UC lab orders faxed to [facility] at [phone].

## 2025-07-21 ENCOUNTER — TELEPHONE (OUTPATIENT)
Dept: UROLOGY | Facility: CLINIC | Age: 40
End: 2025-07-21
Payer: COMMERCIAL

## 2025-07-21 NOTE — TELEPHONE ENCOUNTER
Spoke with pt and let her know that she will need a pre op prior to surgery on 8/8 and if she set it up what day is it on. Pt states she is ill again and has not set up appt. States she will set up for July. Reminded pt to leave a urine at her appt. Will check in 1 week to see how pt is doing and if appt is set up.     Aimee Goldberg  [facility]  Phone: [phone]

## 2025-07-28 ENCOUNTER — TELEPHONE (OUTPATIENT)
Dept: UROLOGY | Facility: CLINIC | Age: 40
End: 2025-07-28
Payer: COMMERCIAL

## 2025-07-28 DIAGNOSIS — N31.9 NEUROGENIC BLADDER: ICD-10-CM

## 2025-07-28 DIAGNOSIS — N13.30 HYDRONEPHROSIS, UNSPECIFIED HYDRONEPHROSIS TYPE: Primary | ICD-10-CM

## 2025-07-30 ENCOUNTER — TRANSFERRED RECORDS (OUTPATIENT)
Dept: HEALTH INFORMATION MANAGEMENT | Facility: CLINIC | Age: 40
End: 2025-07-30
Payer: COMMERCIAL

## 2025-07-30 ENCOUNTER — TELEPHONE (OUTPATIENT)
Dept: UROLOGY | Facility: CLINIC | Age: 40
End: 2025-07-30
Payer: COMMERCIAL

## 2025-07-31 NOTE — TELEPHONE ENCOUNTER
Spoke with [facility] and gave them Provider name, Procedure name and date of procedure. Confirmed they had UA/UC orders.     Aimee Goldberg  RNCC Urology  Phone: [phone]

## 2025-08-05 ENCOUNTER — TELEPHONE (OUTPATIENT)
Dept: UROLOGY | Facility: CLINIC | Age: 40
End: 2025-08-05
Payer: COMMERCIAL

## 2025-08-06 ENCOUNTER — TELEPHONE (OUTPATIENT)
Dept: UROLOGY | Facility: CLINIC | Age: 40
End: 2025-08-06
Payer: COMMERCIAL

## 2025-08-06 DIAGNOSIS — R39.89 SUSPECTED UTI: Primary | ICD-10-CM

## 2025-08-06 RX ORDER — CIPROFLOXACIN 500 MG/1
500 TABLET, FILM COATED ORAL 2 TIMES DAILY
Qty: 6 TABLET | Refills: 0 | Status: SHIPPED | OUTPATIENT
Start: 2025-08-06 | End: 2025-08-09

## 2025-08-07 ENCOUNTER — TELEPHONE (OUTPATIENT)
Dept: UROLOGY | Facility: CLINIC | Age: 40
End: 2025-08-07
Payer: COMMERCIAL

## 2025-08-25 ENCOUNTER — DOCUMENTATION ONLY (OUTPATIENT)
Dept: UROLOGY | Facility: CLINIC | Age: 40
End: 2025-08-25
Payer: COMMERCIAL

## 2025-08-25 ENCOUNTER — MEDICAL CORRESPONDENCE (OUTPATIENT)
Dept: HEALTH INFORMATION MANAGEMENT | Facility: CLINIC | Age: 40
End: 2025-08-25
Payer: COMMERCIAL

## 2025-09-03 ENCOUNTER — TELEPHONE (OUTPATIENT)
Dept: UROLOGY | Facility: CLINIC | Age: 40
End: 2025-09-03
Payer: COMMERCIAL

## (undated) DEVICE — CATH TRAY FOLEY SURESTEP 16FR WDRAIN BAG STLK LATEX A300316A

## (undated) DEVICE — SU MONOCRYL 4-0 P-3 18" UND Y494G

## (undated) DEVICE — SUCTION VACUUM CANISTER STANDARD W/LID&CAPS 003987-901

## (undated) DEVICE — GLOVE PROTEXIS W/NEU-THERA 6.5  2D73TE65

## (undated) DEVICE — SOL NACL 0.9% IRRIG 1000ML BOTTLE 2F7124

## (undated) DEVICE — SUCTION MANIFOLD DORNOCH ULTRA CART UL-CL500

## (undated) DEVICE — Device

## (undated) DEVICE — SU PDS II 1 CTX 36" Z371T

## (undated) DEVICE — STPL SKIN 35W ROTATING HEAD PRW35

## (undated) DEVICE — PREP CHLORAPREP 26ML TINTED ORANGE  260815

## (undated) DEVICE — SU VICRYL 0 TIE 54" J608H

## (undated) DEVICE — PAD CHUX UNDERPAD 30X36" P3036C

## (undated) DEVICE — JELLY LUBRICATING SURGILUBE 2OZ TUBE 0281-0205-02

## (undated) DEVICE — SUCTION MANIFOLD NEPTUNE 2 SYS 4 PORT 0702-020-000

## (undated) DEVICE — SYR 50ML CATH TIP W/O NDL 309620

## (undated) DEVICE — TUBING SUCTION VACUUM COLLECTION 6FT 610

## (undated) DEVICE — PACK C-SECTION LF PL15OTA83B

## (undated) DEVICE — DRSG ABDOMINAL 07 1/2X8" 7197D

## (undated) DEVICE — GLOVE PROTEXIS BLUE W/NEU-THERA 6.5  2D73EB65

## (undated) DEVICE — LINEN ORTHO PACK 5446

## (undated) DEVICE — BARRIER SEPRAFILM 3X5" X6 SHEETS 5086-02

## (undated) DEVICE — GLOVE BIOGEL PI MICRO SZ 8.0 48580

## (undated) DEVICE — SU VICRYL 3-0 SH CR 8X18" J774

## (undated) DEVICE — LINEN GOWN X4 5410

## (undated) DEVICE — LINEN TOWEL PACK X5 5464

## (undated) DEVICE — SU VICRYL 3-0 CT-1 36" J344H

## (undated) DEVICE — DRSG TELFA ISLAND 4X8" 7541

## (undated) DEVICE — LINEN GOWN XLG 5407

## (undated) DEVICE — STRAP KNEE/BODY 31143004

## (undated) DEVICE — GOWN XLG DISP 9545

## (undated) DEVICE — SU PDS II 0 CTX 60" Z990G

## (undated) DEVICE — SU VICRYL 2-0 CT-1 27" J339H

## (undated) DEVICE — SUCTION CANNULA UTERINE 10MM CVD  21553

## (undated) DEVICE — SU MONOCRYL 4-0 PS-2 18" UND Y496G

## (undated) DEVICE — DRSG STERI STRIP 1/2X4" R1547

## (undated) DEVICE — SPONGE LAP 18X18" X8435

## (undated) DEVICE — DECANTER TRANSFER DEVICE 2008S

## (undated) DEVICE — SU MONOCRYL 3-0 PS-1 27" Y936H

## (undated) DEVICE — COVER CAMERA IN-LIGHT DISP LT-C02

## (undated) DEVICE — PACK CYSTO CUSTOM ASC

## (undated) DEVICE — SOL NACL 0.9% IRRIG 3000ML BAG 2B7477

## (undated) DEVICE — GLOVE PROTEXIS MICRO 6.5  2D73PM65

## (undated) DEVICE — ESU GROUND PAD UNIVERSAL W/O CORD

## (undated) DEVICE — GLOVE BIOGEL PI MICRO SZ 7.5 48575

## (undated) DEVICE — SOL WATER IRRIG 1000ML BOTTLE 2F7114

## (undated) DEVICE — GOWN REINFORCED XXLG 9071

## (undated) DEVICE — ESU PENCIL W/HOLSTER E2350H

## (undated) DEVICE — PREP POVIDONE IODINE SOLUTION 10% 4OZ BOTTLE 29906-004

## (undated) DEVICE — PREP POVIDONE-IODINE 7.5% SCRUB 4OZ BOTTLE MDS093945

## (undated) DEVICE — SPECIMEN TRAP VACUUM SUCTION SAFETOUCH 003853-902

## (undated) DEVICE — SU MONOCRYL 2-0 CT-2 36" Y762H

## (undated) DEVICE — SU VICRYL 0 CT-1 36" J346H

## (undated) DEVICE — CATH FOLEY 14FR 5ML SILICONE LUBRI-SIL 175814

## (undated) RX ORDER — CITRIC ACID/SODIUM CITRATE 334-500MG
SOLUTION, ORAL ORAL
Status: DISPENSED
Start: 2018-09-20

## (undated) RX ORDER — PHENYLEPHRINE HCL IN 0.9% NACL 1 MG/10 ML
SYRINGE (ML) INTRAVENOUS
Status: DISPENSED
Start: 2018-09-20

## (undated) RX ORDER — DOXYCYCLINE 100 MG/10ML
INJECTION, POWDER, LYOPHILIZED, FOR SOLUTION INTRAVENOUS
Status: DISPENSED
Start: 2019-11-27

## (undated) RX ORDER — FENTANYL CITRATE 50 UG/ML
INJECTION, SOLUTION INTRAMUSCULAR; INTRAVENOUS
Status: DISPENSED
Start: 2024-05-02

## (undated) RX ORDER — PROPOFOL 10 MG/ML
INJECTION, EMULSION INTRAVENOUS
Status: DISPENSED
Start: 2024-05-02

## (undated) RX ORDER — FENTANYL CITRATE 50 UG/ML
INJECTION, SOLUTION INTRAMUSCULAR; INTRAVENOUS
Status: DISPENSED
Start: 2018-09-20

## (undated) RX ORDER — SCOLOPAMINE TRANSDERMAL SYSTEM 1 MG/1
PATCH, EXTENDED RELEASE TRANSDERMAL
Status: DISPENSED
Start: 2024-05-02

## (undated) RX ORDER — CEFAZOLIN SODIUM 2 G/50ML
SOLUTION INTRAVENOUS
Status: DISPENSED
Start: 2024-05-02

## (undated) RX ORDER — LIDOCAINE HYDROCHLORIDE 10 MG/ML
INJECTION, SOLUTION EPIDURAL; INFILTRATION; INTRACAUDAL; PERINEURAL
Status: DISPENSED
Start: 2019-11-27

## (undated) RX ORDER — CEFAZOLIN SODIUM 2 G/100ML
INJECTION, SOLUTION INTRAVENOUS
Status: DISPENSED
Start: 2018-09-20

## (undated) RX ORDER — KETOROLAC TROMETHAMINE 30 MG/ML
INJECTION, SOLUTION INTRAMUSCULAR; INTRAVENOUS
Status: DISPENSED
Start: 2018-09-20

## (undated) RX ORDER — CEPHALEXIN 500 MG/1
CAPSULE ORAL
Status: DISPENSED
Start: 2024-01-17

## (undated) RX ORDER — FENTANYL CITRATE 50 UG/ML
INJECTION, SOLUTION INTRAMUSCULAR; INTRAVENOUS
Status: DISPENSED
Start: 2019-11-27

## (undated) RX ORDER — MORPHINE SULFATE 1 MG/ML
INJECTION, SOLUTION EPIDURAL; INTRATHECAL; INTRAVENOUS
Status: DISPENSED
Start: 2018-09-20